# Patient Record
Sex: MALE | Race: WHITE | NOT HISPANIC OR LATINO | Employment: OTHER | URBAN - METROPOLITAN AREA
[De-identification: names, ages, dates, MRNs, and addresses within clinical notes are randomized per-mention and may not be internally consistent; named-entity substitution may affect disease eponyms.]

---

## 2017-01-18 ENCOUNTER — ALLSCRIPTS OFFICE VISIT (OUTPATIENT)
Dept: OTHER | Facility: OTHER | Age: 67
End: 2017-01-18

## 2017-01-18 ENCOUNTER — HOSPITAL ENCOUNTER (OUTPATIENT)
Dept: RADIOLOGY | Facility: MEDICAL CENTER | Age: 67
Discharge: HOME | End: 2017-01-18
Payer: COMMERCIAL

## 2017-01-18 DIAGNOSIS — R06.02 SHORTNESS OF BREATH: ICD-10-CM

## 2017-01-18 DIAGNOSIS — Z11.59 ENCOUNTER FOR SCREENING FOR OTHER VIRAL DISEASES: ICD-10-CM

## 2017-01-18 DIAGNOSIS — Z12.5 ENCOUNTER FOR SCREENING FOR MALIGNANT NEOPLASM OF PROSTATE: ICD-10-CM

## 2017-01-18 DIAGNOSIS — Z12.2 ENCOUNTER FOR SCREENING FOR MALIGNANT NEOPLASM OF RESPIRATORY ORGANS: ICD-10-CM

## 2017-01-18 DIAGNOSIS — I10 ESSENTIAL (PRIMARY) HYPERTENSION: ICD-10-CM

## 2017-01-18 DIAGNOSIS — Z13.6 ENCOUNTER FOR SCREENING FOR CARDIOVASCULAR DISORDERS: ICD-10-CM

## 2017-01-18 PROCEDURE — 71020 HB CHEST X-RAY 2VW FRONTAL&LATL: CPT

## 2017-01-19 ENCOUNTER — APPOINTMENT (OUTPATIENT)
Dept: LAB | Facility: HOSPITAL | Age: 67
End: 2017-01-19
Payer: COMMERCIAL

## 2017-01-19 ENCOUNTER — TRANSCRIBE ORDERS (OUTPATIENT)
Dept: ADMINISTRATIVE | Facility: HOSPITAL | Age: 67
End: 2017-01-19

## 2017-01-19 ENCOUNTER — GENERIC CONVERSION - ENCOUNTER (OUTPATIENT)
Dept: OTHER | Facility: OTHER | Age: 67
End: 2017-01-19

## 2017-01-19 DIAGNOSIS — I10 ESSENTIAL (PRIMARY) HYPERTENSION: ICD-10-CM

## 2017-01-19 DIAGNOSIS — R06.81 APNEA: Primary | ICD-10-CM

## 2017-01-19 DIAGNOSIS — Z11.59 ENCOUNTER FOR SCREENING FOR OTHER VIRAL DISEASES: ICD-10-CM

## 2017-01-19 DIAGNOSIS — Z12.5 ENCOUNTER FOR SCREENING FOR MALIGNANT NEOPLASM OF PROSTATE: ICD-10-CM

## 2017-01-19 LAB
ALBUMIN SERPL BCP-MCNC: 3.8 G/DL (ref 3.5–5)
ALP SERPL-CCNC: 67 U/L (ref 46–116)
ALT SERPL W P-5'-P-CCNC: 28 U/L (ref 12–78)
ANION GAP SERPL CALCULATED.3IONS-SCNC: 3 MMOL/L (ref 4–13)
AST SERPL W P-5'-P-CCNC: 17 U/L (ref 5–45)
BASOPHILS # BLD AUTO: 0.05 THOUSANDS/ΜL (ref 0–0.1)
BASOPHILS NFR BLD AUTO: 1 % (ref 0–1)
BILIRUB SERPL-MCNC: 0.7 MG/DL (ref 0.2–1)
BUN SERPL-MCNC: 13 MG/DL (ref 5–25)
CALCIUM SERPL-MCNC: 9.1 MG/DL (ref 8.3–10.1)
CHLORIDE SERPL-SCNC: 95 MMOL/L (ref 100–108)
CHOLEST SERPL-MCNC: 211 MG/DL (ref 50–200)
CO2 SERPL-SCNC: 34 MMOL/L (ref 21–32)
CREAT SERPL-MCNC: 0.8 MG/DL (ref 0.6–1.3)
EOSINOPHIL # BLD AUTO: 0.2 THOUSAND/ΜL (ref 0–0.61)
EOSINOPHIL NFR BLD AUTO: 5 % (ref 0–6)
ERYTHROCYTE [DISTWIDTH] IN BLOOD BY AUTOMATED COUNT: 12.9 % (ref 11.6–15.1)
GFR SERPL CREATININE-BSD FRML MDRD: >60 ML/MIN/1.73SQ M
GLUCOSE SERPL-MCNC: 105 MG/DL (ref 65–140)
HCT VFR BLD AUTO: 53 % (ref 36.5–49.3)
HDLC SERPL-MCNC: 67 MG/DL (ref 40–60)
HGB BLD-MCNC: 17.8 G/DL (ref 12–17)
LDLC SERPL CALC-MCNC: 134 MG/DL (ref 0–100)
LYMPHOCYTES # BLD AUTO: 0.88 THOUSANDS/ΜL (ref 0.6–4.47)
LYMPHOCYTES NFR BLD AUTO: 20 % (ref 14–44)
MCH RBC QN AUTO: 32.1 PG (ref 26.8–34.3)
MCHC RBC AUTO-ENTMCNC: 33.6 G/DL (ref 31.4–37.4)
MCV RBC AUTO: 96 FL (ref 82–98)
MONOCYTES # BLD AUTO: 0.5 THOUSAND/ΜL (ref 0.17–1.22)
MONOCYTES NFR BLD AUTO: 11 % (ref 4–12)
NEUTROPHILS # BLD AUTO: 2.74 THOUSANDS/ΜL (ref 1.85–7.62)
NEUTS SEG NFR BLD AUTO: 63 % (ref 43–75)
NRBC BLD AUTO-RTO: 0 /100 WBCS
PLATELET # BLD AUTO: 182 THOUSANDS/UL (ref 149–390)
PMV BLD AUTO: 9.6 FL (ref 8.9–12.7)
POTASSIUM SERPL-SCNC: 4.5 MMOL/L (ref 3.5–5.3)
PROT SERPL-MCNC: 7.6 G/DL (ref 6.4–8.2)
PSA SERPL-MCNC: 0.6 NG/ML (ref 0–4)
RBC # BLD AUTO: 5.54 MILLION/UL (ref 3.88–5.62)
SODIUM SERPL-SCNC: 132 MMOL/L (ref 136–145)
TRIGL SERPL-MCNC: 49 MG/DL
TSH SERPL DL<=0.05 MIU/L-ACNC: 1.7 UIU/ML (ref 0.36–3.74)
WBC # BLD AUTO: 4.38 THOUSAND/UL (ref 4.31–10.16)

## 2017-01-19 PROCEDURE — 80053 COMPREHEN METABOLIC PANEL: CPT

## 2017-01-19 PROCEDURE — 80061 LIPID PANEL: CPT

## 2017-01-19 PROCEDURE — 85025 COMPLETE CBC W/AUTO DIFF WBC: CPT

## 2017-01-19 PROCEDURE — G0103 PSA SCREENING: HCPCS

## 2017-01-19 PROCEDURE — 36415 COLL VENOUS BLD VENIPUNCTURE: CPT

## 2017-01-19 PROCEDURE — 86803 HEPATITIS C AB TEST: CPT

## 2017-01-19 PROCEDURE — 84443 ASSAY THYROID STIM HORMONE: CPT

## 2017-01-20 LAB — HCV AB SER QL: NORMAL

## 2017-01-26 ENCOUNTER — HOSPITAL ENCOUNTER (OUTPATIENT)
Dept: CT IMAGING | Facility: CLINIC | Age: 67
Discharge: HOME | End: 2017-01-26
Payer: COMMERCIAL

## 2017-01-26 DIAGNOSIS — Z12.2 ENCOUNTER FOR SCREENING FOR MALIGNANT NEOPLASM OF RESPIRATORY ORGANS: ICD-10-CM

## 2017-01-30 ENCOUNTER — HOSPITAL ENCOUNTER (OUTPATIENT)
Dept: NON INVASIVE DIAGNOSTICS | Facility: CLINIC | Age: 67
Discharge: HOME | End: 2017-01-30
Payer: COMMERCIAL

## 2017-01-30 DIAGNOSIS — R06.02 SHORTNESS OF BREATH: ICD-10-CM

## 2017-01-30 PROCEDURE — 93303 ECHO TRANSTHORACIC: CPT

## 2017-01-31 ENCOUNTER — HOSPITAL ENCOUNTER (OUTPATIENT)
Dept: ULTRASOUND IMAGING | Facility: CLINIC | Age: 67
Discharge: HOME | End: 2017-01-31
Payer: COMMERCIAL

## 2017-01-31 DIAGNOSIS — Z13.6 ENCOUNTER FOR SCREENING FOR CARDIOVASCULAR DISORDERS: ICD-10-CM

## 2017-01-31 PROCEDURE — 76775 US EXAM ABDO BACK WALL LIM: CPT

## 2017-02-02 ENCOUNTER — ALLSCRIPTS OFFICE VISIT (OUTPATIENT)
Dept: OTHER | Facility: OTHER | Age: 67
End: 2017-02-02

## 2017-02-15 ENCOUNTER — GENERIC CONVERSION - ENCOUNTER (OUTPATIENT)
Dept: OTHER | Facility: OTHER | Age: 67
End: 2017-02-15

## 2017-03-03 ENCOUNTER — ALLSCRIPTS OFFICE VISIT (OUTPATIENT)
Dept: OTHER | Facility: OTHER | Age: 67
End: 2017-03-03

## 2017-03-10 ENCOUNTER — TRANSCRIBE ORDERS (OUTPATIENT)
Dept: SLEEP CENTER | Facility: CLINIC | Age: 67
End: 2017-03-10

## 2017-03-10 ENCOUNTER — HOSPITAL ENCOUNTER (OUTPATIENT)
Dept: SLEEP CENTER | Facility: CLINIC | Age: 67
Discharge: HOME | End: 2017-03-10
Payer: COMMERCIAL

## 2017-03-10 DIAGNOSIS — G47.33 OSA (OBSTRUCTIVE SLEEP APNEA): Primary | ICD-10-CM

## 2017-03-10 DIAGNOSIS — R06.81 APNEA: ICD-10-CM

## 2017-03-16 ENCOUNTER — ALLSCRIPTS OFFICE VISIT (OUTPATIENT)
Dept: OTHER | Facility: OTHER | Age: 67
End: 2017-03-16

## 2017-03-17 ENCOUNTER — TRANSCRIBE ORDERS (OUTPATIENT)
Dept: ADMINISTRATIVE | Facility: HOSPITAL | Age: 67
End: 2017-03-17

## 2017-03-17 DIAGNOSIS — Z72.0 TOBACCO ABUSE: Primary | ICD-10-CM

## 2017-03-27 ENCOUNTER — HOSPITAL ENCOUNTER (OUTPATIENT)
Dept: NON INVASIVE DIAGNOSTICS | Facility: CLINIC | Age: 67
Discharge: HOME | End: 2017-03-27
Payer: COMMERCIAL

## 2017-03-27 DIAGNOSIS — R94.31 ABNORMAL EKG: ICD-10-CM

## 2017-03-27 LAB
MAX DIASTOLIC BP: 84 MMHG
MAX HEART RATE: 88 BPM
MAX PREDICTED HEART RATE: 155 BPM
MAX. SYSTOLIC BP: 114 MMHG
PROTOCOL NAME: NORMAL
REASON FOR TERMINATION: NORMAL
TARGET HR FORMULA: NORMAL
TEST INDICATION: NORMAL
TIME IN EXERCISE PHASE: 180 S

## 2017-03-27 PROCEDURE — A9502 TC99M TETROFOSMIN: HCPCS

## 2017-03-27 PROCEDURE — 78452 HT MUSCLE IMAGE SPECT MULT: CPT

## 2017-03-27 PROCEDURE — 93017 CV STRESS TEST TRACING ONLY: CPT

## 2017-03-29 ENCOUNTER — ALLSCRIPTS OFFICE VISIT (OUTPATIENT)
Dept: OTHER | Facility: OTHER | Age: 67
End: 2017-03-29

## 2017-04-03 ENCOUNTER — ALLSCRIPTS OFFICE VISIT (OUTPATIENT)
Dept: OTHER | Facility: OTHER | Age: 67
End: 2017-04-03

## 2017-04-11 ENCOUNTER — ALLSCRIPTS OFFICE VISIT (OUTPATIENT)
Dept: OTHER | Facility: OTHER | Age: 67
End: 2017-04-11

## 2017-04-26 ENCOUNTER — TELEPHONE (OUTPATIENT)
Dept: INPATIENT UNIT | Facility: HOSPITAL | Age: 67
End: 2017-04-26

## 2017-04-26 PROBLEM — R00.2 PALPITATIONS: Status: ACTIVE | Noted: 2017-04-26

## 2017-04-27 ENCOUNTER — HOSPITAL ENCOUNTER (OUTPATIENT)
Dept: NON INVASIVE DIAGNOSTICS | Facility: HOSPITAL | Age: 67
Discharge: HOME | End: 2017-04-27
Attending: INTERNAL MEDICINE | Admitting: INTERNAL MEDICINE
Payer: COMMERCIAL

## 2017-04-27 VITALS
SYSTOLIC BLOOD PRESSURE: 128 MMHG | OXYGEN SATURATION: 95 % | BODY MASS INDEX: 34.46 KG/M2 | TEMPERATURE: 98 F | HEIGHT: 73 IN | DIASTOLIC BLOOD PRESSURE: 67 MMHG | HEART RATE: 70 BPM | WEIGHT: 260 LBS | RESPIRATION RATE: 18 BRPM

## 2017-04-27 DIAGNOSIS — R94.39 ABNORMAL STRESS TEST: ICD-10-CM

## 2017-04-27 LAB
ANION GAP SERPL CALCULATED.3IONS-SCNC: 7 MMOL/L (ref 4–13)
ATRIAL RATE: 72 BPM
BUN SERPL-MCNC: 16 MG/DL (ref 5–25)
CALCIUM SERPL-MCNC: 9.1 MG/DL (ref 8.3–10.1)
CHLORIDE SERPL-SCNC: 95 MMOL/L (ref 100–108)
CHOLEST SERPL-MCNC: 176 MG/DL (ref 50–200)
CO2 SERPL-SCNC: 29 MMOL/L (ref 21–32)
CREAT SERPL-MCNC: 0.8 MG/DL (ref 0.6–1.3)
ERYTHROCYTE [DISTWIDTH] IN BLOOD BY AUTOMATED COUNT: 13.1 % (ref 11.6–15.1)
GFR SERPL CREATININE-BSD FRML MDRD: >60 ML/MIN/1.73SQ M
GLUCOSE P FAST SERPL-MCNC: 102 MG/DL (ref 65–99)
GLUCOSE SERPL-MCNC: 102 MG/DL (ref 65–140)
HCT VFR BLD AUTO: 46.5 % (ref 36.5–49.3)
HDLC SERPL-MCNC: 49 MG/DL (ref 40–60)
HGB BLD-MCNC: 16.3 G/DL (ref 12–17)
INR PPP: 0.97 (ref 0.86–1.16)
LDLC SERPL CALC-MCNC: 115 MG/DL (ref 0–100)
MAGNESIUM SERPL-MCNC: 2.1 MG/DL (ref 1.6–2.6)
MCH RBC QN AUTO: 33 PG (ref 26.8–34.3)
MCHC RBC AUTO-ENTMCNC: 35.1 G/DL (ref 31.4–37.4)
MCV RBC AUTO: 94 FL (ref 82–98)
P AXIS: -11 DEGREES
PLATELET # BLD AUTO: 184 THOUSANDS/UL (ref 149–390)
PMV BLD AUTO: 9.7 FL (ref 8.9–12.7)
POTASSIUM SERPL-SCNC: 4.4 MMOL/L (ref 3.5–5.3)
PR INTERVAL: 188 MS
PROTHROMBIN TIME: 13 SECONDS (ref 12–14.3)
QRS AXIS: 47 DEGREES
QRSD INTERVAL: 98 MS
QT INTERVAL: 394 MS
QTC INTERVAL: 431 MS
RBC # BLD AUTO: 4.94 MILLION/UL (ref 3.88–5.62)
SODIUM SERPL-SCNC: 131 MMOL/L (ref 136–145)
T WAVE AXIS: 69 DEGREES
TRIGL SERPL-MCNC: 62 MG/DL
VENTRICULAR RATE: 72 BPM
WBC # BLD AUTO: 5.77 THOUSAND/UL (ref 4.31–10.16)

## 2017-04-27 PROCEDURE — C1769 GUIDE WIRE: HCPCS | Performed by: INTERNAL MEDICINE

## 2017-04-27 PROCEDURE — 85027 COMPLETE CBC AUTOMATED: CPT | Performed by: INTERNAL MEDICINE

## 2017-04-27 PROCEDURE — 93458 L HRT ARTERY/VENTRICLE ANGIO: CPT | Performed by: INTERNAL MEDICINE

## 2017-04-27 PROCEDURE — 83735 ASSAY OF MAGNESIUM: CPT | Performed by: INTERNAL MEDICINE

## 2017-04-27 PROCEDURE — 99153 MOD SED SAME PHYS/QHP EA: CPT | Performed by: INTERNAL MEDICINE

## 2017-04-27 PROCEDURE — 85610 PROTHROMBIN TIME: CPT | Performed by: INTERNAL MEDICINE

## 2017-04-27 PROCEDURE — 93005 ELECTROCARDIOGRAM TRACING: CPT | Performed by: INTERNAL MEDICINE

## 2017-04-27 PROCEDURE — C1894 INTRO/SHEATH, NON-LASER: HCPCS | Performed by: INTERNAL MEDICINE

## 2017-04-27 PROCEDURE — 80061 LIPID PANEL: CPT | Performed by: INTERNAL MEDICINE

## 2017-04-27 PROCEDURE — A9270 NON-COVERED ITEM OR SERVICE: HCPCS | Performed by: INTERNAL MEDICINE

## 2017-04-27 PROCEDURE — 99152 MOD SED SAME PHYS/QHP 5/>YRS: CPT | Performed by: INTERNAL MEDICINE

## 2017-04-27 PROCEDURE — 80048 BASIC METABOLIC PNL TOTAL CA: CPT | Performed by: INTERNAL MEDICINE

## 2017-04-27 RX ORDER — AMLODIPINE BESYLATE 5 MG/1
5 TABLET ORAL DAILY
Status: CANCELLED | OUTPATIENT
Start: 2017-04-27

## 2017-04-27 RX ORDER — LIDOCAINE HYDROCHLORIDE 10 MG/ML
INJECTION, SOLUTION INFILTRATION; PERINEURAL CODE/TRAUMA/SEDATION MEDICATION
Status: COMPLETED | OUTPATIENT
Start: 2017-04-27 | End: 2017-04-27

## 2017-04-27 RX ORDER — MIDAZOLAM HYDROCHLORIDE 1 MG/ML
INJECTION INTRAMUSCULAR; INTRAVENOUS CODE/TRAUMA/SEDATION MEDICATION
Status: COMPLETED | OUTPATIENT
Start: 2017-04-27 | End: 2017-04-27

## 2017-04-27 RX ORDER — HEPARIN SODIUM 1000 [USP'U]/ML
INJECTION, SOLUTION INTRAVENOUS; SUBCUTANEOUS CODE/TRAUMA/SEDATION MEDICATION
Status: COMPLETED | OUTPATIENT
Start: 2017-04-27 | End: 2017-04-27

## 2017-04-27 RX ORDER — FENTANYL CITRATE 50 UG/ML
INJECTION, SOLUTION INTRAMUSCULAR; INTRAVENOUS CODE/TRAUMA/SEDATION MEDICATION
Status: COMPLETED | OUTPATIENT
Start: 2017-04-27 | End: 2017-04-27

## 2017-04-27 RX ORDER — ASPIRIN 81 MG/1
324 TABLET, CHEWABLE ORAL ONCE
Status: COMPLETED | OUTPATIENT
Start: 2017-04-27 | End: 2017-04-27

## 2017-04-27 RX ORDER — ASPIRIN 81 MG/1
81 TABLET, CHEWABLE ORAL DAILY
COMMUNITY
End: 2018-01-30 | Stop reason: DRUGHIGH

## 2017-04-27 RX ORDER — LISINOPRIL 10 MG/1
10 TABLET ORAL DAILY
COMMUNITY
End: 2018-08-10 | Stop reason: SDUPTHER

## 2017-04-27 RX ORDER — CLOPIDOGREL BISULFATE 75 MG/1
600 TABLET ORAL ONCE
Status: COMPLETED | OUTPATIENT
Start: 2017-04-27 | End: 2017-04-27

## 2017-04-27 RX ORDER — VERAPAMIL HCL 2.5 MG/ML
AMPUL (ML) INTRAVENOUS CODE/TRAUMA/SEDATION MEDICATION
Status: COMPLETED | OUTPATIENT
Start: 2017-04-27 | End: 2017-04-27

## 2017-04-27 RX ORDER — SODIUM CHLORIDE 9 MG/ML
100 INJECTION, SOLUTION INTRAVENOUS CONTINUOUS
Status: DISCONTINUED | OUTPATIENT
Start: 2017-04-27 | End: 2017-04-27 | Stop reason: HOSPADM

## 2017-04-27 RX ORDER — ALBUTEROL SULFATE 90 UG/1
2 AEROSOL, METERED RESPIRATORY (INHALATION) EVERY 4 HOURS PRN
Status: CANCELLED | OUTPATIENT
Start: 2017-04-27

## 2017-04-27 RX ORDER — SODIUM CHLORIDE 9 MG/ML
125 INJECTION, SOLUTION INTRAVENOUS CONTINUOUS
Status: DISCONTINUED | OUTPATIENT
Start: 2017-04-27 | End: 2017-04-27

## 2017-04-27 RX ORDER — NITROGLYCERIN 20 MG/100ML
INJECTION INTRAVENOUS CODE/TRAUMA/SEDATION MEDICATION
Status: COMPLETED | OUTPATIENT
Start: 2017-04-27 | End: 2017-04-27

## 2017-04-27 RX ORDER — AMLODIPINE BESYLATE 5 MG/1
5 TABLET ORAL DAILY
COMMUNITY
End: 2018-02-26 | Stop reason: SDUPTHER

## 2017-04-27 RX ORDER — LISINOPRIL 20 MG/1
20 TABLET ORAL DAILY
Status: CANCELLED | OUTPATIENT
Start: 2017-04-27

## 2017-04-27 RX ADMIN — ASPIRIN 324 MG: 81 TABLET, CHEWABLE ORAL at 09:07

## 2017-04-27 RX ADMIN — CLOPIDOGREL 600 MG: 75 TABLET, FILM COATED ORAL at 09:14

## 2017-04-27 RX ADMIN — FENTANYL CITRATE 50 MCG: 50 INJECTION, SOLUTION INTRAMUSCULAR; INTRAVENOUS at 12:14

## 2017-04-27 RX ADMIN — VERAPAMIL HYDROCHLORIDE 1.25 MG: 2.5 INJECTION, SOLUTION INTRAVENOUS at 12:24

## 2017-04-27 RX ADMIN — LIDOCAINE HYDROCHLORIDE 1 ML: 10 INJECTION, SOLUTION INFILTRATION; PERINEURAL at 12:21

## 2017-04-27 RX ADMIN — HEPARIN SODIUM 4000 UNITS: 1000 INJECTION INTRAVENOUS; SUBCUTANEOUS at 12:24

## 2017-04-27 RX ADMIN — MIDAZOLAM HYDROCHLORIDE 2 MG: 1 INJECTION, SOLUTION INTRAMUSCULAR; INTRAVENOUS at 12:14

## 2017-04-27 RX ADMIN — SODIUM CHLORIDE 125 ML/HR: 0.9 INJECTION, SOLUTION INTRAVENOUS at 09:07

## 2017-04-27 RX ADMIN — NITROGLYCERIN 200 MCG: 20 INJECTION INTRAVENOUS at 12:23

## 2017-05-03 ENCOUNTER — ALLSCRIPTS OFFICE VISIT (OUTPATIENT)
Dept: OTHER | Facility: OTHER | Age: 67
End: 2017-05-03

## 2017-05-08 ENCOUNTER — ANESTHESIA EVENT (OUTPATIENT)
Dept: GASTROENTEROLOGY | Facility: HOSPITAL | Age: 67
End: 2017-05-08
Payer: COMMERCIAL

## 2017-05-09 ENCOUNTER — GENERIC CONVERSION - ENCOUNTER (OUTPATIENT)
Dept: OTHER | Facility: OTHER | Age: 67
End: 2017-05-09

## 2017-05-09 ENCOUNTER — HOSPITAL ENCOUNTER (OUTPATIENT)
Facility: HOSPITAL | Age: 67
Setting detail: OUTPATIENT SURGERY
Discharge: HOME | End: 2017-05-09
Attending: INTERNAL MEDICINE | Admitting: INTERNAL MEDICINE
Payer: COMMERCIAL

## 2017-05-09 ENCOUNTER — ANESTHESIA (OUTPATIENT)
Dept: GASTROENTEROLOGY | Facility: HOSPITAL | Age: 67
End: 2017-05-09
Payer: COMMERCIAL

## 2017-05-09 VITALS
OXYGEN SATURATION: 97 % | HEIGHT: 73 IN | RESPIRATION RATE: 20 BRPM | BODY MASS INDEX: 35.06 KG/M2 | SYSTOLIC BLOOD PRESSURE: 111 MMHG | WEIGHT: 264.55 LBS | TEMPERATURE: 97.2 F | HEART RATE: 69 BPM | DIASTOLIC BLOOD PRESSURE: 59 MMHG

## 2017-05-09 DIAGNOSIS — K44.9 HIATAL HERNIA: ICD-10-CM

## 2017-05-09 DIAGNOSIS — Z12.11 ENCOUNTER FOR SCREENING FOR MALIGNANT NEOPLASM OF COLON: ICD-10-CM

## 2017-05-09 PROCEDURE — 88313 SPECIAL STAINS GROUP 2: CPT | Performed by: INTERNAL MEDICINE

## 2017-05-09 PROCEDURE — 88342 IMHCHEM/IMCYTCHM 1ST ANTB: CPT | Performed by: INTERNAL MEDICINE

## 2017-05-09 PROCEDURE — 88305 TISSUE EXAM BY PATHOLOGIST: CPT | Performed by: INTERNAL MEDICINE

## 2017-05-09 RX ORDER — SODIUM CHLORIDE, SODIUM LACTATE, POTASSIUM CHLORIDE, CALCIUM CHLORIDE 600; 310; 30; 20 MG/100ML; MG/100ML; MG/100ML; MG/100ML
100 INJECTION, SOLUTION INTRAVENOUS CONTINUOUS
Status: DISCONTINUED | OUTPATIENT
Start: 2017-05-09 | End: 2017-05-09 | Stop reason: HOSPADM

## 2017-05-09 RX ORDER — HYDROCHLOROTHIAZIDE 12.5 MG/1
12.5 TABLET ORAL DAILY
COMMUNITY
End: 2018-02-22

## 2017-05-09 RX ORDER — KETAMINE HYDROCHLORIDE 50 MG/ML
INJECTION, SOLUTION, CONCENTRATE INTRAMUSCULAR; INTRAVENOUS AS NEEDED
Status: DISCONTINUED | OUTPATIENT
Start: 2017-05-09 | End: 2017-05-09 | Stop reason: SURG

## 2017-05-09 RX ORDER — PROPOFOL 10 MG/ML
INJECTION, EMULSION INTRAVENOUS AS NEEDED
Status: DISCONTINUED | OUTPATIENT
Start: 2017-05-09 | End: 2017-05-09 | Stop reason: SURG

## 2017-05-09 RX ORDER — PANTOPRAZOLE SODIUM 40 MG/1
40 TABLET, DELAYED RELEASE ORAL
Status: DISCONTINUED | OUTPATIENT
Start: 2017-05-09 | End: 2017-05-09 | Stop reason: HOSPADM

## 2017-05-09 RX ORDER — ATORVASTATIN CALCIUM 20 MG/1
20 TABLET, FILM COATED ORAL DAILY
COMMUNITY
End: 2018-08-13 | Stop reason: SDUPTHER

## 2017-05-09 RX ADMIN — METOPROLOL TARTRATE 2.5 MG: 5 INJECTION, SOLUTION INTRAVENOUS at 09:13

## 2017-05-09 RX ADMIN — KETAMINE HYDROCHLORIDE 30 MG: 50 INJECTION INTRAMUSCULAR; INTRAVENOUS at 09:07

## 2017-05-09 RX ADMIN — PROPOFOL 20 MG: 10 INJECTION, EMULSION INTRAVENOUS at 09:09

## 2017-05-09 RX ADMIN — KETAMINE HYDROCHLORIDE 40 MG: 50 INJECTION INTRAMUSCULAR; INTRAVENOUS at 09:02

## 2017-05-09 RX ADMIN — PROPOFOL 100 MG: 10 INJECTION, EMULSION INTRAVENOUS at 09:02

## 2017-05-09 RX ADMIN — METOPROLOL TARTRATE 2.5 MG: 5 INJECTION, SOLUTION INTRAVENOUS at 09:05

## 2017-05-09 RX ADMIN — SODIUM CHLORIDE, SODIUM LACTATE, POTASSIUM CHLORIDE, AND CALCIUM CHLORIDE 100 ML/HR: .6; .31; .03; .02 INJECTION, SOLUTION INTRAVENOUS at 08:21

## 2017-05-09 RX ADMIN — PROPOFOL 30 MG: 10 INJECTION, EMULSION INTRAVENOUS at 09:16

## 2017-05-09 RX ADMIN — KETAMINE HYDROCHLORIDE 30 MG: 50 INJECTION INTRAMUSCULAR; INTRAVENOUS at 09:14

## 2017-05-09 RX ADMIN — PROPOFOL 30 MG: 10 INJECTION, EMULSION INTRAVENOUS at 09:12

## 2017-05-15 ENCOUNTER — GENERIC CONVERSION - ENCOUNTER (OUTPATIENT)
Dept: OTHER | Facility: OTHER | Age: 67
End: 2017-05-15

## 2017-05-17 ENCOUNTER — ALLSCRIPTS OFFICE VISIT (OUTPATIENT)
Dept: OTHER | Facility: OTHER | Age: 67
End: 2017-05-17

## 2017-05-17 ENCOUNTER — HOSPITAL ENCOUNTER (OUTPATIENT)
Dept: RADIOLOGY | Facility: MEDICAL CENTER | Age: 67
Discharge: HOME | End: 2017-05-17
Payer: COMMERCIAL

## 2017-05-17 DIAGNOSIS — I10 ESSENTIAL (PRIMARY) HYPERTENSION: ICD-10-CM

## 2017-05-17 DIAGNOSIS — M25.512 PAIN IN LEFT SHOULDER: ICD-10-CM

## 2017-05-17 DIAGNOSIS — J44.9 CHRONIC OBSTRUCTIVE PULMONARY DISEASE (HCC): ICD-10-CM

## 2017-05-17 DIAGNOSIS — M25.511 PAIN IN RIGHT SHOULDER: ICD-10-CM

## 2017-05-17 DIAGNOSIS — M25.619 STIFFNESS OF UNSPECIFIED SHOULDER, NOT ELSEWHERE CLASSIFIED: ICD-10-CM

## 2017-05-17 PROCEDURE — 73030 X-RAY EXAM OF SHOULDER: CPT

## 2017-05-19 ENCOUNTER — GENERIC CONVERSION - ENCOUNTER (OUTPATIENT)
Dept: OTHER | Facility: OTHER | Age: 67
End: 2017-05-19

## 2017-05-24 ENCOUNTER — APPOINTMENT (OUTPATIENT)
Dept: LAB | Facility: MEDICAL CENTER | Age: 67
End: 2017-05-24
Payer: COMMERCIAL

## 2017-05-24 DIAGNOSIS — E78.5 HYPERLIPIDEMIA: ICD-10-CM

## 2017-05-24 DIAGNOSIS — J44.9 CHRONIC OBSTRUCTIVE PULMONARY DISEASE (HCC): ICD-10-CM

## 2017-05-24 DIAGNOSIS — M25.619 STIFFNESS OF UNSPECIFIED SHOULDER, NOT ELSEWHERE CLASSIFIED: ICD-10-CM

## 2017-05-24 DIAGNOSIS — I10 ESSENTIAL (PRIMARY) HYPERTENSION: ICD-10-CM

## 2017-05-24 DIAGNOSIS — M25.512 PAIN IN LEFT SHOULDER: ICD-10-CM

## 2017-05-24 DIAGNOSIS — E87.1 HYPO-OSMOLALITY AND HYPONATREMIA: ICD-10-CM

## 2017-05-24 DIAGNOSIS — M25.511 PAIN IN RIGHT SHOULDER: ICD-10-CM

## 2017-05-24 LAB
ALBUMIN SERPL BCP-MCNC: 3.7 G/DL (ref 3.5–5)
ALP SERPL-CCNC: 67 U/L (ref 46–116)
ALT SERPL W P-5'-P-CCNC: 30 U/L (ref 12–78)
ANION GAP SERPL CALCULATED.3IONS-SCNC: 4 MMOL/L (ref 4–13)
AST SERPL W P-5'-P-CCNC: 24 U/L (ref 5–45)
BASOPHILS # BLD AUTO: 0.05 THOUSANDS/ΜL (ref 0–0.1)
BASOPHILS NFR BLD AUTO: 1 % (ref 0–1)
BILIRUB DIRECT SERPL-MCNC: 0.29 MG/DL (ref 0–0.2)
BILIRUB SERPL-MCNC: 0.89 MG/DL (ref 0.2–1)
BUN SERPL-MCNC: 9 MG/DL (ref 5–25)
CALCIUM SERPL-MCNC: 9.6 MG/DL (ref 8.3–10.1)
CHLORIDE SERPL-SCNC: 87 MMOL/L (ref 100–108)
CHOLEST SERPL-MCNC: 101 MG/DL (ref 50–200)
CO2 SERPL-SCNC: 33 MMOL/L (ref 21–32)
CREAT SERPL-MCNC: 0.8 MG/DL (ref 0.6–1.3)
EOSINOPHIL # BLD AUTO: 0.21 THOUSAND/ΜL (ref 0–0.61)
EOSINOPHIL NFR BLD AUTO: 4 % (ref 0–6)
ERYTHROCYTE [DISTWIDTH] IN BLOOD BY AUTOMATED COUNT: 13.4 % (ref 11.6–15.1)
GFR SERPL CREATININE-BSD FRML MDRD: >60 ML/MIN/1.73SQ M
GLUCOSE P FAST SERPL-MCNC: 96 MG/DL (ref 65–99)
HCT VFR BLD AUTO: 46.6 % (ref 36.5–49.3)
HDLC SERPL-MCNC: 38 MG/DL (ref 40–60)
HGB BLD-MCNC: 16.1 G/DL (ref 12–17)
LDLC SERPL CALC-MCNC: 52 MG/DL (ref 0–100)
LYMPHOCYTES # BLD AUTO: 0.81 THOUSANDS/ΜL (ref 0.6–4.47)
LYMPHOCYTES NFR BLD AUTO: 13 % (ref 14–44)
MCH RBC QN AUTO: 32.9 PG (ref 26.8–34.3)
MCHC RBC AUTO-ENTMCNC: 34.5 G/DL (ref 31.4–37.4)
MCV RBC AUTO: 95 FL (ref 82–98)
MONOCYTES # BLD AUTO: 0.79 THOUSAND/ΜL (ref 0.17–1.22)
MONOCYTES NFR BLD AUTO: 13 % (ref 4–12)
NEUTROPHILS # BLD AUTO: 4.16 THOUSANDS/ΜL (ref 1.85–7.62)
NEUTS SEG NFR BLD AUTO: 69 % (ref 43–75)
NRBC BLD AUTO-RTO: 0 /100 WBCS
PLATELET # BLD AUTO: 209 THOUSANDS/UL (ref 149–390)
PMV BLD AUTO: 9.7 FL (ref 8.9–12.7)
POTASSIUM SERPL-SCNC: 4.4 MMOL/L (ref 3.5–5.3)
PROT SERPL-MCNC: 7.5 G/DL (ref 6.4–8.2)
RBC # BLD AUTO: 4.9 MILLION/UL (ref 3.88–5.62)
SODIUM SERPL-SCNC: 124 MMOL/L (ref 136–145)
TRIGL SERPL-MCNC: 56 MG/DL
WBC # BLD AUTO: 6.03 THOUSAND/UL (ref 4.31–10.16)

## 2017-05-24 PROCEDURE — 80048 BASIC METABOLIC PNL TOTAL CA: CPT

## 2017-05-24 PROCEDURE — 80076 HEPATIC FUNCTION PANEL: CPT

## 2017-05-24 PROCEDURE — 85025 COMPLETE CBC W/AUTO DIFF WBC: CPT

## 2017-05-24 PROCEDURE — 80061 LIPID PANEL: CPT

## 2017-05-24 PROCEDURE — 36415 COLL VENOUS BLD VENIPUNCTURE: CPT

## 2017-05-25 ENCOUNTER — GENERIC CONVERSION - ENCOUNTER (OUTPATIENT)
Dept: OTHER | Facility: OTHER | Age: 67
End: 2017-05-25

## 2017-05-26 ENCOUNTER — HOSPITAL ENCOUNTER (EMERGENCY)
Facility: HOSPITAL | Age: 67
Discharge: LEFT BEFORE DISCHARGE | End: 2017-05-26
Attending: EMERGENCY MEDICINE
Payer: COMMERCIAL

## 2017-05-26 ENCOUNTER — APPOINTMENT (EMERGENCY)
Dept: RADIOLOGY | Facility: HOSPITAL | Age: 67
End: 2017-05-26
Payer: COMMERCIAL

## 2017-05-26 VITALS
HEART RATE: 80 BPM | WEIGHT: 262.35 LBS | SYSTOLIC BLOOD PRESSURE: 106 MMHG | DIASTOLIC BLOOD PRESSURE: 59 MMHG | OXYGEN SATURATION: 95 % | RESPIRATION RATE: 20 BRPM | BODY MASS INDEX: 34.77 KG/M2 | HEIGHT: 73 IN | TEMPERATURE: 97.8 F

## 2017-05-26 DIAGNOSIS — E87.1 HYPONATREMIA: ICD-10-CM

## 2017-05-26 DIAGNOSIS — J44.1 COPD EXACERBATION (HCC): ICD-10-CM

## 2017-05-26 DIAGNOSIS — J18.9 PNEUMONIA: Primary | ICD-10-CM

## 2017-05-26 LAB
ANION GAP SERPL CALCULATED.3IONS-SCNC: 8 MMOL/L (ref 4–13)
BASOPHILS # BLD AUTO: 0.02 THOUSANDS/ΜL (ref 0–0.1)
BASOPHILS NFR BLD AUTO: 0 % (ref 0–1)
BUN SERPL-MCNC: 11 MG/DL (ref 5–25)
CALCIUM SERPL-MCNC: 9.9 MG/DL (ref 8.3–10.1)
CHLORIDE SERPL-SCNC: 84 MMOL/L (ref 100–108)
CO2 SERPL-SCNC: 31 MMOL/L (ref 21–32)
CREAT SERPL-MCNC: 0.78 MG/DL (ref 0.6–1.3)
EOSINOPHIL # BLD AUTO: 0.09 THOUSAND/ΜL (ref 0–0.61)
EOSINOPHIL NFR BLD AUTO: 1 % (ref 0–6)
ERYTHROCYTE [DISTWIDTH] IN BLOOD BY AUTOMATED COUNT: 12.6 % (ref 11.6–15.1)
GFR SERPL CREATININE-BSD FRML MDRD: >60 ML/MIN/1.73SQ M
GLUCOSE SERPL-MCNC: 99 MG/DL (ref 65–140)
HCT VFR BLD AUTO: 47.3 % (ref 36.5–49.3)
HGB BLD-MCNC: 16.6 G/DL (ref 12–17)
LACTATE SERPL-SCNC: 1.5 MMOL/L (ref 0.5–2)
LYMPHOCYTES # BLD AUTO: 0.93 THOUSANDS/ΜL (ref 0.6–4.47)
LYMPHOCYTES NFR BLD AUTO: 11 % (ref 14–44)
MAGNESIUM SERPL-MCNC: 1.8 MG/DL (ref 1.6–2.6)
MCH RBC QN AUTO: 31.9 PG (ref 26.8–34.3)
MCHC RBC AUTO-ENTMCNC: 35.1 G/DL (ref 31.4–37.4)
MCV RBC AUTO: 91 FL (ref 82–98)
MONOCYTES # BLD AUTO: 0.7 THOUSAND/ΜL (ref 0.17–1.22)
MONOCYTES NFR BLD AUTO: 8 % (ref 4–12)
NEUTROPHILS # BLD AUTO: 6.52 THOUSANDS/ΜL (ref 1.85–7.62)
NEUTS SEG NFR BLD AUTO: 79 % (ref 43–75)
NRBC BLD AUTO-RTO: 0 /100 WBCS
NT-PROBNP SERPL-MCNC: 490 PG/ML
PLATELET # BLD AUTO: 223 THOUSANDS/UL (ref 149–390)
PMV BLD AUTO: 8.9 FL (ref 8.9–12.7)
POTASSIUM SERPL-SCNC: 4 MMOL/L (ref 3.5–5.3)
RBC # BLD AUTO: 5.21 MILLION/UL (ref 3.88–5.62)
SODIUM SERPL-SCNC: 123 MMOL/L (ref 136–145)
TROPONIN I SERPL-MCNC: <0.02 NG/ML
WBC # BLD AUTO: 8.29 THOUSAND/UL (ref 4.31–10.16)

## 2017-05-26 PROCEDURE — A9270 NON-COVERED ITEM OR SERVICE: HCPCS | Performed by: EMERGENCY MEDICINE

## 2017-05-26 PROCEDURE — 94760 N-INVAS EAR/PLS OXIMETRY 1: CPT

## 2017-05-26 PROCEDURE — 93005 ELECTROCARDIOGRAM TRACING: CPT | Performed by: EMERGENCY MEDICINE

## 2017-05-26 PROCEDURE — 84484 ASSAY OF TROPONIN QUANT: CPT | Performed by: EMERGENCY MEDICINE

## 2017-05-26 PROCEDURE — 83605 ASSAY OF LACTIC ACID: CPT | Performed by: EMERGENCY MEDICINE

## 2017-05-26 PROCEDURE — 80048 BASIC METABOLIC PNL TOTAL CA: CPT | Performed by: EMERGENCY MEDICINE

## 2017-05-26 PROCEDURE — 71020 HB CHEST X-RAY 2VW FRONTAL&LATL: CPT

## 2017-05-26 PROCEDURE — 83735 ASSAY OF MAGNESIUM: CPT | Performed by: EMERGENCY MEDICINE

## 2017-05-26 PROCEDURE — 94644 CONT INHLJ TX 1ST HOUR: CPT

## 2017-05-26 PROCEDURE — 99285 EMERGENCY DEPT VISIT HI MDM: CPT

## 2017-05-26 PROCEDURE — 36415 COLL VENOUS BLD VENIPUNCTURE: CPT | Performed by: EMERGENCY MEDICINE

## 2017-05-26 PROCEDURE — 87040 BLOOD CULTURE FOR BACTERIA: CPT | Performed by: EMERGENCY MEDICINE

## 2017-05-26 PROCEDURE — 85025 COMPLETE CBC W/AUTO DIFF WBC: CPT | Performed by: EMERGENCY MEDICINE

## 2017-05-26 PROCEDURE — 83880 ASSAY OF NATRIURETIC PEPTIDE: CPT | Performed by: EMERGENCY MEDICINE

## 2017-05-26 PROCEDURE — 96374 THER/PROPH/DIAG INJ IV PUSH: CPT

## 2017-05-26 RX ORDER — IPRATROPIUM BROMIDE AND ALBUTEROL SULFATE 2.5; .5 MG/3ML; MG/3ML
3 SOLUTION RESPIRATORY (INHALATION) ONCE
Status: DISCONTINUED | OUTPATIENT
Start: 2017-05-26 | End: 2017-05-26

## 2017-05-26 RX ORDER — ALBUTEROL SULFATE 90 UG/1
2 AEROSOL, METERED RESPIRATORY (INHALATION) EVERY 4 HOURS PRN
Qty: 1 INHALER | Refills: 0 | Status: SHIPPED | OUTPATIENT
Start: 2017-05-26 | End: 2018-03-13 | Stop reason: SDUPTHER

## 2017-05-26 RX ORDER — ALBUTEROL SULFATE 2.5 MG/3ML
2.5 SOLUTION RESPIRATORY (INHALATION) ONCE
Status: DISCONTINUED | OUTPATIENT
Start: 2017-05-26 | End: 2017-05-26 | Stop reason: HOSPADM

## 2017-05-26 RX ORDER — ALBUTEROL SULFATE 90 UG/1
2 AEROSOL, METERED RESPIRATORY (INHALATION) ONCE
Status: COMPLETED | OUTPATIENT
Start: 2017-05-26 | End: 2017-05-26

## 2017-05-26 RX ORDER — ALBUTEROL SULFATE 2.5 MG/3ML
SOLUTION RESPIRATORY (INHALATION)
Status: DISCONTINUED
Start: 2017-05-26 | End: 2017-05-26 | Stop reason: HOSPADM

## 2017-05-26 RX ORDER — SODIUM CHLORIDE FOR INHALATION 0.9 %
VIAL, NEBULIZER (ML) INHALATION
Status: COMPLETED
Start: 2017-05-26 | End: 2017-05-26

## 2017-05-26 RX ORDER — PANTOPRAZOLE SODIUM 40 MG/1
40 TABLET, DELAYED RELEASE ORAL DAILY
COMMUNITY
End: 2018-08-10 | Stop reason: SDUPTHER

## 2017-05-26 RX ORDER — AMLODIPINE BESYLATE 5 MG/1
TABLET ORAL
COMMUNITY
Start: 2017-01-18 | End: 2017-05-26

## 2017-05-26 RX ORDER — AMOXICILLIN AND CLAVULANATE POTASSIUM 562.5; 437.5; 62.5 MG/1; MG/1; MG/1
2 TABLET, FILM COATED, EXTENDED RELEASE ORAL 2 TIMES DAILY
Qty: 40 TABLET | Refills: 0 | Status: SHIPPED | OUTPATIENT
Start: 2017-05-26 | End: 2017-06-05

## 2017-05-26 RX ORDER — AZITHROMYCIN 250 MG/1
500 TABLET, FILM COATED ORAL ONCE
Status: COMPLETED | OUTPATIENT
Start: 2017-05-26 | End: 2017-05-26

## 2017-05-26 RX ORDER — ALBUTEROL SULFATE 2.5 MG/3ML
10 SOLUTION RESPIRATORY (INHALATION) ONCE
Status: COMPLETED | OUTPATIENT
Start: 2017-05-26 | End: 2017-05-26

## 2017-05-26 RX ORDER — ALBUTEROL SULFATE 2.5 MG/3ML
2.5 SOLUTION RESPIRATORY (INHALATION) EVERY 6 HOURS PRN
Qty: 75 ML | Refills: 0 | Status: SHIPPED | OUTPATIENT
Start: 2017-05-26 | End: 2018-03-13

## 2017-05-26 RX ORDER — PREDNISONE 10 MG/1
TABLET ORAL
Qty: 30 TABLET | Refills: 0 | Status: ON HOLD | OUTPATIENT
Start: 2017-05-26 | End: 2018-02-14

## 2017-05-26 RX ORDER — AZITHROMYCIN 250 MG/1
250 TABLET, FILM COATED ORAL DAILY
Qty: 4 TABLET | Refills: 0 | Status: SHIPPED | OUTPATIENT
Start: 2017-05-27 | End: 2017-05-31

## 2017-05-26 RX ADMIN — AZITHROMYCIN 500 MG: 250 TABLET, FILM COATED ORAL at 21:35

## 2017-05-26 RX ADMIN — ALBUTEROL SULFATE 2 PUFF: 90 AEROSOL, METERED RESPIRATORY (INHALATION) at 21:35

## 2017-05-26 RX ADMIN — ALBUTEROL SULFATE 10 MG: 2.5 SOLUTION RESPIRATORY (INHALATION) at 18:45

## 2017-05-26 RX ADMIN — ISODIUM CHLORIDE 9 ML: 0.03 SOLUTION RESPIRATORY (INHALATION) at 18:44

## 2017-05-26 RX ADMIN — CEFTRIAXONE 1000 MG: 1 INJECTION, POWDER, FOR SOLUTION INTRAMUSCULAR; INTRAVENOUS at 21:04

## 2017-05-30 LAB
ATRIAL RATE: 77 BPM
PR INTERVAL: 172 MS
QRS AXIS: 65 DEGREES
QRSD INTERVAL: 102 MS
QT INTERVAL: 386 MS
QTC INTERVAL: 436 MS
T WAVE AXIS: 90 DEGREES
VENTRICULAR RATE: 77 BPM

## 2017-05-31 LAB
BACTERIA BLD CULT: NORMAL
BACTERIA BLD CULT: NORMAL

## 2017-06-01 ENCOUNTER — ALLSCRIPTS OFFICE VISIT (OUTPATIENT)
Dept: OTHER | Facility: OTHER | Age: 67
End: 2017-06-01

## 2017-06-02 ENCOUNTER — ALLSCRIPTS OFFICE VISIT (OUTPATIENT)
Dept: OTHER | Facility: OTHER | Age: 67
End: 2017-06-02

## 2017-06-14 ENCOUNTER — ALLSCRIPTS OFFICE VISIT (OUTPATIENT)
Dept: OTHER | Facility: OTHER | Age: 67
End: 2017-06-14

## 2017-06-30 ENCOUNTER — TRANSCRIBE ORDERS (OUTPATIENT)
Dept: ADMINISTRATIVE | Facility: HOSPITAL | Age: 67
End: 2017-06-30

## 2017-06-30 ENCOUNTER — APPOINTMENT (OUTPATIENT)
Dept: LAB | Facility: HOSPITAL | Age: 67
End: 2017-06-30
Payer: COMMERCIAL

## 2017-06-30 DIAGNOSIS — J44.9 OBSTRUCTIVE CHRONIC BRONCHITIS WITHOUT EXACERBATION (HCC): Primary | ICD-10-CM

## 2017-06-30 DIAGNOSIS — M25.511 RIGHT SHOULDER PAIN, UNSPECIFIED CHRONICITY: ICD-10-CM

## 2017-06-30 DIAGNOSIS — M25.619: ICD-10-CM

## 2017-06-30 DIAGNOSIS — J44.9 OBSTRUCTIVE CHRONIC BRONCHITIS WITHOUT EXACERBATION (HCC): ICD-10-CM

## 2017-06-30 LAB
ANION GAP SERPL CALCULATED.3IONS-SCNC: 7 MMOL/L (ref 4–13)
BASOPHILS # BLD AUTO: 0.04 THOUSANDS/ΜL (ref 0–0.1)
BASOPHILS NFR BLD AUTO: 1 % (ref 0–1)
BUN SERPL-MCNC: 12 MG/DL (ref 5–25)
CALCIUM SERPL-MCNC: 9.9 MG/DL (ref 8.3–10.1)
CHLORIDE SERPL-SCNC: 92 MMOL/L (ref 100–108)
CO2 SERPL-SCNC: 29 MMOL/L (ref 21–32)
CREAT SERPL-MCNC: 0.91 MG/DL (ref 0.6–1.3)
EOSINOPHIL # BLD AUTO: 0.17 THOUSAND/ΜL (ref 0–0.61)
EOSINOPHIL NFR BLD AUTO: 4 % (ref 0–6)
ERYTHROCYTE [DISTWIDTH] IN BLOOD BY AUTOMATED COUNT: 13.5 % (ref 11.6–15.1)
GFR SERPL CREATININE-BSD FRML MDRD: >60 ML/MIN/1.73SQ M
GLUCOSE P FAST SERPL-MCNC: 107 MG/DL (ref 65–99)
HCT VFR BLD AUTO: 48.6 % (ref 36.5–49.3)
HGB BLD-MCNC: 16.7 G/DL (ref 12–17)
LYMPHOCYTES # BLD AUTO: 0.84 THOUSANDS/ΜL (ref 0.6–4.47)
LYMPHOCYTES NFR BLD AUTO: 18 % (ref 14–44)
MCH RBC QN AUTO: 31.7 PG (ref 26.8–34.3)
MCHC RBC AUTO-ENTMCNC: 34.4 G/DL (ref 31.4–37.4)
MCV RBC AUTO: 92 FL (ref 82–98)
MONOCYTES # BLD AUTO: 0.53 THOUSAND/ΜL (ref 0.17–1.22)
MONOCYTES NFR BLD AUTO: 11 % (ref 4–12)
NEUTROPHILS # BLD AUTO: 3.09 THOUSANDS/ΜL (ref 1.85–7.62)
NEUTS SEG NFR BLD AUTO: 66 % (ref 43–75)
NRBC BLD AUTO-RTO: 0 /100 WBCS
PLATELET # BLD AUTO: 238 THOUSANDS/UL (ref 149–390)
PMV BLD AUTO: 9.1 FL (ref 8.9–12.7)
POTASSIUM SERPL-SCNC: 5.2 MMOL/L (ref 3.5–5.3)
RBC # BLD AUTO: 5.26 MILLION/UL (ref 3.88–5.62)
SODIUM SERPL-SCNC: 128 MMOL/L (ref 136–145)
WBC # BLD AUTO: 4.69 THOUSAND/UL (ref 4.31–10.16)

## 2017-06-30 PROCEDURE — 36415 COLL VENOUS BLD VENIPUNCTURE: CPT

## 2017-06-30 PROCEDURE — 85025 COMPLETE CBC W/AUTO DIFF WBC: CPT

## 2017-06-30 PROCEDURE — 80048 BASIC METABOLIC PNL TOTAL CA: CPT

## 2017-07-03 ENCOUNTER — GENERIC CONVERSION - ENCOUNTER (OUTPATIENT)
Dept: OTHER | Facility: OTHER | Age: 67
End: 2017-07-03

## 2017-08-04 ENCOUNTER — ALLSCRIPTS OFFICE VISIT (OUTPATIENT)
Dept: OTHER | Facility: OTHER | Age: 67
End: 2017-08-04

## 2017-08-04 ENCOUNTER — GENERIC CONVERSION - ENCOUNTER (OUTPATIENT)
Dept: OTHER | Facility: OTHER | Age: 67
End: 2017-08-04

## 2017-08-04 ENCOUNTER — APPOINTMENT (OUTPATIENT)
Dept: RADIOLOGY | Facility: MEDICAL CENTER | Age: 67
End: 2017-08-04
Payer: COMMERCIAL

## 2017-08-04 DIAGNOSIS — E87.1 HYPO-OSMOLALITY AND HYPONATREMIA: ICD-10-CM

## 2017-08-04 DIAGNOSIS — M25.561 PAIN IN RIGHT KNEE: ICD-10-CM

## 2017-08-04 PROCEDURE — 73562 X-RAY EXAM OF KNEE 3: CPT

## 2017-08-17 ENCOUNTER — ALLSCRIPTS OFFICE VISIT (OUTPATIENT)
Dept: OTHER | Facility: OTHER | Age: 67
End: 2017-08-17

## 2017-09-05 ENCOUNTER — APPOINTMENT (OUTPATIENT)
Dept: LAB | Facility: MEDICAL CENTER | Age: 67
End: 2017-09-05
Payer: COMMERCIAL

## 2017-09-05 ENCOUNTER — GENERIC CONVERSION - ENCOUNTER (OUTPATIENT)
Dept: OTHER | Facility: OTHER | Age: 67
End: 2017-09-05

## 2017-09-05 DIAGNOSIS — E87.1 HYPO-OSMOLALITY AND HYPONATREMIA: ICD-10-CM

## 2017-09-05 LAB
ANION GAP SERPL CALCULATED.3IONS-SCNC: 6 MMOL/L (ref 4–13)
BUN SERPL-MCNC: 11 MG/DL (ref 5–25)
CALCIUM SERPL-MCNC: 9.6 MG/DL (ref 8.3–10.1)
CHLORIDE SERPL-SCNC: 89 MMOL/L (ref 100–108)
CO2 SERPL-SCNC: 29 MMOL/L (ref 21–32)
CREAT SERPL-MCNC: 0.79 MG/DL (ref 0.6–1.3)
GFR SERPL CREATININE-BSD FRML MDRD: 94 ML/MIN/1.73SQ M
GLUCOSE P FAST SERPL-MCNC: 98 MG/DL (ref 65–99)
POTASSIUM SERPL-SCNC: 4.6 MMOL/L (ref 3.5–5.3)
SODIUM SERPL-SCNC: 124 MMOL/L (ref 136–145)

## 2017-09-05 PROCEDURE — 80048 BASIC METABOLIC PNL TOTAL CA: CPT

## 2017-09-05 PROCEDURE — 36415 COLL VENOUS BLD VENIPUNCTURE: CPT

## 2017-09-06 ENCOUNTER — GENERIC CONVERSION - ENCOUNTER (OUTPATIENT)
Dept: OTHER | Facility: OTHER | Age: 67
End: 2017-09-06

## 2017-09-11 ENCOUNTER — ALLSCRIPTS OFFICE VISIT (OUTPATIENT)
Dept: OTHER | Facility: OTHER | Age: 67
End: 2017-09-11

## 2017-09-12 DIAGNOSIS — J18.9 PNEUMONIA: ICD-10-CM

## 2017-09-12 DIAGNOSIS — E87.1 HYPO-OSMOLALITY AND HYPONATREMIA (CODE): ICD-10-CM

## 2017-10-11 ENCOUNTER — APPOINTMENT (OUTPATIENT)
Dept: LAB | Facility: HOSPITAL | Age: 67
End: 2017-10-11
Attending: INTERNAL MEDICINE
Payer: COMMERCIAL

## 2017-10-11 ENCOUNTER — TRANSCRIBE ORDERS (OUTPATIENT)
Dept: ADMINISTRATIVE | Facility: HOSPITAL | Age: 67
End: 2017-10-11

## 2017-10-11 ENCOUNTER — ALLSCRIPTS OFFICE VISIT (OUTPATIENT)
Dept: OTHER | Facility: OTHER | Age: 67
End: 2017-10-11

## 2017-10-11 DIAGNOSIS — E87.1 HYPO-OSMOLALITY AND HYPONATREMIA (CODE): ICD-10-CM

## 2017-10-11 LAB
ANION GAP SERPL CALCULATED.3IONS-SCNC: 8 MMOL/L (ref 4–13)
BUN SERPL-MCNC: 19 MG/DL (ref 5–25)
CALCIUM SERPL-MCNC: 9.6 MG/DL (ref 8.3–10.1)
CHLORIDE SERPL-SCNC: 93 MMOL/L (ref 100–108)
CO2 SERPL-SCNC: 30 MMOL/L (ref 21–32)
CREAT SERPL-MCNC: 0.91 MG/DL (ref 0.6–1.3)
GFR SERPL CREATININE-BSD FRML MDRD: 88 ML/MIN/1.73SQ M
GLUCOSE P FAST SERPL-MCNC: 101 MG/DL (ref 65–99)
OSMOLALITY UR: 247 MMOL/KG
POTASSIUM SERPL-SCNC: 5 MMOL/L (ref 3.5–5.3)
SODIUM 24H UR-SCNC: 36 MOL/L
SODIUM SERPL-SCNC: 131 MMOL/L (ref 136–145)
TSH SERPL DL<=0.05 MIU/L-ACNC: 1.35 UIU/ML (ref 0.36–3.74)

## 2017-10-11 PROCEDURE — 84443 ASSAY THYROID STIM HORMONE: CPT

## 2017-10-11 PROCEDURE — 84300 ASSAY OF URINE SODIUM: CPT

## 2017-10-11 PROCEDURE — 80048 BASIC METABOLIC PNL TOTAL CA: CPT

## 2017-10-11 PROCEDURE — 83935 ASSAY OF URINE OSMOLALITY: CPT

## 2017-10-11 PROCEDURE — 36415 COLL VENOUS BLD VENIPUNCTURE: CPT

## 2017-10-27 NOTE — CONSULTS
Assessment  1  Hypertension (401 9) (I10)   2  Hyponatremia (276 1) (E87 1)    Plan  Hyponatremia    · (1) BASIC METABOLIC PROFILE; Status:Active; Requested for:11Oct2017;    Perform:[facility]; Due:11Oct2018; Ordered;For:Hyponatremia; Ordered By:Dmirti Burrows;   · (1) BASIC METABOLIC PROFILE; Status:Active; Requested for:11Oct2017;    Perform:[facility]; Due:11Oct2018; Ordered;For:Hyponatremia; Ordered By:Dmitri Burrows;   · (1) OSMOLALITY, URINE; Status:Active; Requested for:11Oct2017;    Perform:[facility]; Due:11Oct2018; Ordered;For:Hyponatremia; Ordered By:Dmitri Burrows;   · (1) SODIUM, URINE RANDOM; Status:Active; Requested for:11Oct2017;    Perform:[facility]; Due:11Oct2018; Ordered;For:Hyponatremia; Ordered By:Dmitri Burrows;   · (1) TSH WITH FT4 REFLEX; Status:Active; Requested for:11Oct2017;    Perform:[facility]; Due:11Oct2018; Ordered;Ordered By:Dmitri Burrows;   · Follow-up visit in 1 month Evaluation and Treatment  Follow-up  Status: Hold For -Scheduling  Requested for: 29QDR8970   Ordered; Hyponatremia; Ordered By: Tamiko Andrade Performed:  Due: 06ZLU7065  Unlinked    · HydroCHLOROthiazide 12 5 MG Oral Tablet   Dispense: 0 Days ; #: Sufficient Tablet; Refill: 0; RANJIT = N; Record; Last Updated By: Tamiko Andrade; 10/11/2017 9:29:14 AM    Discussion/Summary    1  Hyponatremia  Chronic, exact etiology is unknown but suspect secondary to SIADH which can be secondary to underlying COPD  review of old medical records since January 2017 patient is found to have hyponatremia with last known sodium level of 124  Currently patient is asymptomatic from hyponatremia perspective  Hydrochlorothiazide was also stopped in June 2017 due to hyponatremia but sodium level has failed to improve  BMP, random urine sodium and urine osmolarity along with TSH for further evaluation of hyponatremia    found to have signs of SIADH, may consider 1 5 L of fluid restriction along with salt tablet and possible Lasix  CT scan of the chest done in January 2017 did not show signs of lung cancer  Patient continued to smoke cigarettes every day but not interested to quit at this point  Hypertension  Essential, today's blood pressure was controlled and at goal and plan to monitor hypertension with amlodipine 5 mg q  daily and lisinopril 10 mg q  daily  Continue avoiding HCTZ for times being  May need to liberalize salt intake if found to have SIADH as the etiology of hyponatremia  to nephrology clinic in 4 weeks  Plan to check BMP before next visit  can be reached at [phone]  [reviewed on 10/12/2017]-called and left a message to the patient0 91 with a GFR of 88  TSH 1 3  Sodium 131 + urinary sodium of 36 and osmolality of 247 -> suggesting SIADH as the etiology of hyponatremia although exact etiology of SIADH has remained unknown  Plan to start salt tablet 1 g p o  t i d  plus advised to follow 24-32 ounce of fluid restriction at home  prescription was sent to the pharmacy  The patient was counseled regarding diagnostic results,-- instructions for management,-- risk factor reductions,-- prognosis,-- patient and family education,-- impressions,-- risks and benefits of treatment options,-- importance of compliance with treatment  Patient is able to Self-Care  Smoking cessation counseling was provided although patient is not interested to quit at this point  Possible side effects of new medications were reviewed with the patient/guardian today  The treatment plan was reviewed with the patient/guardian  The patient/guardian understands and agrees with the treatment plan      Reason For Visit  Further management of hyponatremia      History of Present Illness  This is 63-year-old male with a past medical history of coronary artery disease, hypertension, hyperlipidemia, COPD, current smoker, found to have hyponatremia and was referred to nephrology for further evaluation    review of old medical records since January 2007 patient was found to have low sodium level with last known sodium of 124  Sodium level before January 2017 is unknown  also underwent CT scan of the chest in January 2017 and did not have any signs of lung cancer  said that he also history of hypertension which is well controlled on recent medication  In June 2017 due to low sodium level patient's hydrochlorothiazide was stopped by primary care physician but it failed to improve his sodium level  also have a COPD and continue to smoke  Smoking cessation counseling was provided today but patient is not interested to quit at this point  also have underlying coronary artery disease along with hyperlipidemia and also also been followed by cardiologist   takes all the medications as prescribed and does not use any NSAIDs  Review of Systems   Constitutional: no fever,-- no chills-- and-- not feeling poorly  Eyes: no dryness of the eyes  ENT: no nasal discharge  Cardiovascular: no orthopnea,-- no chest pain-- and-- no palpitations  Respiratory: cough, but-- no shortness of breath-- and-- no wheezing  Gastrointestinal: no abdominal pain,-- no nausea-- and-- no diarrhea  Genitourinary: no dysuria-- and-- does not have slow stream   Musculoskeletal: no arthralgias,-- no joint swelling-- and-- no joint stiffness  Integumentary: no itching-- and-- no skin wound  Neurological: no headache,-- no numbness-- and-- no dizziness  Psychiatric: not suicidal,-- no anxiety-- and-- no depression  Endocrine: no muscle weakness  Hematologic/Lymphatic: no tendency for easy bleeding  ROS reviewed  Active Problems  1  Abnormal EKG (794 31) (R94 31)   2  Abnormal heart sounds (785 3) (R01 2)   3  Arteriosclerotic coronary artery disease (414 00) (I25 10)   4  Bilateral shoulder pain (719 41) (M25 511,M25 512)   5  Colon polyps (211 3) (K63 5)   6  COPD (chronic obstructive pulmonary disease) (496) (J44 9)   7   Elevated hematocrit (282 7) (R71 8)   8  Elevated hemoglobin (282 7) (D58 2)   9  Emphysema of lung (492 8) (J43 9)   10  Encounter for screening colonoscopy (V76 51) (Z12 11)   11  Hiatal hernia (553 3) (K44 9)   12  History of adenomatous polyp of colon (V12 72) (Z86 010)   13  Hyperlipidemia (272 4) (E78 5)   14  Hypertension (401 9) (I10)   15  Hyponatremia (276 1) (E87 1)   16  Limited range of motion of shoulder (719 51) (M25 619)   17  Mild concentric left ventricular hypertrophy (LVH) (429 3) (I51 7)   18  Need for pneumococcal vaccination (V03 82) (Z23)   19  Osteoarthritis of left glenohumeral joint (715 91) (M19 012)   20  Osteoarthritis of right glenohumeral joint (715 91) (M19 011)   21  Pityriasis rosea (696 3) (L42)   22  Pneumonia (486) (J18 9)   23  PVC (premature ventricular contraction) (427 69) (I49 3)   24  Right knee pain (719 46) (M25 561)   25  SOB (shortness of breath) on exertion (786 05) (R06 02)   26  Tobacco use (305 1) (Z72 0)    Past Medical History  1  Patient denies significant medical history    The active problems and past medical history were reviewed and updated today  Surgical History  1  History of Ankle Surgery   2  History of Hand Surgery    The surgical history was reviewed and updated today  Family History  Mother    1  Denied: Family history of Colon cancer   2  Denied: Family history of colonic polyps   3  Family history of hypertension (V17 49) (Z82 49)   4  Denied: Family history of liver disease  Father    11  Denied: Family history of Colon cancer   6  Family history of Emphysema lung   7  Denied: Family history of colonic polyps   8  Family history of hypertension (V17 49) (Z82 49)   9  Denied: Family history of liver disease  Brother    8  Family history of arthritis (V17 7) (Z82 61)   11  Family history of diabetes mellitus (V18 0) (Z83 3)  Grandmother    15  Family history of cardiac disorder (V17 49) (Z82 49)   13   Family history of hypertension (V17 49) (Z82 49)    The family history was reviewed and updated today  Social History     · Alcohol use (V49 89) (Z78 9)   · Caffeine use (V49 89) (F15 90)   · Current every day smoker (305 1) (F17 200)   · Tobacco use (305 1) (Z72 0)  The social history was reviewed and updated today  The social history was reviewed and is unchanged  Current Meds   1  AmLODIPine Besylate 5 MG Oral Tablet; take 1 tablet every day as directed; Therapy: 63NMB9879 to ([employer]in [employer]uss)  Requested for: 71Rnn7383; Last Rx:00Iur3731 Ordered   2  Aspirin 81 MG Oral Tablet Delayed Release; Take 1 tablet daily Recorded   3  Atorvastatin Calcium 20 MG Oral Tablet; Take 1 tablet daily; Therapy: 13ITG9662 to (Evaluate:30Oct2017)  Requested for: 16GEP2150; Last Rx:91Pof9274 Ordered   4  Colcrys 0 6 MG Oral Tablet; Take 2 tabs now and then 1 tab 1hr later x1  May repeat again in 3 days and then in 6 days if needed; Therapy: 56XOX4253 to (Last Rx:17Ojh7310)  Requested for: 93Qbp8454 Ordered   5  HydroCHLOROthiazide 12 5 MG Oral Tablet; TAKE 1 TABLET DAILY; Therapy: (Recorded:11Oct2017) to Recorded   6  Lisinopril 10 MG Oral Tablet; TAKE 1 TABLET DAILY AS DIRECTED; Therapy: 11HAR6258 to (Evaluate:14Mar2018)  Requested for: 66Skb6132; Last Rx:59Hej0917 Ordered   7  Pantoprazole Sodium 40 MG Oral Tablet Delayed Release; TAKE ONE TABLET DAILY 30 MINS BEFORE BREAKFAST  Requested for: 82Fsw4711; Last Rx:08Dzj4235 Ordered   8  Spiriva Respimat 2 5 MCG/ACT Inhalation Aerosol Solution; INHALE 2 PUFFS ONCE DAILY; Therapy: 48BLI0123 to (Last Rx:41Kqx9544)  Requested for: 06JQS7391 Ordered   9  Ventolin  (90 Base) MCG/ACT Inhalation Aerosol Solution; INHALE 1 TO 2 PUFFS EVERY 4 TO 6 HOURS AS NEEDED; Therapy: 21JPE5998 to (Last Rx:00Doj9765)  Requested for: 99Ihf7369 Ordered    The medication list was reviewed and updated today  Allergies  1   No Known Drug Allergies    Vitals  Vital Signs    Recorded: 22LUN3973 09:04AM   Temperature 97 7 F Heart Rate 64   Systolic 392, Sitting   Diastolic 82, Sitting   Height 6 ft    Weight 260 lb    BMI Calculated 35 26   BSA Calculated 2 38       Physical Exam   Constitutional: General appearance: No acute distress, well appearing and well nourished  ENT: External ears and nose appear normal     Eyes: Anicteric sclerae  Neck: No bruit heard over either carotid  JVD:  No JVD present  Pulmonary: Respiratory effort: No increased work of breathing or signs of respiratory distress  -- Auscultation of lungs: Clear to auscultation  Cardiovascular: Auscultation of heart: Abnormal  -- S1-S2 heard, irregular  Abdomen: Non-tender, no masses  Extremities: No cyanosis, clubbing or edema  Pulses: Dorsalis Pedis and Posterior Tibial pulses normal   Rash: No rash present  Neurologic: Non Focal     Psychiatric: Orientation to person, place, and time: Normal  -- and-- Mood and affect: Normal    Back: No CVA tenderness  Results/Data  (1) BASIC METABOLIC PROFILE 93BJZ4328 10:10AM Olivia Monahan Order Number: [id number]     Test Name Result Flag Reference   SODIUM 124 mmol/L L 136-145   POTASSIUM 4 6 mmol/L  3 5-5 3   CHLORIDE 89 mmol/L L 100-108   CARBON DIOXIDE 29 mmol/L  21-32   ANION GAP (CALC) 6 mmol/L  4-13   BLOOD UREA NITROGEN 11 mg/dL  5-25   CREATININE 0 79 mg/dL  0 60-1 30   Standardized to IDMS reference method   CALCIUM 9 6 mg/dL  8 3-10 1   eGFR 94 ml/min/1 73sq m       National Kidney Disease Education Program recommendations are as follows: GFR calculation is accurate only with a steady state creatinine Chronic Kidney disease less than 60 ml/min/1 73 sq  meters Kidney failure less than 15 ml/min/1 73 sq  meters  GLUCOSE FASTING 98 mg/dL  65-99   Specimen collection should occur prior to Sulfasalazine administration due to the potential for falsely depressed results   Specimen collection should occur prior to Sulfapyridine administration due to the potential for falsely elevated results  * CT LUNG SCREENING PROGRAM 26Jan2017 09:35AM Akila Chirinos Order Number: [id number]   - Patient Instructions: To schedule this appointment, please contact Central Scheduling at [phone]  Test Name Result Flag Reference   CT LUNG SCREENING PROGRAM (Report)       CT CHEST LUNG CANCER SCREENING WITHOUT IV CONTRAST   INDICATION: Long term smoking history  COMPARISON: None  TECHNIQUE: Unenhanced CT examination of the chest was performed utilizing a low dose protocol  Axial, sagittal and coronal reformatted images were submitted for interpretation  This examination, like all CT scans performed in the [facility], was performed utilizing techniques to minimize radiation dose exposure, including the use of iterative reconstruction and automated exposure control  FINDINGS:   LUNGS: Scattered somewhat tree-in-bud opacities are seen within the inferior right upper lobe as well as the lingula  Findings are most consistent with an infectious etiology likely an atypical organism  Moderate emphysema is identified  No pulmonary mass  No tracheal or endobronchial lesion  PLEURA: Unremarkable  HEART/GREAT VESSELS: Extensive coronary atherosclerosis is noted  MEDIASTINUM AND JAZMYNE: Moderate hiatal hernia is present  CHEST WALL AND LOWER NECK: Unremarkable  VISUALIZED STRUCTURES IN THE UPPER ABDOMEN: Unremarkable  OSSEOUS STRUCTURES: No acute fracture  No destructive osseous lesion  IMPRESSION:   No lung nodule or focal consolidation  Lung-RADS: Lung-RADS1, negative  Continued annual screening with LDCT in 12 months  Tree-in-bud appearing opacities within the inferior right upper lobe and lingula most consistent with an infectious process, likely an atypical organism  Moderate emphysema  Workstation performed: [id number]   Signed by:   Torito Pearson MD  1/27/17     Future Appointments    Date/Time Provider Specialty Site   02/22/2018 10:00 AM Gudelia Barboza APPLE Scott   [address] [facility]       Signatures   Electronically signed by : APPLE Sanchez ; Oct 11 2017  9:31AM EST                       (Author)    Electronically signed by : APPLE Sanchez ; Oct 12 2017  3:50PM EST                       (Author)

## 2017-10-31 ENCOUNTER — APPOINTMENT (OUTPATIENT)
Dept: RADIOLOGY | Facility: MEDICAL CENTER | Age: 67
End: 2017-10-31
Payer: COMMERCIAL

## 2017-10-31 DIAGNOSIS — J18.9 PNEUMONIA: ICD-10-CM

## 2017-10-31 PROCEDURE — 71020 HB CHEST X-RAY 2VW FRONTAL&LATL: CPT

## 2017-11-02 ENCOUNTER — GENERIC CONVERSION - ENCOUNTER (OUTPATIENT)
Dept: OTHER | Facility: OTHER | Age: 67
End: 2017-11-02

## 2017-11-16 ENCOUNTER — APPOINTMENT (OUTPATIENT)
Dept: LAB | Facility: HOSPITAL | Age: 67
End: 2017-11-16
Attending: INTERNAL MEDICINE
Payer: COMMERCIAL

## 2017-11-16 ENCOUNTER — TRANSCRIBE ORDERS (OUTPATIENT)
Dept: ADMINISTRATIVE | Facility: HOSPITAL | Age: 67
End: 2017-11-16

## 2017-11-16 DIAGNOSIS — E87.1 HYPO-OSMOLALITY AND HYPONATREMIA (CODE): ICD-10-CM

## 2017-11-16 LAB
ANION GAP SERPL CALCULATED.3IONS-SCNC: 9 MMOL/L (ref 4–13)
BUN SERPL-MCNC: 12 MG/DL (ref 5–25)
CALCIUM SERPL-MCNC: 9.5 MG/DL (ref 8.3–10.1)
CHLORIDE SERPL-SCNC: 95 MMOL/L (ref 100–108)
CO2 SERPL-SCNC: 29 MMOL/L (ref 21–32)
CREAT SERPL-MCNC: 1 MG/DL (ref 0.6–1.3)
GFR SERPL CREATININE-BSD FRML MDRD: 79 ML/MIN/1.73SQ M
GLUCOSE P FAST SERPL-MCNC: 126 MG/DL (ref 65–99)
POTASSIUM SERPL-SCNC: 4.4 MMOL/L (ref 3.5–5.3)
SODIUM SERPL-SCNC: 133 MMOL/L (ref 136–145)

## 2017-11-16 PROCEDURE — 36415 COLL VENOUS BLD VENIPUNCTURE: CPT

## 2017-11-16 PROCEDURE — 80048 BASIC METABOLIC PNL TOTAL CA: CPT

## 2017-11-22 ENCOUNTER — GENERIC CONVERSION - ENCOUNTER (OUTPATIENT)
Dept: OTHER | Facility: OTHER | Age: 67
End: 2017-11-22

## 2017-11-22 DIAGNOSIS — E87.1 HYPO-OSMOLALITY AND HYPONATREMIA (CODE): ICD-10-CM

## 2018-01-10 ENCOUNTER — ALLSCRIPTS OFFICE VISIT (OUTPATIENT)
Dept: OTHER | Facility: OTHER | Age: 68
End: 2018-01-10

## 2018-01-10 NOTE — RESULT NOTES
Verified Results  (1) Yesica 91CYN4460 10:30AM [facility] Order Number: [id number]     Test Name Result Flag Reference   SODIUM 131 mmol/L L 136-145   POTASSIUM 5 0 mmol/L  3 5-5 3   Slightly Hemolyzed; Results May be Affected   CHLORIDE 93 mmol/L L 100-108   CARBON DIOXIDE 30 mmol/L  21-32   ANION GAP (CALC) 8 mmol/L  4-13   BLOOD UREA NITROGEN 19 mg/dL  5-25   CREATININE 0 91 mg/dL  0 60-1 30   Standardized to IDMS reference method   CALCIUM 9 6 mg/dL  8 3-10 1   eGFR 88 ml/min/1 73sq m     National Kidney Disease Education Program recommendations are as follows:  GFR calculation is accurate only with a steady state creatinine  Chronic Kidney disease less than 60 ml/min/1 73 sq  meters  Kidney failure less than 15 ml/min/1 73 sq  meters  GLUCOSE FASTING 101 mg/dL H 65-99   Specimen collection should occur prior to Sulfasalazine administration due to the potential for falsely depressed results  Specimen collection should occur prior to Sulfapyridine administration due to the potential for falsely elevated results  (1) OSMOLALITY, URINE 33OYL0810 10:30AM opinions.h Order Number: [id number]     Test Name Result Flag Reference   OSMOL, URINE 247 mmol/KG L 250-900     (1) SODIUM, URINE RANDOM 46UHV7222 10:30AM [facility] Order Number: [id number]     Test Name Result Flag Reference   SODIUM URINE 36       (1) TSH WITH FT4 REFLEX 11Oct2017 10:30AM opinions.h Order Number: [id number]     Test Name Result Flag Reference   TSH 1 352 uIU/mL  0 358-3 740   Patients undergoing fluorescein dye angiography may retain small amounts of fluorescein in the body for 48-72 hours post procedure  Samples containing fluorescein can produce falsely depressed TSH values  If the patient had this procedure,a specimen should be resubmitted post fluorescein clearance         Plan  Hyponatremia    · (1) BASIC METABOLIC PROFILE; Status:Active; Requested for:15Xuk3439;    · (1) BASIC METABOLIC PROFILE; Status:Complete;   Done: 81XTK7010 10:30AM   · (1) OSMOLALITY, URINE; Status:Complete;   Done: 74LEF9750 10:30AM   · (1) SODIUM, URINE RANDOM; Status:Complete;   Done: 31QNB4073 10:30AM   · (1) TSH WITH FT4 REFLEX; Status:Complete;   Done: 35KXA4314 10:30AM   · Follow-up visit in 1 month Evaluation and Treatment  Follow-up  Status: Complete -  Scheduling  Done: 03DBR9694 11:00AM  Unlinked    · HydroCHLOROthiazide 12 5 MG Oral Tablet

## 2018-01-10 NOTE — RESULT NOTES
Discussion/Summary      Gastric biopsies came back as benign and negative for H  pylori  Patient to continue PPI and call for follow-up office visit in 2 months with PA to discuss about hiatal hernia repair         Colon polyps removed came back as precancerous tubular adenoma  Next colonoscopy in 3 years  Patient to call our office for any GI symptoms  DISCUSSED WITH PATIENT  FOLLOW-UP WITH CARLINE SCHEDULED FOR 07/11/17  REMINDER SET  CS           Verified Results  (1) TISSUE EXAM 31ULV6305 09:06AM Asmita Door     Test Name Result Flag Reference   LAB AP CASE REPORT (Report)     Surgical Pathology Report             Case: [id number]                   Authorizing Provider: Dixie Toure MD     Collected:      05/09/2017 0906        Ordering Location:   [facility]    Received:      05/09/2017 R Kaia Metz [facility] Endoscopy                               Pathologist:      Louie Argueta DO                               Specimens:  A) - Stomach, Cold Bx Gastric Body R/O H Pylori                            B) - Colon, Cold Snare Polypectomy Hepatic Flexure                           C) - Rectum, Cold Bx Polypectomy Rectum x5   LAB AP FINAL DIAGNOSIS (Report)     A  Stomach, body, biopsy:  - Minimal chronic inactive oxyntic gastritis  - No H  pylori organisms identified on H&E and immunohistochemical stains   - No intestinal metaplasia identified, confirmed with PAS/AB cytochemical   stain  B  Colon, hepatic flexure polyp, polypectomy:  - Polypoid fragments of colonic mucosa with focal hyperplastic changes of   surface epithelium  C  Rectum, polyps x5, polypectomy:  - Tubular adenoma  - Hyperplastic polyps (4)  - Negative for high-grade dysplasia  Appropriate positive and negative controls obtained    Interpretation performed at [facility], [facility]  Electronically signed by Louie Argueta DO on 5/10/2017 at 12:36 PM   LAB [facility] SURGICAL ADDITIONAL INFORMATION (Report)     These tests were developed and their performance characteristics   determined by [facility] or [address] [place] [address]  They may not be cleared or approved by the [place]  Food and   Drug Administration  The FDA has determined that such clearance or   approval is not necessary  These tests are used for clinical purposes  They should not be regarded as investigational or for research  This   laboratory has been approved by [place] [address], designated as a high-complexity   laboratory and is qualified to perform these tests  LAB AP GROSS DESCRIPTION (Report)     A  The specimen is received in formalin, labeled with the patient's name   and hospital number, and is designated gastric body biopsy  The   specimen consists of 2 tan-pink soft tissue fragments measuring 0 4 cm and   0 5 cm in greatest dimension  Entirely submitted  One cassette  B  The specimen is received in formalin, labeled with the patient's name   and hospital number, and is designated hepatic flexure polyp  The   specimen consists of one tan-pink soft tissue fragment measuring 0 4 cm in   greatest dimension  Entirely submitted  One cassette  C  The specimen is received in formalin, labeled with the patient's name   and hospital number, and is designated rectum polyp ??5 the specimen   consists of 5 tan-pink soft tissue fragments each measuring 0 3 cm in   greatest dimension  Entirely submitted  One cassette  Note: The estimated total formalin fixation time based upon information   provided by the submitting clinician and the standard processing schedule   is 17 25 hours  MAS   LAB AP CLINICAL INFORMATION (Report)     FINDINGS: Esophagus: The GE junction was observed 33 cm from the entry   site  Large hiatal hernia about 10cm with erythema and eroions at the   distal part c/w ariane erosions  Stomach: Gastritis was found in the   body of the stomach  A biopsy was taken   The specimen was collected for   pathology   Duodenum: The duodenum appeared to be normal

## 2018-01-10 NOTE — RESULT NOTES
PFT Results v2:   Diagnosis/Reason For Study: COPD   Referring Provider: Dr Gallo Staff   Spirometry: Forced vital capacity: 2 41L and 48% Predicted Values  Forced expiratory volume in one second: 0 60L and 16% Predicted Value  FEV1/FVC ratio is 33% Predicted Values  F EF 25-75 percent, 0 25 L, 8% predicted    Post Bronchodilator Spirometry: Forced vital capacity : 3 76L and 76% Predicted Values  Forced expiratory volume in one second : 1 01L and 27% Predicted Value  FEV1/FVC ratio is 35% Predicted Values  F EF 25-75 percent, 0 55 L, 19% predicted    Lung Volumes:   Slow vital capacity 1 24 L, 25% predicted    DLCO:   DLCO 52% Predicted Values  PFT Interpretation:   Patient had a full lung function testing with spirometry lung volumes and DLCO  Patient gave a good effort  Patient was unable to tolerate the FRC,  The flow volume curve demonstrates a severe obstructive pattern  There is severe obstructive ventilatory limitation with an appreciable response to the bronchodilator  The total lung capacity could not be measured because the patient could not tolerate the FRC  The DLCO is moderately decreased  Findings are consistent with severe COPD  Clinical correlation is required  Future Appointments    Date/Time Provider Specialty Site   03/03/2017 10:15 AM APPLE Ruiz  [address] [facility]   03/29/2017 03:20 PM APPLE Brown   Gastroenterology Adult [place] [address]      Electronically signed by : APPLE Tabor ; Feb 21 2017  1:00PM EST                       (Author)

## 2018-01-10 NOTE — PROGRESS NOTES
Chief Complaint  Patient here for a blood pressure check  Pt states he was in the ER and Dx: with Pneumonia  Active Problems    1  Abnormal EKG (794 31) (R94 31)   2  Abnormal heart sounds (785 3) (R01 2)   3  Arteriosclerotic coronary artery disease (414 00) (I25 10)   4  Bilateral shoulder pain (719 41) (M25 511,M25 512)   5  Colon polyps (211 3) (K63 5)   6  COPD (chronic obstructive pulmonary disease) (496) (J44 9)   7  Elevated hematocrit (282 7) (R71 8)   8  Elevated hemoglobin (282 7) (D58 2)   9  Emphysema of lung (492 8) (J43 9)   10  Encounter for screening colonoscopy (V76 51) (Z12 11)   11  Hiatal hernia (553 3) (K44 9)   12  Hyperlipidemia (272 4) (E78 5)   13  Hypertension (401 9) (I10)   14  Hyponatremia (276 1) (E87 1)   15  Limited range of motion of shoulder (719 51) (M25 619)   16  Mild concentric left ventricular hypertrophy (LVH) (429 3) (I51 7)   17  Need for pneumococcal vaccination (V03 82) (Z23)   18  Osteoarthritis of left glenohumeral joint (715 91) (M19 012)   19  Osteoarthritis of right glenohumeral joint (715 91) (M19 011)   20  Pityriasis rosea (696 3) (L42)   21  Pneumonia (486) (J18 9)   22  PVC (premature ventricular contraction) (427 69) (I49 3)   23  SOB (shortness of breath) on exertion (786 05) (R06 02)   24  Tobacco use (305 1) (Z72 0)    Current Meds   1  AmLODIPine Besylate 5 MG Oral Tablet; TAKE 1 TABLET DAILY AS DIRECTED; Therapy: 53VOU0298 to (Evaluate:06Sia1113)  Requested for: 99MCO3707; Last   Rx:13Mar2017 Ordered   2  Aspirin 81 MG Oral Tablet Delayed Release; Take 1 tablet daily Recorded   3  Atorvastatin Calcium 20 MG Oral Tablet; Take 1 tablet daily; Therapy: 71ETM0341 to (Evaluate:30Oct2017)  Requested for: 23XOU2818; Last   Rx:69Qdv2114 Ordered   4  Lisinopril 10 MG Oral Tablet; Therapy: 91CSP4093 to Recorded   5  Pantoprazole Sodium 40 MG Oral Tablet Delayed Release; Take 1 tablet twice daily   Recorded   6   Spiriva Respimat 2 5 MCG/ACT Inhalation Aerosol Solution; INHALE 2 PUFFS ONCE   DAILY; Therapy: 32JDI1584 to (Last Rx:96Gut0176)  Requested for: 24HVK4631 Ordered   7  ZyrTEC Allergy 10 MG Oral Capsule Recorded    Allergies    1  No Known Drug Allergies    Vitals  Signs    Heart Rate: 76  Systolic: 996  Diastolic: 64    Plan  Hyponatremia    · (1) BASIC METABOLIC PROFILE; Status:Active; Requested IXQ:[id number]; Future Appointments    Date/Time Provider Specialty Site   08/17/2017 10:15 AM APPLE Sepulveda  [address] [facility]   07/13/2017 08:45 AM APPLE Dozier   Orthopedic Surgery [facility]   06/14/2017 01:20 PM Mendel Bares, MD Cardiology [address] [place]   07/11/2017 01:00 PM Tony Cardona [facility] Gastroenterology [place] [address]     Signatures   Electronically signed by : APPLE Claros ; Jun 2 2017 10:17AM EST                       (Author)

## 2018-01-10 NOTE — RESULT NOTES
Message   CXR reviewed  The lungs are clear  There is however a hiatal hernia  I recommend pt see [facility] for evaluation as this can be harmful as the stomach starts to push on the heart  Verified Results  * XR CHEST PA & LATERAL 24MDI9427 11:15AM Brynn Chambers Order Number: [id number]     Test Name Result Flag Reference   XR CHEST PA & LATERAL (Report)     CHEST - DUAL ENERGY     INDICATION: Shortness of breath     COMPARISON: None     VIEWS: PA (including soft tissue/bone algorithms) and lateral ; 6 images     FINDINGS:     The cardiac silhouette is unremarkable  Retrocardiac hiatal hernia noted  The lungs are clear  No pleural effusions  Mild to moderate degenerative changes in dextroscoliosis, thoracic spine  Degenerative changes noted at the right glenohumeral joint  IMPRESSION:     No active pulmonary disease  Hiatal hernia          Workstation performed: [id number]     Signed by:   Batsheva Fisher MD   1/19/17

## 2018-01-12 VITALS
DIASTOLIC BLOOD PRESSURE: 80 MMHG | WEIGHT: 258.5 LBS | RESPIRATION RATE: 20 BRPM | BODY MASS INDEX: 34.1 KG/M2 | SYSTOLIC BLOOD PRESSURE: 126 MMHG | HEART RATE: 88 BPM

## 2018-01-12 VITALS — SYSTOLIC BLOOD PRESSURE: 154 MMHG | HEART RATE: 76 BPM | DIASTOLIC BLOOD PRESSURE: 64 MMHG

## 2018-01-12 VITALS
SYSTOLIC BLOOD PRESSURE: 132 MMHG | DIASTOLIC BLOOD PRESSURE: 80 MMHG | WEIGHT: 265 LBS | TEMPERATURE: 96.2 F | OXYGEN SATURATION: 95 % | HEART RATE: 72 BPM

## 2018-01-12 NOTE — PROGRESS NOTES
Assessment   Assessed    1  Arteriosclerotic coronary artery disease (414 00) (I25 10)   2  Hyperlipidemia (272 4) (E78 5)   3  Hypertension (401 9) (I10)   4  Tobacco use (305 1) (Z72 0)    Plan   SocHx: Tobacco use    · Follow-up visit in 3 months Evaluation and Treatment  Follow-up  Status: Hold For -    Scheduling  Requested for: 78UWX0311   Ordered; For: SocHx: Tobacco use; Ordered By: Delano Kruse Performed:  Due: 66DOC1107    Discussion/Summary   His exertional symptoms continue  He would like to undergo PCI of his RCA   We reviewed the fact that the likelihood of success is 80-90%, lower than with non- PCI  We also discussed the possibility that his symptoms may be purely respiratory  Finally, we reviewed the risks entailed in  PCI  He understands and is willing to proceed  We will schedule the procedure for March  As in the past, I urged him to discontinue smoking  He has significantly reduced his cigarette consumption  Chief Complaint   Chief Complaint Free Text Note Form: pt is here for a follow up  pt c/o sob due to copd  History of Present Illness                 Mr Rupinder Lopez was referred in 3/17 becausee of frequent PVCs identified on a routine ECG  There was no history of overt heart disease, and the patient denied chest discomfort, dyspnea, syncope, or palpitations  However, because of the presence of multiple coronary risk factors, a SPECT study was performed, revealing a large fixed inferior defect  In addition, a screening CT of the chest without contrast, obtained to assess his lungs because of his smoking history, disclosed the presence of extensive coronary atherosclerosis  Consequently, coronary angiography was performed  He was found to have a chronic total occlusion of the mid RCA with CORNELL grade 1 antegrade flow, as well as collateral flow from the left system  There was moderate atherosclerosis of the LAD with 50% lesions in the proximal and mid vessel    The basal and mid inferior wall was hypokinetic with an estimated ejection fraction 55%  The EDP was mildly elevated  Echocardiography disclosed mild LVH  LV function was normal   Because of the presence of a chronic total occlusion of the mid RCA with no reversibility by SPECT what preserved wall motion by echo, it is likely that there is a significant territory of viable but ischemic myocardium in the inferior distribution  In 917, we discussed the feasibility of  PCI, with the possibility of improving his symptoms, but it's somewhat increased risk compared to conventional PCI  He initially did not wish to consider this option, that in 1/18 indicated that he would prefer to undergo attempted RCA   The risks and benefits of that procedure were discussed in detail  He has multiple CAD risk factors  He has significant dyslipidemia; A lipid panel in 1/17 disclosed total cholesterol 211, , HDL 67, and triglycerides 49  He initially declined to take statin therapy because he believes it adversely affected his father's kidneys  However, after the finding of significant CAD at catheterization, he agreed to a trial of atorvastatin, beginning with 20 mg per day and slowly titrating upward as tolerated  He had a markedly beneficial response, with a reduction in total cholesterol to 101, and a reduction in LDL to 52; the HDL also declined to 38; triglycerides were stable at 56  He is hypertensive  He was reluctant to increase his antihypertensive therapy because it was recently initiated  He is a smoker and was initially resistant to smoking cessation; however at the time of his 5/17 visit he indicated that he was attempting to reduce his cigarette consumption  He does not have diabetes  A screening ultrasound of the abdominal aorta in 1/17 was normal           He has significant COPD  CT of the chest in 1/17 disclosed moderate emphysema   There opacities in the right upper lobe suggesting the presence of an infectious process, possibly an atypical organism  Extensive coronary atherosclerosis was noted  Active Problems   Problems    1  Abnormal EKG (794 31) (R94 31)   2  Abnormal heart sounds (785 3) (R01 2)   3  Arteriosclerotic coronary artery disease (414 00) (I25 10)   4  Bilateral shoulder pain (719 41) (M25 511,M25 512)   5  Colon polyps (211 3) (K63 5)   6  COPD (chronic obstructive pulmonary disease) (496) (J44 9)   7  Elevated hematocrit (282 7) (R71 8)   8  Elevated hemoglobin (282 7) (D58 2)   9  Emphysema of lung (492 8) (J43 9)   10  Encounter for screening colonoscopy (V76 51) (Z12 11)   11  Hiatal hernia (553 3) (K44 9)   12  History of adenomatous polyp of colon (V12 72) (Z86 010)   13  Hyperlipidemia (272 4) (E78 5)   14  Hypertension (401 9) (I10)   15  Hyponatremia (276 1) (E87 1)   16  Limited range of motion of shoulder (719 51) (M25 619)   17  Mild concentric left ventricular hypertrophy (LVH) (429 3) (I51 7)   18  Need for pneumococcal vaccination (V03 82) (Z23)   19  Osteoarthritis of left glenohumeral joint (715 91) (M19 012)   20  Osteoarthritis of right glenohumeral joint (715 91) (M19 011)   21  Pityriasis rosea (696 3) (L42)   22  Pneumonia (486) (J18 9)   23  PVC (premature ventricular contraction) (427 69) (I49 3)   24  Right knee pain (719 46) (M25 561)   25  SOB (shortness of breath) on exertion (786 05) (R06 02)   26  Tobacco use (305 1) (Z72 0)    Past Medical History   Problems    1  Patient denies significant medical history    Surgical History   Problems    1  History of Ankle Surgery   2  History of Hand Surgery    Family History   Mother    1  Denied: Family history of Colon cancer   2  Denied: Family history of colonic polyps   3  Family history of hypertension (V17 49) (Z82 49)   4  Denied: Family history of liver disease  Father    11  Denied: Family history of Colon cancer   6  Family history of Emphysema lung   7   Denied: Family history of colonic polyps   8  Family history of hypertension (V17 49) (Z82 49)   9  Denied: Family history of liver disease  Brother    8  Family history of arthritis (V17 7) (Z82 61)   11  Family history of diabetes mellitus (V18 0) (Z83 3)  Grandmother    15  Family history of cardiac disorder (V17 49) (Z82 49)   13  Family history of hypertension (V17 49) (Z82 49)    Social History   Problems    · Alcohol use (V49 89) (Z78 9)   · Always uses seat belt   · Caffeine use (V49 89) (F15 90)   · Current every day smoker (305 1) (F17 200)   · Daily caffeine consumption, 2-3 servings a day   · Tobacco use (305 1) (Z72 0)    Current Meds    1  AmLODIPine Besylate 5 MG Oral Tablet; take 1 tablet every day as directed; Therapy: 09QYU3123 to ([id number])  Requested for: 93Vil3920; Last     Rx:01Sep2017 Ordered   2  Aspirin 81 MG Oral Tablet Delayed Release; Take 1 tablet daily Recorded   3  Atorvastatin Calcium 20 MG Oral Tablet; Take 1 tablet daily; Therapy: 53TNQ2900 to (Evaluate:40Kex9636)  Requested for: 24DDF8337; Last     Rx:14Nov2017 Ordered   4  Colcrys 0 6 MG Oral Tablet; Take 2 tabs now and then 1 tab 1hr later x1  May repeat     again in 3 days and then in 6 days if needed; Therapy: 37WOZ9595 to (Last Rx:75Bdb4448)  Requested for: 09Kza6382 Ordered   5  Lisinopril 10 MG Oral Tablet; TAKE 1 TABLET DAILY AS DIRECTED; Therapy: 63RKB8847 to (Dixie Perez)  Requested for: 52PUK3564; Last     Rx:09Jan2018 Ordered   6  Pantoprazole Sodium 40 MG Oral Tablet Delayed Release; TAKE ONE TABLET DAILY 30     MINS BEFORE BREAKFAST  Requested for: 25Nls6577; Last Rx:77Wyp3707 Ordered   7  Sodium Chloride 1 GM Oral Tablet; take 2 tablet twice daily; Therapy: 04KEK3101 to ([phone])  Requested for: 75CAL8454; Last     Rx:09Jan2018 Ordered   8  Spiriva Respimat 2 5 MCG/ACT Inhalation Aerosol Solution; INHALE 2 PUFFS ONCE     DAILY;      Therapy: 15OOO5844 to (Last Rx:09Jan2018)  Requested for: 81LNT7894 Ordered   9  Ventolin  (90 Base) MCG/ACT Inhalation Aerosol Solution; INHALE 1 TO 2 PUFFS     EVERY 4 TO 6 HOURS AS NEEDED; Therapy: 71WYE4158 to (Last Rx:09Jan2018)  Requested for: 46HFR3291 Ordered    Allergies   Medication    1  No Known Drug Allergies    Vitals   Vital Signs    Recorded: 10XXV2477 10:59AM   Heart Rate 84, R Radial   Systolic 722, LUE, Sitting   Diastolic 74, LUE, Sitting   BP CUFF SIZE Large   Height 6 ft    Weight 268 lb 11 2 oz   BMI Calculated 36 44   BSA Calculated 2 42   O2 Saturation 98     Physical Exam        Constitutional - General appearance: No acute distress, well appearing and well nourished  Eyes - Conjunctiva and Sclera examination: Conjunctiva pink, sclera anicteric  Neck - Normal, no JVD   Pulmonary - Respiratory effort: No signs of respiratory distress  -- Auscultation of lungs: Abnormal  -- Significantly reduced breath sounds bilaterally; scattered rhonchi  Cardiovascular - Auscultation of heart: Normal rate and rhythm, normal S1 and S2, no murmurs  -- Pedal pulses: Normal, 2+ bilaterally  -- Examination of extremities for edema and/or varicosities: Normal        Abdomen - Soft  Musculoskeletal - Gait and station: Normal gait  Skin - Skin: Normal without rashes  Skin is warm and well perfused  Neurologic - Speech normal  No focal deficits  Psychiatric - Orientation to person, place, and time: Normal       Health Management   Colon polyps   COLONOSCOPY (GI, SURG); every 3 years; Last 22HWM1668; Next Due: 53VEH0147; Active    Future Appointments      Date/Time Provider Specialty Site   02/22/2018 10:00 AM Crista Meier DO Family Medicine [facility]   02/27/2018 10:15 AM APPLE Wood   Nephrology  [address] [place]     Signatures    Electronically signed by : Romelia Grant MD; Sung 10 2018 11:32AM EST                       (Author)

## 2018-01-13 VITALS
RESPIRATION RATE: 26 BRPM | WEIGHT: 259.38 LBS | SYSTOLIC BLOOD PRESSURE: 160 MMHG | DIASTOLIC BLOOD PRESSURE: 94 MMHG | HEART RATE: 64 BPM

## 2018-01-13 VITALS
SYSTOLIC BLOOD PRESSURE: 162 MMHG | DIASTOLIC BLOOD PRESSURE: 84 MMHG | WEIGHT: 265.5 LBS | RESPIRATION RATE: 20 BRPM | HEART RATE: 68 BPM

## 2018-01-13 VITALS
WEIGHT: 253.25 LBS | RESPIRATION RATE: 22 BRPM | TEMPERATURE: 98.1 F | HEART RATE: 100 BPM | DIASTOLIC BLOOD PRESSURE: 96 MMHG | SYSTOLIC BLOOD PRESSURE: 140 MMHG | BODY MASS INDEX: 34.35 KG/M2

## 2018-01-13 VITALS
SYSTOLIC BLOOD PRESSURE: 140 MMHG | HEART RATE: 64 BPM | WEIGHT: 260 LBS | DIASTOLIC BLOOD PRESSURE: 82 MMHG | TEMPERATURE: 97.7 F | HEIGHT: 72 IN | BODY MASS INDEX: 35.21 KG/M2

## 2018-01-13 VITALS
BODY MASS INDEX: 34.81 KG/M2 | SYSTOLIC BLOOD PRESSURE: 140 MMHG | HEIGHT: 72 IN | OXYGEN SATURATION: 94 % | TEMPERATURE: 97.2 F | DIASTOLIC BLOOD PRESSURE: 70 MMHG | WEIGHT: 257 LBS | RESPIRATION RATE: 16 BRPM | HEART RATE: 72 BPM

## 2018-01-13 VITALS
WEIGHT: 256 LBS | OXYGEN SATURATION: 85 % | HEART RATE: 79 BPM | SYSTOLIC BLOOD PRESSURE: 140 MMHG | HEIGHT: 72 IN | DIASTOLIC BLOOD PRESSURE: 56 MMHG | BODY MASS INDEX: 34.67 KG/M2

## 2018-01-13 VITALS
DIASTOLIC BLOOD PRESSURE: 81 MMHG | SYSTOLIC BLOOD PRESSURE: 151 MMHG | WEIGHT: 261.13 LBS | BODY MASS INDEX: 35.37 KG/M2 | HEIGHT: 72 IN | HEART RATE: 80 BPM

## 2018-01-13 VITALS
WEIGHT: 256 LBS | HEART RATE: 64 BPM | BODY MASS INDEX: 34.67 KG/M2 | SYSTOLIC BLOOD PRESSURE: 136 MMHG | DIASTOLIC BLOOD PRESSURE: 58 MMHG | HEIGHT: 72 IN

## 2018-01-13 NOTE — RESULT NOTES
Verified Results  (1) BASIC METABOLIC PROFILE 91AJM1974 10:10AM Blanka Anderson Order Number: [id number]     Test Name Result Flag Reference   SODIUM 124 mmol/L L 136-145   POTASSIUM 4 6 mmol/L  3 5-5 3   CHLORIDE 89 mmol/L L 100-108   CARBON DIOXIDE 29 mmol/L  21-32   ANION GAP (CALC) 6 mmol/L  4-13   BLOOD UREA NITROGEN 11 mg/dL  5-25   CREATININE 0 79 mg/dL  0 60-1 30   Standardized to IDMS reference method   CALCIUM 9 6 mg/dL  8 3-10 1   eGFR 94 ml/min/1 73sq m     National Kidney Disease Education Program recommendations are as follows:  GFR calculation is accurate only with a steady state creatinine  Chronic Kidney disease less than 60 ml/min/1 73 sq  meters  Kidney failure less than 15 ml/min/1 73 sq  meters  GLUCOSE FASTING 98 mg/dL  65-99   Specimen collection should occur prior to Sulfasalazine administration due to the potential for falsely depressed results  Specimen collection should occur prior to Sulfapyridine administration due to the potential for falsely elevated results

## 2018-01-14 VITALS
HEIGHT: 72 IN | HEART RATE: 76 BPM | WEIGHT: 269.13 LBS | BODY MASS INDEX: 36.45 KG/M2 | DIASTOLIC BLOOD PRESSURE: 76 MMHG | RESPIRATION RATE: 16 BRPM | SYSTOLIC BLOOD PRESSURE: 132 MMHG

## 2018-01-14 VITALS
HEIGHT: 72 IN | BODY MASS INDEX: 36.3 KG/M2 | WEIGHT: 268 LBS | SYSTOLIC BLOOD PRESSURE: 100 MMHG | DIASTOLIC BLOOD PRESSURE: 60 MMHG | HEART RATE: 76 BPM

## 2018-01-14 VITALS
DIASTOLIC BLOOD PRESSURE: 80 MMHG | WEIGHT: 263.25 LBS | BODY MASS INDEX: 35.7 KG/M2 | HEART RATE: 88 BPM | SYSTOLIC BLOOD PRESSURE: 158 MMHG | RESPIRATION RATE: 24 BRPM

## 2018-01-14 VITALS
WEIGHT: 262.25 LBS | RESPIRATION RATE: 22 BRPM | SYSTOLIC BLOOD PRESSURE: 180 MMHG | HEIGHT: 72 IN | HEART RATE: 80 BPM | DIASTOLIC BLOOD PRESSURE: 98 MMHG | BODY MASS INDEX: 35.52 KG/M2

## 2018-01-14 VITALS
WEIGHT: 265 LBS | SYSTOLIC BLOOD PRESSURE: 164 MMHG | BODY MASS INDEX: 35.89 KG/M2 | HEIGHT: 72 IN | HEART RATE: 76 BPM | DIASTOLIC BLOOD PRESSURE: 80 MMHG

## 2018-01-14 NOTE — RESULT NOTES
Verified Results  * XR KNEE 3 VW RIGHT NON INJURY 68Wiv3733 03:56PM Teri Mccray Order Number: [id number]   Performing Comments: Assess for signs of joint infection  Test Name Result Flag Reference   XR KNEE 3 VW RIGHT (Report)     RIGHT KNEE     INDICATION: Right knee pain  COMPARISON: None     VIEWS: 3     IMAGES: 3     FINDINGS:     There is no acute fracture or dislocation  There is a moderate-sized joint effusion  Minimal patellar spurring  Narrowing of the patellofemoral compartment  No lytic or blastic lesions are seen  Soft tissue swelling about the knee  IMPRESSION:     There is a moderate-sized joint effusion  Soft tissue swelling  Joint effusion         Workstation performed: [id number]     Signed by:   Joel Us MD   8/4/17

## 2018-01-15 NOTE — RESULT NOTES
Verified Results  (1) CBC/PLT/DIFF 07IJL7951 10:12AM Jaron Smith     Test Name Result Flag Reference   WBC COUNT 4 69 Thousand/uL  4 31-10 16   RBC COUNT 5 26 Million/uL  3 88-5 62   HEMOGLOBIN 16 7 g/dL  12 0-17 0   HEMATOCRIT 48 6 %  36 5-49 3   MCV 92 fL  82-98   MCH 31 7 pg  26 8-34 3   MCHC 34 4 g/dL  31 4-37 4   RDW 13 5 %  11 6-15 1   MPV 9 1 fL  8 9-12 7   PLATELET COUNT 291 Thousands/uL  149-390   nRBC AUTOMATED 0 /100 WBCs     NEUTROPHILS RELATIVE PERCENT 66 %  43-75   LYMPHOCYTES RELATIVE PERCENT 18 %  14-44   MONOCYTES RELATIVE PERCENT 11 %  4-12   EOSINOPHILS RELATIVE PERCENT 4 %  0-6   BASOPHILS RELATIVE PERCENT 1 %  0-1   NEUTROPHILS ABSOLUTE COUNT 3 09 Thousands/? ??L  1 85-7 62   LYMPHOCYTES ABSOLUTE COUNT 0 84 Thousands/? ??L  0 60-4 47   MONOCYTES ABSOLUTE COUNT 0 53 Thousand/? ??L  0 17-1 22   EOSINOPHILS ABSOLUTE COUNT 0 17 Thousand/? ??L  0 00-0 61   BASOPHILS ABSOLUTE COUNT 0 04 Thousands/? ??L  0 00-0 10   This bloodwork is non-fasting  Please drink two glasses of water morning of  bloodwork  (1) BASIC METABOLIC PROFILE 21JDT9670 10:12AM Jaron [place]     Test Name Result Flag Reference   SODIUM 128 mmol/L L 136-145   POTASSIUM 5 2 mmol/L  3 5-5 3   Slightly Hemolyzed; Results May be Affected   CHLORIDE 92 mmol/L L 100-108   CARBON DIOXIDE 29 mmol/L  21-32   ANION GAP (CALC) 7 mmol/L  4-13   BLOOD UREA NITROGEN 12 mg/dL  5-25   CREATININE 0 91 mg/dL  0 60-1 30   Standardized to IDMS reference method   CALCIUM 9 9 mg/dL  8 3-10 1   eGFR Non-African American      >60 0 ml/min/1 73sq m   [email or web address] [facility] Disease Education Program recommendations are as follows:  GFR calculation is accurate only with a steady state creatinine  Chronic Kidney disease less than 60 ml/min/1 73 sq  meters  Kidney failure less than 15 ml/min/1 73 sq  meters     GLUCOSE FASTING 107 mg/dL H 65-99

## 2018-01-15 NOTE — RESULT NOTES
Verified Results  * XR CHEST PA & LATERAL 31Oct2017 10:42AM [facility] Order Number: [id number]     Test Name Result Flag Reference   XR CHEST PA & LATERAL (Report)     CHEST - DUAL ENERGY     INDICATION: Pneumonia  COMPARISON: 5/26/2017  VIEWS: PA (including soft tissue/bone algorithms) and lateral projections     IMAGES: 6     FINDINGS:        Cardiomediastinal silhouette appears unremarkable  Emphysematous changes are noted consistent with chronic obstructive pulmonary disease  No airspace opacity to suggest focal pneumonia  No pneumothorax or pleural effusion  Severe right shoulder arthropathy stable  IMPRESSION:     Emphysematous changes are noted  Otherwise clear lungs         Workstation performed: [id number]     Signed by:   Norm Barriga MD   11/2/17

## 2018-01-16 NOTE — RESULT NOTES
Verified Results  (1) BASIC METABOLIC PROFILE [id number] 11:08AM Wilene Arrow Order Number: [id number]     Test Name Result Flag Reference   SODIUM 133 mmol/L L 136-145   POTASSIUM 4 4 mmol/L  3 5-5 3   CHLORIDE 95 mmol/L L 100-108   CARBON DIOXIDE 29 mmol/L  21-32   ANION GAP (CALC) 9 mmol/L  4-13   BLOOD UREA NITROGEN 12 mg/dL  5-25   CREATININE 1 00 mg/dL  0 60-1 30   Standardized to IDMS reference method   CALCIUM 9 5 mg/dL  8 3-10 1   eGFR 79 ml/min/1 73sq m     National Kidney Disease Education Program recommendations are as follows:  GFR calculation is accurate only with a steady state creatinine  Chronic Kidney disease less than 60 ml/min/1 73 sq  meters  Kidney failure less than 15 ml/min/1 73 sq  meters  GLUCOSE FASTING 126 mg/dL H 65-99   Specimen collection should occur prior to Sulfasalazine administration due to the potential for falsely depressed results  Specimen collection should occur prior to Sulfapyridine administration due to the potential for falsely elevated results  (1) Yesica 74YXS9974 10:30AM [facility] Order Number: [id number]     Test Name Result Flag Reference   SODIUM 131 mmol/L L 136-145   POTASSIUM 5 0 mmol/L  3 5-5 3   Slightly Hemolyzed; Results May be Affected   CHLORIDE 93 mmol/L L 100-108   CARBON DIOXIDE 30 mmol/L  21-32   ANION GAP (CALC) 8 mmol/L  4-13   BLOOD UREA NITROGEN 19 mg/dL  5-25   CREATININE 0 91 mg/dL  0 60-1 30   Standardized to IDMS reference method   CALCIUM 9 6 mg/dL  8 3-10 1   eGFR 88 ml/min/1 73sq m     National Kidney Disease Education Program recommendations are as follows:  GFR calculation is accurate only with a steady state creatinine  Chronic Kidney disease less than 60 ml/min/1 73 sq  meters  Kidney failure less than 15 ml/min/1 73 sq  meters     GLUCOSE FASTING 101 mg/dL H 65-99   Specimen collection should occur prior to Sulfasalazine administration due to the potential for falsely depressed results  Specimen collection should occur prior to Sulfapyridine administration due to the potential for falsely elevated results  (1) OSMOLALITY, URINE 69BFG2350 10:30AM Karen Mode   TW Order Number: [id number]     Test Name Result Flag Reference   OSMOL, URINE 247 mmol/KG L 250-900     (1) SODIUM, URINE RANDOM 84WEV3129 10:30AM Michael Whaley Order Number: [id number]     Test Name Result Flag Reference   SODIUM URINE 36       (1) TSH WITH FT4 REFLEX 11Oct2017 10:30AM Karen Mode   TW Order Number: [id number]     Test Name Result Flag Reference   TSH 1 352 uIU/mL  0 358-3 740   Patients undergoing fluorescein dye angiography may retain small amounts of fluorescein in the body for 48-72 hours post procedure  Samples containing fluorescein can produce falsely depressed TSH values  If the patient had this procedure,a specimen should be resubmitted post fluorescein clearance         Plan  Hyponatremia    · Follow-up visit in 1 month Evaluation and Treatment  Follow-up  Status: Complete -  Scheduling  Done: 87UXO2373 11:00AM   · (1) BASIC METABOLIC PROFILE; Status:Complete;   Done: 91YPJ6646 10:30AM   · (1) BASIC METABOLIC PROFILE; Status:Complete;   Done: 20TJN1132 11:08AM   · (1) OSMOLALITY, URINE; Status:Complete;   Done: 96BQR1603 10:30AM   · (1) SODIUM, URINE RANDOM; Status:Complete;   Done: 18BHQ4292 10:30AM   · (1) TSH WITH FT4 REFLEX; Status:Complete;   Done: 91PTV3883 10:30AM  Unlinked    · HydroCHLOROthiazide 12 5 MG Oral Tablet

## 2018-01-16 NOTE — RESULT NOTES
Verified Results  (1) CBC/PLT/DIFF 86PYN6134 09:56AM [facility] Number: [id number]     Test Name Result Flag Reference   WBC COUNT 6 03 Thousand/uL  4 31-10 16   RBC COUNT 4 90 Million/uL  3 88-5 62   HEMOGLOBIN 16 1 g/dL  12 0-17 0   HEMATOCRIT 46 6 %  36 5-49 3   MCV 95 fL  82-98   MCH 32 9 pg  26 8-34 3   MCHC 34 5 g/dL  31 4-37 4   RDW 13 4 %  11 6-15 1   MPV 9 7 fL  8 9-12 7   PLATELET COUNT 776 Thousands/uL  149-390   nRBC AUTOMATED 0 /100 WBCs     NEUTROPHILS RELATIVE PERCENT 69 %  43-75   LYMPHOCYTES RELATIVE PERCENT 13 % L 14-44   MONOCYTES RELATIVE PERCENT 13 % H 4-12   EOSINOPHILS RELATIVE PERCENT 4 %  0-6   BASOPHILS RELATIVE PERCENT 1 %  0-1   NEUTROPHILS ABSOLUTE COUNT 4 16 Thousands/? ??L  1 85-7 62   LYMPHOCYTES ABSOLUTE COUNT 0 81 Thousands/? ??L  0 60-4 47   MONOCYTES ABSOLUTE COUNT 0 79 Thousand/? ??L  0 17-1 22   EOSINOPHILS ABSOLUTE COUNT 0 21 Thousand/? ??L  0 00-0 61   BASOPHILS ABSOLUTE COUNT 0 05 Thousands/? ??L  0 00-0 10     (1) BASIC METABOLIC PROFILE 03XUU3032 09:56AM [facility] Order Number: [id number]     Test Name Result Flag Reference   SODIUM 124 mmol/L L 136-145   POTASSIUM 4 4 mmol/L  3 5-5 3   CHLORIDE 87 mmol/L L 100-108   CARBON DIOXIDE 33 mmol/L H 21-32   ANION GAP (CALC) 4 mmol/L  4-13   BLOOD UREA NITROGEN 9 mg/dL  5-25   CREATININE 0 80 mg/dL  0 60-1 30   Standardized to IDMS reference method   CALCIUM 9 6 mg/dL  8 3-10 1   eGFR Non-African American      >60 0 ml/min/1 73sq m   [employer] Disease Education Program recommendations are as follows:  GFR calculation is accurate only with a steady state creatinine  Chronic Kidney disease less than 60 ml/min/1 73 sq  meters  Kidney failure less than 15 ml/min/1 73 sq  meters     GLUCOSE FASTING 96 mg/dL  65-99

## 2018-01-16 NOTE — RESULT NOTES
Verified Results  * XR SHOULDER 2+ VIEW LEFT [id number] 10:27AM vLine Order Number: [id number]     Test Name Result Flag Reference   XR SHOULDER 2+ VW LEFT (Report)     LEFT SHOULDER     INDICATION: Limited range of motion  Anterior shoulder pain  COMPARISON: Contralateral     VIEWS: 3     IMAGES: 3     FINDINGS:     There is no acute fracture or dislocation  There is severe narrowing of the subacromial space  There is also severe degenerative changes with bone on bone narrowing of the glenohumeral articulation, seen in combination with sclerosis, subchondral cyst formation and marginal osteophytes  There    is remodeling and deformity of the normal humeral head contour  IMPRESSION:     Severe degenerative changes as described  Workstation performed: [id number]     Signed by:   Cuco Lo MD   5/19/17     * XR SHOULDER 2+ VIEW RIGHT [id number] 10:27AM vLine Order Number: [id number]     Test Name Result Flag Reference   XR SHOULDER 2+ VW RIGHT (Report)     RIGHT SHOULDER     INDICATION: Limited range of motion  Anterior shoulder pain  COMPARISON: None     VIEWS: 3     IMAGES: 3     FINDINGS:     There is no acute fracture or dislocation  Severe degenerative changes, with bone on bone narrowing of the glenohumeral articulation with severe sclerosis and subchondral cyst formation and hypertrophic osteophyte formation  Moderate narrowing of the subacromial space  IMPRESSION:     Severe degenerative changes as above         Workstation performed: [id number]     Signed by:   Cuco Lo MD   5/19/17

## 2018-01-22 VITALS
TEMPERATURE: 98.1 F | DIASTOLIC BLOOD PRESSURE: 86 MMHG | BODY MASS INDEX: 35.01 KG/M2 | HEART RATE: 66 BPM | WEIGHT: 258.5 LBS | HEIGHT: 72 IN | SYSTOLIC BLOOD PRESSURE: 132 MMHG

## 2018-01-22 VITALS
BODY MASS INDEX: 35.26 KG/M2 | WEIGHT: 260 LBS | HEART RATE: 81 BPM | SYSTOLIC BLOOD PRESSURE: 118 MMHG | TEMPERATURE: 96.2 F | DIASTOLIC BLOOD PRESSURE: 68 MMHG | OXYGEN SATURATION: 96 %

## 2018-01-23 VITALS
BODY MASS INDEX: 36.39 KG/M2 | WEIGHT: 268.7 LBS | SYSTOLIC BLOOD PRESSURE: 126 MMHG | HEIGHT: 72 IN | DIASTOLIC BLOOD PRESSURE: 74 MMHG | OXYGEN SATURATION: 98 % | HEART RATE: 84 BPM

## 2018-01-26 ENCOUNTER — TRANSCRIBE ORDERS (OUTPATIENT)
Dept: CARDIOLOGY CLINIC | Facility: CLINIC | Age: 68
End: 2018-01-26

## 2018-01-26 DIAGNOSIS — I25.10 CORONARY ARTERY DISEASE INVOLVING NATIVE CORONARY ARTERY OF NATIVE HEART WITHOUT ANGINA PECTORIS: Primary | ICD-10-CM

## 2018-01-30 ENCOUNTER — TELEPHONE (OUTPATIENT)
Dept: INPATIENT UNIT | Facility: HOSPITAL | Age: 68
End: 2018-01-30

## 2018-01-30 PROBLEM — I20.9 ANGINA, CLASS III (HCC): Chronic | Status: ACTIVE | Noted: 2018-01-30

## 2018-01-30 PROBLEM — I25.10 CORONARY ARTERY DISEASE INVOLVING NATIVE CORONARY ARTERY: Chronic | Status: ACTIVE | Noted: 2018-01-30

## 2018-01-30 PROBLEM — R06.00 DOE (DYSPNEA ON EXERTION): Chronic | Status: ACTIVE | Noted: 2018-01-30

## 2018-01-30 RX ORDER — SODIUM CHLORIDE 9 MG/ML
125 INJECTION, SOLUTION INTRAVENOUS CONTINUOUS
Status: CANCELLED | OUTPATIENT
Start: 2018-01-30

## 2018-01-30 RX ORDER — ASPIRIN 81 MG/1
324 TABLET, CHEWABLE ORAL ONCE
Status: CANCELLED | OUTPATIENT
Start: 2018-01-30 | End: 2018-01-30

## 2018-01-30 RX ORDER — CLOPIDOGREL BISULFATE 75 MG/1
600 TABLET ORAL ONCE
Status: CANCELLED | OUTPATIENT
Start: 2018-01-30 | End: 2018-01-30

## 2018-01-30 RX ORDER — SODIUM CHLORIDE 9 MG/ML
75 INJECTION, SOLUTION INTRAVENOUS CONTINUOUS
Status: CANCELLED | OUTPATIENT
Start: 2018-01-30

## 2018-01-31 ENCOUNTER — HOSPITAL ENCOUNTER (OUTPATIENT)
Dept: NON INVASIVE DIAGNOSTICS | Facility: HOSPITAL | Age: 68
Discharge: HOME | End: 2018-01-31
Attending: INTERNAL MEDICINE | Admitting: INTERNAL MEDICINE
Payer: COMMERCIAL

## 2018-01-31 VITALS
WEIGHT: 270 LBS | HEIGHT: 73 IN | DIASTOLIC BLOOD PRESSURE: 68 MMHG | SYSTOLIC BLOOD PRESSURE: 137 MMHG | TEMPERATURE: 98.5 F | BODY MASS INDEX: 35.78 KG/M2 | OXYGEN SATURATION: 98 % | HEART RATE: 64 BPM | RESPIRATION RATE: 18 BRPM

## 2018-01-31 DIAGNOSIS — I25.10 CORONARY ARTERY DISEASE INVOLVING NATIVE CORONARY ARTERY OF NATIVE HEART WITHOUT ANGINA PECTORIS: ICD-10-CM

## 2018-01-31 LAB
ANION GAP SERPL CALCULATED.3IONS-SCNC: 6 MMOL/L (ref 4–13)
ATRIAL RATE: 64 BPM
BUN SERPL-MCNC: 12 MG/DL (ref 5–25)
CALCIUM SERPL-MCNC: 9 MG/DL (ref 8.3–10.1)
CHLORIDE SERPL-SCNC: 93 MMOL/L (ref 100–108)
CHOLEST SERPL-MCNC: 120 MG/DL (ref 50–200)
CO2 SERPL-SCNC: 30 MMOL/L (ref 21–32)
CREAT SERPL-MCNC: 0.76 MG/DL (ref 0.6–1.3)
ERYTHROCYTE [DISTWIDTH] IN BLOOD BY AUTOMATED COUNT: 13.4 % (ref 11.6–15.1)
GFR SERPL CREATININE-BSD FRML MDRD: 95 ML/MIN/1.73SQ M
GLUCOSE P FAST SERPL-MCNC: 104 MG/DL (ref 65–99)
GLUCOSE SERPL-MCNC: 104 MG/DL (ref 65–140)
HCT VFR BLD AUTO: 44.9 % (ref 36.5–49.3)
HDLC SERPL-MCNC: 42 MG/DL (ref 40–60)
HGB BLD-MCNC: 15.4 G/DL (ref 12–17)
INR PPP: 1.06 (ref 0.86–1.16)
LDLC SERPL CALC-MCNC: 66 MG/DL (ref 0–100)
MAGNESIUM SERPL-MCNC: 2.1 MG/DL (ref 1.6–2.6)
MCH RBC QN AUTO: 32.3 PG (ref 26.8–34.3)
MCHC RBC AUTO-ENTMCNC: 34.3 G/DL (ref 31.4–37.4)
MCV RBC AUTO: 94 FL (ref 82–98)
PLATELET # BLD AUTO: 261 THOUSANDS/UL (ref 149–390)
PMV BLD AUTO: 9.5 FL (ref 8.9–12.7)
POTASSIUM SERPL-SCNC: 4.3 MMOL/L (ref 3.5–5.3)
PR INTERVAL: 194 MS
PROTHROMBIN TIME: 13.8 SECONDS (ref 12.1–14.4)
QRS AXIS: 36 DEGREES
QRSD INTERVAL: 92 MS
QT INTERVAL: 398 MS
QTC INTERVAL: 410 MS
RBC # BLD AUTO: 4.77 MILLION/UL (ref 3.88–5.62)
SODIUM SERPL-SCNC: 129 MMOL/L (ref 136–145)
T WAVE AXIS: 75 DEGREES
TRIGL SERPL-MCNC: 60 MG/DL
VENTRICULAR RATE: 64 BPM
WBC # BLD AUTO: 7.73 THOUSAND/UL (ref 4.31–10.16)

## 2018-01-31 PROCEDURE — C1769 GUIDE WIRE: HCPCS | Performed by: INTERNAL MEDICINE

## 2018-01-31 PROCEDURE — 93005 ELECTROCARDIOGRAM TRACING: CPT | Performed by: INTERNAL MEDICINE

## 2018-01-31 PROCEDURE — 99152 MOD SED SAME PHYS/QHP 5/>YRS: CPT | Performed by: INTERNAL MEDICINE

## 2018-01-31 PROCEDURE — 80048 BASIC METABOLIC PNL TOTAL CA: CPT | Performed by: INTERNAL MEDICINE

## 2018-01-31 PROCEDURE — 85027 COMPLETE CBC AUTOMATED: CPT | Performed by: INTERNAL MEDICINE

## 2018-01-31 PROCEDURE — C1887 CATHETER, GUIDING: HCPCS | Performed by: INTERNAL MEDICINE

## 2018-01-31 PROCEDURE — 93010 ELECTROCARDIOGRAM REPORT: CPT | Performed by: INTERNAL MEDICINE

## 2018-01-31 PROCEDURE — 93454 CORONARY ARTERY ANGIO S&I: CPT | Performed by: INTERNAL MEDICINE

## 2018-01-31 PROCEDURE — 83735 ASSAY OF MAGNESIUM: CPT | Performed by: INTERNAL MEDICINE

## 2018-01-31 PROCEDURE — C1894 INTRO/SHEATH, NON-LASER: HCPCS | Performed by: INTERNAL MEDICINE

## 2018-01-31 PROCEDURE — 99153 MOD SED SAME PHYS/QHP EA: CPT | Performed by: INTERNAL MEDICINE

## 2018-01-31 PROCEDURE — C1760 CLOSURE DEV, VASC: HCPCS | Performed by: INTERNAL MEDICINE

## 2018-01-31 PROCEDURE — 85610 PROTHROMBIN TIME: CPT | Performed by: INTERNAL MEDICINE

## 2018-01-31 PROCEDURE — 80061 LIPID PANEL: CPT | Performed by: INTERNAL MEDICINE

## 2018-01-31 RX ORDER — ACETAMINOPHEN 325 MG/1
650 TABLET ORAL EVERY 6 HOURS PRN
Status: DISCONTINUED | OUTPATIENT
Start: 2018-01-31 | End: 2018-01-31 | Stop reason: HOSPADM

## 2018-01-31 RX ORDER — ASPIRIN 81 MG/1
81 TABLET ORAL DAILY
COMMUNITY
End: 2019-07-30 | Stop reason: ALTCHOICE

## 2018-01-31 RX ORDER — MIDAZOLAM HYDROCHLORIDE 1 MG/ML
INJECTION INTRAMUSCULAR; INTRAVENOUS CODE/TRAUMA/SEDATION MEDICATION
Status: COMPLETED | OUTPATIENT
Start: 2018-01-31 | End: 2018-01-31

## 2018-01-31 RX ORDER — CLOPIDOGREL BISULFATE 75 MG/1
600 TABLET ORAL ONCE
Status: COMPLETED | OUTPATIENT
Start: 2018-01-31 | End: 2018-01-31

## 2018-01-31 RX ORDER — SODIUM CHLORIDE 9 MG/ML
125 INJECTION, SOLUTION INTRAVENOUS CONTINUOUS
Status: DISCONTINUED | OUTPATIENT
Start: 2018-01-31 | End: 2018-01-31 | Stop reason: HOSPADM

## 2018-01-31 RX ORDER — ASPIRIN 81 MG/1
324 TABLET, CHEWABLE ORAL ONCE
Status: COMPLETED | OUTPATIENT
Start: 2018-01-31 | End: 2018-01-31

## 2018-01-31 RX ORDER — FENTANYL CITRATE 50 UG/ML
INJECTION, SOLUTION INTRAMUSCULAR; INTRAVENOUS CODE/TRAUMA/SEDATION MEDICATION
Status: COMPLETED | OUTPATIENT
Start: 2018-01-31 | End: 2018-01-31

## 2018-01-31 RX ORDER — LIDOCAINE HYDROCHLORIDE 10 MG/ML
INJECTION, SOLUTION INFILTRATION; PERINEURAL CODE/TRAUMA/SEDATION MEDICATION
Status: COMPLETED | OUTPATIENT
Start: 2018-01-31 | End: 2018-01-31

## 2018-01-31 RX ORDER — SODIUM CHLORIDE 1000 MG
2 TABLET, SOLUBLE MISCELLANEOUS DAILY
COMMUNITY
End: 2018-02-27 | Stop reason: SDUPTHER

## 2018-01-31 RX ADMIN — SODIUM CHLORIDE 125 ML/HR: 0.9 INJECTION, SOLUTION INTRAVENOUS at 07:30

## 2018-01-31 RX ADMIN — IOHEXOL 130 ML: 350 INJECTION, SOLUTION INTRAVENOUS at 08:43

## 2018-01-31 RX ADMIN — MIDAZOLAM 2 MG: 1 INJECTION INTRAMUSCULAR; INTRAVENOUS at 08:06

## 2018-01-31 RX ADMIN — ASPIRIN 81 MG 243 MG: 81 TABLET ORAL at 07:28

## 2018-01-31 RX ADMIN — CLOPIDOGREL BISULFATE 600 MG: 75 TABLET ORAL at 07:29

## 2018-01-31 RX ADMIN — MIDAZOLAM 1 MG: 1 INJECTION INTRAMUSCULAR; INTRAVENOUS at 08:15

## 2018-01-31 RX ADMIN — FENTANYL CITRATE 50 MCG: 50 INJECTION, SOLUTION INTRAMUSCULAR; INTRAVENOUS at 08:06

## 2018-01-31 RX ADMIN — LIDOCAINE HYDROCHLORIDE 10 ML: 10 INJECTION, SOLUTION INFILTRATION; PERINEURAL at 08:13

## 2018-01-31 RX ADMIN — FENTANYL CITRATE 25 MCG: 50 INJECTION, SOLUTION INTRAMUSCULAR; INTRAVENOUS at 08:14

## 2018-01-31 NOTE — DISCHARGE INSTRUCTIONS
1  Please see the post cardiac catheterization dishcarge instructions  No heavy lifting, greater than 10 lbs  or strenuous  activity for 48 hrs  2 Remove band aid tomorrow  Shower and wash area- groin gently with soap and water- beginning tomorrow  Rinse and pat dry  Apply new water seal band aid  Repeat this process for 5 days  No powders, creams lotions or antibiotic ointments  for 5 days  No tub baths, hot tubs or swimming for 5 days  3  Please call our office ([phone]) if you have any fever, redness, swelling, discharge from your groin access site  4 No driving for 1 day    5  Perclose Booklet       Heart Catheterization   WHAT YOU NEED TO KNOW:   A heart catheterization is a procedure to look at your heart and its blood vessels  Healthcare providers can measure oxygen levels and pressures in your heart  They can also fix problems with your valves, blood vessels, or the walls of your heart  You may need this procedure if you have chest pain, heart disease, or your heart is not working properly  DISCHARGE INSTRUCTIONS:   Call 911 for any of the following:   · You have any of the following signs of a heart attack:      ¨ Squeezing, pressure, or pain in your chest that lasts longer than 5 minutes or returns    ¨ Discomfort or pain in your back, neck, jaw, stomach, or arm     ¨ Trouble breathing    ¨ Nausea or vomiting    ¨ Lightheadedness or a sudden cold sweat, especially with chest pain or trouble breathing    · You have any of the following signs of a stroke:      ¨ Numbness or drooping on one side of your face     ¨ Weakness in an arm or leg    ¨ Confusion or difficulty speaking    ¨ Dizziness, a severe headache, or vision loss    · You feel lightheaded, short of breath, and have chest pain  · You cough up blood  · You have trouble breathing  · You cannot stop the bleeding from your wound even after you hold firm pressure for 10 minutes    Seek care immediately if: · Blood soaks through your bandage  · Your stitches come apart  · Your arm or leg feels numb, cool, or looks pale  · Your wound gets swollen quickly  Contact your healthcare provider if:   · You have a fever or chills  · Your wound is red, swollen, or draining pus  · Your wound looks more bruised or there is new bruising on the side of your leg or arm  · You have nausea or are vomiting  · Your skin is itchy, swollen, or you have a rash  · You have questions or concerns about your condition or care  Medicines: You may need any of the following:  · Blood thinners  help prevent blood clots  You may need to take blood thinners after your procedure if your healthcare provider finds problems in your heart or blood vessels  You may also need them if he replaces one of your heart valves  Examples of blood thinners include heparin and warfarin  Clots can cause strokes, heart attacks, and death  The following are general safety guidelines to follow while you are taking a blood thinner:    ¨ Watch for bleeding and bruising while you take blood thinners  Watch for bleeding from your gums or nose  Watch for blood in your urine and bowel movements  Use a soft washcloth on your skin, and a soft toothbrush to brush your teeth  This can keep your skin and gums from bleeding  If you shave, use an electric shaver  Do not play contact sports  ¨ Tell your dentist and other healthcare providers that you take anticoagulants  Wear a bracelet or necklace that says you take this medicine  ¨ Do not start or stop any medicines unless your healthcare provider tells you to  Many medicines cannot be used with blood thinners  ¨ Tell your healthcare provider right away if you forget to take the medicine, or if you take too much  ¨ Warfarin  is a blood thinner that you may need to take  The following are things you should be aware of if you take warfarin      § Foods and medicines can affect the amount of warfarin in your blood  Do not make major changes to your diet while you take warfarin  Warfarin works best when you eat about the same amount of vitamin K every day  Vitamin K is found in green leafy vegetables and certain other foods  Ask for more information about what to eat when you are taking warfarin  § You will need to see your healthcare provider for follow-up visits when you are on warfarin  You will need regular blood tests  These tests are used to decide how much medicine you need  · Acetaminophen  helps decrease your pain and fever  This medicine is available without a doctor's order  Ask how much medicine is safe to take, and how often to take it  Acetaminophen can cause liver damage if not taken correctly  · Take your medicine as directed  Contact your healthcare provider if you think your medicine is not helping or if you have side effects  Tell him or her if you are allergic to any medicine  Keep a list of the medicines, vitamins, and herbs you take  Include the amounts, and when and why you take them  Bring the list or the pill bottles to follow-up visits  Carry your medicine list with you in case of an emergency  Bathing: You may be able to shower the day after your procedure  Remove your pressure bandage before you shower  Do not take baths or go in hot tubs or pools  Carefully wash the wound with soap and water  Pat the area dry  Care for your wound as directed:  Change your bandage when it gets wet or dirty  A small band-aid can be placed on your wound after you remove the pressure bandage  Monitor your wound everyday for signs of infection such as redness, swelling, or pus  Mild bruising is normal and expected  Do not put powders, lotions, or creams on your wound  Self-care:   · Apply firm , steady pressure if bleeding occurs  A small amount of bleeding from your wound is possible  Apply pressure with a clean gauze or towel for 5 to 10 minutes   Call 911 if bleeding becomes heavy or does not stop  · Do not lift anything heavier than 5 pounds  until directed by your healthcare provider  Heavy lifting can put stress on your wound and cause bleeding  Do not push or pull with the arm that was used for the procedure  · Do not do vigorous activity for at least 48 hours  Vigorous activity may cause bleeding from your wound  Rest and do quiet activities  Short walks to the bathroom and around the house are okay  Ask your healthcare provider when you can return to your normal activities  · Limit stair climbing  to prevent bleeding from your wound  Plan activities on one floor and use stairs 2 times a day or less  · Drink liquids  to flush the contrast liquid from your body and prevent blood clots  Ask how much liquid to drink each day and which liquids are best for you  · Restart your blood thinners as directed  Your healthcare provider may tell you to start taking your blood thinners after your procedure or he may have you wait a few days  Follow up with your healthcare provider as directed:  Write down your questions so you remember to ask them during your visits  © 2017 [facility] Information is for End User's use only and may not be sold, redistributed or otherwise used for commercial purposes  All illustrations and images included in CareNotes® are the copyrighted property of A D A M [employer]  or [employer]  The above information is an  only  It is not intended as medical advice for individual conditions or treatments  Talk to your doctor, nurse or pharmacist before following any medical regimen to see if it is safe and effective for you
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
## 2018-02-13 ENCOUNTER — TELEPHONE (OUTPATIENT)
Dept: INPATIENT UNIT | Facility: HOSPITAL | Age: 68
End: 2018-02-13

## 2018-02-13 RX ORDER — SODIUM CHLORIDE 9 MG/ML
125 INJECTION, SOLUTION INTRAVENOUS CONTINUOUS
Status: CANCELLED | OUTPATIENT
Start: 2018-02-13

## 2018-02-13 RX ORDER — ASPIRIN 81 MG/1
324 TABLET, CHEWABLE ORAL ONCE
Status: CANCELLED | OUTPATIENT
Start: 2018-02-13 | End: 2018-02-13

## 2018-02-14 ENCOUNTER — HOSPITAL ENCOUNTER (OUTPATIENT)
Dept: NON INVASIVE DIAGNOSTICS | Facility: HOSPITAL | Age: 68
Discharge: HOME | End: 2018-02-15
Attending: INTERNAL MEDICINE | Admitting: INTERNAL MEDICINE
Payer: COMMERCIAL

## 2018-02-14 ENCOUNTER — ANESTHESIA (OUTPATIENT)
Dept: SURGERY | Facility: HOSPITAL | Age: 68
End: 2018-02-14
Payer: COMMERCIAL

## 2018-02-14 ENCOUNTER — ANESTHESIA EVENT (OUTPATIENT)
Dept: SURGERY | Facility: HOSPITAL | Age: 68
End: 2018-02-14
Payer: COMMERCIAL

## 2018-02-14 DIAGNOSIS — I25.10 CORONARY ARTERIOSCLEROSIS: ICD-10-CM

## 2018-02-14 DIAGNOSIS — I25.10 CORONARY ARTERY DISEASE INVOLVING NATIVE CORONARY ARTERY: Primary | Chronic | ICD-10-CM

## 2018-02-14 LAB
ANION GAP SERPL CALCULATED.3IONS-SCNC: 7 MMOL/L (ref 4–13)
ATRIAL RATE: 71 BPM
BUN SERPL-MCNC: 15 MG/DL (ref 5–25)
CALCIUM SERPL-MCNC: 8.9 MG/DL (ref 8.3–10.1)
CHLORIDE SERPL-SCNC: 96 MMOL/L (ref 100–108)
CHOLEST SERPL-MCNC: 138 MG/DL (ref 50–200)
CO2 SERPL-SCNC: 29 MMOL/L (ref 21–32)
CREAT SERPL-MCNC: 0.79 MG/DL (ref 0.6–1.3)
ERYTHROCYTE [DISTWIDTH] IN BLOOD BY AUTOMATED COUNT: 13.9 % (ref 11.6–15.1)
GFR SERPL CREATININE-BSD FRML MDRD: 94 ML/MIN/1.73SQ M
GLUCOSE P FAST SERPL-MCNC: 105 MG/DL (ref 65–99)
GLUCOSE SERPL-MCNC: 105 MG/DL (ref 65–140)
HCT VFR BLD AUTO: 44.2 % (ref 36.5–49.3)
HDLC SERPL-MCNC: 54 MG/DL (ref 40–60)
HGB BLD-MCNC: 15.3 G/DL (ref 12–17)
INR PPP: 1.07 (ref 0.86–1.16)
KCT BLD-ACNC: 189 SEC (ref 89–137)
KCT BLD-ACNC: 224 SEC (ref 89–137)
KCT BLD-ACNC: 272 SEC (ref 89–137)
KCT BLD-ACNC: 297 SEC (ref 89–137)
LDLC SERPL CALC-MCNC: 72 MG/DL (ref 0–100)
MAGNESIUM SERPL-MCNC: 1.9 MG/DL (ref 1.6–2.6)
MCH RBC QN AUTO: 32.6 PG (ref 26.8–34.3)
MCHC RBC AUTO-ENTMCNC: 34.6 G/DL (ref 31.4–37.4)
MCV RBC AUTO: 94 FL (ref 82–98)
P AXIS: 113 DEGREES
PLATELET # BLD AUTO: 227 THOUSANDS/UL (ref 149–390)
PMV BLD AUTO: 9.8 FL (ref 8.9–12.7)
POTASSIUM SERPL-SCNC: 4.5 MMOL/L (ref 3.5–5.3)
PR INTERVAL: 184 MS
PROTHROMBIN TIME: 13.9 SECONDS (ref 12.1–14.4)
QRS AXIS: 52 DEGREES
QRSD INTERVAL: 98 MS
QT INTERVAL: 394 MS
QTC INTERVAL: 428 MS
RBC # BLD AUTO: 4.69 MILLION/UL (ref 3.88–5.62)
SODIUM SERPL-SCNC: 132 MMOL/L (ref 136–145)
SPECIMEN SOURCE: ABNORMAL
T WAVE AXIS: 86 DEGREES
TRIGL SERPL-MCNC: 60 MG/DL
VENTRICULAR RATE: 71 BPM
WBC # BLD AUTO: 6.57 THOUSAND/UL (ref 4.31–10.16)

## 2018-02-14 PROCEDURE — 80061 LIPID PANEL: CPT | Performed by: INTERNAL MEDICINE

## 2018-02-14 PROCEDURE — C9607 PERC D-E COR REVASC CHRO SIN: HCPCS | Performed by: INTERNAL MEDICINE

## 2018-02-14 PROCEDURE — C1769 GUIDE WIRE: HCPCS | Performed by: INTERNAL MEDICINE

## 2018-02-14 PROCEDURE — 92920 PRQ TRLUML C ANGIOP 1ART&/BR: CPT | Performed by: INTERNAL MEDICINE

## 2018-02-14 PROCEDURE — C1887 CATHETER, GUIDING: HCPCS | Performed by: INTERNAL MEDICINE

## 2018-02-14 PROCEDURE — 93005 ELECTROCARDIOGRAM TRACING: CPT

## 2018-02-14 PROCEDURE — C1894 INTRO/SHEATH, NON-LASER: HCPCS | Performed by: INTERNAL MEDICINE

## 2018-02-14 PROCEDURE — 85610 PROTHROMBIN TIME: CPT | Performed by: INTERNAL MEDICINE

## 2018-02-14 PROCEDURE — 93454 CORONARY ARTERY ANGIO S&I: CPT | Performed by: INTERNAL MEDICINE

## 2018-02-14 PROCEDURE — 93010 ELECTROCARDIOGRAM REPORT: CPT | Performed by: INTERNAL MEDICINE

## 2018-02-14 PROCEDURE — 80048 BASIC METABOLIC PNL TOTAL CA: CPT | Performed by: INTERNAL MEDICINE

## 2018-02-14 PROCEDURE — 83735 ASSAY OF MAGNESIUM: CPT | Performed by: INTERNAL MEDICINE

## 2018-02-14 PROCEDURE — 85347 COAGULATION TIME ACTIVATED: CPT

## 2018-02-14 PROCEDURE — 85027 COMPLETE CBC AUTOMATED: CPT | Performed by: INTERNAL MEDICINE

## 2018-02-14 PROCEDURE — C1725 CATH, TRANSLUMIN NON-LASER: HCPCS | Performed by: INTERNAL MEDICINE

## 2018-02-14 PROCEDURE — C1760 CLOSURE DEV, VASC: HCPCS | Performed by: INTERNAL MEDICINE

## 2018-02-14 RX ORDER — LIDOCAINE HYDROCHLORIDE AND EPINEPHRINE 10; 10 MG/ML; UG/ML
INJECTION, SOLUTION INFILTRATION; PERINEURAL CODE/TRAUMA/SEDATION MEDICATION
Status: COMPLETED | OUTPATIENT
Start: 2018-02-14 | End: 2018-02-14

## 2018-02-14 RX ORDER — SODIUM CHLORIDE 9 MG/ML
INJECTION, SOLUTION INTRAVENOUS CONTINUOUS PRN
Status: DISCONTINUED | OUTPATIENT
Start: 2018-02-14 | End: 2018-02-14 | Stop reason: SURG

## 2018-02-14 RX ORDER — DEXMEDETOMIDINE HYDROCHLORIDE 100 UG/ML
INJECTION, SOLUTION INTRAVENOUS AS NEEDED
Status: DISCONTINUED | OUTPATIENT
Start: 2018-02-14 | End: 2018-02-14 | Stop reason: SURG

## 2018-02-14 RX ORDER — ONDANSETRON 2 MG/ML
4 INJECTION INTRAMUSCULAR; INTRAVENOUS EVERY 6 HOURS PRN
Status: DISCONTINUED | OUTPATIENT
Start: 2018-02-14 | End: 2018-02-15 | Stop reason: HOSPADM

## 2018-02-14 RX ORDER — MIDAZOLAM HYDROCHLORIDE 1 MG/ML
INJECTION INTRAMUSCULAR; INTRAVENOUS AS NEEDED
Status: DISCONTINUED | OUTPATIENT
Start: 2018-02-14 | End: 2018-02-14 | Stop reason: SURG

## 2018-02-14 RX ORDER — KETAMINE HYDROCHLORIDE 50 MG/ML
INJECTION, SOLUTION, CONCENTRATE INTRAMUSCULAR; INTRAVENOUS AS NEEDED
Status: DISCONTINUED | OUTPATIENT
Start: 2018-02-14 | End: 2018-02-14 | Stop reason: SURG

## 2018-02-14 RX ORDER — PROPOFOL 10 MG/ML
INJECTION, EMULSION INTRAVENOUS AS NEEDED
Status: DISCONTINUED | OUTPATIENT
Start: 2018-02-14 | End: 2018-02-14 | Stop reason: SURG

## 2018-02-14 RX ORDER — CLOPIDOGREL BISULFATE 75 MG/1
75 TABLET ORAL DAILY
Status: DISCONTINUED | OUTPATIENT
Start: 2018-02-15 | End: 2018-02-15 | Stop reason: HOSPADM

## 2018-02-14 RX ORDER — ATORVASTATIN CALCIUM 80 MG/1
80 TABLET, FILM COATED ORAL
Status: DISCONTINUED | OUTPATIENT
Start: 2018-02-14 | End: 2018-02-15 | Stop reason: HOSPADM

## 2018-02-14 RX ORDER — LISINOPRIL 10 MG/1
10 TABLET ORAL DAILY
Status: DISCONTINUED | OUTPATIENT
Start: 2018-02-15 | End: 2018-02-15 | Stop reason: HOSPADM

## 2018-02-14 RX ORDER — NITROGLYCERIN 0.4 MG/1
0.4 TABLET SUBLINGUAL
Qty: 90 TABLET | Refills: 3 | Status: CANCELLED | OUTPATIENT
Start: 2018-02-14

## 2018-02-14 RX ORDER — NICOTINE 21 MG/24HR
1 PATCH, TRANSDERMAL 24 HOURS TRANSDERMAL DAILY
Status: DISCONTINUED | OUTPATIENT
Start: 2018-02-15 | End: 2018-02-15 | Stop reason: HOSPADM

## 2018-02-14 RX ORDER — CLOPIDOGREL BISULFATE 75 MG/1
75 TABLET ORAL DAILY
Qty: 30 TABLET | Refills: 6 | Status: CANCELLED | OUTPATIENT
Start: 2018-02-15

## 2018-02-14 RX ORDER — CLOPIDOGREL BISULFATE 75 MG/1
300 TABLET ORAL ONCE
Status: COMPLETED | OUTPATIENT
Start: 2018-02-14 | End: 2018-02-14

## 2018-02-14 RX ORDER — ACETAMINOPHEN 325 MG/1
650 TABLET ORAL EVERY 4 HOURS PRN
Status: DISCONTINUED | OUTPATIENT
Start: 2018-02-14 | End: 2018-02-15 | Stop reason: HOSPADM

## 2018-02-14 RX ORDER — SODIUM CHLORIDE 1000 MG
1 TABLET, SOLUBLE MISCELLANEOUS 2 TIMES DAILY
Status: DISCONTINUED | OUTPATIENT
Start: 2018-02-14 | End: 2018-02-15 | Stop reason: HOSPADM

## 2018-02-14 RX ORDER — AMLODIPINE BESYLATE 5 MG/1
5 TABLET ORAL DAILY
Status: DISCONTINUED | OUTPATIENT
Start: 2018-02-15 | End: 2018-02-15 | Stop reason: HOSPADM

## 2018-02-14 RX ORDER — PANTOPRAZOLE SODIUM 40 MG/1
40 TABLET, DELAYED RELEASE ORAL
Status: DISCONTINUED | OUTPATIENT
Start: 2018-02-15 | End: 2018-02-15 | Stop reason: HOSPADM

## 2018-02-14 RX ORDER — SODIUM CHLORIDE 9 MG/ML
125 INJECTION, SOLUTION INTRAVENOUS CONTINUOUS
Status: DISCONTINUED | OUTPATIENT
Start: 2018-02-14 | End: 2018-02-14

## 2018-02-14 RX ORDER — ASPIRIN 81 MG/1
324 TABLET, CHEWABLE ORAL ONCE
Status: COMPLETED | OUTPATIENT
Start: 2018-02-14 | End: 2018-02-14

## 2018-02-14 RX ORDER — ALBUTEROL SULFATE 90 UG/1
2 AEROSOL, METERED RESPIRATORY (INHALATION) EVERY 4 HOURS PRN
Status: DISCONTINUED | OUTPATIENT
Start: 2018-02-14 | End: 2018-02-15 | Stop reason: HOSPADM

## 2018-02-14 RX ORDER — ASPIRIN 81 MG/1
81 TABLET, CHEWABLE ORAL DAILY
Status: DISCONTINUED | OUTPATIENT
Start: 2018-02-14 | End: 2018-02-15 | Stop reason: HOSPADM

## 2018-02-14 RX ORDER — LIDOCAINE HYDROCHLORIDE 10 MG/ML
INJECTION, SOLUTION INFILTRATION; PERINEURAL CODE/TRAUMA/SEDATION MEDICATION
Status: COMPLETED | OUTPATIENT
Start: 2018-02-14 | End: 2018-02-14

## 2018-02-14 RX ORDER — NITROGLYCERIN 0.4 MG/1
0.4 TABLET SUBLINGUAL
Status: DISCONTINUED | OUTPATIENT
Start: 2018-02-14 | End: 2018-02-15 | Stop reason: HOSPADM

## 2018-02-14 RX ORDER — ALBUTEROL SULFATE 2.5 MG/3ML
2.5 SOLUTION RESPIRATORY (INHALATION) EVERY 6 HOURS PRN
Status: DISCONTINUED | OUTPATIENT
Start: 2018-02-14 | End: 2018-02-15 | Stop reason: HOSPADM

## 2018-02-14 RX ORDER — HYDROCHLOROTHIAZIDE 12.5 MG/1
12.5 TABLET ORAL DAILY
Status: DISCONTINUED | OUTPATIENT
Start: 2018-02-15 | End: 2018-02-15 | Stop reason: HOSPADM

## 2018-02-14 RX ORDER — SODIUM CHLORIDE 9 MG/ML
125 INJECTION, SOLUTION INTRAVENOUS CONTINUOUS
Status: DISPENSED | OUTPATIENT
Start: 2018-02-14 | End: 2018-02-15

## 2018-02-14 RX ORDER — HEPARIN SODIUM 1000 [USP'U]/ML
INJECTION, SOLUTION INTRAVENOUS; SUBCUTANEOUS CODE/TRAUMA/SEDATION MEDICATION
Status: COMPLETED | OUTPATIENT
Start: 2018-02-14 | End: 2018-02-14

## 2018-02-14 RX ADMIN — SODIUM CHLORIDE: 0.9 INJECTION, SOLUTION INTRAVENOUS at 11:45

## 2018-02-14 RX ADMIN — DEXMEDETOMIDINE HYDROCHLORIDE 6 MCG: 100 INJECTION, SOLUTION INTRAVENOUS at 14:04

## 2018-02-14 RX ADMIN — SODIUM CHLORIDE 125 ML/HR: 0.9 INJECTION, SOLUTION INTRAVENOUS at 08:03

## 2018-02-14 RX ADMIN — DEXMEDETOMIDINE HYDROCHLORIDE 4 MCG: 100 INJECTION, SOLUTION INTRAVENOUS at 13:38

## 2018-02-14 RX ADMIN — DEXMEDETOMIDINE HYDROCHLORIDE 4 MCG: 100 INJECTION, SOLUTION INTRAVENOUS at 13:27

## 2018-02-14 RX ADMIN — KETAMINE HYDROCHLORIDE 10 MG: 50 INJECTION, SOLUTION INTRAMUSCULAR; INTRAVENOUS at 13:12

## 2018-02-14 RX ADMIN — DEXMEDETOMIDINE HYDROCHLORIDE 4 MCG: 100 INJECTION, SOLUTION INTRAVENOUS at 14:01

## 2018-02-14 RX ADMIN — LIDOCAINE HYDROCHLORIDE AND EPINEPHRINE 20 ML: 10; 10 INJECTION, SOLUTION INFILTRATION; PERINEURAL at 14:42

## 2018-02-14 RX ADMIN — HEPARIN SODIUM 5000 UNITS: 1000 INJECTION INTRAVENOUS; SUBCUTANEOUS at 14:09

## 2018-02-14 RX ADMIN — KETAMINE HYDROCHLORIDE 20 MG: 50 INJECTION, SOLUTION INTRAMUSCULAR; INTRAVENOUS at 12:39

## 2018-02-14 RX ADMIN — DEXMEDETOMIDINE HYDROCHLORIDE 4 MCG: 100 INJECTION, SOLUTION INTRAVENOUS at 13:45

## 2018-02-14 RX ADMIN — PROPOFOL 30 MG: 10 INJECTION, EMULSION INTRAVENOUS at 14:06

## 2018-02-14 RX ADMIN — CLOPIDOGREL BISULFATE 300 MG: 75 TABLET ORAL at 22:31

## 2018-02-14 RX ADMIN — KETAMINE HYDROCHLORIDE 10 MG: 50 INJECTION, SOLUTION INTRAMUSCULAR; INTRAVENOUS at 12:30

## 2018-02-14 RX ADMIN — HEPARIN SODIUM 7500 UNITS: 1000 INJECTION INTRAVENOUS; SUBCUTANEOUS at 12:42

## 2018-02-14 RX ADMIN — SODIUM CHLORIDE: 0.9 INJECTION, SOLUTION INTRAVENOUS at 14:08

## 2018-02-14 RX ADMIN — MIDAZOLAM HYDROCHLORIDE 2 MG: 1 INJECTION, SOLUTION INTRAMUSCULAR; INTRAVENOUS at 12:22

## 2018-02-14 RX ADMIN — PROPOFOL 30 MG: 10 INJECTION, EMULSION INTRAVENOUS at 14:30

## 2018-02-14 RX ADMIN — HEPARIN SODIUM 5000 UNITS: 1000 INJECTION INTRAVENOUS; SUBCUTANEOUS at 12:57

## 2018-02-14 RX ADMIN — IOHEXOL 275 ML: 350 INJECTION, SOLUTION INTRAVENOUS at 14:35

## 2018-02-14 RX ADMIN — ASPIRIN 81 MG 324 MG: 81 TABLET ORAL at 08:03

## 2018-02-14 RX ADMIN — LIDOCAINE HYDROCHLORIDE 5 ML: 10 INJECTION, SOLUTION INFILTRATION; PERINEURAL at 12:32

## 2018-02-14 RX ADMIN — PROPOFOL 30 MG: 10 INJECTION, EMULSION INTRAVENOUS at 13:12

## 2018-02-14 RX ADMIN — KETAMINE HYDROCHLORIDE 10 MG: 50 INJECTION, SOLUTION INTRAMUSCULAR; INTRAVENOUS at 14:02

## 2018-02-14 RX ADMIN — LIDOCAINE HYDROCHLORIDE 5 ML: 10 INJECTION, SOLUTION INFILTRATION; PERINEURAL at 12:34

## 2018-02-14 RX ADMIN — DEXMEDETOMIDINE HYDROCHLORIDE 4 MCG: 100 INJECTION, SOLUTION INTRAVENOUS at 13:43

## 2018-02-14 RX ADMIN — DEXMEDETOMIDINE HYDROCHLORIDE 10 MCG: 100 INJECTION, SOLUTION INTRAVENOUS at 14:38

## 2018-02-14 RX ADMIN — TICAGRELOR 180 MG: 90 TABLET ORAL at 12:03

## 2018-02-14 RX ADMIN — DEXMEDETOMIDINE HYDROCHLORIDE 4 MCG: 100 INJECTION, SOLUTION INTRAVENOUS at 13:22

## 2018-02-14 RX ADMIN — Medication 0.03 MCG/KG/MIN: at 12:30

## 2018-02-14 NOTE — PROGRESS NOTES
Patient is entirely non compliant  Has slow ooze and refuses to lie flat in bed  Groin dressing changed and patient sitting up at the bedside within minutes after  Patient thoroughly educated on the importance of bed rest  No active s/s bleeding or hematoma noted when groin change occurred  Will continue to monitor

## 2018-02-14 NOTE — ANESTHESIA PREPROCEDURE EVALUATION
Review of Systems/Medical History  Patient summary reviewed  Chart reviewed      Cardiovascular  Hyperlipidemia, Hypertension , Past MI > 6 months, CAD, , ULLOA,   Comment: EF 40-55%  Occluded mid RCA,  Pulmonary  Smoker cigarette smoker  , COPD , Shortness of breath,        GI/Hepatic    GERD ,             Endo/Other  Negative endo/other ROS      GYN       Hematology  Negative hematology ROS      Musculoskeletal  Negative musculoskeletal ROS        Neurology  Negative neurology ROS      Psychology   Negative psychology ROS              Physical Exam    Airway  Comment: Large neck, dominguez  Mallampati score: III  TM Distance: <3 FB  Neck ROM: full     Dental   Comment: edentulous,     Cardiovascular      Pulmonary      Other Findings        Anesthesia Plan  ASA Score- 3     Anesthesia Type- IV sedation with anesthesia with ASA Monitors  Additional Monitors:   Airway Plan:     Comment: Backup plan GA; discussed with patient  Plan Factors-    Induction- intravenous  Postoperative Plan-     Informed Consent- Anesthetic plan and risks discussed with patient

## 2018-02-14 NOTE — ANESTHESIA POSTPROCEDURE EVALUATION
Post-Op Assessment Note      CV Status:  Stable    Mental Status:  Alert and awake    Hydration Status:  Stable    PONV Controlled:  None    Airway Patency:  Patent    Post Op Vitals Reviewed: Yes          Staff: CRNA           BP   143/72   Temp      Pulse  68   Resp   10   SpO2   95% on 6LFM   Post procedure VS noted above, SV non obstructed, awake and comfortable

## 2018-02-14 NOTE — DISCHARGE SUMMARY
Discharge Summary - Karin Turcios 79 y o  male MRN: [id number]        Admission Date: 2/14/2018     Date of Discharge:  02/15/2018    Admitting Diagnosis: Coronary arteriosclerosis [I25 10]    Secondary Diagnoses:   Hyperlipidemia  Hypertension  Tobacco user  Chronic stable COPD  Discharge Diagnosis:   CAD status post PCI of  of the RCA    Cardiologist:  Dr Jerris Severin    Office Cardiologist:  Dr Jerris Severin    PCP: Jassi Edmond [facility]:  70-year-old male with history of coronary disease and chronically occluded right coronary artery, hypertension hyperlipidemia chronic stable COPD and tobacco use  Patient was electively admitted for PCI (no stent) of a chronically occluded RCA  This was performed by Dr Dr Jerris Severin  Both femoral arteries were utilized  Please refer to full cardiac catheterization report for complete details of procedure  Patient will require DAPT for 1 year  Procedures Performed:   Orders Placed This Encounter   Procedures    Cardiac pci w/        Complications:  None       Discharge instructions/Information to patient and family:   See after visit summary for information provided to patient and family  Provisions for Follow-Up Care:  Dr Jerris Severin 26th of February at 16:00, [facility] office      Disposition: Home      Planned Readmission: No    Discharge Statement   I spent 45 minutes discharging the patient  This time was spent on the day of discharge  I had direct contact with the patient on the day of discharge  Additional documentation is required if more than 30 minutes were spent on discharge  Discharge Medications:  Please refer to medication discharge record for current medications         Dictated By: TAYLOR Diop, Acute Care Nurse Practitioner    ** Please Note: Fluency Direct Dictation voice to text software may have been used in the creation of this document   **

## 2018-02-15 VITALS
HEART RATE: 75 BPM | WEIGHT: 265 LBS | BODY MASS INDEX: 35.12 KG/M2 | HEIGHT: 73 IN | TEMPERATURE: 98.2 F | DIASTOLIC BLOOD PRESSURE: 77 MMHG | RESPIRATION RATE: 20 BRPM | SYSTOLIC BLOOD PRESSURE: 157 MMHG | OXYGEN SATURATION: 97 %

## 2018-02-15 LAB
ANION GAP SERPL CALCULATED.3IONS-SCNC: 7 MMOL/L (ref 4–13)
BUN SERPL-MCNC: 11 MG/DL (ref 5–25)
CALCIUM SERPL-MCNC: 8.8 MG/DL (ref 8.3–10.1)
CHLORIDE SERPL-SCNC: 99 MMOL/L (ref 100–108)
CO2 SERPL-SCNC: 28 MMOL/L (ref 21–32)
CREAT SERPL-MCNC: 0.68 MG/DL (ref 0.6–1.3)
ERYTHROCYTE [DISTWIDTH] IN BLOOD BY AUTOMATED COUNT: 13.9 % (ref 11.6–15.1)
GFR SERPL CREATININE-BSD FRML MDRD: 100 ML/MIN/1.73SQ M
GLUCOSE SERPL-MCNC: 100 MG/DL (ref 65–140)
HCT VFR BLD AUTO: 41.6 % (ref 36.5–49.3)
HGB BLD-MCNC: 14.1 G/DL (ref 12–17)
MCH RBC QN AUTO: 32 PG (ref 26.8–34.3)
MCHC RBC AUTO-ENTMCNC: 33.9 G/DL (ref 31.4–37.4)
MCV RBC AUTO: 94 FL (ref 82–98)
PLATELET # BLD AUTO: 221 THOUSANDS/UL (ref 149–390)
PMV BLD AUTO: 9.9 FL (ref 8.9–12.7)
POTASSIUM SERPL-SCNC: 4.2 MMOL/L (ref 3.5–5.3)
RBC # BLD AUTO: 4.41 MILLION/UL (ref 3.88–5.62)
SODIUM SERPL-SCNC: 134 MMOL/L (ref 136–145)
WBC # BLD AUTO: 7.38 THOUSAND/UL (ref 4.31–10.16)

## 2018-02-15 PROCEDURE — 80048 BASIC METABOLIC PNL TOTAL CA: CPT | Performed by: INTERNAL MEDICINE

## 2018-02-15 PROCEDURE — 99214 OFFICE O/P EST MOD 30 MIN: CPT | Performed by: NURSE PRACTITIONER

## 2018-02-15 PROCEDURE — 85027 COMPLETE CBC AUTOMATED: CPT | Performed by: INTERNAL MEDICINE

## 2018-02-15 RX ORDER — CLOPIDOGREL BISULFATE 75 MG/1
75 TABLET ORAL DAILY
Qty: 30 TABLET | Refills: 11 | Status: SHIPPED | OUTPATIENT
Start: 2018-02-16 | End: 2019-02-24 | Stop reason: SDUPTHER

## 2018-02-15 RX ORDER — NICOTINE 21 MG/24HR
1 PATCH, TRANSDERMAL 24 HOURS TRANSDERMAL DAILY
Qty: 28 PATCH | Refills: 0 | Status: SHIPPED | OUTPATIENT
Start: 2018-02-16 | End: 2018-02-15 | Stop reason: HOSPADM

## 2018-02-15 RX ORDER — NITROGLYCERIN 0.4 MG/1
0.4 TABLET SUBLINGUAL
Qty: 90 TABLET | Refills: 0 | Status: SHIPPED | OUTPATIENT
Start: 2018-02-15 | End: 2019-08-14 | Stop reason: SDUPTHER

## 2018-02-15 RX ADMIN — CLOPIDOGREL BISULFATE 75 MG: 75 TABLET ORAL at 08:42

## 2018-02-15 RX ADMIN — AMLODIPINE BESYLATE 5 MG: 5 TABLET ORAL at 08:42

## 2018-02-15 RX ADMIN — PANTOPRAZOLE SODIUM 40 MG: 40 TABLET, DELAYED RELEASE ORAL at 06:34

## 2018-02-15 RX ADMIN — LISINOPRIL 10 MG: 10 TABLET ORAL at 08:42

## 2018-02-15 RX ADMIN — ASPIRIN 81 MG 81 MG: 81 TABLET ORAL at 08:42

## 2018-02-15 NOTE — CASE MANAGEMENT
Thank you,  [facility] in the [facility] by [employer] for 2017  Network Utilization Review Department  Phone: [phone]; Fax [phone]  ATTENTION: The Network Utilization Review Department is now centralized for our 7 Facilities  Make a note that we have a new phone and fax numbers for our Department  Please call with any questions or concerns to [phone] and carefully follow the prompts so that you are directed to the right person  All voicemails are confidential  Fax any determinations, approvals, denials, and requests for initial or continue stay review clinical to [phone]  Due to HIGH CALL volume, it would be easier if you could please send faxed requests to expedite your requests and in part, help us provide discharge notifications faster  Initial Clinical Review  SCHEDULED OUTPATIENT CARDIAC PROCEDURE 2/14/18 / CARDIAC CATH + PCI RCA  (PROCEDURE REPORT NOT AVAILABLE)    Age/Sex: 79 y o  male    Surgery Date: 2/14/18    Procedure: CARDIAC CATH / PCI RCA    Anesthesia Plan: IV sedation with anesthesia with ASA Monitors  Admission Orders: Date/Time/Statement: 2/14/18 AT 1515 OUTPATIENT NO CHARGE BED    02/14/18 1516  Outpatient No Charge Bed Once     Transfer Service: Cardiology       Question: Admitting Physician Answer: Cecy Frey    02/14/18 1515       Vital Signs: /77 (BP Location: Right arm)   Pulse 75   Temp 98 2 °F (36 8 °C) (Tympanic Core)   Resp 20   Ht 6' 1" (1 854 m)   Wt 120 kg (265 lb)   SpO2 97%   BMI 34 96 kg/m²     Diet:        Diet Orders            Start     Ordered    02/14/18 1516  Diet Cardiac; Cardiac TLC 2 3 GM NA  Diet effective now     Question Answer Comment   Diet Type Cardiac    Cardiac Cardiac TLC 2 3 GM NA    RD to adjust diet per protocol?  Yes        02/14/18 1515          Continuous IV Infusions:  IVF sodium chloride 0 9 % infusion  at 125 cc/hr     Mobility:   Post Procedure Restrictions    DVT Prophylaxis:  ASA, Plavix, SCD    Scheduled Meds:  Current Facility-Administered Medications:  acetaminophen 650 mg Oral Q4H PRN Dominga Rivas, JULIONP   albuterol 2 puff Inhalation Q4H PRN Dominga Rivas, CRNP   albuterol 2 5 mg Nebulization Q6H PRN Dominga Rivas, CRNP   amLODIPine 5 mg Oral Daily Dominga Rivas, CRNP   aspirin 81 mg Oral Daily Dominga Rivas, CRNP   atorvastatin 80 mg Oral Daily With [employer], CRNP   clopidogrel 75 mg Oral Daily Dominga Rivas, CRNP   hydrochlorothiazide 12 5 mg Oral Daily Dominga Rivas, CRNP   lisinopril 10 mg Oral Daily Dominga Rivas, CRNP   nicotine 1 patch Transdermal Daily Dominga Rivas, JULIONP   nitroglycerin 0 4 mg Sublingual Q5 Min PRN Dominga Rivas, CRNP   ondansetron 4 mg Intravenous Q6H PRN Dominga Rivas, CRNP   pantoprazole 40 mg Oral Early Morning Dominga Rivas, CRNP   sodium chloride 1 g Oral BID Dominga Rivas, CRNP   tiotropium 18 mcg Inhalation Daily Dominga Rivas, JULIONP     PRN Meds:     acetaminophen    albuterol    albuterol    nitroglycerin    ondansetron      Pain Control:   Pain Medications             aspirin (ECOTRIN LOW STRENGTH) 81 mg EC tablet Take 81 mg by mouth daily

## 2018-02-15 NOTE — PROGRESS NOTES
Received patient at 2130  Clarification obtained from Cardiologist on call regarding overdue Plavix and Aspirin  OK to administer only Plavix 300 mg PO for now  Plavix administered as ordered  Patient alert and oriented  No signs of distress noted  No C/O pain  Cardiologist on call also made aware of small amount of bleeding noted on Left groin site

## 2018-02-15 NOTE — PROGRESS NOTES
Dr Mika Marcano made aware that ASA 81mg wasn't given at , post cath  Approved to just start in morning

## 2018-02-15 NOTE — DISCHARGE INSTRUCTIONS
1  Please see the post angioplasty discharge instructions  No heavy lifting, greater than 10 lbs  or strenuous activity for 1 week  Follow angioplasty discharge instructions  2 Remove band aid tomorrow  Shower and wash area- groins gently with soap and water- beginning tomorrow  Rinse and pat dry  Apply new water seal band aid  Repeat this process for 5 days  No powders, creams lotions or antibiotic ointments  for 5 days  No tub baths, hot tubs or swimming for 5 days  3  Call [facility] ([phone]) if you develop a fever, redness or drainage at your groins access site  4  No driving for 2 days    5  Do not stop aspirin or Plavix (clopiogrel) any reason without a cardiologists consent, or the stent could block up and cause a heart attack  6  Stent card and book  7 Perclose Booklet  Coronary Artery Disease   WHAT YOU NEED TO KNOW:   Coronary artery disease (CAD) is narrowing of the arteries to your heart caused by a buildup of plaque  Plaque is made up of cholesterol and other substances  The narrowing in your arteries decreases the amount of blood that can flow to your heart  This causes your heart to get less oxygen  DISCHARGE INSTRUCTIONS:   Call 911 for any of the following:   · You have any of the following signs of a heart attack:      ¨ Squeezing, pressure, or pain in your chest that lasts longer than 5 minutes or returns    ¨ Discomfort or pain in your back, neck, jaw, stomach, or arm     ¨ Trouble breathing    ¨ Nausea or vomiting    ¨ Lightheadedness or a sudden cold sweat, especially with chest pain or trouble breathing    Contact your healthcare provider if:   · You have chest pain that is more frequent, or you have chest pain at rest     · You have questions or concerns about your condition or care  Cardiac rehabilitation:  Your healthcare provider may recommend that you attend cardiac rehabilitation (rehab)   This is a program run by specialists who will help you safely strengthen your heart and reduce the risk for more heart disease  The plan includes exercise, relaxation, stress management, and heart-healthy nutrition  Healthcare providers will also check to make sure any medicines you are taking are working  Medicines: You may  need any of the following:  · Blood pressure medicines  are given to lower your blood pressure  These medicines may include ACE inhibitors and beta-blockers  ACE inhibitors help keep your blood vessels relaxed and open, which helps keep blood flowing into your heart  Beta-blockers keep your heart pumping strongly and regularly  This helps keep your heart from working too hard to get oxygen  · Cholesterol medicines  help lower blood cholesterol levels  · Nitrates , such as nitroglycerin, relax the arteries of your heart so it gets more oxygen  They help to relieve your chest pain  · Antiplatelets , such as aspirin, help prevent blood clots  Take your antiplatelet medicine exactly as directed  These medicines make it more likely for you to bleed or bruise  If you are told to take aspirin, do not take acetaminophen or ibuprofen instead  · Blood thinners    help prevent blood clots  Examples of blood thinners include heparin and warfarin  Clots can cause strokes, heart attacks, and death  The following are general safety guidelines to follow while you are taking a blood thinner:    ¨ Watch for bleeding and bruising while you take blood thinners  Watch for bleeding from your gums or nose  Watch for blood in your urine and bowel movements  Use a soft washcloth on your skin, and a soft toothbrush to brush your teeth  This can keep your skin and gums from bleeding  If you shave, use an electric shaver  Do not play contact sports  ¨ Tell your dentist and other healthcare providers that you take anticoagulants  Wear a bracelet or necklace that says you take this medicine       ¨ Do not start or stop any medicines unless your healthcare provider tells you to  Many medicines cannot be used with blood thinners  ¨ Tell your healthcare provider right away if you forget to take the medicine, or if you take too much  ¨ Warfarin  is a blood thinner that you may need to take  The following are things you should be aware of if you take warfarin  § Foods and medicines can affect the amount of warfarin in your blood  Do not make major changes to your diet while you take warfarin  Warfarin works best when you eat about the same amount of vitamin K every day  Vitamin K is found in green leafy vegetables and certain other foods  Ask for more information about what to eat when you are taking warfarin  § You will need to see your healthcare provider for follow-up visits when you are on warfarin  You will need regular blood tests  These tests are used to decide how much medicine you need  · Do not take certain medicines without asking your healthcare provider first   These include NSAIDs, herbal or vitamin supplements, or hormones (estrogen or progestin)  · Take your medicine as directed  Contact your healthcare provider if you think your medicine is not helping or if you have side effects  Tell him or her if you are allergic to any medicine  Keep a list of the medicines, vitamins, and herbs you take  Include the amounts, and when and why you take them  Bring the list or the pill bottles to follow-up visits  Carry your medicine list with you in case of an emergency  Follow up with your healthcare provider as directed: You may need to return for other tests  You may also be referred to a cardiac surgeon  Write down your questions so you remember to ask them during your visits  Manage CAD:   · Do not smoke  Nicotine and other chemicals in cigarettes and cigars can cause heart and lung damage  Ask your healthcare provider for information if you currently smoke and need help to quit   E-cigarettes or smokeless tobacco still contain nicotine  Talk to your healthcare provider before you use these products  · Exercise regularly  Exercise at least 30 minutes each day, on most days of the week  Exercise helps to lower high cholesterol and high blood pressure  It can also help you maintain a healthy weight  Ask your healthcare provider about the kind of exercise you should do and how to get started  · Maintain a healthy weight  If you are overweight, talk to your healthcare provider about how to lose weight  A weight loss of 10% can improve your heart health  · Eat heart-healthy foods  Include fresh fruits and vegetables in your meal plan  Choose low-fat foods, such as skim or 1% fat milk, low-fat cheese and yogurt, fish, chicken (without skin), and lean meats  Eat two 4-ounce servings of fish high in omega-3 fats each week, such as salmon, fresh tuna, and herring  Do not eat foods that are high in sodium, such as canned foods, potato chips, salty snacks, and cold cuts  Put less table salt on your food  · Limit or do not drink alcohol  A drink of alcohol is 12 ounces of beer, 5 ounces of wine, or 1½ ounces of liquor  · Manage other health conditions  Follow your healthcare provider's advice on how to manage other conditions that can affect your heart health  These include diabetes, high blood pressure, and high cholesterol  You may need to take medicines for these conditions and make other lifestyle changes  · Ask if you should have a flu vaccine  The flu can be dangerous for a person who has CAD  The flu vaccine is available every year in the fall  © 2017 [facility] Information is for End User's use only and may not be sold, redistributed or otherwise used for commercial purposes  All illustrations and images included in CareNotes® are the copyrighted property of [employer]  or [employer]ash[employer]  The above information is an  only   It is not intended as medical advice for individual conditions or treatments  Talk to your doctor, nurse or pharmacist before following any medical regimen to see if it is safe and effective for you  Coronary Angioplasty   WHAT YOU SHOULD KNOW:   Coronary angioplasty is a procedure that opens arteries in your heart that have a buildup of plaque  Plaque is a mixture of fat and cholesterol  This procedure helps to increase blood flow to your heart  AFTER YOU LEAVE:   Medicines: You will be given any of the following:  · Antiplatelets  prevent blood clots from forming  You will need to take aspirin and another type of platelet medicine  Take this medicine daily as directed  Tell your cardiologist if you miss a dose  · Nitrates , such as nitroglycerin, relax the arteries of your heart so it gets more oxygen  This medicine helps to relieve chest pain  · Cholesterol medicine  helps decrease the amount of cholesterol in your blood  · Blood pressure medicine  lowers your blood pressure  · Take your medicine as directed  Call your healthcare provider if you think your medicine is not helping or if you have side effects  Tell him if you are allergic to any medicine  Keep a list of the medicines, vitamins, and herbs you take  Include the amounts, and when and why you take them  Bring the list or the pill bottles to follow-up visits  Carry your medicine list with you in case of an emergency  Follow up with your cardiologist as directed:  Write down your questions so you remember to ask them during your visits  Activity:   · Keep your leg straight as much as possible  Try not to bend at the site of the incision for 24 to 48 hours  · You may feel like resting more after your procedure  Slowly start to do more each day  Rest when you feel it is needed  · Ask when you can return to your daily activities  Wound care:  Carefully wash the wound with soap and water  Dry the area and put on new, clean bandages as directed   Change your bandages when they get wet or dirty  Do not smoke: If you smoke, it is never too late to quit  Smoking increases your risk for heart disease and stroke  Ask your cardiologist if you need help quitting  Cardiac rehab:  Your cardiologist may recommend that you attend cardiac rehabilitation (rehab)  This is a program run by specialists who will help you safely strengthen your heart and reduce the risk of more heart disease  The plan includes exercise, relaxation, stress management, and heart-healthy nutrition  Caregivers will also check to make sure any medicines you are taking are working  Contact your cardiologist if:   · You have a fever or chills  · You have questions or concerns about your condition or care  Seek care immediately or call 911 if:   · Your incision is swollen, red, or has pus or foul-smelling fluid coming from it  · You start to bleed from your catheter site again  · You have any of the following signs of a heart attack:     ¨ Squeezing, pressure, fullness, or pain in your chest that lasts longer than a few minutes or returns     ¨ Discomfort or pain in your back, neck, jaw, stomach, or arm    ¨ Shortness of breath or breathing problems    ¨ A sudden cold sweat, lightheadedness, dizziness, or nausea, especially with chest pain or trouble breathing    · You have any of the following signs of a stroke:     ¨ Part of your face droops or is numb    ¨ Weakness in an arm or leg    ¨ Confusion or difficulty speaking    ¨ Dizziness, a severe headache, or vision loss    · Your arm or leg feels warm, tender, and painful  It may look swollen and red  © 2014 [address] is for End User's use only and may not be sold, redistributed or otherwise used for commercial purposes  All illustrations and images included in CareNotes® are the copyrighted property of [employer]  or Prashanth Layne  The above information is an  only   It is not intended as medical advice for individual conditions or treatments  Talk to your doctor, nurse or pharmacist before following any medical regimen to see if it is safe and effective for you

## 2018-02-15 NOTE — DISCHARGE SUMMARY
Discharge Summary - Julia Infante 79 y o  male MRN: [id number]    Unit/Bed#: [facility] 214-02 Encounter: [id number]    Admission Date: 2/14/2018   Discharge Date: 2/15/18    Disposition: Home      PCP: Dr Simba Canela   OP Cardiologist: Dr Ravinder Ward   Interventional cardiologist: Dr Ravinder Ward     Discharge Diagnosis: CAD  Secondary Diagnoses:   1  Hyperlipidemia  2  Hypertension   3  Tobacco abuse  4  COPD     Condition at Discharge: stable   Consultants: None   Procedures: Cardiac cath 2/14/18 s/p PCI of  of the RCA    /77 (BP Location: Right arm)   Pulse 75   Temp 98 2 °F (36 8 °C) (Tympanic Core)   Resp 20   Ht 6' 1" (1 854 m)   Wt 120 kg (265 lb)   SpO2 97%   BMI 34 96 kg/m²      Review of Systems   Constitution: Negative for chills and fever  Cardiovascular: Positive for dyspnea on exertion  Negative for chest pain, leg swelling and orthopnea  Respiratory: Positive for cough and shortness of breath  Musculoskeletal: Negative for falls  Gastrointestinal: Negative for nausea and vomiting  Neurological: Negative for dizziness and light-headedness  Psychiatric/Behavioral: Negative for altered mental status  Physical Exam   Constitutional: He is oriented to person, place, and time  No distress  Neck: Neck supple  No JVD present  Cardiovascular: Normal rate, regular rhythm, normal heart sounds and intact distal pulses  Pulmonary/Chest: Effort normal and breath sounds normal  He has no wheezes  He has no rales  RA  Decreased throughout  No crackles    Abdominal: Soft  Bowel sounds are normal    Morbidly obese    Musculoskeletal: He exhibits no edema  Neurological: He is alert and oriented to person, place, and time  Skin: Skin is warm and dry  He is not diaphoretic  Bilateral groin puncture sites dsg CDI  No hematoma or bleeding  Ecchymosis    Psychiatric: He has a normal mood and affect   His behavior is normal        HPI and Hospital Course:     Mr Julia Infante is a 79year old male with history of CAD and chronically occluded RCA, hypertension, hyperlipidemia, COPD and tobacco abuse  He was electively admitted 2/14/18 for PCI (no stent) of a chronically occluded RCA by Dr Lorene Espinal via bilateral femoral arteries  Patient discharged home on DAPT and recommended for 1 year  He will follow up with Dr Lorene Espinal as scheduled 2/26/18  Discharge instructions reviewed with patient  Patient declines nicotine patch  Scripts for BMP and CBC within 1 week provided  Discharge Medications:  See after visit summary for reconciled discharge medications provided to patient and family        Current Facility-Administered Medications   Medication Dose Route Frequency    acetaminophen (TYLENOL) tablet 650 mg  650 mg Oral Q4H PRN    albuterol (PROVENTIL HFA,VENTOLIN HFA) inhaler 2 puff  2 puff Inhalation Q4H PRN    albuterol inhalation solution 2 5 mg  2 5 mg Nebulization Q6H PRN    amLODIPine (NORVASC) tablet 5 mg  5 mg Oral Daily    aspirin chewable tablet 81 mg  81 mg Oral Daily    atorvastatin (LIPITOR) tablet 80 mg  80 mg Oral Daily With Dinner    clopidogrel (PLAVIX) tablet 75 mg  75 mg Oral Daily    hydrochlorothiazide (HYDRODIURIL) tablet 12 5 mg  12 5 mg Oral Daily    lisinopril (ZESTRIL) tablet 10 mg  10 mg Oral Daily    nicotine (NICODERM CQ) 14 mg/24hr TD 24 hr patch 1 patch  1 patch Transdermal Daily    nitroglycerin (NITROSTAT) SL tablet 0 4 mg  0 4 mg Sublingual Q5 Min PRN    ondansetron (ZOFRAN) injection 4 mg  4 mg Intravenous Q6H PRN    pantoprazole (PROTONIX) EC tablet 40 mg  40 mg Oral Early Morning    sodium chloride tablet 1 g  1 g Oral BID    tiotropium (SPIRIVA) capsule for inhaler 18 mcg  18 mcg Inhalation Daily       Pertinent Labs/diagnostics:        CBC with diff:   Results from last 7 days  Lab Units 02/15/18  0504 02/14/18  0816   WBC Thousand/uL 7 38 6 57   HEMOGLOBIN g/dL 14 1 15 3   HEMATOCRIT % 41 6 44 2   MCV fL 94 94   PLATELETS Thousands/uL 221 227   MCH pg 32 0 32 6   MCHC g/dL 33 9 34 6   RDW % 13 9 13 9   MPV fL 9 9 9 8         CMP:  Results from last 7 days  Lab Units 02/15/18  0504 02/14/18  0816   SODIUM mmol/L 134* 132*   POTASSIUM mmol/L 4 2 4 5   CHLORIDE mmol/L 99* 96*   CO2 mmol/L 28 29   ANION GAP mmol/L 7 7   BUN mg/dL 11 15   CREATININE mg/dL 0 68 0 79   GLUCOSE RANDOM mg/dL 100 105   CALCIUM mg/dL 8 8 8 9   EGFR ml/min/1 73sq m 100 94       Lipid Profile:   Lab Results   Component Value Date    CHOL 138 02/14/2018    CHOL 120 01/31/2018    CHOL 101 05/24/2017     Lab Results   Component Value Date    HDL 54 02/14/2018    HDL 42 01/31/2018    HDL 38 (L) 05/24/2017     Lab Results   Component Value Date    LDLCALC 72 02/14/2018    LDLCALC 66 01/31/2018    LDLCALC 52 05/24/2017     Lab Results   Component Value Date    TRIG 60 02/14/2018    TRIG 60 01/31/2018    TRIG 56 05/24/2017       Discharge instructions/Information to patient and family:   See after visit summary for information provided to patient and family  Provisions for Follow-Up Care:  See after visit summary for information related to follow-up care and any pertinent home health orders  Planned Readmission: No    Discharge Statement   I spent 45 minutes minutes discharging the patient  This time was spent on the day of discharge  I had direct contact with the patient on the day of discharge  Additional documentation is required if more than 30 minutes were spent on discharge  ** Please Note: Dragon 360 Dictation voice to text software may have been used in the creation of this document   **

## 2018-02-15 NOTE — PROGRESS NOTES
Patient moved to room for the night across the bone, patient remains non compliant with bedrest  When patient went to stand up, active bleeding was noted from both femoral puncture sites  Dressings are saturated and falling off, patient is stating that his groin hurts  Patient helped to bed, 2L applied as patient does not tolerate lying flat r/t smoking  Pressure held for 45 minutes on both groins sites as active bleeding and bilateral hematomas were noted  Cardiology fellow paged immediately  Dr Junior Clemente made aware of patient's status and at bedside to hold pressure  VSS  Bleeding stopped and hematomas decompressed  +1 bilateral pedal pulses noted throughout  Patient reeducated on bed rest lying flat until 2130

## 2018-02-15 NOTE — PROGRESS NOTES
Bilateral groin dressings changed at this time  Old drainage noted on left groin dressing  No active bleeding or new hematoma  Patient has ecchymosis around bilateral puncture sites  Redressed with water seal bandaids, and cleaned thoroughly with chloroprep  Patient refuses to be washed or have his sheets changed at this time  Will continue to monitor

## 2018-02-22 ENCOUNTER — OFFICE VISIT (OUTPATIENT)
Dept: FAMILY MEDICINE CLINIC | Facility: MEDICAL CENTER | Age: 68
End: 2018-02-22
Payer: COMMERCIAL

## 2018-02-22 VITALS
SYSTOLIC BLOOD PRESSURE: 138 MMHG | BODY MASS INDEX: 36.11 KG/M2 | RESPIRATION RATE: 18 BRPM | WEIGHT: 272.5 LBS | HEIGHT: 73 IN | DIASTOLIC BLOOD PRESSURE: 82 MMHG | HEART RATE: 70 BPM

## 2018-02-22 DIAGNOSIS — Z23 FLU VACCINE NEED: ICD-10-CM

## 2018-02-22 DIAGNOSIS — Z12.5 SCREENING FOR PROSTATE CANCER: ICD-10-CM

## 2018-02-22 DIAGNOSIS — Z12.2 ENCOUNTER FOR SCREENING FOR LUNG CANCER: ICD-10-CM

## 2018-02-22 DIAGNOSIS — Z00.00 WELLNESS EXAMINATION: Primary | ICD-10-CM

## 2018-02-22 PROBLEM — I49.3 PVC (PREMATURE VENTRICULAR CONTRACTION): Status: ACTIVE | Noted: 2017-03-03

## 2018-02-22 PROBLEM — R01.2 ABNORMAL HEART SOUNDS: Status: ACTIVE | Noted: 2017-03-03

## 2018-02-22 PROBLEM — R94.31 ABNORMAL EKG: Status: ACTIVE | Noted: 2017-03-03

## 2018-02-22 PROBLEM — M25.619 LIMITED RANGE OF MOTION OF SHOULDER: Status: RESOLVED | Noted: 2017-05-17 | Resolved: 2018-02-22

## 2018-02-22 PROBLEM — M25.511 BILATERAL SHOULDER PAIN: Status: ACTIVE | Noted: 2017-05-17

## 2018-02-22 PROBLEM — D58.2 ELEVATED HEMOGLOBIN (HCC): Status: ACTIVE | Noted: 2017-02-02

## 2018-02-22 PROBLEM — I10 HYPERTENSION: Status: ACTIVE | Noted: 2017-01-18

## 2018-02-22 PROBLEM — E87.1 HYPONATREMIA: Status: ACTIVE | Noted: 2017-02-02

## 2018-02-22 PROBLEM — M25.561 RIGHT KNEE PAIN: Status: ACTIVE | Noted: 2017-08-04

## 2018-02-22 PROBLEM — J18.9 PNEUMONIA: Status: ACTIVE | Noted: 2017-02-02

## 2018-02-22 PROBLEM — M19.011 OSTEOARTHRITIS OF RIGHT GLENOHUMERAL JOINT: Status: ACTIVE | Noted: 2017-06-01

## 2018-02-22 PROBLEM — I51.7 MILD CONCENTRIC LEFT VENTRICULAR HYPERTROPHY (LVH): Status: ACTIVE | Noted: 2017-02-02

## 2018-02-22 PROBLEM — R71.8 ELEVATED HEMATOCRIT: Status: ACTIVE | Noted: 2017-02-02

## 2018-02-22 PROBLEM — K63.5 COLON POLYPS: Status: ACTIVE | Noted: 2017-05-15

## 2018-02-22 PROBLEM — M25.512 BILATERAL SHOULDER PAIN: Status: RESOLVED | Noted: 2017-05-17 | Resolved: 2018-02-22

## 2018-02-22 PROBLEM — M25.512 BILATERAL SHOULDER PAIN: Status: ACTIVE | Noted: 2017-05-17

## 2018-02-22 PROBLEM — M25.619 LIMITED RANGE OF MOTION OF SHOULDER: Status: ACTIVE | Noted: 2017-05-17

## 2018-02-22 PROBLEM — E78.5 HYPERLIPIDEMIA: Status: ACTIVE | Noted: 2017-02-02

## 2018-02-22 PROBLEM — J18.9 PNEUMONIA: Status: RESOLVED | Noted: 2017-02-02 | Resolved: 2018-02-22

## 2018-02-22 PROBLEM — M25.511 BILATERAL SHOULDER PAIN: Status: RESOLVED | Noted: 2017-05-17 | Resolved: 2018-02-22

## 2018-02-22 PROBLEM — J44.9 COPD (CHRONIC OBSTRUCTIVE PULMONARY DISEASE) (HCC): Status: ACTIVE | Noted: 2017-01-18

## 2018-02-22 PROBLEM — J43.9 EMPHYSEMA OF LUNG (HCC): Status: ACTIVE | Noted: 2017-02-02

## 2018-02-22 PROBLEM — M25.561 RIGHT KNEE PAIN: Status: RESOLVED | Noted: 2017-08-04 | Resolved: 2018-02-22

## 2018-02-22 PROBLEM — K44.9 HIATAL HERNIA: Status: ACTIVE | Noted: 2017-01-19

## 2018-02-22 PROBLEM — M19.012 OSTEOARTHRITIS OF LEFT GLENOHUMERAL JOINT: Status: ACTIVE | Noted: 2017-06-01

## 2018-02-22 PROBLEM — L42 PITYRIASIS ROSEA: Status: ACTIVE | Noted: 2017-01-18

## 2018-02-22 PROCEDURE — 3725F SCREEN DEPRESSION PERFORMED: CPT

## 2018-02-22 PROCEDURE — 90662 IIV NO PRSV INCREASED AG IM: CPT

## 2018-02-22 PROCEDURE — 1101F PT FALLS ASSESS-DOCD LE1/YR: CPT | Performed by: FAMILY MEDICINE

## 2018-02-22 PROCEDURE — 90471 IMMUNIZATION ADMIN: CPT

## 2018-02-22 PROCEDURE — G0439 PPPS, SUBSEQ VISIT: HCPCS | Performed by: FAMILY MEDICINE

## 2018-02-22 NOTE — PROGRESS NOTES
HPI:  Karen Ha is a 79 y o  male here for his Subsequent Wellness Visit  Patient Active Problem List   Diagnosis    Palpitations    Coronary artery disease involving native coronary artery    Angina, class III (HCC)    ULLOA (dyspnea on exertion)    Abnormal EKG    Abnormal heart sounds    Colon polyps    COPD (chronic obstructive pulmonary disease) (HCC)    Elevated hematocrit    Elevated hemoglobin (HCC)    Emphysema of lung (HCC)    Hiatal hernia    Hyperlipidemia    Hypertension    Hyponatremia    Mild concentric left ventricular hypertrophy (LVH)    Osteoarthritis of left glenohumeral joint    Osteoarthritis of right glenohumeral joint    Pityriasis rosea    PVC (premature ventricular contraction)     Past Medical History:   Diagnosis Date    COPD [facility]ch[facility] obstructive pulmonary disease) ([facility] [address] )     Hyperlipidemia     Hypertension     Myocardial infarction     Pneumonia 2/2/2017    Pulmonary emphysema (HCC)     Shortness of breath      Past Surgical History:   Procedure Laterality Date    ANKLE SURGERY Right 1978    COLONOSCOPY      HAND SURGERY Left 1964    MI COLONOSCOPY FLX DX W/COLLJ SPEC WHEN PFRMD N/A 5/9/2017    Procedure: EGD AND COLONOSCOPY;  Surgeon: Juan F Esparza MD;  [facility];   Service: Gastroenterology     Family History   Problem Relation Age of Onset    Hypertension Mother     Emphysema Father     Hypertension Father     Arthritis Brother     Diabetes Brother     Other Other      Cardiac Disorder    Hypertension Other      History   Smoking Status    Current Every Day Smoker    Packs/day: 0 50    Years: 40 00   Smokeless Tobacco    Never Used     History   Alcohol Use    25 2 oz/week    42 Cans of beer per week     Comment: 3-6 drinks per day      History   Drug Use No     /82   Pulse 70   Resp 18   Ht 6' 1" (1 854 m)   Wt 124 kg (272 lb 8 oz)   BMI 35 95 kg/m²       Current Outpatient Prescriptions   Medication Sig Dispense Refill    albuterol (2 5 mg/3 mL) 0 083 % nebulizer solution Take 3 mL by nebulization every 6 (six) hours as needed for wheezing 75 mL 0    albuterol (PROVENTIL HFA,VENTOLIN HFA) 90 mcg/act inhaler Inhale 2 puffs every 4 (four) hours as needed for wheezing 1 Inhaler 0    amLODIPine (NORVASC) 5 mg tablet Take 5 mg by mouth daily      aspirin (ECOTRIN LOW STRENGTH) 81 mg EC tablet Take 81 mg by mouth daily      atorvastatin (LIPITOR) 20 mg tablet Take 20 mg by mouth daily      clopidogrel (PLAVIX) 75 mg tablet Take 1 tablet (75 mg total) by mouth daily 30 tablet 11    lisinopril (ZESTRIL) 20 mg tablet Take 10 mg by mouth daily        nitroglycerin (NITROSTAT) 0 4 mg SL tablet Place 1 tablet (0 4 mg total) under the tongue every 5 (five) minutes as needed for chest pain 90 tablet 0    pantoprazole (PROTONIX) 40 mg tablet Take by mouth      sodium chloride 1 g tablet Take 1 g by mouth 2 (two) times a day      Tiotropium Bromide Monohydrate (SPIRIVA RESPIMAT) 2 5 MCG/ACT AERS Inhale       No current facility-administered medications for this visit  No Known Allergies  Immunization History   Administered Date(s) Administered    Influenza Split High Dose Preservative Free IM 02/22/2018    Pneumococcal Conjugate 13-Valent 05/17/2017       Patient Care Team:  Chris Edmond DO as PCP - MD Zen Slade MD Ulanda Shearing, PA-C Suzy Nobles, MD Donzella Greenland, MD as Consulting Physician (Nephrology)  Elidia Vásquez MD    Medicare Screening Tests and Risk Assessments:  AWV Clinical     ISAR:   Previous hospitalizations?:  Yes   How many hospitalizations have you had in the last year?:  1-2   Additional Comments: For cardiac stent         Once in a Lifetime Medicare Screening:   EKG performed?:  Yes Results:  See various EKGs   AAA screening performed? (if performed, please add date to Health Maintenance):  Yes   Date performed:  1/31/17 Results:  No AAA       Medicare Screening Tests and Risk Assessment:   AAA Risk Assessment     Tobacco use (males only):   Yes   Age over 72 (males only):  Yes    Osteoporosis Risk Assessment    None indicated:  Yes    HIV Risk Assessment    None indicated:  Yes        Drug and Alcohol Use:   Tobacco use    Cigarettes:  current smoker    Smokeless:  never used smokeless tobacco    Tobacco use duration    Tobacco Cessation Readiness    Alcohol use    Alcohol use:  frequent use    Alcohol Treatment Readiness   Illicit Drug Use    Drug use:  never        Diet & Exercise:   Diet   What is your diet?:  Regular   How many servings a day of the following:   Exercise    Do you currently exercise?:  currently not exercising       Cognitive Impairment Screening:   Depression screening preformed:  Yes    Cognitive Impairment Screening        Functional Ability/Level of Safety:   Hearing    Hearing difficulties:  No    Hearing Impairment Assessment    Hearing status:  No impairment   Current Activities    Help needed with the folllowing:    ADL    Fall Risk   Injury History       Home Safety:   Are there hazards in your environment?:  No   Home Safety Risk Factors       Advanced Directives:   Advanced Directives    Living Will:  Yes    Patient's End of Life Decisions        Urinary Incontinence:   Do you have urinary incontinence?:  No        Glaucoma:            Provider Screening     Preventative Screening/Counseling:   Cardiovascular Screening/Counseling:   (Labs Q5 years, EKG optional one-time)   General:  Screening Current Counseling:  Healthy Diet, Healthy Weight          Diabetes Screening/Counseling:   (2 tests/year if Pre-Diabetes or 1 test/year if no Diabetes)   General:  Screening Current Counseling:  Healthy Diet, Healthy Weight          Colorectal Cancer Screening/Counseling:   (FOBT Q1 yr; Flex Sig Q4 yrs or Q10 yrs after Screening Colonoscopy; Screening Colonoscpy Q2 yrs High Risk or Q10 yrs Low Risk; Barium Enema Q2 yrs High Risk or Q4 yrs Low Risk) General:  Screening Current Counseling:  high fiber diet          Prostate Cancer Screening/Counseling:   (Annual)    General:  Risks and Benefits Discussed Due for: Labs:  PSA         Breast Cancer Screening/Counseling:   (Baseline Age 28 - 43; Annual Age 36+)         Cervical Cancer Screening/Counseling:   (Annual for High Risk or Childbearing Age with Abnormal Pap in Last 3 yrs; Every 2 all others)         Osteoporosis Screening/Counseling:   (Every 2 Yrs if at risk or more if medically necessary)   General:  Screening Not Indicated           AAA Screening/Counseling:   (Once per Lifetime with risk factors)     Age over 72 (males only):  Yes Tobacco use (males only):  Yes   General:  Screening Current           Glaucoma Screening/Counseling:   (Annual)   General:  Risks and Benefits Discussed, Patient Declines          HIV Screening/Counseling:   (Voluntary; Once annually for high risk OR 3 times for Pregnancy at diagnosis of IUP; 3rd trimester; and at Labor   General:  Screening Not Indicated           Hepatitis C Screening:             Immunizations:   Influenza (annual):  Risks & Benefits Discussed, Influenza Due Today   Pneumococcal (Once in a Lifetime):  Risks & Benefits Discussed   Zostavax (Medicare D Coverage, Pt >70 yo):  Patient Declines       Other Preventative Couseling (Non-Medicare Wellness Visit Required):       Referrals (Non-Medicare Wellness Visit Required):       Medical Equipment/Suppliers:           No exam data present    Physical Exam :  Physical Exam   Constitutional: He is oriented to person, place, and time  He appears well-developed and well-nourished  HENT:   Head: Normocephalic and atraumatic  Right Ear: External ear normal    Left Ear: External ear normal    Nose: Nose normal    Mouth/Throat: Oropharynx is clear and moist    Eyes: Conjunctivae and EOM are normal  Pupils are equal, round, and reactive to light  Neck: Normal range of motion  Neck supple     Cardiovascular: Normal rate, regular rhythm and normal heart sounds  Pulmonary/Chest: Effort normal and breath sounds normal    Abdominal: Soft  Bowel sounds are normal    Neurological: He is alert and oriented to person, place, and time  Skin: Skin is warm and dry  Reviewed Updated [facility] Prior Wellness Visits:   Last Medicare wellness visit information was reviewed, patient interviewed , no change since last AWVno  Last Medicare wellness visit information was reviewed, patient interviewed and updates made to the record today no    Assessment and Plan:  1  Wellness examination     2  Flu vaccine need  Flu Vaccine High Dose Split Preservative Free IM   3  Encounter for screening for lung cancer  CT lung screening program   4   Screening for prostate cancer  PSA       Health Maintenance Due   Topic Date Due    DTaP,Tdap,and Td Vaccines (1 - Tdap) 01/05/1959    Depression Screening PHQ-9  01/05/1964    Fall Risk  01/05/2017    ABDOMINAL AORTIC ANEURYSM (AAA) SCREEN  01/05/2017    PNEUMOCOCCAL POLYSACCHARIDE VACCINE AGE 72 AND OVER  01/05/2017    INFLUENZA VACCINE  09/01/2017

## 2018-02-26 ENCOUNTER — OFFICE VISIT (OUTPATIENT)
Dept: CARDIOLOGY CLINIC | Facility: MEDICAL CENTER | Age: 68
End: 2018-02-26
Payer: COMMERCIAL

## 2018-02-26 ENCOUNTER — APPOINTMENT (OUTPATIENT)
Dept: LAB | Facility: MEDICAL CENTER | Age: 68
End: 2018-02-26
Payer: COMMERCIAL

## 2018-02-26 VITALS
SYSTOLIC BLOOD PRESSURE: 132 MMHG | OXYGEN SATURATION: 96 % | BODY MASS INDEX: 35.88 KG/M2 | HEART RATE: 60 BPM | WEIGHT: 270.7 LBS | DIASTOLIC BLOOD PRESSURE: 68 MMHG | HEIGHT: 73 IN

## 2018-02-26 DIAGNOSIS — I10 ESSENTIAL HYPERTENSION: ICD-10-CM

## 2018-02-26 DIAGNOSIS — E87.1 HYPO-OSMOLALITY AND HYPONATREMIA (CODE): ICD-10-CM

## 2018-02-26 DIAGNOSIS — I25.10 CORONARY ARTERY DISEASE INVOLVING NATIVE CORONARY ARTERY OF NATIVE HEART WITHOUT ANGINA PECTORIS: Primary | Chronic | ICD-10-CM

## 2018-02-26 DIAGNOSIS — I25.10 CORONARY ARTERY DISEASE INVOLVING NATIVE CORONARY ARTERY: Chronic | ICD-10-CM

## 2018-02-26 DIAGNOSIS — Z12.5 SCREENING FOR PROSTATE CANCER: ICD-10-CM

## 2018-02-26 DIAGNOSIS — I10 ESSENTIAL HYPERTENSION: Primary | ICD-10-CM

## 2018-02-26 DIAGNOSIS — I51.7 MILD CONCENTRIC LEFT VENTRICULAR HYPERTROPHY (LVH): ICD-10-CM

## 2018-02-26 PROBLEM — I20.9 ANGINA, CLASS III (HCC): Chronic | Status: RESOLVED | Noted: 2018-01-30 | Resolved: 2018-02-26

## 2018-02-26 LAB
ANION GAP SERPL CALCULATED.3IONS-SCNC: 7 MMOL/L (ref 4–13)
BUN SERPL-MCNC: 19 MG/DL (ref 5–25)
CALCIUM SERPL-MCNC: 9.7 MG/DL (ref 8.3–10.1)
CHLORIDE SERPL-SCNC: 95 MMOL/L (ref 100–108)
CO2 SERPL-SCNC: 30 MMOL/L (ref 21–32)
CREAT SERPL-MCNC: 1.04 MG/DL (ref 0.6–1.3)
ERYTHROCYTE [DISTWIDTH] IN BLOOD BY AUTOMATED COUNT: 14.1 % (ref 11.6–15.1)
GFR SERPL CREATININE-BSD FRML MDRD: 74 ML/MIN/1.73SQ M
GLUCOSE SERPL-MCNC: 97 MG/DL (ref 65–140)
HCT VFR BLD AUTO: 47.9 % (ref 36.5–49.3)
HGB BLD-MCNC: 16.2 G/DL (ref 12–17)
MCH RBC QN AUTO: 32.4 PG (ref 26.8–34.3)
MCHC RBC AUTO-ENTMCNC: 33.8 G/DL (ref 31.4–37.4)
MCV RBC AUTO: 96 FL (ref 82–98)
PLATELET # BLD AUTO: 224 THOUSANDS/UL (ref 149–390)
PMV BLD AUTO: 10.1 FL (ref 8.9–12.7)
POTASSIUM SERPL-SCNC: 4.8 MMOL/L (ref 3.5–5.3)
PSA SERPL-MCNC: 0.6 NG/ML (ref 0–4)
RBC # BLD AUTO: 5 MILLION/UL (ref 3.88–5.62)
SODIUM SERPL-SCNC: 132 MMOL/L (ref 136–145)
WBC # BLD AUTO: 6.37 THOUSAND/UL (ref 4.31–10.16)

## 2018-02-26 PROCEDURE — 85027 COMPLETE CBC AUTOMATED: CPT

## 2018-02-26 PROCEDURE — 36415 COLL VENOUS BLD VENIPUNCTURE: CPT

## 2018-02-26 PROCEDURE — 99214 OFFICE O/P EST MOD 30 MIN: CPT | Performed by: INTERNAL MEDICINE

## 2018-02-26 PROCEDURE — G0103 PSA SCREENING: HCPCS

## 2018-02-26 PROCEDURE — 80048 BASIC METABOLIC PNL TOTAL CA: CPT

## 2018-02-26 RX ORDER — AMLODIPINE BESYLATE 5 MG/1
5 TABLET ORAL DAILY
Qty: 90 TABLET | Refills: 1 | Status: SHIPPED | OUTPATIENT
Start: 2018-02-26 | End: 2018-09-06 | Stop reason: SDUPTHER

## 2018-02-26 NOTE — PROGRESS NOTES
Cardiology Follow Up    Giles Hernandez  1950  [id number]  [facility]  [address] [place] [address] [address]    No diagnosis found  Discussion: His breathing has improved since opening the mid RCA  He still has a  at the acute margin  I will review his films with Dr Biju Amaya in [place] in 2 days  I will see him again in 3 months  Cardiovascular History:    Mr Arnulfo Us was referred in 3/17 becausee of frequent PVCs identified on a routine ECG  There was no history of overt heart disease, and the patient denied chest discomfort, dyspnea, syncope, or palpitations  However, because of the presence of multiple coronary risk factors, a SPECT study was performed, revealing a large fixed inferior defect  In addition, a screening CT of the chest without contrast, obtained to assess his lungs because of his smoking history, disclosed the presence of extensive coronary atherosclerosis  Consequently, coronary angiography was performed  He was found to have a chronic total occlusion of the mid RCA with CORNELL grade 1 antegrade flow, as well as collateral flow from the left system  There was moderate atherosclerosis of the LAD with 50% lesions in the proximal and mid vessel  The basal and mid inferior wall was hypokinetic with an estimated ejection fraction 55%  The EDP was mildly elevated  Echocardiography disclosed mild LVH  LV function was normal   Because of the presence of a chronic total occlusion of the mid RCA with no reversibility by SPECT what preserved wall motion by echo, it is likely that there is a significant territory of viable but ischemic myocardium in the inferior distribution  In 917, we discussed the feasibility of  PCI, with the possibility of improving his symptoms, but it's somewhat increased risk compared to conventional PCI   He initially did not wish to consider this option, that in 1/18 indicated that he would prefer to undergo attempted RCA   The risks and benefits of that procedure were discussed in detail  On 2/14/18, balloon angioplasty procedure was performed on the lesion in the mid RCA  The chronic total occlusion was crossed and dilated  However, this revealed the presence of a total stenosis in the distal vessel  This distal occlusion could not be crossed  The subintima was entered, but the subintimal plane could not be extended beyond the distal cap  Attempts to reach the distal RCA retrograde via septal collaterals were unsuccessful  Stenting of the mid vessel was not attempted, since this might impede efforts to deliver equipment distally at the next procedure  He has multiple CAD risk factors  He has significant dyslipidemia; A lipid panel in 1/17 disclosed total cholesterol 211, , HDL 67, and triglycerides 49  He initially declined to take statin therapy because he believes it adversely affected his father's kidneys  However, after the finding of significant CAD at catheterization, he agreed to a trial of atorvastatin, beginning with 20 mg per day and slowly titrating upward as tolerated  He had a markedly beneficial response, with a reduction in total cholesterol to 101, and a reduction in LDL to 52; the HDL also declined to 38; triglycerides were stable at 56  He is hypertensive  He was reluctant to increase his antihypertensive therapy because it was recently initiated  He is a smoker and was initially resistant to smoking cessation; however at the time of his 5/17 visit he indicated that he was attempting to reduce his cigarette consumption  He does not have diabetes  A screening ultrasound of the abdominal aorta in 1/17 was normal           He has significant COPD  CT of the chest in 1/17 disclosed moderate emphysema  There opacities in the right upper lobe suggesting the presence of an infectious process, possibly an atypical organism   Extensive coronary atherosclerosis was noted  Patient Active Problem List   Diagnosis    Palpitations    Coronary artery disease involving native coronary artery    [facility] ([facility]    ULLOA (dyspnea on exertion)    Abnormal EKG    Abnormal heart sounds    Colon polyps    COPD (chronic obstructive pulmonary disease) (HCC)    Elevated hematocrit    Elevated hemoglobin (HCC)    Emphysema of lung (HCC)    Hiatal hernia    Hyperlipidemia    Hypertension    Hyponatremia    Mild concentric left ventricular hypertrophy (LVH)    Osteoarthritis of left glenohumeral joint    Osteoarthritis of right glenohumeral joint    Pityriasis rosea    PVC (premature ventricular contraction)     Past Medical History:   Diagnosis Date    [facility]ch[facility] disease) ([facility])     Hyperlipidemia     Hypertension     Myocardial infarction     Pneumonia 2/2/2017    Pulmonary emphysema ([facility])     Shortness of breath      Social History     Social History    Marital status:      Spouse name: N/A    Number of children: N/A    Years of education: N/A     Occupational History    Not on file       Social History Main Topics    Smoking status: Current Every Day Smoker     Packs/day: 0 50     Years: 40 00    Smokeless tobacco: Never Used    Alcohol use 25 2 oz/week     42 Cans of beer per week      Comment: 3-6 drinks per day    Drug use: No    Sexual activity: Not on file     Other Topics Concern    Not on file     Social History Narrative    Always uses a seat belt    Caffeine use - Daily caffeine consumption, 2-3 servings a day              Family History   Problem Relation Age of Onset    Hypertension Mother     Emphysema Father     Hypertension Father     Arthritis Brother     Diabetes Brother     Other Other      Cardiac Disorder    Hypertension Other      Past Surgical History:   Procedure Laterality Date    ANKLE SURGERY Right 1978    COLONOSCOPY      HAND SURGERY Left 1964    MI COLONOSCOPY FLX DX W/COLLJ SPEC WHEN PFRMD N/A 5/9/2017    Procedure: EGD AND COLONOSCOPY;  Surgeon: Roly Fuchs MD;  [facility];   Service: Gastroenterology       Current Outpatient Prescriptions:     albuterol (2 5 mg/3 mL) 0 083 % nebulizer solution, Take 3 mL by nebulization every 6 (six) hours as needed for wheezing, Disp: 75 mL, Rfl: 0    albuterol (PROVENTIL HFA,VENTOLIN HFA) 90 mcg/act inhaler, Inhale 2 puffs every 4 (four) hours as needed for wheezing, Disp: 1 Inhaler, Rfl: 0    amLODIPine (NORVASC) 5 mg tablet, Take 5 mg by mouth daily, Disp: , Rfl:     aspirin (ECOTRIN LOW STRENGTH) 81 mg EC tablet, Take 81 mg by mouth daily, Disp: , Rfl:     atorvastatin (LIPITOR) 20 mg tablet, Take 20 mg by mouth daily, Disp: , Rfl:     clopidogrel (PLAVIX) 75 mg tablet, Take 1 tablet (75 mg total) by mouth daily, Disp: 30 tablet, Rfl: 11    lisinopril (ZESTRIL) 20 mg tablet, Take 10 mg by mouth daily  , Disp: , Rfl:     nitroglycerin (NITROSTAT) 0 4 mg SL tablet, Place 1 tablet (0 4 mg total) under the tongue every 5 (five) minutes as needed for chest pain, Disp: 90 tablet, Rfl: 0    pantoprazole (PROTONIX) 40 mg tablet, Take by mouth, Disp: , Rfl:     sodium chloride 1 g tablet, Take 1 g by mouth 2 (two) times a day, Disp: , Rfl:     Tiotropium Bromide Monohydrate (SPIRIVA RESPIMAT) 2 5 MCG/ACT AERS, Inhale, Disp: , Rfl:   No Known Allergies    Labs:  Admission on 02/14/2018, Discharged on 02/15/2018   Component Date Value    Sodium 02/14/2018 132*    Potassium 02/14/2018 4 5     Chloride 02/14/2018 96*    CO2 02/14/2018 29     Anion Gap 02/14/2018 7     BUN 02/14/2018 15     Creatinine 02/14/2018 0 79     Glucose 02/14/2018 105     Glucose, Fasting 02/14/2018 105*    Calcium 02/14/2018 8 9     eGFR 02/14/2018 94     WBC 02/14/2018 6 57     RBC 02/14/2018 4 69     Hemoglobin 02/14/2018 15 3     Hematocrit 02/14/2018 44 2     MCV 02/14/2018 94     MCH 02/14/2018 32 6     [facility]     RDW 02/14/2018 13 9     Platelets 22/77/1723 227     MPV 02/14/2018 9 8     Cholesterol 02/14/2018 138     Triglycerides 02/14/2018 60     HDL, Direct 02/14/2018 54     LDL Calculated 02/14/2018 72     Magnesium 02/14/2018 1 9     Protime 02/14/2018 13 9     INR 02/14/2018 1 07     Ventricular Rate 02/14/2018 71     Atrial Rate 02/14/2018 71     LA Interval 02/14/2018 184     QRSD Interval 02/14/2018 98     QT Interval 02/14/2018 394     QTC Interval 02/14/2018 428     P Axis 02/14/2018 113     QRS Axis 02/14/2018 52     T Wave Axis 02/14/2018 86     Activated Clotting Time,* 02/14/2018 224*    Specimen Type 02/14/2018 VENOUS     Activated Clotting Time,* 02/14/2018 297*    Specimen Type 02/14/2018 VENOUS     Activated Clotting Time,* 02/14/2018 189*    Specimen Type 02/14/2018 VENOUS     Activated Clotting Time,* 02/14/2018 272*    Specimen Type 02/14/2018 VENOUS     WBC 02/15/2018 7 38     RBC 02/15/2018 4 41     Hemoglobin 02/15/2018 14 1     Hematocrit 02/15/2018 41 6     MCV 02/15/2018 94     MCH 02/15/2018 32 0     MCHC 02/15/2018 33 9     RDW 02/15/2018 13 9     Platelets 55/54/0749 221     MPV 02/15/2018 9 9     Sodium 02/15/2018 134*    Potassium 02/15/2018 4 2     Chloride 02/15/2018 99*    CO2 02/15/2018 28     Anion Gap 02/15/2018 7     BUN 02/15/2018 11     Creatinine 02/15/2018 0 68     Glucose 02/15/2018 100     Calcium 02/15/2018 8 8     eGFR 02/15/2018 100    Admission on 01/31/2018, Discharged on 01/31/2018   Component Date Value    Ventricular Rate 01/31/2018 64     Atrial Rate 01/31/2018 64     LA Interval 01/31/2018 194     QRSD Interval 01/31/2018 92     QT Interval 01/31/2018 398     QTC Interval 01/31/2018 410     QRS Axis 01/31/2018 36     T Wave Axis 01/31/2018 75     Sodium 01/31/2018 129*    Potassium 01/31/2018 4 3     Chloride 01/31/2018 93*    CO2 01/31/2018 30     Anion Gap 01/31/2018 6     BUN 01/31/2018 12     Creatinine 01/31/2018 0 76     Glucose 01/31/2018 104     Glucose, Fasting 01/31/2018 104*    Calcium 01/31/2018 9 0     eGFR 01/31/2018 95     WBC 01/31/2018 7 73     RBC 01/31/2018 4 77     Hemoglobin 01/31/2018 15 4     Hematocrit 01/31/2018 44 9     MCV 01/31/2018 94     MCH 01/31/2018 32 3     MCHC 01/31/2018 34 3     RDW 01/31/2018 13 4     Platelets 54/54/2432 261     MPV 01/31/2018 9 5     Cholesterol 01/31/2018 120     Triglycerides 01/31/2018 60     HDL, Direct 01/31/2018 42     LDL Calculated 01/31/2018 66     Magnesium 01/31/2018 2 1     Protime 01/31/2018 13 8     INR 01/31/2018 1 06      Imaging: No results found  Review of Systems:  ROS    Physical Exam:  Physical Exam   Constitutional: He is oriented to person, place, and time  He appears well-developed and well-nourished  No distress  HENT:   Head: Normocephalic and atraumatic  Eyes: Conjunctivae and EOM are normal  No scleral icterus  Neck: Normal range of motion  Neck supple  No JVD present  No tracheal deviation present  Cardiovascular: Normal rate, regular rhythm, normal heart sounds and intact distal pulses  Exam reveals no gallop and no friction rub  No murmur heard  Pulmonary/Chest: Effort normal  No stridor  No respiratory distress  He has no wheezes  He has no rales  He exhibits no tenderness  Decreased BS bilaterally  No rales  Abdominal: Soft  Bowel sounds are normal  He exhibits no distension  There is no tenderness  Musculoskeletal: Normal range of motion  He exhibits no edema or tenderness  Neurological: He is alert and oriented to person, place, and time  No cranial nerve deficit  Coordination normal    Skin: Skin is warm and dry  He is not diaphoretic  No erythema  Psychiatric: He has a normal mood and affect  His behavior is normal  Judgment and thought content normal    Vitals reviewed        Anirudh Watson MD

## 2018-02-27 ENCOUNTER — OFFICE VISIT (OUTPATIENT)
Dept: NEPHROLOGY | Facility: CLINIC | Age: 68
End: 2018-02-27

## 2018-02-27 VITALS
SYSTOLIC BLOOD PRESSURE: 152 MMHG | BODY MASS INDEX: 35.78 KG/M2 | WEIGHT: 270 LBS | DIASTOLIC BLOOD PRESSURE: 86 MMHG | HEIGHT: 73 IN

## 2018-02-27 DIAGNOSIS — I10 ESSENTIAL HYPERTENSION: ICD-10-CM

## 2018-02-27 DIAGNOSIS — E87.1 HYPONATREMIA: Primary | ICD-10-CM

## 2018-02-27 PROCEDURE — 99213 OFFICE O/P EST LOW 20 MIN: CPT | Performed by: INTERNAL MEDICINE

## 2018-02-27 RX ORDER — SODIUM CHLORIDE 1000 MG
1 TABLET, SOLUBLE MISCELLANEOUS 2 TIMES DAILY
Qty: 60 TABLET | Refills: 0
Start: 2018-02-27 | End: 2018-06-06 | Stop reason: SDUPTHER

## 2018-02-27 NOTE — PROGRESS NOTES
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  Patient has also underwent CT scan of chest in January 2017 which was also negative for lung cancer  Hyponatremia was suspected due to underlying COPD  Currently patient is on salt tablet and was prescribed 2 g PO BID but currently patient is taking salt tablet 2 g PO daily and only in the morning  Patient was also advised to follow fluid restriction but currently drinking at least 60 oz of fluid on daily basis  Patient also a history of hypertension and said that his blood pressure is well controlled at home  Patient also history of COPD and continues more  Smoking cessation counseling was provided today but patient is not interested to quit at this point  Patient also underlying coronary artery disease and also taking aspirin and Plavix  Patient takes all the medications as prescribed and does not use any NSAIDs  REVIEW OF SYSTEMS:    Review of Systems   Constitutional: Negative for chills and fever  HENT: Negative for nosebleeds and sore throat  Eyes: Negative for photophobia and pain  Respiratory: Negative for choking and wheezing  Cardiovascular: Negative for palpitations and leg swelling  Gastrointestinal: Negative for abdominal pain and blood in stool  Endocrine: Negative for cold intolerance and heat intolerance  Genitourinary: Negative for flank pain and hematuria  Musculoskeletal: Negative for joint swelling and neck pain  Skin: Negative for color change and pallor  Allergic/Immunologic: Negative for environmental allergies and immunocompromised state  Neurological: Negative for seizures and syncope  Hematological: Negative for adenopathy  Does not bruise/bleed easily  Psychiatric/Behavioral: Negative for confusion and suicidal ideas  PAST MEDICAL HISTORY:  Past Medical History:   Diagnosis Date    COPD (chronic obstructive pulmonary disease) ([address] )     Hyperlipidemia     Hypertension     Myocardial infarction     Pneumonia 2/2/2017    Pulmonary emphysema (HCC)     Shortness of breath        PAST SURGICAL HISTORY:  Past Surgical History:   Procedure Laterality Date    ANKLE SURGERY Right 1978    CAROTID STENT  02/13/2018    COLONOSCOPY      HAND SURGERY Left 1964    [place] COLONOSCOPY FLX DX W/COLLJ SPEC WHEN PFRMD N/A 5/9/2017    Procedure: EGD AND COLONOSCOPY;  Surgeon: Thomas Foy MD;  [facility];   Service: Gastroenterology       SOCIAL HISTORY:  History   Alcohol Use    4 2 - 12 6 oz/week    7 - 21 Cans of beer per week     Comment: 1-3 daily      History   Drug Use No     History   Smoking Status    Current Every Day Smoker    Packs/day: 0 50    Years: 40 00    Types: Cigarettes   Smokeless Tobacco    Never Used       FAMILY HISTORY:  Family History   Problem Relation Age of Onset    Hypertension Mother     Emphysema Father     Hypertension Father     Arthritis Brother     Diabetes Brother     Other Other      Cardiac Disorder    Hypertension Other        ALLERGY:  No Known Allergies    MEDICATIONS:    Current Outpatient Prescriptions:     albuterol (2 5 mg/3 mL) 0 083 % nebulizer solution, Take 3 mL by nebulization every 6 (six) hours as needed for wheezing, Disp: 75 mL, Rfl: 0    albuterol (PROVENTIL HFA,VENTOLIN HFA) 90 mcg/act inhaler, Inhale 2 puffs every 4 (four) hours as needed for wheezing, Disp: 1 Inhaler, Rfl: 0    amLODIPine (NORVASC) 5 mg tablet, Take 1 tablet (5 mg total) by mouth daily, Disp: 90 tablet, Rfl: 1    aspirin (ECOTRIN LOW STRENGTH) 81 mg EC tablet, Take 81 mg by mouth daily, Disp: , Rfl:     atorvastatin (LIPITOR) 20 mg tablet, Take 20 mg by mouth daily, Disp: , Rfl:     clopidogrel (PLAVIX) 75 mg tablet, Take 1 tablet (75 mg total) by mouth daily, Disp: 30 tablet, Rfl: 11    lisinopril (ZESTRIL) 20 mg tablet, Take 10 mg by mouth daily  , Disp: , Rfl:     pantoprazole (PROTONIX) 40 mg tablet, Take 40 mg by mouth daily  , Disp: , Rfl:     sodium chloride 1 g tablet, Take 1 tablet (1 g total) by mouth 2 (two) times a day, Disp: 60 tablet, Rfl: 0    Tiotropium Bromide Monohydrate (SPIRIVA RESPIMAT) 2 5 MCG/ACT AERS, Inhale 5 mcg daily  , Disp: , Rfl:     nitroglycerin (NITROSTAT) 0 4 mg SL tablet, Place 1 tablet (0 4 mg total) under the tongue every 5 (five) minutes as needed for chest pain, Disp: 90 tablet, Rfl: 0    PHYSICAL EXAM:  Vitals:    02/27/18 0958   BP: 152/86   BP Location: Left arm   Patient Position: Sitting   Cuff Size: Large   Weight: 122 kg (270 lb)   Height: 6' 1" (1 854 m)     Body mass index is 35 62 kg/m²  Physical Exam   Constitutional: He appears well-nourished  No distress  HENT:   Head: Normocephalic and atraumatic  Eyes: Conjunctivae are normal  No scleral icterus  Right eye exhibits normal extraocular motion  Left eye exhibits normal extraocular motion  Neck: Neck supple  No JVD present  Cardiovascular: Normal rate, S1 normal and S2 normal     Pulmonary/Chest: No accessory muscle usage  No respiratory distress  He has no wheezes  Abdominal: Soft  He exhibits no distension  Musculoskeletal:        Right ankle: He exhibits swelling  Left ankle: He exhibits swelling  Right hand: He exhibits no laceration  Left hand: He exhibits no laceration  Lymphadenopathy:        Right cervical: No superficial cervical adenopathy present  Left cervical: No superficial cervical adenopathy present  Right: No supraclavicular adenopathy present  Left: No supraclavicular adenopathy present  Neurological: He is alert  He is not disoriented  Skin: Skin is warm  No laceration noted  No cyanosis  Psychiatric: He is not combative  He does not exhibit a depressed mood  He expresses no suicidal ideation  LAB RESULTS:  Results for orders placed or performed in visit on 31/82/20   Basic metabolic panel   Result Value Ref Range    Sodium 132 (L) 136 - 145 mmol/L    Potassium 4 8 3 5 - 5 3 mmol/L    Chloride 95 (L) 100 - 108 mmol/L    CO2 30 21 - 32 mmol/L    Anion Gap 7 4 - 13 mmol/L    BUN 19 5 - 25 mg/dL    Creatinine 1 04 0 60 - 1 30 mg/dL    Glucose 97 65 - 140 mg/dL    Calcium 9 7 8 3 - 10 1 mg/dL    eGFR 74 ml/min/1 73sq m   CBC and Platelet   Result Value Ref Range    WBC 6 37 4 31 - 10 16 Thousand/uL    RBC 5 00 3 88 - 5 62 Million/uL    Hemoglobin 16 2 12 0 - 17 0 g/dL    Hematocrit 47 9 36 5 - 49 3 %    MCV 96 82 - 98 fL    MCH 32 4 26 8 - 34 3 pg    MCHC 33 8 31 4 - 37 4 g/dL    RDW 14 1 11 6 - 15 1 %    Platelets 733 341 - 617 Thousands/uL    MPV 10 1 8 9 - 12 7 fL   PSA   Result Value Ref Range    PSA 0 6 0 0 - 4 0 ng/mL       ASSESSMENT and PLAN:  Diagnoses and all orders for this visit:    Hyponatremia  -     Basic metabolic panel; Future  -     sodium chloride 1 g tablet; Take 1 tablet (1 g total) by mouth 2 (two) times a day    Essential hypertension  -     Basic metabolic panel; Future  -     Microalbumin / creatinine urine ratio; Future  -     Urinalysis with reflex to microscopic; Future    1  Hyponatremia  Chronic, based on urine lytes hyponatremia was thought to be secondary to underlying SIADH  Suspecting COPD as the etiology of SIADH  Patient continued to smoke cigarette and smoking cessation counseling was provided today but patient is not interested to quit smoking at this point      Patient is currently on salt tablet and was prescribed 2 g PO BID but for last few weeks patient has been taking salt tablet 2 g PO in the morning only  Patient is reluctant to go up on the salt tablet dose  Current sodium level is 132  Will plan to change salt tablet to 1 g PO BID as it will be better absorbs rather than taking 2 g at once  Patient is also drinking lots of water at least 60 oz per day and I have educated importance of fluid restriction in the setting of SIADH/hyponatremia  Advised patient to drink around 1 L of fluid per day  Plan to recheck sodium level again before next visit  Currently hyponatremia is asymptomatic  2   Hypertension  Essential, today's blood pressure was elevated and above the goal but patient said that he has been checking blood pressure on regular basis and his blood pressure has been under well control     Plan to monitor hypertension today with amlodipine 5 mg PO daily and lisinopril 10 mg PO daily     Currently renal function is at baseline with current creatinine of 1 04  Returns to Nephrology Clinic in 3 months  Plan to check BMP, urine analysis and urine microalbumin to creatinine ratio before next visit  Lab (reviewed on 6/4/2018)  Creatinine 1 1 with EGFR of 70  Urine analysis showed no signs of dysuria or hematuria  Urine microalbumin to creatinine ratio is 168 mg-> continue lisinopril 10 mg p o  daily  Sodium 130 to-> continue salt tablet 1 g p o  BID + follow 45 oz of fluid restriction per day  Normal potassium, bicarb and calcium    NO CHANGE        Portions of the record may have been created with voice recognition software  Occasional wrong word or "sound a like" substitutions may have occurred due to the inherent limitations of voice recognition software  Read the chart carefully and recognize, using context, where substitutions have occurred  If you have any questions, please contact the dictating provider

## 2018-02-27 NOTE — LETTER
February 27, 2018     Jayjay Gonzalez, DO  [address]  [facility] [address]    Patient: Laurel Beltran   YOB: 1950   Date of Visit: 2/27/2018       Dear Dr Beth Dykes: Thank you for referring Laurel Beltran to me for evaluation  Below are my notes for this consultation  If you have questions, please do not hesitate to call me  I look forward to following your patient along with you  Sincerely,        Tyler Hernandez MD        CC: No Recipients  Tyler Hernandez MD  2/27/2018 10:39 AM  Sign at close encounter  [address] 79 y o  male MRN: [id number]    Encounter: [id number] 2/27/2018    REASON FOR VISIT: Laurel Beltran is a 79 y o  male who is here on 2/27/2018 for No chief complaint on file  Tosin Crisp HPI:    This is a 72-year-old male with a past medical history of coronary artery disease, hypertension, hyperlipidemia, COPD, current smoker, returns to Nephrology Clinic for further management of hyponatremia  Patient had underwent workup for hyponatremia in the past and exact etiology of hyponatremia has remained unknown but 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  Patient has also underwent CT scan of chest in January 2017 which was also negative for lung cancer  Hyponatremia was suspected due to underlying COPD  Currently patient is on salt tablet and was prescribed 2 g PO BID but currently patient is taking salt tablet 2 g PO daily and only in the morning  Patient was also advised to follow fluid restriction but currently drinking at least 60 oz of fluid on daily basis  Patient also a history of hypertension and said that his blood pressure is well controlled at home  Patient also history of COPD and continues more  Smoking cessation counseling was provided today but patient is not interested to quit at this point  Patient also underlying coronary artery disease and also taking aspirin and Plavix      Patient takes all the medications as prescribed and does not use any NSAIDs  REVIEW OF SYSTEMS:    Review of Systems   Constitutional: Negative for chills and fever  HENT: Negative for nosebleeds and sore throat  Eyes: Negative for photophobia and pain  Respiratory: Negative for choking and wheezing  Cardiovascular: Negative for palpitations and leg swelling  Gastrointestinal: Negative for abdominal pain and blood in stool  Endocrine: Negative for cold intolerance and heat intolerance  Genitourinary: Negative for flank pain and hematuria  Musculoskeletal: Negative for joint swelling and neck pain  Skin: Negative for color change and pallor  Allergic/Immunologic: Negative for environmental allergies and immunocompromised state  Neurological: Negative for seizures and syncope  Hematological: Negative for adenopathy  Does not bruise/bleed easily  Psychiatric/Behavioral: Negative for confusion and suicidal ideas  PAST MEDICAL HISTORY:  Past Medical History:   Diagnosis Date    [facility] obstructive [facility]) [facility] [address] [facility]     Hyperlipidemia     Hypertension     Myocardial infarction     Pneumonia 2/2/2017    Pulmonary emphysema (HCC)     Shortness of breath        PAST SURGICAL HISTORY:  Past Surgical History:   Procedure Laterality Date    ANKLE SURGERY Right 1978    CAROTID STENT  02/13/2018    COLONOSCOPY      HAND SURGERY Left 1964    [place] COLONOSCOPY FLX DX W/COLLJ SPEC WHEN PFRMD N/A 5/9/2017    Procedure: EGD AND COLONOSCOPY;  Surgeon: Clau Hawthorne MD;  [facility];   Service: Gastroenterology       SOCIAL HISTORY:  History   Alcohol Use    4 2 - 12 6 oz/week    7 - 21 Cans of beer per week     Comment: 1-3 daily      History   Drug Use No     History   Smoking Status    Current Every Day Smoker    Packs/day: 0 50    Years: 40 00    Types: Cigarettes   Smokeless Tobacco    Never Used       FAMILY HISTORY:  Family History   Problem Relation Age of Onset    Hypertension Mother  Emphysema Father     Hypertension Father     Arthritis Brother     Diabetes Brother     Other Other      Cardiac Disorder    Hypertension Other        ALLERGY:  No Known Allergies    MEDICATIONS:    Current Outpatient Prescriptions:     albuterol (2 5 mg/3 mL) 0 083 % nebulizer solution, Take 3 mL by nebulization every 6 (six) hours as needed for wheezing, Disp: 75 mL, Rfl: 0    albuterol (PROVENTIL HFA,VENTOLIN HFA) 90 mcg/act inhaler, Inhale 2 puffs every 4 (four) hours as needed for wheezing, Disp: 1 Inhaler, Rfl: 0    amLODIPine (NORVASC) 5 mg tablet, Take 1 tablet (5 mg total) by mouth daily, Disp: 90 tablet, Rfl: 1    aspirin (ECOTRIN LOW STRENGTH) 81 mg EC tablet, Take 81 mg by mouth daily, Disp: , Rfl:     atorvastatin (LIPITOR) 20 mg tablet, Take 20 mg by mouth daily, Disp: , Rfl:     clopidogrel (PLAVIX) 75 mg tablet, Take 1 tablet (75 mg total) by mouth daily, Disp: 30 tablet, Rfl: 11    lisinopril (ZESTRIL) 20 mg tablet, Take 10 mg by mouth daily  , Disp: , Rfl:     pantoprazole (PROTONIX) 40 mg tablet, Take 40 mg by mouth daily  , Disp: , Rfl:     sodium chloride 1 g tablet, Take 1 tablet (1 g total) by mouth 2 (two) times a day, Disp: 60 tablet, Rfl: 0    Tiotropium Bromide Monohydrate (SPIRIVA RESPIMAT) 2 5 MCG/ACT AERS, Inhale 5 mcg daily  , Disp: , Rfl:     nitroglycerin (NITROSTAT) 0 4 mg SL tablet, Place 1 tablet (0 4 mg total) under the tongue every 5 (five) minutes as needed for chest pain, Disp: 90 tablet, Rfl: 0    PHYSICAL EXAM:  Vitals:    02/27/18 0958   BP: 152/86   BP Location: Left arm   Patient Position: Sitting   Cuff Size: Large   Weight: 122 kg (270 lb)   Height: 6' 1" (1 854 m)     Body mass index is 35 62 kg/m²  Physical Exam   Constitutional: He appears well-nourished  No distress  HENT:   Head: Normocephalic and atraumatic  Eyes: Conjunctivae are normal  No scleral icterus  Right eye exhibits normal extraocular motion   Left eye exhibits normal extraocular motion  Neck: Neck supple  No JVD present  Cardiovascular: Normal rate, S1 normal and S2 normal     Pulmonary/Chest: No accessory muscle usage  No respiratory distress  He has no wheezes  Abdominal: Soft  He exhibits no distension  Musculoskeletal:        Right ankle: He exhibits swelling  Left ankle: He exhibits swelling  Right hand: He exhibits no laceration  Left hand: He exhibits no laceration  Lymphadenopathy:        Right cervical: No superficial cervical adenopathy present  Left cervical: No superficial cervical adenopathy present  Right: No supraclavicular adenopathy present  Left: No supraclavicular adenopathy present  Neurological: He is alert  He is not disoriented  Skin: Skin is warm  No laceration noted  No cyanosis  Psychiatric: He is not combative  He does not exhibit a depressed mood  He expresses no suicidal ideation  LAB RESULTS:  Results for orders placed or performed in visit on 93/11/51   Basic metabolic panel   Result Value Ref Range    Sodium 132 (L) 136 - 145 mmol/L    Potassium 4 8 3 5 - 5 3 mmol/L    Chloride 95 (L) 100 - 108 mmol/L    CO2 30 21 - 32 mmol/L    Anion Gap 7 4 - 13 mmol/L    BUN 19 5 - 25 mg/dL    Creatinine 1 04 0 60 - 1 30 mg/dL    Glucose 97 65 - 140 mg/dL    Calcium 9 7 8 3 - 10 1 mg/dL    eGFR 74 ml/min/1 73sq m   CBC and Platelet   Result Value Ref Range    WBC 6 37 4 31 - 10 16 Thousand/uL    RBC 5 00 3 88 - 5 62 Million/uL    Hemoglobin 16 2 12 0 - 17 0 g/dL    Hematocrit 47 9 36 5 - 49 3 %    MCV 96 82 - 98 fL    MCH 32 4 26 8 - 34 3 pg    MCHC 33 8 31 4 - 37 4 g/dL    RDW 14 1 11 6 - 15 1 %    Platelets 392 384 - 254 Thousands/uL    MPV 10 1 8 9 - 12 7 fL   PSA   Result Value Ref Range    PSA 0 6 0 0 - 4 0 ng/mL       ASSESSMENT and PLAN:  Diagnoses and all orders for this visit:    Hyponatremia  -     Basic metabolic panel; Future  -     sodium chloride 1 g tablet;  Take 1 tablet (1 g total) by mouth 2 (two) times a day    Essential hypertension  -     Basic metabolic panel; Future  -     Microalbumin / creatinine urine ratio; Future  -     Urinalysis with reflex to microscopic; Future    1  Hyponatremia  Chronic, based on urine lytes hyponatremia was thought to be secondary to underlying SIADH  Suspecting COPD as the etiology of SIADH  Patient continued to smoke cigarette and smoking cessation counseling was provided today but patient is not interested to quit smoking at this point  Patient is currently on salt tablet and was prescribed 2 g PO BID but for last few weeks patient has been taking salt tablet 2 g PO in the morning only  Patient is reluctant to go up on the salt tablet dose  Current sodium level is 132  Will plan to change salt tablet to 1 g PO BID as it will be better absorbs rather than taking 2 g at once  Patient is also drinking lots of water at least 60 oz per day and I have educated importance of fluid restriction in the setting of SIADH/hyponatremia  Advised patient to drink around 1 L of fluid per day  Plan to recheck sodium level again before next visit  Currently hyponatremia is asymptomatic  2   Hypertension  Essential, today's blood pressure was controlled and at goal   Plan to monitor hypertension today with amlodipine 5 mg PO daily and lisinopril 10 mg PO daily     Currently renal function is at baseline with current creatinine of 1 04  Returns to Nephrology Clinic in 3 months  Plan to check BMP, urine analysis and urine microalbumin to creatinine ratio before next visit  Portions of the record may have been created with voice recognition software  Occasional wrong word or "sound a like" substitutions may have occurred due to the inherent limitations of voice recognition software  Read the chart carefully and recognize, using context, where substitutions have occurred  If you have any questions, please contact the dictating provider

## 2018-03-13 DIAGNOSIS — J44.9 CHRONIC OBSTRUCTIVE PULMONARY DISEASE, UNSPECIFIED COPD TYPE (HCC): Primary | ICD-10-CM

## 2018-03-15 ENCOUNTER — HOSPITAL ENCOUNTER (OUTPATIENT)
Dept: CT IMAGING | Facility: CLINIC | Age: 68
Discharge: HOME | End: 2018-03-15
Payer: COMMERCIAL

## 2018-03-15 DIAGNOSIS — Z12.2 ENCOUNTER FOR SCREENING FOR LUNG CANCER: ICD-10-CM

## 2018-03-22 PROBLEM — I71.2 THORACIC ASCENDING AORTIC ANEURYSM (HCC): Status: ACTIVE | Noted: 2018-03-22

## 2018-05-17 ENCOUNTER — OFFICE VISIT (OUTPATIENT)
Dept: CARDIOLOGY CLINIC | Facility: MEDICAL CENTER | Age: 68
End: 2018-05-17

## 2018-05-17 VITALS
SYSTOLIC BLOOD PRESSURE: 130 MMHG | HEART RATE: 82 BPM | WEIGHT: 273.3 LBS | DIASTOLIC BLOOD PRESSURE: 70 MMHG | OXYGEN SATURATION: 96 % | BODY MASS INDEX: 36.22 KG/M2 | HEIGHT: 73 IN

## 2018-05-17 DIAGNOSIS — I51.7 MILD CONCENTRIC LEFT VENTRICULAR HYPERTROPHY (LVH): ICD-10-CM

## 2018-05-17 DIAGNOSIS — I25.10 CORONARY ARTERY DISEASE INVOLVING NATIVE CORONARY ARTERY: Primary | Chronic | ICD-10-CM

## 2018-05-17 DIAGNOSIS — E78.5 HYPERLIPIDEMIA: ICD-10-CM

## 2018-05-17 DIAGNOSIS — R06.00 DOE (DYSPNEA ON EXERTION): Chronic | ICD-10-CM

## 2018-05-17 PROCEDURE — 99214 OFFICE O/P EST MOD 30 MIN: CPT | Performed by: INTERNAL MEDICINE

## 2018-05-17 PROCEDURE — 93000 ELECTROCARDIOGRAM COMPLETE: CPT | Performed by: INTERNAL MEDICINE

## 2018-05-17 NOTE — PROGRESS NOTES
Cardiology Follow Up    Roe Alejandra  1950  [id number]  [address]  [facility] [address]    1  Coronary artery disease involving native coronary artery  POCT ECG   2  Hyperlipidemia     3  Mild concentric left ventricular hypertrophy (LVH)  POCT ECG   4  ULLOA (dyspnea on exertion)  POCT ECG       Discussion: He remains stable  He has chronic dyspnea  He continues to smoke at a reduced level  I believe he is volume overloaded, but is on sodium supplementation because of a history of hyponatremia  Diuresis is probably warranted, although combining diuresis with sodium supplementation would be a irrational  He has a chronic total occlusion of the distal RCA  I believe that the risk/benefit ratio may not be favorable  I did not make any changes in his current medical regimen  I asked him to return for follow-up visit in 6 weeks  Cardiovascular History:  Mr Sil Watson was referred in 3/17 becausee of frequent PVCs identified on a routine ECG  There was no history of overt heart disease, and the patient denied chest discomfort, dyspnea, syncope, or palpitations  However, because of the presence of multiple coronary risk factors, a SPECT study was performed, revealing a large fixed inferior defect  In addition, a screening CT of the chest without contrast, obtained to assess his lungs because of his smoking history, disclosed the presence of extensive coronary atherosclerosis  Consequently, coronary angiography was performed  He was found to have a chronic total occlusion of the mid RCA with CORNELL grade 1 antegrade flow, as well as collateral flow from the left system  There was moderate atherosclerosis of the LAD with 50% lesions in the proximal and mid vessel   The basal and mid inferior wall was hypokinetic with an estimated ejection fraction 55%  The EDP was mildly elevated  Echocardiography disclosed mild LVH  LV function was normal   Because of the presence of a chronic total occlusion of the mid RCA with no reversibility by SPECT what preserved wall motion by echo, it is likely that there is a significant territory of viable but ischemic myocardium in the inferior distribution   In 9/17, we discussed the feasibility of  PCI, with the possibility of improving his symptoms, but it's somewhat increased risk compared to conventional PCI  He initially did not wish to consider this option, that in 1/18 indicated that he would prefer to undergo attempted RCA   The risks and benefits of that procedure were discussed in detail  On 2/14/18, balloon angioplasty procedure was performed on the lesion in the mid RCA  The chronic total occlusion was crossed and dilated  However, this revealed the presence of a total stenosis in the distal vessel  This distal occlusion could not be crossed  The subintima was entered, but the subintimal plane could not be extended beyond the distal cap  Attempts to reach the distal RCA retrograde via septal collaterals were unsuccessful  Stenting of the mid vessel was not attempted, since this might impede efforts to deliver equipment distally at the next procedure      He has multiple CAD risk factors  He has significant dyslipidemia; A lipid panel in 1/17 disclosed total cholesterol 211, , HDL 67, and triglycerides 49  He initially declined to take statin therapy because he believes it adversely affected his father's kidneys  However, after the finding of significant CAD at catheterization, he agreed to a trial of atorvastatin, beginning with 20 mg per day and slowly titrating upward as tolerated  He had a markedly beneficial response, with a reduction in total cholesterol to 101, and a reduction in LDL to 52; the HDL also declined to 38; triglycerides were stable at 56  He is hypertensive   He was reluctant to increase his antihypertensive therapy because it was recently initiated  He is a smoker and was initially resistant to smoking cessation; however at the time of his 5/17 visit he indicated that he was attempting to reduce his cigarette consumption  He does not have diabetes        A screening ultrasound of the abdominal aorta in 1/17 was normal       He has significant COPD  CT of the chest in 1/17 disclosed moderate emphysema  There opacities in the right upper lobe suggesting the presence of an infectious process, possibly an atypical organism  Extensive coronary atherosclerosis was noted  He has a history of hyponatremia for which he has been receiving oral sodium supplementation  At the time of catheterization in 2/18, elevation of the LVEDP was identified and furosemide initiated, but this was subsequently discontinued by another physician  Patient Active Problem List   Diagnosis    Palpitations    Coronary artery disease involving native coronary artery    ULLOA (dyspnea on exertion)    Abnormal EKG    Abnormal heart sounds    Colon polyps    COPD (chronic obstructive pulmonary disease) (HCC)    Elevated hematocrit    Elevated hemoglobin (HCC)    Emphysema of lung (HCC)    Hiatal hernia    Hyperlipidemia    Hypertension    Hyponatremia    Mild concentric left ventricular hypertrophy (LVH)    Osteoarthritis of left glenohumeral joint    Osteoarthritis of right glenohumeral joint    Pityriasis rosea    PVC (premature ventricular contraction)    Thoracic ascending aortic aneurysm (HCC)     Past Medical History:   Diagnosis Date    COPD (chronic obstructive pulmonary disease) (HCC)     Hyperlipidemia     Hypertension     [facility]ocardial infarction ([facility] [address] )     Pneumonia 2/2/2017    Pulmonary emphysema (HCC)     Shortness of breath      Social History     Social History    Marital status:      Spouse name: N/A    Number of children: N/A    Years of education: N/A     Occupational History    Not on file  Social History Main Topics    Smoking status: Current Every Day Smoker     Packs/day: 0 50     Years: 40 00     Types: Cigarettes    Smokeless tobacco: Never Used    Alcohol use 4 2 - 12 6 oz/week     7 - 21 Cans of beer per week      Comment: 1-3 daily     Drug use: No    Sexual activity: Not on file     Other Topics Concern    Not on file     Social History Narrative    Always uses a seat belt    Caffeine use - Daily caffeine consumption, 2-3 servings a day              Family History   Problem Relation Age of Onset    Hypertension Mother     Emphysema Father     Hypertension Father     Arthritis Brother     Diabetes Brother     Other Other      Cardiac Disorder    Hypertension Other      Past Surgical History:   Procedure Laterality Date    ANKLE SURGERY Right 1978    CAROTID STENT  02/13/2018    COLONOSCOPY      HAND SURGERY Left 1964    [place] COLONOSCOPY FLX DX W/COLLJ SPEC WHEN PFRMD N/A 5/9/2017    Procedure: EGD AND COLONOSCOPY;  Surgeon: Rusty Miller MD;  [facility];   Service: Gastroenterology       Current Outpatient Prescriptions:     amLODIPine (NORVASC) 5 mg tablet, Take 1 tablet (5 mg total) by mouth daily, Disp: 90 tablet, Rfl: 1    aspirin (ECOTRIN LOW STRENGTH) 81 mg EC tablet, Take 81 mg by mouth daily, Disp: , Rfl:     atorvastatin (LIPITOR) 20 mg tablet, Take 20 mg by mouth daily, Disp: , Rfl:     clopidogrel (PLAVIX) 75 mg tablet, Take 1 tablet (75 mg total) by mouth daily, Disp: 30 tablet, Rfl: 11    lisinopril (ZESTRIL) 10 mg tablet, Take 10 mg by mouth daily  , Disp: , Rfl:     nitroglycerin (NITROSTAT) 0 4 mg SL tablet, Place 1 tablet (0 4 mg total) under the tongue every 5 (five) minutes as needed for chest pain, Disp: 90 tablet, Rfl: 0    pantoprazole (PROTONIX) 40 mg tablet, Take 40 mg by mouth daily  , Disp: , Rfl:     sodium chloride 1 g tablet, Take 1 tablet (1 g total) by mouth 2 (two) times a day, Disp: 60 tablet, Rfl: 0    Tiotropium Bromide Monohydrate (SPIRIVA RESPIMAT) 2 5 MCG/ACT AERS, Inhale 5 mcg daily  , Disp: , Rfl:     VENTOLIN  (90 Base) MCG/ACT inhaler, inhale 1 to 2 puffs by mouth every 4 to 6 hours if needed, Disp: 18 g, Rfl: 1  No Known Allergies    Labs:  No visits with results within 2 Month(s) from this visit  Latest known visit with results is:   Appointment on 02/26/2018   Component Date Value    Sodium 02/26/2018 132*    Potassium 02/26/2018 4 8     Chloride 02/26/2018 95*    CO2 02/26/2018 30     Anion Gap 02/26/2018 7     BUN 02/26/2018 19     Creatinine 02/26/2018 1 04     Glucose 02/26/2018 97     Calcium 02/26/2018 9 7     eGFR 02/26/2018 74     WBC 02/26/2018 6 37     RBC 02/26/2018 5 00     Hemoglobin 02/26/2018 16 2     Hematocrit 02/26/2018 47 9     MCV 02/26/2018 96     MCH 02/26/2018 32 4     MCHC 02/26/2018 33 8     RDW 02/26/2018 14 1     Platelets 26/40/8670 224     MPV 02/26/2018 10 1     PSA 02/26/2018 0 6      Imaging: No results found  Review of Systems:  Review of Systems   Constitution: Negative  HENT: Negative  Eyes: Negative  Cardiovascular: Negative  Respiratory: Negative  Endocrine: Negative  Hematologic/Lymphatic: Negative  Skin: Negative  Musculoskeletal: Negative  Gastrointestinal: Negative  Genitourinary: Negative  Neurological: Negative  Psychiatric/Behavioral: Negative  Allergic/Immunologic: Negative  All other systems reviewed and are negative  Physical Exam:  Physical Exam   Constitutional: He is oriented to person, place, and time  He appears well-developed and well-nourished  No distress  HENT:   Head: Normocephalic and atraumatic  Eyes: Conjunctivae and EOM are normal  No scleral icterus  Neck: Normal range of motion  Neck supple  No JVD present  No tracheal deviation present  Cardiovascular: Normal rate, regular rhythm, normal heart sounds and intact distal pulses  Exam reveals no gallop and no friction rub  No murmur heard  Pulmonary/Chest: Effort normal  No stridor  No respiratory distress  He has no wheezes  He has no rales  He exhibits no tenderness  Significantly decreased breath sounds bilaterally  Abdominal: Soft  Bowel sounds are normal  He exhibits no distension  There is no tenderness  Musculoskeletal: Normal range of motion  He exhibits no edema or tenderness  Neurological: He is alert and oriented to person, place, and time  No cranial nerve deficit  Coordination normal    Skin: Skin is warm and dry  He is not diaphoretic  No erythema  Psychiatric: He has a normal mood and affect  His behavior is normal  Judgment and thought content normal    Vitals reviewed        Maxine Mackay MD

## 2018-05-31 ENCOUNTER — APPOINTMENT (OUTPATIENT)
Dept: LAB | Facility: HOSPITAL | Age: 68
End: 2018-05-31
Attending: INTERNAL MEDICINE
Payer: COMMERCIAL

## 2018-05-31 DIAGNOSIS — E87.1 HYPONATREMIA: ICD-10-CM

## 2018-05-31 DIAGNOSIS — I10 ESSENTIAL HYPERTENSION: ICD-10-CM

## 2018-05-31 LAB
ANION GAP SERPL CALCULATED.3IONS-SCNC: 6 MMOL/L (ref 4–13)
BACTERIA UR QL AUTO: ABNORMAL /HPF
BILIRUB UR QL STRIP: NEGATIVE
BUN SERPL-MCNC: 14 MG/DL (ref 5–25)
CALCIUM SERPL-MCNC: 9.4 MG/DL (ref 8.3–10.1)
CHLORIDE SERPL-SCNC: 96 MMOL/L (ref 100–108)
CLARITY UR: CLEAR
CO2 SERPL-SCNC: 30 MMOL/L (ref 21–32)
COLOR UR: YELLOW
CREAT SERPL-MCNC: 1.1 MG/DL (ref 0.6–1.3)
CREAT UR-MCNC: 64.9 MG/DL
GFR SERPL CREATININE-BSD FRML MDRD: 70 ML/MIN/1.73SQ M
GLUCOSE SERPL-MCNC: 115 MG/DL (ref 65–140)
GLUCOSE UR STRIP-MCNC: NEGATIVE MG/DL
HGB UR QL STRIP.AUTO: ABNORMAL
KETONES UR STRIP-MCNC: NEGATIVE MG/DL
LEUKOCYTE ESTERASE UR QL STRIP: NEGATIVE
MICROALBUMIN UR-MCNC: 109 MG/L (ref 0–20)
MICROALBUMIN/CREAT 24H UR: 168 MG/G CREATININE (ref 0–30)
NITRITE UR QL STRIP: NEGATIVE
NON-SQ EPI CELLS URNS QL MICRO: ABNORMAL /HPF
PH UR STRIP.AUTO: 7 [PH] (ref 4.5–8)
POTASSIUM SERPL-SCNC: 4.5 MMOL/L (ref 3.5–5.3)
PROT UR STRIP-MCNC: ABNORMAL MG/DL
RBC #/AREA URNS AUTO: ABNORMAL /HPF
SODIUM SERPL-SCNC: 132 MMOL/L (ref 136–145)
SP GR UR STRIP.AUTO: 1.01 (ref 1–1.03)
UROBILINOGEN UR QL STRIP.AUTO: 1 E.U./DL
WBC #/AREA URNS AUTO: ABNORMAL /HPF

## 2018-05-31 PROCEDURE — 81001 URINALYSIS AUTO W/SCOPE: CPT

## 2018-05-31 PROCEDURE — 82570 ASSAY OF URINE CREATININE: CPT

## 2018-05-31 PROCEDURE — 80048 BASIC METABOLIC PNL TOTAL CA: CPT

## 2018-05-31 PROCEDURE — 82043 UR ALBUMIN QUANTITATIVE: CPT

## 2018-05-31 PROCEDURE — 36415 COLL VENOUS BLD VENIPUNCTURE: CPT

## 2018-06-06 ENCOUNTER — OFFICE VISIT (OUTPATIENT)
Dept: NEPHROLOGY | Facility: CLINIC | Age: 68
End: 2018-06-06

## 2018-06-06 VITALS
DIASTOLIC BLOOD PRESSURE: 62 MMHG | SYSTOLIC BLOOD PRESSURE: 140 MMHG | BODY MASS INDEX: 35.74 KG/M2 | TEMPERATURE: 97.6 F | WEIGHT: 270.8 LBS

## 2018-06-06 DIAGNOSIS — E87.1 HYPONATREMIA: Primary | ICD-10-CM

## 2018-06-06 DIAGNOSIS — I10 ESSENTIAL HYPERTENSION: ICD-10-CM

## 2018-06-06 PROCEDURE — 99213 OFFICE O/P EST LOW 20 MIN: CPT | Performed by: INTERNAL MEDICINE

## 2018-06-06 RX ORDER — SODIUM CHLORIDE 1000 MG
1 TABLET, SOLUBLE MISCELLANEOUS DAILY
Qty: 60 TABLET | Refills: 0
Start: 2018-06-06 | End: 2019-03-07 | Stop reason: SDUPTHER

## 2018-06-06 NOTE — PROGRESS NOTES
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  Patient has also underwent CT scan of chest in January 2017 which was also negative for lung cancer  Hyponatremia was suspected due to underlying COPD  Currently patient is on salt tablet and was advised to take 1 g PO BID of salt tablet during last visit but patient is only taking 1 g PO daily at home  Patient said that he had gained significant amount of weight after initiation of salt tablet plus he is feeling more thirsty with afternoon dose of salt tablet and also getting nightmares with night dose of salt tablet and reluctant to go higher on the salt tablet dose at this point  Patient was also advised to follow fluid restriction and said that his drinking according to the thirst     Patient also a history of hypertension and said that his blood pressure is well controlled at home  Patient also history of COPD and continues more  Smoking cessation counseling was provided today but patient is not interested to quit at this point  Patient also underlying coronary artery disease and also taking aspirin and Plavix  Patient takes all the medications as prescribed and does not use any NSAIDs  REVIEW OF SYSTEMS:    Review of Systems   Constitutional: Negative for chills and fever  HENT: Negative for nosebleeds and sore throat  Eyes: Negative for photophobia and pain  Respiratory: Negative for choking and wheezing  Cardiovascular: Negative for palpitations and leg swelling  Gastrointestinal: Negative for abdominal pain and blood in stool  Endocrine: Negative for cold intolerance and heat intolerance  Genitourinary: Negative for flank pain and hematuria  Musculoskeletal: Negative for joint swelling and neck pain  Skin: Negative for color change and pallor  Allergic/Immunologic: Negative for environmental allergies and immunocompromised state  Neurological: Negative for seizures and syncope  Hematological: Negative for adenopathy  Does not bruise/bleed easily  Psychiatric/Behavioral: Negative for confusion and suicidal ideas  PAST MEDICAL HISTORY:  Past Medical History:   Diagnosis Date    COPD (chronic obstructive pulmonary disease) ([place] [address] )     Hyperlipidemia     Hypertension     Myocardial infarction ([place] [address] )     Pneumonia 2/2/2017    Pulmonary emphysema ([place] [address] )     Shortness of breath        PAST SURGICAL HISTORY:  Past Surgical History:   Procedure Laterality Date    ANKLE SURGERY Right 1978    CAROTID STENT  02/13/2018    COLONOSCOPY      HAND SURGERY Left 1964    [place] COLONOSCOPY FLX DX W/COLLJ SPEC WHEN PFRMD N/A 5/9/2017    Procedure: EGD AND COLONOSCOPY;  Surgeon: Elvia Smiley MD;  [facility];   Service: Gastroenterology       SOCIAL HISTORY:  History   Alcohol Use    4 2 - 12 6 oz/week    7 - 21 Cans of beer per week     Comment: 1-3 daily      History   Drug Use No     History   Smoking Status    Current Every Day Smoker    Packs/day: 0 50    Years: 40 00    Types: Cigarettes   Smokeless Tobacco    Never Used       FAMILY HISTORY:  Family History   Problem Relation Age of Onset    Hypertension Mother     Emphysema Father     Hypertension Father     Arthritis Brother     Diabetes Brother     Other Other      Cardiac Disorder    Hypertension Other        ALLERGY:  No Known Allergies    MEDICATIONS:    Current Outpatient Prescriptions:     amLODIPine (NORVASC) 5 mg tablet, Take 1 tablet (5 mg total) by mouth daily, Disp: 90 tablet, Rfl: 1    aspirin (ECOTRIN LOW STRENGTH) 81 mg EC tablet, Take 81 mg by mouth daily, Disp: , Rfl:     atorvastatin (LIPITOR) 20 mg tablet, Take 20 mg by mouth daily, Disp: , Rfl:     clopidogrel (PLAVIX) 75 mg tablet, Take 1 tablet (75 mg total) by mouth daily, Disp: 30 tablet, Rfl: 11    lisinopril (ZESTRIL) 10 mg tablet, Take 10 mg by mouth daily  , Disp: , Rfl:     nitroglycerin (NITROSTAT) 0 4 mg SL tablet, Place 1 tablet (0 4 mg total) under the tongue every 5 (five) minutes as needed for chest pain, Disp: 90 tablet, Rfl: 0    pantoprazole (PROTONIX) 40 mg tablet, Take 40 mg by mouth daily  , Disp: , Rfl:     sodium chloride 1 g tablet, Take 1 tablet (1 g total) by mouth daily, Disp: 60 tablet, Rfl: 0    Tiotropium Bromide Monohydrate (SPIRIVA RESPIMAT) 2 5 MCG/ACT AERS, Inhale 5 mcg daily  , Disp: , Rfl:     VENTOLIN  (90 Base) MCG/ACT inhaler, inhale 1 to 2 puffs by mouth every 4 to 6 hours if needed, Disp: 18 g, Rfl: 1    PHYSICAL EXAM:  Vitals:    06/06/18 1015   BP: 140/62   BP Location: Left arm   Patient Position: Sitting   Cuff Size: Adult   Temp: 97 6 °F (36 4 °C)   TempSrc: Oral   Weight: 123 kg (270 lb 12 8 oz)     Body mass index is 35 74 kg/m²  Physical Exam   Constitutional: He appears well-nourished  No distress  HENT:   Head: Normocephalic and atraumatic  Eyes: Conjunctivae are normal  No scleral icterus  Right eye exhibits normal extraocular motion  Left eye exhibits normal extraocular motion  Neck: Neck supple  No JVD present  Cardiovascular: Normal rate, S1 normal and S2 normal     Pulmonary/Chest: No accessory muscle usage  No respiratory distress  He has no wheezes  Abdominal: Soft  He exhibits no distension  Musculoskeletal:        Right ankle: He exhibits swelling  Left ankle: He exhibits swelling  Right hand: He exhibits no laceration          Left hand: He exhibits no laceration  Trace pedal edema   Lymphadenopathy:        Right cervical: No superficial cervical adenopathy present  Left cervical: No superficial cervical adenopathy present  Right: No supraclavicular adenopathy present  Left: No supraclavicular adenopathy present  Neurological: He is alert  He is not disoriented  Skin: Skin is warm  No laceration noted  No cyanosis  Psychiatric: He is not combative  He does not exhibit a depressed mood  He expresses no suicidal ideation  LAB RESULTS:  Results for orders placed or performed in visit on 85/48/04   Basic metabolic panel   Result Value Ref Range    Sodium 132 (L) 136 - 145 mmol/L    Potassium 4 5 3 5 - 5 3 mmol/L    Chloride 96 (L) 100 - 108 mmol/L    CO2 30 21 - 32 mmol/L    Anion Gap 6 4 - 13 mmol/L    BUN 14 5 - 25 mg/dL    Creatinine 1 10 0 60 - 1 30 mg/dL    Glucose 115 65 - 140 mg/dL    Calcium 9 4 8 3 - 10 1 mg/dL    eGFR 70 ml/min/1 73sq m   Microalbumin / creatinine urine ratio   Result Value Ref Range    Creatinine, Ur 64 9 mg/dL    Microalbum  ,U,Random 109 0 (H) 0 0 - 20 0 mg/L    Microalb Creat Ratio 168 (H) 0 - 30 mg/g creatinine   Urinalysis with reflex to microscopic   Result Value Ref Range    Color, UA Yellow     Clarity, UA Clear     Specific [place], UA 1 010 1 003 - 1 030    pH, UA 7 0 4 5 - 8 0    Leukocytes, UA Negative Negative    Nitrite, UA Negative Negative    Protein, UA Trace (A) Negative mg/dl    Glucose, UA Negative Negative mg/dl    Ketones, UA Negative Negative mg/dl    Urobilinogen, UA 1 0 0 2, 1 0 E U /dl E U /dl    Bilirubin, UA Negative Negative    Blood, UA Trace-lysed (A) Negative   Urine Microscopic   Result Value Ref Range    RBC, UA None Seen None Seen, 0-5 /hpf    WBC, UA 0-1 (A) None Seen, 0-5, 5-55, 5-65 /hpf    Epithelial Cells Occasional None Seen, Occasional /hpf    Bacteria, UA None Seen None Seen, Occasional /hpf       ASSESSMENT and PLAN:  Fawad Brandon was seen today for follow-up  Diagnoses and all orders for this visit:    Hyponatremia  -     Basic metabolic panel; Future    Essential hypertension    1  Hyponatremia  Chronic, based on urine lytes hyponatremia was thought to be secondary to underlying SIADH  Suspecting COPD as the etiology of SIADH  Patient continued to smoke cigarette and smoking cessation counseling was provided today but patient is not interested to quit smoking at this point  Patient was prescribed salt tablet 1 g PO BID during last visit but patient is only taking salt tablet 1 g a day  Patient said that with afternoon dose of salt tablet he is getting more thirsty and with the evening dose of salt tablet he is getting nightmare and patient is reluctant to go up on the salt tablet dose at this point  Patient also said that he has gained significant amount of weight with the salt tablet and he is now also prediabetic and does not want to take any higher dose of salt tablet at this point  Currently patient is taking salt tablet 1 g PO daily with current sodium level of 130  Advised patient to follow 45 oz of fluid restriction per day  Currently patient is asymptomatic from hyponatremia perspective  Will consider increasing the dose of salt tablet if patient's sodium level worsened further  2   Hypertension  Essential, today's blood pressure was controlled and at goal   Plan to monitor hypertension today with amlodipine 5 mg PO daily and lisinopril 10 mg PO daily     Currently renal function is at baseline with current creatinine of 1 1 with EGFR of 70  Returns to Nephrology Clinic in 4 months  Plan to check BMP before next visit  Portions of the record may have been created with voice recognition software  Occasional wrong word or "sound a like" substitutions may have occurred due to the inherent limitations of voice recognition software  Read the chart carefully and recognize, using context, where substitutions have occurred  If you have any questions, please contact the dictating provider

## 2018-06-06 NOTE — LETTER
June 6, 2018     Giovanna Jiménez DO  [address]  [employer] [place] [address]    Patient: Roe Alejandra   YOB: 1950   Date of Visit: 6/6/2018       Dear Dr Rosado Ek: Thank you for referring Roe Alejandra to me for evaluation  Below are my notes for this consultation  If you have questions, please do not hesitate to call me  I look forward to following your patient along with you  Sincerely,        Tyler Weiner MD        CC: No Recipients  Tyler Weiner MD  6/6/2018 10:48 AM  Sign at close encounter  [address] 79 y o  male MRN: [id number]    Encounter: [id number] 6/6/2018    REASON FOR VISIT: Roe Alejandra is a 79 y o  male who is here on 6/6/2018 for Follow-up    HPI:    This is a 72-year-old male with a past medical history of coronary artery disease, hypertension, hyperlipidemia, COPD, current smoker, returns to [facility] for further management of hyponatremia  Patient had underwent workup for hyponatremia in the past and exact etiology of hyponatremia has remained unknown but 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  Patient has also underwent CT scan of chest in January 2017 which was also negative for lung cancer  Hyponatremia was suspected due to underlying COPD  Currently patient is on salt tablet and was advised to take 1 g PO BID of salt tablet during last visit but patient is only taking 1 g PO daily at home  Patient said that he had gained significant amount of weight after initiation of salt tablet plus he is feeling more thirsty with afternoon dose of salt tablet and also getting nightmares with night dose of salt tablet and reluctant to go higher on the salt tablet dose at this point  Patient was also advised to follow fluid restriction and said that his drinking according to the thirst     Patient also a history of hypertension and said that his blood pressure is well controlled at home      Patient also history of COPD and continues more  Smoking cessation counseling was provided today but patient is not interested to quit at this point  Patient also underlying coronary artery disease and also taking aspirin and Plavix  Patient takes all the medications as prescribed and does not use any NSAIDs  REVIEW OF SYSTEMS:    Review of Systems   Constitutional: Negative for chills and fever  HENT: Negative for nosebleeds and sore throat  Eyes: Negative for photophobia and pain  Respiratory: Negative for choking and wheezing  Cardiovascular: Negative for palpitations and leg swelling  Gastrointestinal: Negative for abdominal pain and blood in stool  Endocrine: Negative for cold intolerance and heat intolerance  Genitourinary: Negative for flank pain and hematuria  Musculoskeletal: Negative for joint swelling and neck pain  Skin: Negative for color change and pallor  Allergic/Immunologic: Negative for environmental allergies and immunocompromised state  Neurological: Negative for seizures and syncope  Hematological: Negative for adenopathy  Does not bruise/bleed easily  Psychiatric/Behavioral: Negative for confusion and suicidal ideas  PAST MEDICAL HISTORY:  Past Medical History:   Diagnosis Date    [facility]chronic obstructive pulmonary disease) ([facility] )     Hyperlipidemia     Hypertension     Myocardial infarction ([facility] )     Pneumonia 2/2/2017    Pulmonary emphysema ([facility] )     Shortness of breath        PAST SURGICAL HISTORY:  Past Surgical History:   Procedure Laterality Date    ANKLE SURGERY Right 1978    CAROTID STENT  02/13/2018    COLONOSCOPY      HAND SURGERY Left 1964    [facility] COLONOSCOPY FLX DX W/COLLJ SPEC WHEN PFRMD N/A 5/9/2017    Procedure: EGD AND COLONOSCOPY;  Surgeon: Tom Mercado MD;  [facility];   Service: Gastroenterology       SOCIAL HISTORY:  History   Alcohol Use    4 2 - 12 6 oz/week    7 - 21 Cans of beer per week     Comment: 1-3 daily      History   Drug Use No     History   Smoking Status    Current Every Day Smoker    Packs/day: 0 50    Years: 40 00    Types: Cigarettes   Smokeless Tobacco    Never Used       FAMILY HISTORY:  Family History   Problem Relation Age of Onset    Hypertension Mother     Emphysema Father     Hypertension Father     Arthritis Brother     Diabetes Brother     Other Other      Cardiac Disorder    Hypertension Other        ALLERGY:  No Known Allergies    MEDICATIONS:    Current Outpatient Prescriptions:     amLODIPine (NORVASC) 5 mg tablet, Take 1 tablet (5 mg total) by mouth daily, Disp: 90 tablet, Rfl: 1    aspirin (ECOTRIN LOW STRENGTH) 81 mg EC tablet, Take 81 mg by mouth daily, Disp: , Rfl:     atorvastatin (LIPITOR) 20 mg tablet, Take 20 mg by mouth daily, Disp: , Rfl:     clopidogrel (PLAVIX) 75 mg tablet, Take 1 tablet (75 mg total) by mouth daily, Disp: 30 tablet, Rfl: 11    lisinopril (ZESTRIL) 10 mg tablet, Take 10 mg by mouth daily  , Disp: , Rfl:     nitroglycerin (NITROSTAT) 0 4 mg SL tablet, Place 1 tablet (0 4 mg total) under the tongue every 5 (five) minutes as needed for chest pain, Disp: 90 tablet, Rfl: 0    pantoprazole (PROTONIX) 40 mg tablet, Take 40 mg by mouth daily  , Disp: , Rfl:     sodium chloride 1 g tablet, Take 1 tablet (1 g total) by mouth daily, Disp: 60 tablet, Rfl: 0    Tiotropium Bromide Monohydrate (SPIRIVA RESPIMAT) 2 5 MCG/ACT AERS, Inhale 5 mcg daily  , Disp: , Rfl:     VENTOLIN  (90 Base) MCG/ACT inhaler, inhale 1 to 2 puffs by mouth every 4 to 6 hours if needed, Disp: 18 g, Rfl: 1    PHYSICAL EXAM:  Vitals:    06/06/18 1015   BP: 140/62   BP Location: Left arm   Patient Position: Sitting   Cuff Size: Adult   Temp: 97 6 °F (36 4 °C)   TempSrc: Oral   Weight: 123 kg (270 lb 12 8 oz)     Body mass index is 35 74 kg/m²  Physical Exam   Constitutional: He appears well-nourished  No distress  HENT:   Head: Normocephalic and atraumatic     Eyes: Conjunctivae are normal  No scleral icterus  Right eye exhibits normal extraocular motion  Left eye exhibits normal extraocular motion  Neck: Neck supple  No JVD present  Cardiovascular: Normal rate, S1 normal and S2 normal     Pulmonary/Chest: No accessory muscle usage  No respiratory distress  He has no wheezes  Abdominal: Soft  He exhibits no distension  Musculoskeletal:        Right ankle: He exhibits swelling  Left ankle: He exhibits swelling  Right hand: He exhibits no laceration  Left hand: He exhibits no laceration  Trace pedal edema   Lymphadenopathy:        Right cervical: No superficial cervical adenopathy present  Left cervical: No superficial cervical adenopathy present  Right: No supraclavicular adenopathy present  Left: No supraclavicular adenopathy present  Neurological: He is alert  He is not disoriented  Skin: Skin is warm  No laceration noted  No cyanosis  Psychiatric: He is not combative  He does not exhibit a depressed mood  He expresses no suicidal ideation  LAB RESULTS:  Results for orders placed or performed in visit on 79/02/41   Basic metabolic panel   Result Value Ref Range    Sodium 132 (L) 136 - 145 mmol/L    Potassium 4 5 3 5 - 5 3 mmol/L    Chloride 96 (L) 100 - 108 mmol/L    CO2 30 21 - 32 mmol/L    Anion Gap 6 4 - 13 mmol/L    BUN 14 5 - 25 mg/dL    Creatinine 1 10 0 60 - 1 30 mg/dL    Glucose 115 65 - 140 mg/dL    Calcium 9 4 8 3 - 10 1 mg/dL    eGFR 70 ml/min/1 73sq m   Microalbumin / creatinine urine ratio   Result Value Ref Range    Creatinine, Ur 64 9 mg/dL    Microalbum  ,U,Random 109 0 (H) 0 0 - 20 0 mg/L    Microalb Creat Ratio 168 (H) 0 - 30 mg/g creatinine   Urinalysis with reflex to microscopic   Result Value Ref Range    Color, UA Yellow     Clarity, UA Clear     Specific [place], UA 1 010 1 003 - 1 030    pH, UA 7 0 4 5 - 8 0    Leukocytes, UA Negative Negative    Nitrite, UA Negative Negative    Protein, UA Trace (A) Negative mg/dl Glucose, UA Negative Negative mg/dl    Ketones, UA Negative Negative mg/dl    Urobilinogen, UA 1 0 0 2, 1 0 E U /dl E U /dl    Bilirubin, UA Negative Negative    Blood, UA Trace-lysed (A) Negative   Urine Microscopic   Result Value Ref Range    RBC, UA None Seen None Seen, 0-5 /hpf    WBC, UA 0-1 (A) None Seen, 0-5, 5-55, 5-65 /hpf    Epithelial Cells Occasional None Seen, Occasional /hpf    Bacteria, UA None Seen None Seen, Occasional /hpf       ASSESSMENT and PLAN:  Luiz Galdamez was seen today for follow-up  Diagnoses and all orders for this visit:    Hyponatremia  -     Basic metabolic panel; Future    Essential hypertension    1  Hyponatremia  Chronic, based on urine lytes hyponatremia was thought to be secondary to underlying SIADH  Suspecting COPD as the etiology of SIADH  Patient continued to smoke cigarette and smoking cessation counseling was provided today but patient is not interested to quit smoking at this point  Patient was prescribed salt tablet 1 g PO BID during last visit but patient is only taking salt tablet 1 g a day  Patient said that with afternoon dose of salt tablet he is getting more thirsty and with the evening dose of salt tablet he is getting nightmare and patient is reluctant to go up on the salt tablet dose at this point  Patient also said that he has gained significant amount of weight with the salt tablet and he is now also prediabetic and does not want to take any higher dose of salt tablet at this point  Currently patient is taking salt tablet 1 g PO daily with current sodium level of 130  Advised patient to follow 45 oz of fluid restriction per day  Currently patient is asymptomatic from hyponatremia perspective  Will consider increasing the dose of salt tablet if patient's sodium level worsened further  2   Hypertension    Essential, today's blood pressure was controlled and at goal   Plan to monitor hypertension today with amlodipine 5 mg PO daily and lisinopril 10 mg PO daily     Currently renal function is at baseline with current creatinine of 1 1 with EGFR of 70  Returns to [facility]rology Clinic in 4 months  Plan to check BMP before next visit  Portions of the record may have been created with voice recognition software  Occasional wrong word or "sound a like" substitutions may have occurred due to the inherent limitations of voice recognition software  Read the chart carefully and recognize, using context, where substitutions have occurred  If you have any questions, please contact the dictating provider

## 2018-06-15 DIAGNOSIS — J44.9 CHRONIC OBSTRUCTIVE PULMONARY DISEASE, UNSPECIFIED COPD TYPE (HCC): ICD-10-CM

## 2018-07-24 ENCOUNTER — OFFICE VISIT (OUTPATIENT)
Dept: CARDIOLOGY CLINIC | Facility: MEDICAL CENTER | Age: 68
End: 2018-07-24

## 2018-07-24 VITALS
BODY MASS INDEX: 35.85 KG/M2 | HEART RATE: 70 BPM | DIASTOLIC BLOOD PRESSURE: 70 MMHG | WEIGHT: 270.5 LBS | HEIGHT: 73 IN | OXYGEN SATURATION: 98 % | SYSTOLIC BLOOD PRESSURE: 132 MMHG

## 2018-07-24 DIAGNOSIS — I25.10 CORONARY ARTERY DISEASE INVOLVING NATIVE CORONARY ARTERY: Primary | Chronic | ICD-10-CM

## 2018-07-24 DIAGNOSIS — E78.5 HYPERLIPIDEMIA: ICD-10-CM

## 2018-07-24 DIAGNOSIS — I49.3 PVC (PREMATURE VENTRICULAR CONTRACTION): ICD-10-CM

## 2018-07-24 DIAGNOSIS — I51.7 MILD CONCENTRIC LEFT VENTRICULAR HYPERTROPHY (LVH): ICD-10-CM

## 2018-07-24 DIAGNOSIS — I10 HYPERTENSION: ICD-10-CM

## 2018-07-24 DIAGNOSIS — R06.00 DOE (DYSPNEA ON EXERTION): Chronic | ICD-10-CM

## 2018-07-24 DIAGNOSIS — R00.2 PALPITATIONS: ICD-10-CM

## 2018-07-24 PROBLEM — R01.2 ABNORMAL HEART SOUNDS: Status: RESOLVED | Noted: 2017-03-03 | Resolved: 2018-07-24

## 2018-07-24 PROCEDURE — 99212 OFFICE O/P EST SF 10 MIN: CPT | Performed by: INTERNAL MEDICINE

## 2018-07-24 NOTE — PROGRESS NOTES
Cardiology Follow Up    Adrian Baxter  1950  [id number]  [address]    1  Coronary artery disease involving native coronary artery     2  Hyperlipidemia     3  Mild concentric left ventricular hypertrophy (LVH)     4  PVC (premature ventricular contraction)         Discussion:  He has been stable to mildly improved  He does not require NTG  His salt tablets a been reduced to one per day  He has reduced breath sounds on exam, without change  I did not make any changes in his medical regimen  I asked him to return in 3 months with a BMP, NT proBNP and lipid panel prior to that visit  Cardiovascular History:   Mr Luiz Vaughn was referred in 3/17 because of frequent PVCs identified on a routine ECG  There was no history of overt heart disease, and the patient denied chest discomfort, dyspnea, syncope, or palpitations  However, because of the presence of multiple coronary risk factors, a SPECT study was performed, revealing a large fixed inferior defect  In addition, a screening CT of the chest without contrast, obtained to assess his lungs because of his smoking history, disclosed the presence of extensive coronary atherosclerosis  Consequently, coronary angiography was performed  He was found to have a chronic total occlusion of the mid RCA with CORNELL grade 1 antegrade flow, as well as collateral flow from the left system  There was moderate atherosclerosis of the LAD with 50% lesions in the proximal and mid vessel   The basal and mid inferior wall was hypokinetic with an estimated ejection fraction 55%  The EDP was mildly elevated  Echocardiography disclosed mild LVH   LV function was normal   Because of the presence of a chronic total occlusion of the mid RCA with no reversibility by SPECT what preserved wall motion by echo, it is likely that there is a significant territory of viable but ischemic myocardium in the inferior distribution  Umesh Pannash 9/17, we discussed the feasibility of  PCI, with the possibility of improving his symptoms, but it's somewhat increased risk compared to conventional PCI  He initially did not wish to consider this option, that in 1/18 indicated that he would prefer to undergo attempted RCA   The risks and benefits of that procedure were discussed in detail  On 2/14/18, balloon angioplasty procedure was performed on the lesion in the mid RCA  The chronic total occlusion was crossed and dilated  However, this revealed the presence of a total stenosis in the distal vessel  This distal occlusion could not be crossed  The subintima was entered, but the subintimal plane could not be extended beyond the distal cap  Attempts to reach the distal RCA retrograde via septal collaterals were unsuccessful  Stenting of the mid vessel was not attempted, since this might impede efforts to deliver equipment distally at the next procedure  He has multiple CAD risk factors  He has significant dyslipidemia; A lipid panel in 1/17 disclosed total cholesterol 211, , HDL 67, and triglycerides 49  He initially declined to take statin therapy because he believes it adversely affected his father's kidneys  However, after the finding of significant CAD at catheterization, he agreed to a trial of atorvastatin, beginning with 20 mg per day and slowly titrating upward as tolerated  He had a markedly beneficial response, with a reduction in total cholesterol to 101, and a reduction in LDL to 52; the HDL also declined to 38; triglycerides were stable at 56  He is hypertensive  He was reluctant to increase his antihypertensive therapy because it was recently initiated  He is a smoker and was initially resistant to smoking cessation; however at the time of his 5/17 visit he indicated that he was attempting to reduce his cigarette consumption  He does not have diabetes       A screening ultrasound of the abdominal aorta in 1/17 was normal     He has significant COPD  CT of the chest in 1/17 disclosed moderate emphysema  There opacities in the right upper lobe suggesting the presence of an infectious process, possibly an atypical organism  Extensive coronary atherosclerosis was noted  He has a history of hyponatremia for which he has been receiving oral sodium supplementation  At the time of catheterization in 2/18, elevation of the LVEDP was identified and furosemide initiated, but this was subsequently discontinued by another physician  Patient Active Problem List   Diagnosis    Palpitations    Coronary artery disease involving native coronary artery    ULLOA (dyspnea on exertion)    Abnormal EKG    Abnormal heart sounds    Colon polyps    COPD (chronic obstructive pulmonary disease) (HCC)    Elevated hematocrit    Elevated hemoglobin (HCC)    Emphysema of lung (HCC)    Hiatal hernia    Hyperlipidemia    Hypertension    Hyponatremia    Mild concentric left ventricular hypertrophy (LVH)    Osteoarthritis of left glenohumeral joint    Osteoarthritis of right glenohumeral joint    Pityriasis rosea    PVC (premature ventricular contraction)    Thoracic ascending aortic aneurysm (HCC)     Past Medical History:   Diagnosis Date    COPD (chronic obstructive pulmonary disease) (HCC)     Hyperlipidemia     Hypertension     Myocardial infarction ([facility] [address] )     Pneumonia 2/2/2017    Pulmonary emphysema (HCC)     Shortness of breath      Social History     Social History    Marital status:      Spouse name: N/A    Number of children: N/A    Years of education: N/A     Occupational History    Not on file       Social History Main Topics    Smoking status: Current Every Day Smoker     Packs/day: 0 50     Years: 40 00     Types: Cigarettes    Smokeless tobacco: Never Used    Alcohol use 4 2 - 12 6 oz/week     7 - 21 Cans of beer per week      Comment: 1-3 daily     Drug use: No    Sexual activity: Not on file Other Topics Concern    Not on file     Social History Narrative    Always uses a seat belt    Caffeine use - Daily caffeine consumption, 2-3 servings a day              Family History   Problem Relation Age of Onset    Hypertension Mother     Emphysema Father     Hypertension Father     Arthritis Brother     Diabetes Brother     Other Other         Cardiac Disorder    Hypertension Other     Arrhythmia Neg Hx     Anuerysm Neg Hx     Asthma Neg Hx     Clotting disorder Neg Hx     Fainting Neg Hx      Past Surgical History:   Procedure Laterality Date    ANKLE SURGERY Right 1978    CAROTID STENT  02/13/2018    COLONOSCOPY      HAND SURGERY Left 1964    TX COLONOSCOPY FLX DX W/COLLJ SPEC WHEN PFRMD N/A 5/9/2017    Procedure: EGD AND COLONOSCOPY;  Surgeon: David Bah MD;  [facility];   Service: Gastroenterology       Current Outpatient Prescriptions:     amLODIPine (NORVASC) 5 mg tablet, Take 1 tablet (5 mg total) by mouth daily, Disp: 90 tablet, Rfl: 1    aspirin (ECOTRIN LOW STRENGTH) 81 mg EC tablet, Take 81 mg by mouth daily, Disp: , Rfl:     atorvastatin (LIPITOR) 20 mg tablet, Take 20 mg by mouth daily, Disp: , Rfl:     clopidogrel (PLAVIX) 75 mg tablet, Take 1 tablet (75 mg total) by mouth daily, Disp: 30 tablet, Rfl: 11    lisinopril (ZESTRIL) 10 mg tablet, Take 10 mg by mouth daily  , Disp: , Rfl:     nitroglycerin (NITROSTAT) 0 4 mg SL tablet, Place 1 tablet (0 4 mg total) under the tongue every 5 (five) minutes as needed for chest pain, Disp: 90 tablet, Rfl: 0    pantoprazole (PROTONIX) 40 mg tablet, Take 40 mg by mouth daily  , Disp: , Rfl:     sodium chloride 1 g tablet, Take 1 tablet (1 g total) by mouth daily, Disp: 60 tablet, Rfl: 0    Tiotropium Bromide Monohydrate (SPIRIVA RESPIMAT) 2 5 MCG/ACT AERS, Inhale 5 mcg daily  , Disp: , Rfl:     VENTOLIN  (90 Base) MCG/ACT inhaler, inhale 1 to 2 puffs by mouth every 4 to 6 hours if needed, Disp: 18 g, Rfl: 1  No Known Allergies    Labs:  Appointment on 05/31/2018   Component Date Value    Sodium 05/31/2018 132*    Potassium 05/31/2018 4 5     Chloride 05/31/2018 96*    CO2 05/31/2018 30     Anion Gap 05/31/2018 6     BUN 05/31/2018 14     Creatinine 05/31/2018 1 10     Glucose 05/31/2018 115     Calcium 05/31/2018 9 4     eGFR 05/31/2018 70     Creatinine, Ur 05/31/2018 64 9     Microalbum  ,U,Random 05/31/2018 109 0*    Microalb Creat Ratio 05/31/2018 168*    Color, UA 05/31/2018 Yellow     Clarity, UA 05/31/2018 Clear     Specific Gravity, UA 05/31/2018 1 010     pH, UA 05/31/2018 7 0     Leukocytes, UA 05/31/2018 Negative     Nitrite, UA 05/31/2018 Negative     Protein, UA 05/31/2018 Trace*    Glucose, UA 05/31/2018 Negative     Ketones, UA 05/31/2018 Negative     Urobilinogen, UA 05/31/2018 1 0     Bilirubin, UA 05/31/2018 Negative     Blood, UA 05/31/2018 Trace-lysed*    RBC, UA 05/31/2018 None Seen     WBC, UA 05/31/2018 0-1*    Epithelial Cells 05/31/2018 Occasional     Bacteria, UA 05/31/2018 None Seen    Appointment on 02/26/2018   Component Date Value    Sodium 02/26/2018 132*    Potassium 02/26/2018 4 8     Chloride 02/26/2018 95*    CO2 02/26/2018 30     Anion Gap 02/26/2018 7     BUN 02/26/2018 19     Creatinine 02/26/2018 1 04     Glucose 02/26/2018 97     Calcium 02/26/2018 9 7     eGFR 02/26/2018 74     WBC 02/26/2018 6 37     RBC 02/26/2018 5 00     Hemoglobin 02/26/2018 16 2     Hematocrit 02/26/2018 47 9     MCV 02/26/2018 96     MCH 02/26/2018 32 4     MCHC 02/26/2018 33 8     RDW 02/26/2018 14 1     Platelets 43/33/2672 224     MPV 02/26/2018 10 1     PSA 02/26/2018 0 6    Admission on 02/14/2018, Discharged on 02/15/2018   Component Date Value    Sodium 02/14/2018 132*    Potassium 02/14/2018 4 5     Chloride 02/14/2018 96*    CO2 02/14/2018 29     Anion Gap 02/14/2018 7     BUN 02/14/2018 15     Creatinine 02/14/2018 0 79     Glucose 02/14/2018 105     Glucose, Fasting 02/14/2018 105*    Calcium 02/14/2018 8 9     eGFR 02/14/2018 94     WBC 02/14/2018 6 57     RBC 02/14/2018 4 69     Hemoglobin 02/14/2018 15 3     Hematocrit 02/14/2018 44 2     MCV 02/14/2018 94     MCH 02/14/2018 32 6     MCHC 02/14/2018 34 6     RDW 02/14/2018 13 9     Platelets 38/15/0621 227     MPV 02/14/2018 9 8     Cholesterol 02/14/2018 138     Triglycerides 02/14/2018 60     HDL, Direct 02/14/2018 54     LDL Calculated 02/14/2018 72     Magnesium 02/14/2018 1 9     Protime 02/14/2018 13 9     INR 02/14/2018 1 07     Ventricular Rate 02/14/2018 71     Atrial Rate 02/14/2018 71     NC Interval 02/14/2018 184     QRSD Interval 02/14/2018 98     QT Interval 02/14/2018 394     QTC Interval 02/14/2018 428     P Axis 02/14/2018 113     QRS Axis 02/14/2018 52     T Wave Axis 02/14/2018 86     Activated Clotting Time,* 02/14/2018 224*    Specimen Type 02/14/2018 VENOUS     Activated Clotting Time,* 02/14/2018 297*    Specimen Type 02/14/2018 VENOUS     Activated Clotting Time,* 02/14/2018 189*    Specimen Type 02/14/2018 VENOUS     Activated Clotting Time,* 02/14/2018 272*    Specimen Type 02/14/2018 VENOUS     WBC 02/15/2018 7 38     RBC 02/15/2018 4 41     Hemoglobin 02/15/2018 14 1     Hematocrit 02/15/2018 41 6     MCV 02/15/2018 94     MCH 02/15/2018 32 0     MCHC 02/15/2018 33 9     RDW 02/15/2018 13 9     Platelets 17/30/2209 221     MPV 02/15/2018 9 9     Sodium 02/15/2018 134*    Potassium 02/15/2018 4 2     Chloride 02/15/2018 99*    CO2 02/15/2018 28     Anion Gap 02/15/2018 7     BUN 02/15/2018 11     Creatinine 02/15/2018 0 68     Glucose 02/15/2018 100     Calcium 02/15/2018 8 8     eGFR 02/15/2018 100    Admission on 01/31/2018, Discharged on 01/31/2018   Component Date Value    Ventricular Rate 01/31/2018 64     Atrial Rate 01/31/2018 64     NC Interval 01/31/2018 194     QRSD Interval 01/31/2018 92  QT Interval 01/31/2018 398     QTC Interval 01/31/2018 410     QRS Axis 01/31/2018 36     T Wave Axis 01/31/2018 75     Sodium 01/31/2018 129*    Potassium 01/31/2018 4 3     Chloride 01/31/2018 93*    CO2 01/31/2018 30     Anion Gap 01/31/2018 6     BUN 01/31/2018 12     Creatinine 01/31/2018 0 76     Glucose 01/31/2018 104     Glucose, Fasting 01/31/2018 104*    Calcium 01/31/2018 9 0     eGFR 01/31/2018 95     WBC 01/31/2018 7 73     RBC 01/31/2018 4 77     Hemoglobin 01/31/2018 15 4     Hematocrit 01/31/2018 44 9     MCV 01/31/2018 94     MCH 01/31/2018 32 3     MCHC 01/31/2018 34 3     RDW 01/31/2018 13 4     Platelets 76/63/5964 261     MPV 01/31/2018 9 5     Cholesterol 01/31/2018 120     Triglycerides 01/31/2018 60     HDL, Direct 01/31/2018 42     LDL Calculated 01/31/2018 66     Magnesium 01/31/2018 2 1     Protime 01/31/2018 13 8     INR 01/31/2018 1 06      Imaging: No results found  Review of Systems:  Review of Systems   Constitution: Negative  HENT: Negative  Eyes: Negative  Cardiovascular: Negative  Respiratory: Negative  Endocrine: Negative  Hematologic/Lymphatic: Negative  Skin: Negative  Musculoskeletal: Negative  Gastrointestinal: Negative  Genitourinary: Negative  Neurological: Negative  Psychiatric/Behavioral: Negative  Allergic/Immunologic: Negative  All other systems reviewed and are negative  Physical Exam:  Physical Exam   Constitutional: He is oriented to person, place, and time  He appears well-developed and well-nourished  No distress  HENT:   Head: Normocephalic and atraumatic  Eyes: Conjunctivae and EOM are normal  No scleral icterus  Neck: Normal range of motion  Neck supple  No JVD present  No tracheal deviation present  Cardiovascular: Normal rate, regular rhythm, normal heart sounds and intact distal pulses  Exam reveals no gallop and no friction rub  No murmur heard    Pulmonary/Chest: Effort normal and breath sounds normal  No stridor  No respiratory distress  He has no wheezes  He has no rales  He exhibits no tenderness  Abdominal: Soft  Bowel sounds are normal  He exhibits no distension  There is no tenderness  Musculoskeletal: Normal range of motion  He exhibits no edema or tenderness  Neurological: He is alert and oriented to person, place, and time  No cranial nerve deficit  Coordination normal    Skin: Skin is warm and dry  He is not diaphoretic  No erythema  Psychiatric: He has a normal mood and affect  His behavior is normal  Judgment and thought content normal    Vitals reviewed        Samantha Elizondo MD

## 2018-08-02 DIAGNOSIS — J44.9 CHRONIC OBSTRUCTIVE PULMONARY DISEASE, UNSPECIFIED COPD TYPE (HCC): Primary | ICD-10-CM

## 2018-08-05 RX ORDER — TIOTROPIUM BROMIDE INHALATION SPRAY 3.12 UG/1
SPRAY, METERED RESPIRATORY (INHALATION)
Qty: 4 G | Refills: 5 | Status: SHIPPED | OUTPATIENT
Start: 2018-08-05 | End: 2019-03-04 | Stop reason: SDUPTHER

## 2018-08-10 DIAGNOSIS — K21.9 GASTROESOPHAGEAL REFLUX DISEASE, ESOPHAGITIS PRESENCE NOT SPECIFIED: Primary | ICD-10-CM

## 2018-08-10 DIAGNOSIS — I10 ESSENTIAL HYPERTENSION: ICD-10-CM

## 2018-08-10 RX ORDER — PANTOPRAZOLE SODIUM 40 MG/1
40 TABLET, DELAYED RELEASE ORAL DAILY
Qty: 90 TABLET | Refills: 1 | Status: SHIPPED | OUTPATIENT
Start: 2018-08-10 | End: 2019-01-24 | Stop reason: SDUPTHER

## 2018-08-10 RX ORDER — LISINOPRIL 10 MG/1
20 TABLET ORAL DAILY
Qty: 90 TABLET | Refills: 1 | Status: SHIPPED | OUTPATIENT
Start: 2018-08-10 | End: 2018-10-23 | Stop reason: CLARIF

## 2018-08-13 DIAGNOSIS — E78.00 PURE HYPERCHOLESTEROLEMIA: Primary | ICD-10-CM

## 2018-08-21 RX ORDER — ATORVASTATIN CALCIUM 20 MG/1
20 TABLET, FILM COATED ORAL DAILY
Qty: 90 TABLET | Refills: 3 | Status: SHIPPED | OUTPATIENT
Start: 2018-08-21 | End: 2019-07-10 | Stop reason: HOSPADM

## 2018-08-24 ENCOUNTER — OFFICE VISIT (OUTPATIENT)
Dept: FAMILY MEDICINE CLINIC | Facility: MEDICAL CENTER | Age: 68
End: 2018-08-24

## 2018-08-24 VITALS
HEART RATE: 70 BPM | DIASTOLIC BLOOD PRESSURE: 82 MMHG | SYSTOLIC BLOOD PRESSURE: 132 MMHG | BODY MASS INDEX: 34.83 KG/M2 | WEIGHT: 264 LBS | RESPIRATION RATE: 16 BRPM

## 2018-08-24 DIAGNOSIS — I10 ESSENTIAL HYPERTENSION: Primary | ICD-10-CM

## 2018-08-24 DIAGNOSIS — R73.09 ELEVATED GLUCOSE: ICD-10-CM

## 2018-08-24 DIAGNOSIS — J44.9 CHRONIC OBSTRUCTIVE PULMONARY DISEASE, UNSPECIFIED COPD TYPE (HCC): ICD-10-CM

## 2018-08-24 DIAGNOSIS — E78.5 HYPERLIPIDEMIA, UNSPECIFIED HYPERLIPIDEMIA TYPE: ICD-10-CM

## 2018-08-24 PROCEDURE — 99214 OFFICE O/P EST MOD 30 MIN: CPT | Performed by: FAMILY MEDICINE

## 2018-08-24 RX ORDER — LISINOPRIL 20 MG/1
20 TABLET ORAL DAILY
Refills: 0 | COMMUNITY
Start: 2018-08-10 | End: 2019-01-24 | Stop reason: SDUPTHER

## 2018-08-24 NOTE — PROGRESS NOTES
Assessment/Plan:    No problem-specific Assessment & Plan notes found for this encounter  Diagnoses and all orders for this visit:    Essential hypertension  Blood pressure is well controlled on repeat measurement  Continue amlodipine 5 mg daily and lisinopril 10 mg daily  Chronic obstructive pulmonary disease, unspecified COPD type ([address] )  Appears to be stable  Continue Spiriva and as needed Ventolin  Hyperlipidemia, unspecified hyperlipidemia type  -     Hepatic function panel; Future  Patient was given a lipid panel ordered by his cardiologist   He will be getting this done in September  I will check a hepatic function panel as well and adjust his statin if needed  Elevated glucose  -     Hemoglobin A1C; Future  Elevated glucose on previous labs  Check an A1c with next set of labs to assess for diabetes  Other orders  -     lisinopril (ZESTRIL) 20 mg tablet; Take 20 mg by mouth daily    Follow-up in six months or sooner if needed  Subjective:      Patient ID: Andreina Vega is a 79 y o  male  Patient presents for follow-up  He has high blood pressure  He is currently on amlodipine 5 mg daily and lisinopril 10 mg daily  Lisinopril dosing was clarified with him  Tolerating medications well without any lightheadedness, dizziness, leg swelling or cough  He has COPD  He is on Spiriva daily and Ventolin as needed  No troubles with the medication  They do help his breathing  Lately he has been using Ventolin a little more often due to the weather  Otherwise he will use the medication Ventolin at most once a week  He has high cholesterol  He is on atorvastatin 20 mg daily  No myalgias from the medication  He does have coronary artery disease as well as a thoracic aortic aneurysm and is followed by Cardiology  No acute concerns today          The following portions of the patient's history were reviewed and updated as appropriate:   He   Patient Active Problem List Diagnosis Date Noted    Elevated glucose 08/24/2018    Thoracic ascending aortic aneurysm (HCC) 03/22/2018    Coronary artery disease involving native coronary artery 01/30/2018    ULLOA (dyspnea on exertion) 01/30/2018    Osteoarthritis of left glenohumeral joint 06/01/2017    Osteoarthritis of right glenohumeral joint 06/01/2017    Colon polyps 05/15/2017    Palpitations 04/26/2017    Abnormal EKG 03/03/2017    PVC (premature ventricular contraction) 03/03/2017    Elevated hematocrit 02/02/2017    Elevated hemoglobin (HCC) 02/02/2017    Emphysema of lung ([facility] [address] ) 02/02/2017    Hyperlipidemia 02/02/2017    Hyponatremia 02/02/2017    Mild concentric left ventricular hypertrophy (LVH) 02/02/2017    Hiatal hernia 01/19/2017    COPD [facility] ([facility]) 01/18/2017    Hypertension 01/18/2017    Pityriasis rosea 01/18/2017     Current Outpatient Prescriptions   Medication Sig Dispense Refill    amLODIPine (NORVASC) 5 mg tablet Take 1 tablet (5 mg total) by mouth daily 90 tablet 1    aspirin (ECOTRIN LOW STRENGTH) 81 mg EC tablet Take 81 mg by mouth daily      atorvastatin (LIPITOR) 20 mg tablet Take 1 tablet (20 mg total) by mouth daily 90 tablet 3    clopidogrel (PLAVIX) 75 mg tablet Take 1 tablet (75 mg total) by mouth daily 30 tablet 11    lisinopril (ZESTRIL) 10 mg tablet Take 2 tablets (20 mg total) by mouth daily (Patient taking differently: Take 10 mg by mouth daily  ) 90 tablet 1    nitroglycerin (NITROSTAT) 0 4 mg SL tablet Place 1 tablet (0 4 mg total) under the tongue every 5 (five) minutes as needed for chest pain 90 tablet 0    pantoprazole (PROTONIX) 40 mg tablet Take 1 tablet (40 mg total) by mouth daily 90 tablet 1    sodium chloride 1 g tablet Take 1 tablet (1 g total) by mouth daily 60 tablet 0    SPIRIVA RESPIMAT 2 5 MCG/ACT AERS inhaler INHALE 2 PUFFS ONCE DAILY 4 g 5    VENTOLIN  (90 Base) MCG/ACT inhaler inhale 1 to 2 puffs by mouth every 4 to 6 hours if needed 18 g 1    lisinopril (ZESTRIL) 20 mg tablet Take 20 mg by mouth daily  0     No current facility-administered medications for this visit  He has No Known Allergies       Review of Systems   Constitutional: Negative for fever  Respiratory: Negative for shortness of breath  Cardiovascular: Negative for chest pain  Objective:      /82 (BP Location: Left arm, Patient Position: Sitting, Cuff Size: Large)   Pulse 70   Resp 16   Wt 120 kg (264 lb)   BMI 34 83 kg/m²          Physical Exam   Constitutional: He appears well-developed and well-nourished  Cardiovascular: Normal rate, regular rhythm and normal heart sounds      Pulmonary/Chest: Effort normal and breath sounds normal

## 2018-08-29 DIAGNOSIS — J44.9 CHRONIC OBSTRUCTIVE PULMONARY DISEASE, UNSPECIFIED COPD TYPE (HCC): ICD-10-CM

## 2018-09-05 ENCOUNTER — TELEPHONE (OUTPATIENT)
Dept: FAMILY MEDICINE CLINIC | Facility: MEDICAL CENTER | Age: 68
End: 2018-09-05

## 2018-09-05 NOTE — TELEPHONE ENCOUNTER
I suspect based on patient's last note that he is referring to lisinopril  He is supposed to be taking 10 mg daily and not 20 mg daily  I did clarify the dose with him and sent in the wrong prescription by mistake  I can fax in the 10 mg tablets, patient can cut the 20 mg tablets in half or he can take 20 mg daily as it will likely help to better control his blood pressure  Please let me know which option of the three patient wants to do

## 2018-09-05 NOTE — TELEPHONE ENCOUNTER
Says he went to  his med at the pharmacy and they told him you changed the dose from 10 to 20 mg  Is that correct? You didn't mention to him at appt and he wasn't sure they were right

## 2018-09-06 ENCOUNTER — TELEPHONE (OUTPATIENT)
Dept: FAMILY MEDICINE CLINIC | Facility: MEDICAL CENTER | Age: 68
End: 2018-09-06

## 2018-09-06 DIAGNOSIS — I10 ESSENTIAL HYPERTENSION: ICD-10-CM

## 2018-09-06 RX ORDER — AMLODIPINE BESYLATE 5 MG/1
TABLET ORAL
Qty: 90 TABLET | Refills: 1 | Status: SHIPPED | OUTPATIENT
Start: 2018-09-06 | End: 2019-04-17 | Stop reason: SDUPTHER

## 2018-09-06 NOTE — TELEPHONE ENCOUNTER
Patient left message on my machine, Tuesday so maybe he has called back  If not,  he left a message regarding why his medicine was changed  Please give him a call to review his concerns  Thank you   Charly Wilson

## 2018-09-26 ENCOUNTER — APPOINTMENT (OUTPATIENT)
Dept: LAB | Facility: HOSPITAL | Age: 68
End: 2018-09-26
Attending: INTERNAL MEDICINE
Payer: COMMERCIAL

## 2018-09-26 ENCOUNTER — TRANSCRIBE ORDERS (OUTPATIENT)
Dept: ADMINISTRATIVE | Facility: HOSPITAL | Age: 68
End: 2018-09-26

## 2018-09-26 DIAGNOSIS — E78.5 HYPERLIPIDEMIA, UNSPECIFIED HYPERLIPIDEMIA TYPE: ICD-10-CM

## 2018-09-26 DIAGNOSIS — E78.5 HYPERLIPIDEMIA, UNSPECIFIED HYPERLIPIDEMIA TYPE: Primary | ICD-10-CM

## 2018-09-26 DIAGNOSIS — R73.09 ELEVATED GLUCOSE: ICD-10-CM

## 2018-09-26 DIAGNOSIS — I10 HYPERTENSION: ICD-10-CM

## 2018-09-26 DIAGNOSIS — I10 BENIGN HYPERTENSION: ICD-10-CM

## 2018-09-26 LAB
ALBUMIN SERPL BCP-MCNC: 3.8 G/DL (ref 3.5–5)
ALP SERPL-CCNC: 84 U/L (ref 46–116)
ALT SERPL W P-5'-P-CCNC: 22 U/L (ref 12–78)
ANION GAP SERPL CALCULATED.3IONS-SCNC: 3 MMOL/L (ref 4–13)
AST SERPL W P-5'-P-CCNC: 10 U/L (ref 5–45)
BILIRUB DIRECT SERPL-MCNC: 0.21 MG/DL (ref 0–0.2)
BILIRUB SERPL-MCNC: 0.5 MG/DL (ref 0.2–1)
BUN SERPL-MCNC: 20 MG/DL (ref 5–25)
CALCIUM SERPL-MCNC: 9.5 MG/DL (ref 8.3–10.1)
CHLORIDE SERPL-SCNC: 96 MMOL/L (ref 100–108)
CHOLEST SERPL-MCNC: 142 MG/DL (ref 50–200)
CO2 SERPL-SCNC: 33 MMOL/L (ref 21–32)
CREAT SERPL-MCNC: 0.94 MG/DL (ref 0.6–1.3)
ERYTHROCYTE [DISTWIDTH] IN BLOOD BY AUTOMATED COUNT: 13.8 % (ref 11.6–15.1)
EST. AVERAGE GLUCOSE BLD GHB EST-MCNC: 117 MG/DL
GFR SERPL CREATININE-BSD FRML MDRD: 84 ML/MIN/1.73SQ M
GLUCOSE P FAST SERPL-MCNC: 110 MG/DL (ref 65–99)
HBA1C MFR BLD: 5.7 % (ref 4.2–6.3)
HCT VFR BLD AUTO: 50.4 % (ref 36.5–49.3)
HDLC SERPL-MCNC: 49 MG/DL (ref 40–60)
HGB BLD-MCNC: 16.8 G/DL (ref 12–17)
LDLC SERPL CALC-MCNC: 81 MG/DL (ref 0–100)
MCH RBC QN AUTO: 31.8 PG (ref 26.8–34.3)
MCHC RBC AUTO-ENTMCNC: 33.3 G/DL (ref 31.4–37.4)
MCV RBC AUTO: 96 FL (ref 82–98)
NT-PROBNP SERPL-MCNC: 366 PG/ML
PLATELET # BLD AUTO: 189 THOUSANDS/UL (ref 149–390)
PMV BLD AUTO: 9.6 FL (ref 8.9–12.7)
POTASSIUM SERPL-SCNC: 4.7 MMOL/L (ref 3.5–5.3)
PROT SERPL-MCNC: 7 G/DL (ref 6.4–8.2)
RBC # BLD AUTO: 5.28 MILLION/UL (ref 3.88–5.62)
SODIUM SERPL-SCNC: 132 MMOL/L (ref 136–145)
TRIGL SERPL-MCNC: 62 MG/DL
WBC # BLD AUTO: 5.1 THOUSAND/UL (ref 4.31–10.16)

## 2018-09-26 PROCEDURE — 83036 HEMOGLOBIN GLYCOSYLATED A1C: CPT

## 2018-09-26 PROCEDURE — 80048 BASIC METABOLIC PNL TOTAL CA: CPT

## 2018-09-26 PROCEDURE — 80076 HEPATIC FUNCTION PANEL: CPT

## 2018-09-26 PROCEDURE — 36415 COLL VENOUS BLD VENIPUNCTURE: CPT

## 2018-09-26 PROCEDURE — 83880 ASSAY OF NATRIURETIC PEPTIDE: CPT

## 2018-09-26 PROCEDURE — 80061 LIPID PANEL: CPT

## 2018-09-26 PROCEDURE — 85027 COMPLETE CBC AUTOMATED: CPT

## 2018-09-27 ENCOUNTER — TELEPHONE (OUTPATIENT)
Dept: FAMILY MEDICINE CLINIC | Facility: MEDICAL CENTER | Age: 68
End: 2018-09-27

## 2018-09-27 DIAGNOSIS — R79.81 ELEVATED CO2 LEVEL: ICD-10-CM

## 2018-09-27 DIAGNOSIS — J44.9 CHRONIC OBSTRUCTIVE PULMONARY DISEASE, UNSPECIFIED COPD TYPE (HCC): ICD-10-CM

## 2018-09-27 DIAGNOSIS — J43.9 PULMONARY EMPHYSEMA, UNSPECIFIED EMPHYSEMA TYPE (HCC): Primary | ICD-10-CM

## 2018-09-27 NOTE — TELEPHONE ENCOUNTER
----- Message from Jeny Bejarano DO sent at 9/27/2018  7:19 AM EDT -----  Labs reviewed  Sugars look ok  Liver and kidney function look good  There are some abnormalities to suggest COPD not well controlled  I highly recommend pt see Pulmonology for evaluation  There are some abnormalities on labs for the heart  I suspect the cardiology office will be contacting pt with results  Please let me know if pt is agreeable with seeing Pulm and we can get that set up

## 2018-10-08 ENCOUNTER — OFFICE VISIT (OUTPATIENT)
Dept: CARDIOLOGY CLINIC | Facility: MEDICAL CENTER | Age: 68
End: 2018-10-08
Payer: COMMERCIAL

## 2018-10-08 VITALS
SYSTOLIC BLOOD PRESSURE: 146 MMHG | OXYGEN SATURATION: 95 % | WEIGHT: 264.3 LBS | HEART RATE: 56 BPM | BODY MASS INDEX: 35.03 KG/M2 | DIASTOLIC BLOOD PRESSURE: 70 MMHG | HEIGHT: 73 IN

## 2018-10-08 DIAGNOSIS — E78.5 HYPERLIPIDEMIA: ICD-10-CM

## 2018-10-08 DIAGNOSIS — I25.10 CORONARY ARTERY DISEASE INVOLVING NATIVE CORONARY ARTERY: Primary | Chronic | ICD-10-CM

## 2018-10-08 PROCEDURE — 99213 OFFICE O/P EST LOW 20 MIN: CPT | Performed by: INTERNAL MEDICINE

## 2018-10-08 NOTE — PROGRESS NOTES
Cardiology Follow Up    Dewey Britton  1950  [id number]  [address]    No diagnosis found  Discussion:   He has been stable  He has dyspnea at greater than usual levels of exertion  He has not had any angina  His lipid panel was acceptable  NT proBNP was 366  Blood pressure was slightly elevated but he indicated it generally is in the acceptable range  He is seen pulmonology later this week  In the absence of any changes in symptoms, we will defer consideration of repeat attempt on his RCA   The issue can be revisited if there is a change in his status  I did not make any changes in his medical regimen  I asked him to return for follow-up visit in 4 months  No testing was ordered  Cardiovascular History:  Mr Johnie Felipe was referred in 3/17 because of frequent PVCs identified on a routine ECG  There was no history of overt heart disease, and the patient denied chest discomfort, dyspnea, syncope, or palpitations  However, because of the presence of multiple coronary risk factors, a SPECT study was performed, revealing a large fixed inferior defect  In addition, a screening CT of the chest without contrast, obtained to assess his lungs because of his smoking history, disclosed the presence of extensive coronary atherosclerosis  Consequently, coronary angiography was performed  He was found to have a chronic total occlusion of the mid RCA with CORNELL grade 1 antegrade flow, as well as collateral flow from the left system  There was moderate atherosclerosis of the LAD with 50% lesions in the proximal and mid vessel   The basal and mid inferior wall was hypokinetic with an estimated ejection fraction 55%  The EDP was mildly elevated  Echocardiography disclosed mild LVH   LV function was normal   Because of the presence of a chronic total occlusion of the mid RCA with no reversibility by SPECT what preserved wall motion by echo, it is likely that there is a significant territory of viable but ischemic myocardium in the inferior distribution   In 9/17, we discussed the feasibility of  PCI, with the possibility of improving his symptoms, but it's somewhat increased risk compared to conventional PCI  He initially did not wish to consider this option, that in 1/18 indicated that he would prefer to undergo attempted RCA   The risks and benefits of that procedure were discussed in detail  On 2/14/18, balloon angioplasty procedure was performed on the lesion in the mid RCA  The chronic total occlusion was crossed and dilated  However, this revealed the presence of a total stenosis in the distal vessel  This distal occlusion could not be crossed  The subintima was entered, but the subintimal plane could not be extended beyond the distal cap  Attempts to reach the distal RCA retrograde via septal collaterals were unsuccessful  Stenting of the mid vessel was not attempted, since this might impede efforts to deliver equipment distally at the next procedure      He has multiple CAD risk factors  He has significant dyslipidemia; A lipid panel in 1/17 disclosed total cholesterol 211, , HDL 67, and triglycerides 49  He initially declined to take statin therapy because he believes it adversely affected his father's kidneys  However, after the finding of significant CAD at catheterization, he agreed to a trial of atorvastatin, beginning with 20 mg per day and slowly titrating upward as tolerated  He had a markedly beneficial response, with a reduction in total cholesterol to 101, and a reduction in LDL to 52; the HDL also declined to 38; triglycerides were stable at 56  He is hypertensive  He was reluctant to increase his antihypertensive therapy because it was recently initiated   He is a smoker and was initially resistant to smoking cessation; however at the time of his 5/17 visit he indicated that he was attempting to reduce his cigarette consumption  He does not have diabetes       A screening ultrasound of the abdominal aorta in 1/17 was normal      He has significant COPD  CT of the chest in 1/17 disclosed moderate emphysema  There opacities in the right upper lobe suggesting the presence of an infectious process, possibly an atypical organism  Extensive coronary atherosclerosis was noted      He has a history of hyponatremia for which he has been receiving oral sodium supplementation   At the time of catheterization in 2/18, elevation of the LVEDP was identified and furosemide initiated, but this was subsequently discontinued by another physician  NT pro-BNP was 366 in 9/18         Patient Active Problem List   Diagnosis    Palpitations    Coronary artery disease involving native coronary artery    ULLOA (dyspnea on exertion)    Abnormal EKG    Colon polyps    COPD (chronic obstructive pulmonary disease) (HCC)    Elevated hematocrit    Elevated hemoglobin (HCC)    Emphysema of lung (HCC)    Hiatal hernia    Hyperlipidemia    Hypertension    Hyponatremia    Mild concentric left ventricular hypertrophy (LVH)    Osteoarthritis of left glenohumeral joint    Osteoarthritis of right glenohumeral joint    Pityriasis rosea    PVC (premature ventricular contraction)    Thoracic ascending aortic aneurysm (HCC)    Elevated glucose    Elevated CO2 level     Past Medical History:   Diagnosis Date    [facility]) [facility] [address] [facility]     Hyperlipidemia     Hypertension     [facility] infarction ([facility] )     Pneumonia 2/2/2017    Pulmonary emphysema (HCC)     Shortness of breath      Social History     Social History    Marital status:      Spouse name: N/A    Number of children: N/A    Years of education: N/A     Occupational History    Not on file       Social History Main Topics    Smoking status: Current Every Day Smoker     Packs/day: 0 50     Years: 40 00     Types: Cigarettes    Smokeless tobacco: Never Used    Alcohol use 4 2 - 12 6 oz/week     7 - 21 Cans of beer per week      Comment: 1-3 daily     Drug use: No    Sexual activity: Not on file     Other Topics Concern    Not on file     Social History Narrative    Always uses a seat belt    Caffeine use - Daily caffeine consumption, 2-3 servings a day              Family History   Problem Relation Age of Onset    Hypertension Mother     Emphysema Father     Hypertension Father     Arthritis Brother     Diabetes Brother     Other Other         Cardiac Disorder    Hypertension Other     Arrhythmia Neg Hx     Anuerysm Neg Hx     Asthma Neg Hx     Clotting disorder Neg Hx     Fainting Neg Hx      Past Surgical History:   Procedure Laterality Date    ANKLE SURGERY Right 1978    CAROTID STENT  02/13/2018    COLONOSCOPY      HAND SURGERY Left 1964    NC COLONOSCOPY FLX DX W/COLLJ SPEC WHEN PFRMD N/A 5/9/2017    Procedure: EGD AND COLONOSCOPY;  Surgeon: Giulia Hill MD;  [facility];   Service: Gastroenterology       Current Outpatient Prescriptions:     amLODIPine (NORVASC) 5 mg tablet, take 1 tablet by mouth once daily, Disp: 90 tablet, Rfl: 1    aspirin (ECOTRIN LOW STRENGTH) 81 mg EC tablet, Take 81 mg by mouth daily, Disp: , Rfl:     atorvastatin (LIPITOR) 20 mg tablet, Take 1 tablet (20 mg total) by mouth daily, Disp: 90 tablet, Rfl: 3    clopidogrel (PLAVIX) 75 mg tablet, Take 1 tablet (75 mg total) by mouth daily, Disp: 30 tablet, Rfl: 11    lisinopril (ZESTRIL) 10 mg tablet, Take 2 tablets (20 mg total) by mouth daily (Patient taking differently: Take 10 mg by mouth daily  ), Disp: 90 tablet, Rfl: 1    nitroglycerin (NITROSTAT) 0 4 mg SL tablet, Place 1 tablet (0 4 mg total) under the tongue every 5 (five) minutes as needed for chest pain, Disp: 90 tablet, Rfl: 0    pantoprazole (PROTONIX) 40 mg tablet, Take 1 tablet (40 mg total) by mouth daily, Disp: 90 tablet, Rfl: 1    sodium chloride 1 g tablet, Take 1 tablet (1 g total) by mouth daily, Disp: 60 tablet, Rfl: 0    SPIRIVA RESPIMAT 2 5 MCG/ACT AERS inhaler, INHALE 2 PUFFS ONCE DAILY, Disp: 4 g, Rfl: 5    VENTOLIN  (90 Base) MCG/ACT inhaler, inhale 1 to 2 puffs by mouth every 4 to 6 hours if needed, Disp: 18 g, Rfl: 1    lisinopril (ZESTRIL) 20 mg tablet, Take 20 mg by mouth daily, Disp: , Rfl: 0  No Known Allergies    Labs:  Appointment on 09/26/2018   Component Date Value    Sodium 09/26/2018 132*    Potassium 09/26/2018 4 7     Chloride 09/26/2018 96*    CO2 09/26/2018 33*    ANION GAP 09/26/2018 3*    BUN 09/26/2018 20     Creatinine 09/26/2018 0 94     Glucose, Fasting 09/26/2018 110*    Calcium 09/26/2018 9 5     eGFR 09/26/2018 84     WBC 09/26/2018 5 10     RBC 09/26/2018 5 28     Hemoglobin 09/26/2018 16 8     Hematocrit 09/26/2018 50 4*    MCV 09/26/2018 96     MCH 09/26/2018 31 8     MCHC 09/26/2018 33 3     RDW 09/26/2018 13 8     Platelets 30/68/2909 189     MPV 09/26/2018 9 6     Total Bilirubin 09/26/2018 0 50     Bilirubin, Direct 09/26/2018 0 21*    Alkaline Phosphatase 09/26/2018 84     AST 09/26/2018 10     ALT 09/26/2018 22     Total Protein 09/26/2018 7 0     Albumin 09/26/2018 3 8     Hemoglobin A1C 09/26/2018 5 7     EAG 09/26/2018 117     NT-proBNP 09/26/2018 366*    Cholesterol 09/26/2018 142     Triglycerides 09/26/2018 62     HDL, Direct 09/26/2018 49     LDL Calculated 09/26/2018 81    Appointment on 05/31/2018   Component Date Value    Sodium 05/31/2018 132*    Potassium 05/31/2018 4 5     Chloride 05/31/2018 96*    CO2 05/31/2018 30     ANION GAP 05/31/2018 6     BUN 05/31/2018 14     Creatinine 05/31/2018 1 10     Glucose 05/31/2018 115     Calcium 05/31/2018 9 4     eGFR 05/31/2018 70     Creatinine, Ur 05/31/2018 64 9     Microalbum  ,U,Random 05/31/2018 109 0*    Microalb Creat Ratio 05/31/2018 168*    Color,  05/31/2018 Yellow     Clarity,  05/31/2018 Clear     Specific [place], UA 05/31/2018 1 010     pH, UA 05/31/2018 7 0     Leukocytes, UA 05/31/2018 Negative     Nitrite, UA 05/31/2018 Negative     Protein, UA 05/31/2018 Trace*    Glucose, UA 05/31/2018 Negative     Ketones, UA 05/31/2018 Negative     Urobilinogen, UA 05/31/2018 1 0     Bilirubin, UA 05/31/2018 Negative     Blood, UA 05/31/2018 Trace-lysed*    RBC, UA 05/31/2018 None Seen     WBC, UA 05/31/2018 0-1*    Epithelial Cells 05/31/2018 Occasional     Bacteria, UA 05/31/2018 None Seen      Imaging: No results found  Review of Systems:  ROS    Physical Exam:  Physical Exam   Constitutional: He is oriented to person, place, and time  He appears well-developed and well-nourished  No distress  HENT:   Head: Normocephalic and atraumatic  Eyes: Conjunctivae and EOM are normal  No scleral icterus  Neck: Normal range of motion  Neck supple  No JVD present  No tracheal deviation present  Cardiovascular: Normal rate, regular rhythm, normal heart sounds and intact distal pulses  Exam reveals no gallop and no friction rub  No murmur heard  Pulmonary/Chest: Effort normal  No stridor  No respiratory distress  He has no wheezes  He has no rales  He exhibits no tenderness  Significantly decreased breath sounds; few scattered fine rhonchi   Abdominal: Soft  Bowel sounds are normal  He exhibits no distension  There is no tenderness  Musculoskeletal: Normal range of motion  He exhibits no edema or tenderness  Neurological: He is alert and oriented to person, place, and time  No cranial nerve deficit  Coordination normal    Skin: Skin is warm and dry  He is not diaphoretic  No erythema  Psychiatric: He has a normal mood and affect  His behavior is normal  Judgment and thought content normal    Vitals reviewed        Ulysses Shaggy, MD

## 2018-10-09 ENCOUNTER — TELEPHONE (OUTPATIENT)
Dept: FAMILY MEDICINE CLINIC | Facility: MEDICAL CENTER | Age: 68
End: 2018-10-09

## 2018-10-09 ENCOUNTER — OFFICE VISIT (OUTPATIENT)
Dept: NEPHROLOGY | Facility: CLINIC | Age: 68
End: 2018-10-09
Payer: COMMERCIAL

## 2018-10-09 VITALS
DIASTOLIC BLOOD PRESSURE: 88 MMHG | WEIGHT: 264 LBS | BODY MASS INDEX: 34.83 KG/M2 | HEART RATE: 68 BPM | SYSTOLIC BLOOD PRESSURE: 152 MMHG | TEMPERATURE: 97.8 F

## 2018-10-09 DIAGNOSIS — I10 ESSENTIAL HYPERTENSION: ICD-10-CM

## 2018-10-09 DIAGNOSIS — E87.1 HYPONATREMIA: Primary | ICD-10-CM

## 2018-10-09 PROCEDURE — 99213 OFFICE O/P EST LOW 20 MIN: CPT | Performed by: INTERNAL MEDICINE

## 2018-10-09 NOTE — PROGRESS NOTES
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  Patient has also underwent CT scan of chest in January 2017 which was also negative for lung cancer  Hyponatremia was suspected due to underlying COPD  Patient was initially prescribed salt tablet 1 g PO BID but patient has decreased salt tablet to 1 g PO daily and he has been taking 1 tablet daily since last visit  Patient also said that he is also been following fluid restriction at home  Patient does have a history of hypertension and noticed to be elevated blood pressure today  Patient said that for last few times when he has seen other physician his blood pressure is been running on the higher side  Patient said that he was previously taking lisinopril 20 mg but currently taking 10 mg and he will go back to 20 mg to take care of the hypertension  Patient also history of COPD and continues more  Smoking cessation counseling was provided today but patient is not interested to quit at this point  Patient also underlying coronary artery disease and also taking aspirin and Plavix  Patient takes all the medications as prescribed and does not use any NSAIDs  REVIEW OF SYSTEMS:    Review of Systems   Constitutional: Negative for chills and fever  HENT: Negative for nosebleeds and sore throat  Eyes: Negative for photophobia and pain  Respiratory: Negative for choking and wheezing      Cardiovascular: Negative for palpitations and leg swelling  Gastrointestinal: Negative for abdominal pain and blood in stool  Endocrine: Negative for cold intolerance and heat intolerance  Genitourinary: Negative for flank pain and hematuria  Musculoskeletal: Negative for joint swelling and neck pain  Skin: Negative for color change and pallor  Allergic/Immunologic: Negative for environmental allergies and immunocompromised state  Neurological: Negative for seizures and syncope  Hematological: Negative for adenopathy  Does not bruise/bleed easily  Psychiatric/Behavioral: Negative for confusion and suicidal ideas  PAST MEDICAL HISTORY:  Past Medical History:   Diagnosis Date    [facility]     Hyperlipidemia     Hypertension     Myocardial infarction ([place] [address] )     Pneumonia 2/2/2017    Pulmonary emphysema ([place] [address] )     Shortness of breath        PAST SURGICAL HISTORY:  Past Surgical History:   Procedure Laterality Date    ANKLE SURGERY Right 1978    CAROTID STENT  02/13/2018    COLONOSCOPY      HAND SURGERY Left 1964    CO COLONOSCOPY FLX DX W/COLLJ SPEC WHEN PFRMD N/A 5/9/2017    Procedure: EGD AND COLONOSCOPY;  Surgeon: Carlyn Olson MD;  [facility];   Service: Gastroenterology       SOCIAL HISTORY:  History   Alcohol Use    4 2 - 12 6 oz/week    7 - 21 Cans of beer per week     Comment: 1-3 daily      History   Drug Use No     History   Smoking Status    Current Every Day Smoker    Packs/day: 0 50    Years: 40 00    Types: Cigarettes   Smokeless Tobacco    Never Used       FAMILY HISTORY:  Family History   Problem Relation Age of Onset    Hypertension Mother     Emphysema Father     Hypertension Father     Arthritis Brother     Diabetes Brother     Other Other         Cardiac Disorder    Hypertension Other     Arrhythmia Neg Hx     Anuerysm Neg Hx     Asthma Neg Hx     Clotting disorder Neg Hx     Fainting Neg Hx        ALLERGY:  No Known Allergies    MEDICATIONS:    Current Outpatient Prescriptions:     amLODIPine (NORVASC) 5 mg tablet, take 1 tablet by mouth once daily, Disp: 90 tablet, Rfl: 1    aspirin (ECOTRIN LOW STRENGTH) 81 mg EC tablet, Take 81 mg by mouth daily, Disp: , Rfl:     atorvastatin (LIPITOR) 20 mg tablet, Take 1 tablet (20 mg total) by mouth daily, Disp: 90 tablet, Rfl: 3    clopidogrel (PLAVIX) 75 mg tablet, Take 1 tablet (75 mg total) by mouth daily, Disp: 30 tablet, Rfl: 11    lisinopril (ZESTRIL) 10 mg tablet, Take 2 tablets (20 mg total) by mouth daily (Patient taking differently: Take 10 mg by mouth daily  ), Disp: 90 tablet, Rfl: 1    lisinopril (ZESTRIL) 20 mg tablet, Take 20 mg by mouth daily, Disp: , Rfl: 0    nitroglycerin (NITROSTAT) 0 4 mg SL tablet, Place 1 tablet (0 4 mg total) under the tongue every 5 (five) minutes as needed for chest pain, Disp: 90 tablet, Rfl: 0    pantoprazole (PROTONIX) 40 mg tablet, Take 1 tablet (40 mg total) by mouth daily, Disp: 90 tablet, Rfl: 1    sodium chloride 1 g tablet, Take 1 tablet (1 g total) by mouth daily, Disp: 60 tablet, Rfl: 0    SPIRIVA RESPIMAT 2 5 MCG/ACT AERS inhaler, INHALE 2 PUFFS ONCE DAILY, Disp: 4 g, Rfl: 5    VENTOLIN  (90 Base) MCG/ACT inhaler, inhale 1 to 2 puffs by mouth every 4 to 6 hours if needed, Disp: 18 g, Rfl: 1    PHYSICAL EXAM:  Vitals:    10/09/18 1000   BP: 152/88   BP Location: Left arm   Patient Position: Sitting   Cuff Size: Adult   Pulse: 68   Temp: 97 8 °F (36 6 °C)   TempSrc: Oral   Weight: 120 kg (264 lb)     Body mass index is 34 83 kg/m²  Physical Exam   Constitutional: He appears well-nourished  No distress  HENT:   Head: Normocephalic and atraumatic  Eyes: Conjunctivae are normal  No scleral icterus  Right eye exhibits normal extraocular motion  Left eye exhibits normal extraocular motion  Neck: Neck supple  No JVD present     Cardiovascular: Normal rate, S1 normal and S2 normal     Pulmonary/Chest: No accessory muscle usage  No respiratory distress  He has no wheezes  Abdominal: Soft  He exhibits no distension  Musculoskeletal:        Right ankle: He exhibits swelling  Left ankle: He exhibits swelling  Right hand: He exhibits no laceration  Left hand: He exhibits no laceration  Trace pedal edema   Lymphadenopathy:        Right cervical: No superficial cervical adenopathy present  Left cervical: No superficial cervical adenopathy present  Right: No supraclavicular adenopathy present  Left: No supraclavicular adenopathy present  Neurological: He is alert  He is not disoriented  Skin: Skin is warm  No laceration noted  No cyanosis  Psychiatric: He is not combative  He does not exhibit a depressed mood  He expresses no suicidal ideation         LAB RESULTS:  Results for orders placed or performed in visit on 23/20/23   Basic metabolic panel   Result Value Ref Range    Sodium 132 (L) 136 - 145 mmol/L    Potassium 4 7 3 5 - 5 3 mmol/L    Chloride 96 (L) 100 - 108 mmol/L    CO2 33 (H) 21 - 32 mmol/L    ANION GAP 3 (L) 4 - 13 mmol/L    BUN 20 5 - 25 mg/dL    Creatinine 0 94 0 60 - 1 30 mg/dL    Glucose, Fasting 110 (H) 65 - 99 mg/dL    Calcium 9 5 8 3 - 10 1 mg/dL    eGFR 84 ml/min/1 73sq m   CBC   Result Value Ref Range    WBC 5 10 4 31 - 10 16 Thousand/uL    RBC 5 28 3 88 - 5 62 Million/uL    Hemoglobin 16 8 12 0 - 17 0 g/dL    Hematocrit 50 4 (H) 36 5 - 49 3 %    MCV 96 82 - 98 fL    MCH 31 8 26 8 - 34 3 pg    MCHC 33 3 31 4 - 37 4 g/dL    RDW 13 8 11 6 - 15 1 %    Platelets 400 289 - 593 Thousands/uL    MPV 9 6 8 9 - 12 7 fL   Hepatic function panel   Result Value Ref Range    Total Bilirubin 0 50 0 20 - 1 00 mg/dL    Bilirubin, Direct 0 21 (H) 0 00 - 0 20 mg/dL    Alkaline Phosphatase 84 46 - 116 U/L    AST 10 5 - 45 U/L    ALT 22 12 - 78 U/L    Total Protein 7 0 6 4 - 8 2 g/dL    Albumin 3 8 3 5 - 5 0 g/dL   Hemoglobin A1C   Result Value Ref Range    Hemoglobin A1C 5 7 4 2 - 6 3 %     mg/dl   NT-BNP PRO   Result Value Ref Range    NT-proBNP 366 (H) <125 pg/mL   Lipid Panel with Direct LDL reflex   Result Value Ref Range    Cholesterol 142 50 - 200 mg/dL    Triglycerides 62 <=150 mg/dL    HDL, Direct 49 40 - 60 mg/dL    LDL Calculated 81 0 - 100 mg/dL       ASSESSMENT and PLAN:  Nubia Isaacs was seen today for follow-up  Diagnoses and all orders for this visit:    Hyponatremia  -     Basic metabolic panel; Future    Essential hypertension    1  Hyponatremia  Chronic, based on urine lytes hyponatremia was thought to be secondary to underlying SIADH  Suspecting COPD as the etiology of SIADH  Patient continued to smoke cigarette and smoking cessation counseling was provided today but patient is not interested to quit smoking at this point  Currently patient is taking salt tablet 1 g PO daily and refusing to go up on the salt tablet dose  Patient also saying that he has been following 45 oz of fluid restriction at home  In the interim patient's sodium level has improved to 132  Will plan to continue current dose of salt tablet and recheck sodium level before next visit  2   Hypertension  Essential, today's blood pressure was on the higher side  Patient also said that for last few times when he was seen by other providers his blood pressure has been running on the higher side  Currently patient is on amlodipine 5 mg PO daily and lisinopril 10 mg PO daily  I have discussed adjusting blood pressure medication which include amlodipine or lisinopril today but patient has refused for me to change any medication today  Patient said that he does have 20 mg lisinopril prescription at home and he will take higher dose and talk to primary care doctor regarding further adjustment if necessary  Patient does not check blood pressure at home and also refusing to check blood pressure on regular basis at home    Currently renal function is at baseline with a recent creatinine of 0 94 with EGFR of 84  Returns to Nephrology Clinic in 6 months  Plan to check BMP before next visit  Labs (done on 4/16/2019)  Creatinine 0 99 with EGFR of 78  Sodium 137-> continue current amount of salt tablet  Normal potassium, bicarb and calcium  Portions of the record may have been created with voice recognition software  Occasional wrong word or "sound a like" substitutions may have occurred due to the inherent limitations of voice recognition software  Read the chart carefully and recognize, using context, where substitutions have occurred  If you have any questions, please contact the dictating provider

## 2018-10-09 NOTE — LETTER
October 9, 2018     Ashley Mejia, DO  [address]  [facility] [address]    Patient: Ulysses Poole   YOB: 1950   Date of Visit: 10/9/2018       Dear Dr Micah Mckeon: Thank you for referring Ulysses Poole to me for evaluation  Below are my notes for this consultation  If you have questions, please do not hesitate to call me  I look forward to following your patient along with you  Sincerely,        Victor Manuel Redmond MD        CC: No Recipients  Victor Manuel Redmond MD  10/9/2018 10:15 AM  Sign at close encounter  [address] 79 y o  male MRN: [id number]    Encounter: [id number] 10/9/2018    REASON FOR VISIT: Ulysses Poole is a 79 y o  male who is here on 10/9/2018 for Follow-up    HPI:    This is a 82-year-old male with a past medical history of coronary artery disease, hypertension, hyperlipidemia, COPD, current smoker, returns to Nephrology Clinic for further management of hyponatremia  Patient had underwent workup for hyponatremia in the past and exact etiology of hyponatremia has remained unknown but 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  Patient has also underwent CT scan of chest in January 2017 which was also negative for lung cancer  Hyponatremia was suspected due to underlying COPD  Patient was initially prescribed salt tablet 1 g PO BID but patient has decreased salt tablet to 1 g PO daily and he has been taking 1 tablet daily since last visit  Patient also said that he is also been following fluid restriction at home  Patient does have a history of hypertension and noticed to be elevated blood pressure today  Patient said that for last few times when he has seen other physician his blood pressure is been running on the higher side  Patient said that he was previously taking lisinopril 20 mg but currently taking 10 mg and he will go back to 20 mg to take care of the hypertension  Patient also history of COPD and continues more    Smoking cessation counseling was provided today but patient is not interested to quit at this point  Patient also underlying coronary artery disease and also taking aspirin and Plavix  Patient takes all the medications as prescribed and does not use any NSAIDs  REVIEW OF SYSTEMS:    Review of Systems   Constitutional: Negative for chills and fever  HENT: Negative for nosebleeds and sore throat  Eyes: Negative for photophobia and pain  Respiratory: Negative for choking and wheezing  Cardiovascular: Negative for palpitations and leg swelling  Gastrointestinal: Negative for abdominal pain and blood in stool  Endocrine: Negative for cold intolerance and heat intolerance  Genitourinary: Negative for flank pain and hematuria  Musculoskeletal: Negative for joint swelling and neck pain  Skin: Negative for color change and pallor  Allergic/Immunologic: Negative for environmental allergies and immunocompromised state  Neurological: Negative for seizures and syncope  Hematological: Negative for adenopathy  Does not bruise/bleed easily  Psychiatric/Behavioral: Negative for confusion and suicidal ideas  PAST MEDICAL HISTORY:  Past Medical History:   Diagnosis Date    [facility] )     Hyperlipidemia     Hypertension     Myocardial infarction ([facility] )     Pneumonia 2/2/2017    Pulmonary emphysema ([facility] )     Shortness of breath        PAST SURGICAL HISTORY:  Past Surgical History:   Procedure Laterality Date    ANKLE SURGERY Right 1978    CAROTID STENT  02/13/2018    COLONOSCOPY      HAND SURGERY Left 1964    AR COLONOSCOPY FLX DX W/COLLJ SPEC WHEN PFRMD N/A 5/9/2017    Procedure: EGD AND COLONOSCOPY;  Surgeon: Evan Tom MD;  [facility];   Service: Gastroenterology       SOCIAL HISTORY:  History   Alcohol Use    4 2 - 12 6 oz/week    7 - 21 Cans of beer per week     Comment: 1-3 daily      History   Drug Use No     History Smoking Status    Current Every Day Smoker    Packs/day: 0 50    Years: 40 00    Types: Cigarettes   Smokeless Tobacco    Never Used       FAMILY HISTORY:  Family History   Problem Relation Age of Onset    Hypertension Mother     Emphysema Father     Hypertension Father     Arthritis Brother     Diabetes Brother     Other Other         Cardiac Disorder    Hypertension Other     Arrhythmia Neg Hx     Anuerysm Neg Hx     Asthma Neg Hx     Clotting disorder Neg Hx     Fainting Neg Hx        ALLERGY:  No Known Allergies    MEDICATIONS:    Current Outpatient Prescriptions:     amLODIPine (NORVASC) 5 mg tablet, take 1 tablet by mouth once daily, Disp: 90 tablet, Rfl: 1    aspirin (ECOTRIN LOW STRENGTH) 81 mg EC tablet, Take 81 mg by mouth daily, Disp: , Rfl:     atorvastatin (LIPITOR) 20 mg tablet, Take 1 tablet (20 mg total) by mouth daily, Disp: 90 tablet, Rfl: 3    clopidogrel (PLAVIX) 75 mg tablet, Take 1 tablet (75 mg total) by mouth daily, Disp: 30 tablet, Rfl: 11    lisinopril (ZESTRIL) 10 mg tablet, Take 2 tablets (20 mg total) by mouth daily (Patient taking differently: Take 10 mg by mouth daily  ), Disp: 90 tablet, Rfl: 1    lisinopril (ZESTRIL) 20 mg tablet, Take 20 mg by mouth daily, Disp: , Rfl: 0    nitroglycerin (NITROSTAT) 0 4 mg SL tablet, Place 1 tablet (0 4 mg total) under the tongue every 5 (five) minutes as needed for chest pain, Disp: 90 tablet, Rfl: 0    pantoprazole (PROTONIX) 40 mg tablet, Take 1 tablet (40 mg total) by mouth daily, Disp: 90 tablet, Rfl: 1    sodium chloride 1 g tablet, Take 1 tablet (1 g total) by mouth daily, Disp: 60 tablet, Rfl: 0    SPIRIVA RESPIMAT 2 5 MCG/ACT AERS inhaler, INHALE 2 PUFFS ONCE DAILY, Disp: 4 g, Rfl: 5    VENTOLIN  (90 Base) MCG/ACT inhaler, inhale 1 to 2 puffs by mouth every 4 to 6 hours if needed, Disp: 18 g, Rfl: 1    PHYSICAL EXAM:  Vitals:    10/09/18 1000   BP: 152/88   BP Location: Left arm   Patient Position: Sitting   Cuff Size: Adult   Pulse: 68   Temp: 97 8 °F (36 6 °C)   TempSrc: Oral   Weight: 120 kg (264 lb)     Body mass index is 34 83 kg/m²  Physical Exam   Constitutional: He appears well-nourished  No distress  HENT:   Head: Normocephalic and atraumatic  Eyes: Conjunctivae are normal  No scleral icterus  Right eye exhibits normal extraocular motion  Left eye exhibits normal extraocular motion  Neck: Neck supple  No JVD present  Cardiovascular: Normal rate, S1 normal and S2 normal     Pulmonary/Chest: No accessory muscle usage  No respiratory distress  He has no wheezes  Abdominal: Soft  He exhibits no distension  Musculoskeletal:        Right ankle: He exhibits swelling  Left ankle: He exhibits swelling  Right hand: He exhibits no laceration  Left hand: He exhibits no laceration  Trace pedal edema   Lymphadenopathy:        Right cervical: No superficial cervical adenopathy present  Left cervical: No superficial cervical adenopathy present  Right: No supraclavicular adenopathy present  Left: No supraclavicular adenopathy present  Neurological: He is alert  He is not disoriented  Skin: Skin is warm  No laceration noted  No cyanosis  Psychiatric: He is not combative  He does not exhibit a depressed mood  He expresses no suicidal ideation         LAB RESULTS:  Results for orders placed or performed in visit on 71/32/36   Basic metabolic panel   Result Value Ref Range    Sodium 132 (L) 136 - 145 mmol/L    Potassium 4 7 3 5 - 5 3 mmol/L    Chloride 96 (L) 100 - 108 mmol/L    CO2 33 (H) 21 - 32 mmol/L    ANION GAP 3 (L) 4 - 13 mmol/L    BUN 20 5 - 25 mg/dL    Creatinine 0 94 0 60 - 1 30 mg/dL    Glucose, Fasting 110 (H) 65 - 99 mg/dL    Calcium 9 5 8 3 - 10 1 mg/dL    eGFR 84 ml/min/1 73sq m   CBC   Result Value Ref Range    WBC 5 10 4 31 - 10 16 Thousand/uL    RBC 5 28 3 88 - 5 62 Million/uL    Hemoglobin 16 8 12 0 - 17 0 g/dL    Hematocrit 50 4 (H) 36 5 - 49 3 %    MCV 96 82 - 98 fL    MCH 31 8 26 8 - 34 3 pg    MCHC 33 3 31 4 - 37 4 g/dL    RDW 13 8 11 6 - 15 1 %    Platelets 647 852 - 150 Thousands/uL    MPV 9 6 8 9 - 12 7 fL   Hepatic function panel   Result Value Ref Range    Total Bilirubin 0 50 0 20 - 1 00 mg/dL    Bilirubin, Direct 0 21 (H) 0 00 - 0 20 mg/dL    Alkaline Phosphatase 84 46 - 116 U/L    AST 10 5 - 45 U/L    ALT 22 12 - 78 U/L    Total Protein 7 0 6 4 - 8 2 g/dL    Albumin 3 8 3 5 - 5 0 g/dL   Hemoglobin A1C   Result Value Ref Range    Hemoglobin A1C 5 7 4 2 - 6 3 %     mg/dl   NT-BNP PRO   Result Value Ref Range    NT-proBNP 366 (H) <125 pg/mL   Lipid Panel with Direct LDL reflex   Result Value Ref Range    Cholesterol 142 50 - 200 mg/dL    Triglycerides 62 <=150 mg/dL    HDL, Direct 49 40 - 60 mg/dL    LDL Calculated 81 0 - 100 mg/dL       ASSESSMENT and PLAN:  Nadia Sanchez was seen today for follow-up  Diagnoses and all orders for this visit:    Hyponatremia  -     Basic metabolic panel; Future    Essential hypertension    1  Hyponatremia  Chronic, based on urine lytes hyponatremia was thought to be secondary to underlying SIADH  Suspecting COPD as the etiology of SIADH  Patient continued to smoke cigarette and smoking cessation counseling was provided today but patient is not interested to quit smoking at this point  Currently patient is taking salt tablet 1 g PO daily and refusing to go up on the salt tablet dose  Patient also saying that he has been following 45 oz of fluid restriction at home  In the interim patient's sodium level has improved to 132  Will plan to continue current dose of salt tablet and recheck sodium level before next visit  2   Hypertension  Essential, today's blood pressure was on the higher side  Patient also said that for last few times when he was seen by other providers his blood pressure has been running on the higher side     Currently patient is on amlodipine 5 mg PO daily and lisinopril 10 mg PO daily  I have discussed adjusting blood pressure medication which include amlodipine or lisinopril today but patient has refused for me to change any medication today  Patient said that he does have 20 mg lisinopril prescription at home and he will take higher dose and talk to primary care doctor regarding further adjustment if necessary  Patient does not check blood pressure at home and also refusing to check blood pressure on regular basis at home  Currently renal function is at baseline with a recent creatinine of 0 94 with EGFR of 84  Returns to Nephrology Clinic in 6 months  Plan to check BMP before next visit  Portions of the record may have been created with voice recognition software  Occasional wrong word or "sound a like" substitutions may have occurred due to the inherent limitations of voice recognition software  Read the chart carefully and recognize, using context, where substitutions have occurred  If you have any questions, please contact the dictating provider

## 2018-10-09 NOTE — TELEPHONE ENCOUNTER
Patient called the office upset stating he feels his Nephrologist Dr Claus Fonseca is not helpful  Patient stated Allegra Salvador advised him to increase his BP medications and patient does not agree  Patient wants to talk to Dr Shoshana Fajardo for this matter  Routed to Dr Shoshana Fajardo to advise

## 2018-10-10 NOTE — TELEPHONE ENCOUNTER
BP was high at Nephrology and Cardiology  It was high when pt came to see me but did improve by the end of the visit  I suspect BP is higher most of the day than not  I do recommend the increase dosage

## 2018-10-23 ENCOUNTER — OFFICE VISIT (OUTPATIENT)
Dept: PULMONOLOGY | Facility: CLINIC | Age: 68
End: 2018-10-23
Payer: COMMERCIAL

## 2018-10-23 VITALS
TEMPERATURE: 97.6 F | BODY MASS INDEX: 36.71 KG/M2 | HEIGHT: 73 IN | WEIGHT: 277 LBS | SYSTOLIC BLOOD PRESSURE: 150 MMHG | DIASTOLIC BLOOD PRESSURE: 90 MMHG | OXYGEN SATURATION: 95 % | HEART RATE: 65 BPM

## 2018-10-23 DIAGNOSIS — R06.83 SNORING: Primary | ICD-10-CM

## 2018-10-23 DIAGNOSIS — J44.9 CHRONIC OBSTRUCTIVE PULMONARY DISEASE, UNSPECIFIED COPD TYPE (HCC): ICD-10-CM

## 2018-10-23 DIAGNOSIS — J43.9 PULMONARY EMPHYSEMA, UNSPECIFIED EMPHYSEMA TYPE (HCC): ICD-10-CM

## 2018-10-23 DIAGNOSIS — R79.81 ELEVATED CO2 LEVEL: ICD-10-CM

## 2018-10-23 DIAGNOSIS — G47.19 EXCESSIVE DAYTIME SLEEPINESS: ICD-10-CM

## 2018-10-23 PROCEDURE — 99215 OFFICE O/P EST HI 40 MIN: CPT | Performed by: INTERNAL MEDICINE

## 2018-10-23 PROCEDURE — 4040F PNEUMOC VAC/ADMIN/RCVD: CPT | Performed by: INTERNAL MEDICINE

## 2018-10-23 RX ORDER — POLYETHYLENE GLYCOL 3350 17 G
4 POWDER IN PACKET (EA) ORAL AS NEEDED
Qty: 100 EACH | Refills: 0 | Status: SHIPPED | OUTPATIENT
Start: 2018-10-23 | End: 2019-07-31 | Stop reason: CLARIF

## 2018-10-23 RX ORDER — NICOTINE 21 MG/24HR
1 PATCH, TRANSDERMAL 24 HOURS TRANSDERMAL EVERY 24 HOURS
Qty: 28 PATCH | Refills: 0 | Status: SHIPPED | OUTPATIENT
Start: 2018-10-23 | End: 2019-08-14

## 2018-10-23 NOTE — LETTER
October 24, 2018     Jaelyn Machado DO  [address]  [facility] [address]    Patient: Janice Couch   YOB: 1950   Date of Visit: 10/23/2018       Dear Dr Shoshana Fajardo: Thank you for referring Ely Cedlarry to me for evaluation  Below are my notes for this consultation  If you have questions, please do not hesitate to call me  I look forward to following your patient along with you  Sincerely,        Hal Rebolledo DO        CC: No Recipients  Hal Rebolledo DO  10/24/2018 10:07 PM  Sign at close encounter  Pulmonary Follow Up  Janice Couch 79 y o  male MRN: [id number]      Reason for consultation: COPD management    Assessment/Plan  79 y o  M with PMHx of COPD, HTN, hyperlipidemia and CAD s/p MI who comes in for follow up of COPD and to evaluate snoring  1   GOLD stage 4 COPD (FEV1 - 27%) - still actively smoking  Also with 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, fiberglass       -  He has had some benefit from Spiriva  Will continue that at this time       -  Start Symbicort with samples to see if he has any additional benefit form that       -  Continue albuterol every 4-6 hrs to help with dyspnea       -  He has no interest in performing a 6 minute walk test today  -  He is interested in quitting  Will start nicoderm patches along with lozenges to see if that helps him quit  2  Snoring -  Mallampati class 4, Body mass index is 36 55 kg/m² ,    He is at risk for obstructive sleep apnea based on [employer] survey  -  Check a home sleep study to assess for obstructive sleep apnea      -  Check ABG to assess for chronic hypoxia      -  I discussed in depth the diagnostic studies and treatment options involved with obstructive sleep apnea      -  I also discussed in depth the risk of leaving sleep apnea untreated including hypertension, heart failure, arrhythmia, MI and stroke        -  The patient is agreeable to undergo testing and treatment of obstructive sleep apnea  He understands that pitfalls she may encounter along the way and is willing to attempt CPAP treatment  History of Present Illness   HPI:  Bowen Angel is a 79 y o  male with PMHx as below who comes in for evaluation of COPD and snoring  He was seen by Dr Cha Cummins and was started on spiriva  However, he has not returned to the office after that  He was then encouraged to return to the office for follow up of his COPD  He continues to smoke 1/2 pack per day  He notes shortness of breath with minimal effort including up a slight hill or climbing stairs  He also admits to a chronic cough in the AM that produces clear sputum which remains unchanged  He also gets occasional wheezing and chest tightness  He has been using spiriva and has found that it has helped his symptoms a bit  He also has albuterol as needed  He is not fond of using many medications and has no interest in oxygen  As far as his sleep goes, he snores but denies daytime sleepiness, or witnessed apnea  He does not feel that he has much trouble with his sleep  ROS:   Review of Systems   Constitutional: Negative  HENT: Positive for congestion and postnasal drip  Negative for rhinorrhea and trouble swallowing  Respiratory: Positive for cough, chest tightness, shortness of breath and wheezing  Gastrointestinal: Negative  Genitourinary: Negative  Musculoskeletal: Negative  Skin: Negative  Allergic/Immunologic: Negative  Neurological: Negative  Hematological: Negative  Psychiatric/Behavioral: Negative          Historical Information   Past Medical History:   Diagnosis Date    [facility]     Hyperlipidemia     Hypertension     [facility]     Pneumonia 2/2/2017    Pulmonary emphysema ([facility])     Shortness of breath      Past Surgical History:   Procedure Laterality Date    ANKLE SURGERY Right 1978    CAROTID STENT  02/13/2018    COLONOSCOPY      HAND SURGERY Left 1964    [place] COLONOSCOPY FLX DX W/COLLJ SPEC WHEN PFRMD N/A 5/9/2017    Procedure: EGD AND COLONOSCOPY;  Surgeon: Reina Younger MD;  [facility]; Service: Gastroenterology     Family History   Problem Relation Age of Onset    Hypertension Mother     Emphysema Father     Hypertension Father     Arthritis Brother     Diabetes Brother     Other Other         Cardiac Disorder    Hypertension Other     Arrhythmia Neg Hx     Anuerysm Neg Hx     Asthma Neg Hx     Clotting disorder Neg Hx     Fainting Neg Hx      Social History     Social History    Marital status:      Spouse name: N/A    Number of children: N/A    Years of education: N/A     Occupational History    Not on file  Social History Main Topics    Smoking status: Current Every Day Smoker     Packs/day: 0 50     Years: 45 00     Types: Cigarettes    Smokeless tobacco: Never Used    Alcohol use 4 2 - 12 6 oz/week     7 - 21 Cans of beer per week      Comment: 1-3 daily     Drug use: No    Sexual activity: Not on file     Other Topics Concern    Not on file     Social History Narrative    Always uses a seat belt    Caffeine use - Daily caffeine consumption, 2-3 servings a day               Occupational History: Sheet metal, construction    Meds/Allergies   No Known Allergies    Home medications:  Prior to Admission medications    Medication Sig Start Date End Date Taking?  Authorizing Provider   amLODIPine (NORVASC) 5 mg tablet take 1 tablet by mouth once daily 9/6/18  Yes Janelle Umana DO   aspirin (ECOTRIN LOW STRENGTH) 81 mg EC tablet Take 81 mg by mouth daily   Yes Historical Provider, MD   atorvastatin (LIPITOR) 20 mg tablet Take 1 tablet (20 mg total) by mouth daily 8/21/18  Yes Trista Barrios MD   clopidogrel (PLAVIX) 75 mg tablet Take 1 tablet (75 mg total) by mouth daily 2/16/18  Yes TAYLOR Sylvester   lisinopril (ZESTRIL) 20 mg tablet Take 20 mg by mouth daily 8/10/18  Yes Historical Provider, MD   nitroglycerin (NITROSTAT) 0 4 mg SL tablet Place 1 tablet (0 4 mg total) under the tongue every 5 (five) minutes as needed for chest pain 2/15/18  Yes TAYLOR Cherry   pantoprazole (PROTONIX) 40 mg tablet Take 1 tablet (40 mg total) by mouth daily 8/10/18  Yes Jayla Haines DO   sodium chloride 1 g tablet Take 1 tablet (1 g total) by mouth daily 6/6/18  Yes Raleigh Ling MD   SPIRIVA RESPIMAT 2 5 MCG/ACT AERS inhaler INHALE 2 PUFFS ONCE DAILY 8/5/18  Yes Jayla Haines DO   VENTOLIN  (90 Base) MCG/ACT inhaler inhale 1 to 2 puffs by mouth every 4 to 6 hours if needed 8/29/18  Yes Jayla Haines DO   nicotine (NICODERM CQ) 21 mg/24 hr TD 24 hr patch Place 1 patch on the skin every 24 hours 10/23/18   Jenene Cabot, DO   nicotine polacrilex (COMMIT) 4 MG lozenge Apply 1 lozenge (4 mg total) to the mouth or throat as needed for smoking cessation 10/23/18   Jenene Cabot, DO       Vitals:   Blood pressure 150/90, pulse 65, temperature 97 6 °F (36 4 °C), temperature source Tympanic, height 6' 1" (1 854 m), weight 126 kg (277 lb), SpO2 95 % , RA, Body mass index is 36 55 kg/m²       Physical Exam  General: Obese, disheveled, Awake alert and oriented x 3, conversant without conversational dyspnea, NAD, normal affect  HEENT:  PERRL, Sclera noninjected, nonicteric OU, Nares patent,  no craniofacial abnormalities, Mucous membranes, moist, no oral lesions, normal dentition, Mallampati class 4  NECK:  Trachea midline, no accessory muscle use, no stridor, no cervical or supraclavicular adenopathy, JVP not elevated  CARDIAC: Reg, single s1/S2, no m/r/g  PULM: Decreased breath sounds, no wheezing, rhonchi or rales  ABD: Normoactive bowel sounds, soft nontender, nondistended, no rebound, no rigidity, no guarding  EXT: No cyanosis, no clubbing, no edema, normal capillary refill  NEURO: no focal neurologic deficits, AAOx3, moving all extremities appropriately    Labs: I have personally reviewed pertinent lab results  Lab Results   Component Value Date    WBC 5 10 09/26/2018    HGB 17 3 (H) 10/24/2018    HCT 51 (H) 10/24/2018    MCV 96 09/26/2018     09/26/2018      Lab Results   Component Value Date    GLUCOSE 124 10/24/2018    CALCIUM 9 5 09/26/2018     (L) 09/26/2018    K 4 7 09/26/2018    CO2 33 (H) 09/26/2018    CL 96 (L) 09/26/2018    BUN 20 09/26/2018    CREATININE 0 94 09/26/2018       PFTs:  The most recent pulmonary function tests were reviewed  4/2017  Spirometry: Forced vital capacity: 3 [id number]% Predicted Values  Forced expiratory volume in one second: 1 16ZWQw908% Predicted Value  FEV1/FVC ratio is 30%  Post Bronchodilator Spirometry:   Lung Volumes:   DLCO:    PFT Interpretation:   Study shows a severe obstructive pulmonary impairment  I did compare this to a pre-and post bronchodilator study done on 2/17  I think the patient was having difficulty at that time  There was a very marked improvement in the postbronchodilator study with the postbronchodilator forced vital capacity of 3 76 and a postbronchodilator FEV1 of 1 01L  Imaging  I personally reviewed the images on the [facility] system pertinent to today's visit  CT chest - IMPRESSION:     Stable emphysema  Previously seen tree-in-bud opacities in the lingula and right upper lobe have nearly resolved  No suspicious or developing nodule      Stable ascending thoracic aortic aneurysm measuring 4 1 cm  Continued follow-up recommended      Lung-RADS:  Lung-RADS2, benign appearance or behavior  Continue annual screening with LDCT in 12 months  Cardiac:  Echo 1/2017  LEFT VENTRICLE:  Ejection fraction was estimated to be 55 %  This study was inadequate for the evaluation of regional wall motion  There was mild concentric hypertrophy      Sleep studies:  None    DO Keyshawn Swan 73 Sleep Physician

## 2018-10-24 ENCOUNTER — HOSPITAL ENCOUNTER (OUTPATIENT)
Dept: PULMONOLOGY | Facility: HOSPITAL | Age: 68
Discharge: HOME | End: 2018-10-24
Payer: COMMERCIAL

## 2018-10-24 DIAGNOSIS — R79.81 ELEVATED CO2 LEVEL: ICD-10-CM

## 2018-10-24 LAB
BASE EXCESS BLDA CALC-SCNC: 4 MMOL/L (ref -2–3)
CA-I BLD-SCNC: 1.25 MMOL/L (ref 1.12–1.32)
FIO2 GAS DIL.REBREATH: 21 L
GLUCOSE SERPL-MCNC: 124 MG/DL (ref 65–140)
HCO3 BLDA-SCNC: 29.2 MMOL/L (ref 22–28)
HCT VFR BLD CALC: 51 % (ref 36.5–49.3)
HGB BLDA-MCNC: 17.3 G/DL (ref 12–17)
PCO2 BLD: 31 MMOL/L (ref 21–32)
PCO2 BLD: 44.6 MM HG (ref 36–44)
PH BLD: 7.42 [PH] (ref 7.35–7.45)
PO2 BLD: 69 MM HG (ref 75–129)
POTASSIUM BLD-SCNC: 4.3 MMOL/L (ref 3.5–5.3)
SAO2 % BLD FROM PO2: 94 % (ref 95–98)
SODIUM BLD-SCNC: 134 MMOL/L (ref 136–145)
SPECIMEN SOURCE: ABNORMAL

## 2018-10-24 PROCEDURE — 84132 ASSAY OF SERUM POTASSIUM: CPT

## 2018-10-24 PROCEDURE — 82803 BLOOD GASES ANY COMBINATION: CPT

## 2018-10-24 PROCEDURE — 36600 WITHDRAWAL OF ARTERIAL BLOOD: CPT

## 2018-10-24 PROCEDURE — 85014 HEMATOCRIT: CPT

## 2018-10-24 PROCEDURE — 82947 ASSAY GLUCOSE BLOOD QUANT: CPT

## 2018-10-24 PROCEDURE — 82330 ASSAY OF CALCIUM: CPT

## 2018-10-24 PROCEDURE — 84295 ASSAY OF SERUM SODIUM: CPT

## 2018-10-25 NOTE — PROGRESS NOTES
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, fiberglass       -  He has had some benefit from Spiriva  Will continue that at this time       -  Start Symbicort with samples to see if he has any additional benefit form that       -  Continue albuterol every 4-6 hrs to help with dyspnea       -  He has no interest in performing a 6 minute walk test today  -  He is interested in quitting  Will start nicoderm patches along with lozenges to see if that helps him quit  2  Snoring -  Mallampati class 4, Body mass index is 36 55 kg/m² ,    He is at risk for obstructive sleep apnea based on STOP [employer] survey  -  Check a home sleep study to assess for obstructive sleep apnea      -  Check ABG to assess for chronic hypoxia      -  I discussed in depth the diagnostic studies and treatment options involved with obstructive sleep apnea      -  I also discussed in depth the risk of leaving sleep apnea untreated including hypertension, heart failure, arrhythmia, MI and stroke  -  The patient is agreeable to undergo testing and treatment of obstructive sleep apnea  He understands that pitfalls she may encounter along the way and is willing to attempt CPAP treatment  History of Present Illness   HPI:  Elly Cortes is a 79 y o  male with PMHx as below who comes in for evaluation of COPD and snoring  He was seen by Dr Kym Kline and was started on spiriva  However, he has not returned to the office after that  He was then encouraged to return to the office for follow up of his COPD  He continues to smoke 1/2 pack per day    He notes shortness of breath with minimal effort including up a slight hill or climbing stairs  He also admits to a chronic cough in the AM that produces clear sputum which remains unchanged  He also gets occasional wheezing and chest tightness  He has been using spiriva and has found that it has helped his symptoms a bit  He also has albuterol as needed  He is not fond of using many medications and has no interest in oxygen  As far as his sleep goes, he snores but denies daytime sleepiness, or witnessed apnea  He does not feel that he has much trouble with his sleep  ROS:   Review of Systems   Constitutional: Negative  HENT: Positive for congestion and postnasal drip  Negative for rhinorrhea and trouble swallowing  Respiratory: Positive for cough, chest tightness, shortness of breath and wheezing  Gastrointestinal: Negative  Genitourinary: Negative  Musculoskeletal: Negative  Skin: Negative  Allergic/Immunologic: Negative  Neurological: Negative  Hematological: Negative  Psychiatric/Behavioral: Negative  Historical Information   Past Medical History:   Diagnosis Date    [facility]     Hyperlipidemia     Hypertension     [facility]     Pneumonia 2/2/2017    Pulmonary emphysema (HCC)     Shortness of breath      Past Surgical History:   Procedure Laterality Date    ANKLE SURGERY Right 1978    CAROTID STENT  02/13/2018    COLONOSCOPY      HAND SURGERY Left 1964    AR COLONOSCOPY FLX DX W/COLLJ SPEC WHEN PFRMD N/A 5/9/2017    Procedure: EGD AND COLONOSCOPY;  Surgeon: Jonelle Salcedo MD;  [facility];   Service: Gastroenterology     Family History   Problem Relation Age of Onset    Hypertension Mother     Emphysema Father     Hypertension Father     Arthritis Brother     Diabetes Brother     Other Other         Cardiac Disorder    Hypertension Other     Arrhythmia Neg Hx     Anuerysm Neg Hx     Asthma Neg Hx     Clotting disorder Neg Hx     Fainting Neg Hx      Social History     Social History    Marital status:      Spouse name: N/A    Number of children: N/A    Years of education: N/A     Occupational History    Not on file  Social History Main Topics    Smoking status: Current Every Day Smoker     Packs/day: 0 50     Years: 45 00     Types: Cigarettes    Smokeless tobacco: Never Used    Alcohol use 4 2 - 12 6 oz/week     7 - 21 Cans of beer per week      Comment: 1-3 daily     Drug use: No    Sexual activity: Not on file     Other Topics Concern    Not on file     Social History Narrative    Always uses a seat belt    Caffeine use - Daily caffeine consumption, 2-3 servings a day               Occupational History: Sheet metal, construction    Meds/Allergies   No Known Allergies    Home medications:  Prior to Admission medications    Medication Sig Start Date End Date Taking?  Authorizing Provider   amLODIPine (NORVASC) 5 mg tablet take 1 tablet by mouth once daily 9/6/18  Yes Jayjay Gonzalez DO   aspirin (ECOTRIN LOW STRENGTH) 81 mg EC tablet Take 81 mg by mouth daily   Yes Historical Provider, MD   atorvastatin (LIPITOR) 20 mg tablet Take 1 tablet (20 mg total) by mouth daily 8/21/18  Yes Gwyn Ball MD   clopidogrel (PLAVIX) 75 mg tablet Take 1 tablet (75 mg total) by mouth daily 2/16/18  Yes Kelvin Pellet, CRNP   lisinopril (ZESTRIL) 20 mg tablet Take 20 mg by mouth daily 8/10/18  Yes Historical Provider, MD   nitroglycerin (NITROSTAT) 0 4 mg SL tablet Place 1 tablet (0 4 mg total) under the tongue every 5 (five) minutes as needed for chest pain 2/15/18  Yes Kelvin Pellet, CRNP   pantoprazole (PROTONIX) 40 mg tablet Take 1 tablet (40 mg total) by mouth daily 8/10/18  Yes Jayjay Gonzalez, DO   sodium chloride 1 g tablet Take 1 tablet (1 g total) by mouth daily 6/6/18  Yes Tyler Hernandez MD   SPIRIVA RESPIMAT 2 5 MCG/ACT AERS inhaler INHALE 2 PUFFS ONCE DAILY 8/5/18  Yes Jayjay Gonzalez DO VENTOLIN  (90 Base) MCG/ACT inhaler inhale 1 to 2 puffs by mouth every 4 to 6 hours if needed 8/29/18  Yes Aniya Paredes, DO   nicotine (NICODERM CQ) 21 mg/24 hr TD 24 hr patch Place 1 patch on the skin every 24 hours 10/23/18   Eufemia Holloway DO   nicotine polacrilex (COMMIT) 4 MG lozenge Apply 1 lozenge (4 mg total) to the mouth or throat as needed for smoking cessation 10/23/18   Eufemia Holloway DO       Vitals:   Blood pressure 150/90, pulse 65, temperature 97 6 °F (36 4 °C), temperature source Tympanic, height 6' 1" (1 854 m), weight 126 kg (277 lb), SpO2 95 % , RA, Body mass index is 36 55 kg/m²  Physical Exam  General: Obese, disheveled, Awake alert and oriented x 3, conversant without conversational dyspnea, NAD, normal affect  HEENT:  PERRL, Sclera noninjected, nonicteric OU, Nares patent,  no craniofacial abnormalities, Mucous membranes, moist, no oral lesions, normal dentition, Mallampati class 4  NECK:  Trachea midline, no accessory muscle use, no stridor, no cervical or supraclavicular adenopathy, JVP not elevated  CARDIAC: Reg, single s1/S2, no m/r/g  PULM: Decreased breath sounds, no wheezing, rhonchi or rales  ABD: Normoactive bowel sounds, soft nontender, nondistended, no rebound, no rigidity, no guarding  EXT: No cyanosis, no clubbing, no edema, normal capillary refill  NEURO: no focal neurologic deficits, AAOx3, moving all extremities appropriately    Labs: I have personally reviewed pertinent lab results    Lab Results   Component Value Date    WBC 5 10 09/26/2018    HGB 17 3 (H) 10/24/2018    HCT 51 (H) 10/24/2018    MCV 96 09/26/2018     09/26/2018      Lab Results   Component Value Date    GLUCOSE 124 10/24/2018    CALCIUM 9 5 09/26/2018     (L) 09/26/2018    K 4 7 09/26/2018    CO2 33 (H) 09/26/2018    CL 96 (L) 09/26/2018    BUN 20 09/26/2018    CREATININE 0 94 09/26/2018       PFTs:  The most recent pulmonary function tests were reviewed  4/2017  Spirometry: Forced vital capacity: 3 32YYCx416% Predicted Values  Forced expiratory volume in one second: 1 79WVOn701% Predicted Value  FEV1/FVC ratio is 30%  Post Bronchodilator Spirometry:   Lung Volumes:   DLCO:    PFT Interpretation:   Study shows a severe obstructive pulmonary impairment  I did compare this to a pre-and post bronchodilator study done on 2/17  I think the patient was having difficulty at that time  There was a very marked improvement in the postbronchodilator study with the postbronchodilator forced vital capacity of 3 76 and a postbronchodilator FEV1 of 1 01L  Imaging  I personally reviewed the images on the [facility] system pertinent to today's visit  CT chest - IMPRESSION:     Stable emphysema  Previously seen tree-in-bud opacities in the lingula and right upper lobe have nearly resolved  No suspicious or developing nodule      Stable ascending thoracic aortic aneurysm measuring 4 1 cm  Continued follow-up recommended      Lung-RADS:  Lung-RADS2, benign appearance or behavior  Continue annual screening with LDCT in 12 months  Cardiac:  Echo 1/2017  LEFT VENTRICLE:  Ejection fraction was estimated to be 55 %  This study was inadequate for the evaluation of regional wall motion  There was mild concentric hypertrophy      Sleep studies:  None    DO Keyshawn Dumont 75 Sleep Physician

## 2018-11-01 DIAGNOSIS — J44.9 CHRONIC OBSTRUCTIVE PULMONARY DISEASE, UNSPECIFIED COPD TYPE (HCC): ICD-10-CM

## 2018-11-03 RX ORDER — ALBUTEROL SULFATE 90 UG/1
1-2 AEROSOL, METERED RESPIRATORY (INHALATION) EVERY 4 HOURS PRN
Qty: 18 G | Refills: 3 | Status: SHIPPED | OUTPATIENT
Start: 2018-11-03 | End: 2019-04-02 | Stop reason: SDUPTHER

## 2018-12-20 DIAGNOSIS — J44.9 CHRONIC OBSTRUCTIVE PULMONARY DISEASE, UNSPECIFIED COPD TYPE (HCC): Primary | ICD-10-CM

## 2018-12-20 RX ORDER — BUDESONIDE AND FORMOTEROL FUMARATE DIHYDRATE 80; 4.5 UG/1; UG/1
2 AEROSOL RESPIRATORY (INHALATION) 2 TIMES DAILY
Qty: 1 INHALER | Refills: 5 | Status: SHIPPED | OUTPATIENT
Start: 2018-12-20 | End: 2019-09-06 | Stop reason: SDUPTHER

## 2019-01-24 DIAGNOSIS — I10 ESSENTIAL HYPERTENSION: Primary | ICD-10-CM

## 2019-01-24 DIAGNOSIS — K21.9 GASTROESOPHAGEAL REFLUX DISEASE, ESOPHAGITIS PRESENCE NOT SPECIFIED: ICD-10-CM

## 2019-01-25 RX ORDER — PANTOPRAZOLE SODIUM 40 MG/1
40 TABLET, DELAYED RELEASE ORAL DAILY
Qty: 90 TABLET | Refills: 1 | Status: SHIPPED | OUTPATIENT
Start: 2019-01-25 | End: 2019-07-27 | Stop reason: SDUPTHER

## 2019-01-25 RX ORDER — LISINOPRIL 20 MG/1
20 TABLET ORAL DAILY
Qty: 90 TABLET | Refills: 1 | Status: SHIPPED | OUTPATIENT
Start: 2019-01-25 | End: 2019-04-17

## 2019-02-24 DIAGNOSIS — I25.10 CORONARY ARTERY DISEASE INVOLVING NATIVE CORONARY ARTERY: Chronic | ICD-10-CM

## 2019-02-26 RX ORDER — CLOPIDOGREL BISULFATE 75 MG/1
TABLET ORAL
Qty: 30 TABLET | Refills: 11 | Status: SHIPPED | OUTPATIENT
Start: 2019-02-26 | End: 2019-06-29 | Stop reason: HOSPADM

## 2019-03-04 DIAGNOSIS — J44.9 CHRONIC OBSTRUCTIVE PULMONARY DISEASE, UNSPECIFIED COPD TYPE (HCC): ICD-10-CM

## 2019-03-07 DIAGNOSIS — E87.1 HYPONATREMIA: ICD-10-CM

## 2019-03-07 RX ORDER — SODIUM CHLORIDE 1000 MG
1 TABLET, SOLUBLE MISCELLANEOUS DAILY
Qty: 90 TABLET | Refills: 2 | Status: SHIPPED | OUTPATIENT
Start: 2019-03-07 | End: 2019-09-17

## 2019-04-02 DIAGNOSIS — J44.9 CHRONIC OBSTRUCTIVE PULMONARY DISEASE, UNSPECIFIED COPD TYPE (HCC): ICD-10-CM

## 2019-04-04 ENCOUNTER — TELEPHONE (OUTPATIENT)
Dept: FAMILY MEDICINE CLINIC | Facility: MEDICAL CENTER | Age: 69
End: 2019-04-04

## 2019-04-04 DIAGNOSIS — J44.9 CHRONIC OBSTRUCTIVE PULMONARY DISEASE, UNSPECIFIED COPD TYPE (HCC): ICD-10-CM

## 2019-04-04 DIAGNOSIS — Z12.2 ENCOUNTER FOR SCREENING FOR LUNG CANCER: Primary | ICD-10-CM

## 2019-04-04 DIAGNOSIS — I10 ESSENTIAL HYPERTENSION: Primary | ICD-10-CM

## 2019-04-04 RX ORDER — ALBUTEROL SULFATE 90 UG/1
AEROSOL, METERED RESPIRATORY (INHALATION)
Qty: 18 G | Refills: 3 | Status: CANCELLED | OUTPATIENT
Start: 2019-04-04

## 2019-04-16 ENCOUNTER — APPOINTMENT (OUTPATIENT)
Dept: LAB | Facility: HOSPITAL | Age: 69
End: 2019-04-16
Attending: INTERNAL MEDICINE
Payer: COMMERCIAL

## 2019-04-16 DIAGNOSIS — E87.1 HYPONATREMIA: ICD-10-CM

## 2019-04-16 LAB
ANION GAP SERPL CALCULATED.3IONS-SCNC: 5 MMOL/L (ref 4–13)
BUN SERPL-MCNC: 16 MG/DL (ref 5–25)
CALCIUM SERPL-MCNC: 10.1 MG/DL (ref 8.3–10.1)
CHLORIDE SERPL-SCNC: 97 MMOL/L (ref 100–108)
CO2 SERPL-SCNC: 35 MMOL/L (ref 21–32)
CREAT SERPL-MCNC: 0.99 MG/DL (ref 0.6–1.3)
CREAT UR-MCNC: 85.9 MG/DL
GFR SERPL CREATININE-BSD FRML MDRD: 78 ML/MIN/1.73SQ M
GLUCOSE SERPL-MCNC: 114 MG/DL (ref 65–140)
MICROALBUMIN UR-MCNC: 1820 MG/L (ref 0–20)
MICROALBUMIN/CREAT 24H UR: 2119 MG/G CREATININE (ref 0–30)
POTASSIUM SERPL-SCNC: 4.8 MMOL/L (ref 3.5–5.3)
SODIUM SERPL-SCNC: 137 MMOL/L (ref 136–145)

## 2019-04-16 PROCEDURE — 82570 ASSAY OF URINE CREATININE: CPT | Performed by: FAMILY MEDICINE

## 2019-04-16 PROCEDURE — 80048 BASIC METABOLIC PNL TOTAL CA: CPT

## 2019-04-16 PROCEDURE — 82043 UR ALBUMIN QUANTITATIVE: CPT | Performed by: FAMILY MEDICINE

## 2019-04-16 PROCEDURE — 36415 COLL VENOUS BLD VENIPUNCTURE: CPT

## 2019-04-17 ENCOUNTER — OFFICE VISIT (OUTPATIENT)
Dept: FAMILY MEDICINE CLINIC | Facility: MEDICAL CENTER | Age: 69
End: 2019-04-17
Payer: COMMERCIAL

## 2019-04-17 ENCOUNTER — TELEPHONE (OUTPATIENT)
Dept: FAMILY MEDICINE CLINIC | Facility: MEDICAL CENTER | Age: 69
End: 2019-04-17

## 2019-04-17 VITALS
BODY MASS INDEX: 36.15 KG/M2 | HEART RATE: 68 BPM | RESPIRATION RATE: 16 BRPM | DIASTOLIC BLOOD PRESSURE: 86 MMHG | SYSTOLIC BLOOD PRESSURE: 158 MMHG | WEIGHT: 274 LBS

## 2019-04-17 DIAGNOSIS — E66.01 CLASS 2 SEVERE OBESITY DUE TO EXCESS CALORIES WITH SERIOUS COMORBIDITY AND BODY MASS INDEX (BMI) OF 36.0 TO 36.9 IN ADULT (HCC): ICD-10-CM

## 2019-04-17 DIAGNOSIS — R80.9 MICROALBUMINURIA: ICD-10-CM

## 2019-04-17 DIAGNOSIS — I10 ESSENTIAL HYPERTENSION: Primary | ICD-10-CM

## 2019-04-17 PROBLEM — E66.9 OBESITY: Status: ACTIVE | Noted: 2019-04-17

## 2019-04-17 PROCEDURE — 1160F RVW MEDS BY RX/DR IN RCRD: CPT | Performed by: FAMILY MEDICINE

## 2019-04-17 PROCEDURE — 3725F SCREEN DEPRESSION PERFORMED: CPT | Performed by: FAMILY MEDICINE

## 2019-04-17 PROCEDURE — 99214 OFFICE O/P EST MOD 30 MIN: CPT | Performed by: FAMILY MEDICINE

## 2019-04-17 RX ORDER — AMLODIPINE BESYLATE 5 MG/1
5 TABLET ORAL DAILY
Qty: 90 TABLET | Refills: 0 | Status: SHIPPED | OUTPATIENT
Start: 2019-04-17 | End: 2019-10-01 | Stop reason: SDUPTHER

## 2019-04-17 RX ORDER — OLMESARTAN MEDOXOMIL 40 MG/1
40 TABLET ORAL DAILY
Qty: 90 TABLET | Refills: 0 | Status: SHIPPED | OUTPATIENT
Start: 2019-04-17 | End: 2019-05-17

## 2019-05-17 ENCOUNTER — TELEPHONE (OUTPATIENT)
Dept: FAMILY MEDICINE CLINIC | Facility: MEDICAL CENTER | Age: 69
End: 2019-05-17

## 2019-05-17 DIAGNOSIS — I10 ESSENTIAL HYPERTENSION: Primary | ICD-10-CM

## 2019-05-17 RX ORDER — LOSARTAN POTASSIUM 100 MG/1
100 TABLET ORAL DAILY
Qty: 90 TABLET | Refills: 0 | Status: SHIPPED | OUTPATIENT
Start: 2019-05-17 | End: 2019-09-05 | Stop reason: SDUPTHER

## 2019-06-25 ENCOUNTER — APPOINTMENT (EMERGENCY)
Dept: RADIOLOGY | Facility: HOSPITAL | Age: 69
DRG: 062 | End: 2019-06-25
Payer: COMMERCIAL

## 2019-06-25 ENCOUNTER — HOSPITAL ENCOUNTER (INPATIENT)
Facility: HOSPITAL | Age: 69
LOS: 4 days | Discharge: HOME HEALTH | DRG: 062 | End: 2019-06-29
Attending: EMERGENCY MEDICINE | Admitting: ANESTHESIOLOGY
Payer: COMMERCIAL

## 2019-06-25 ENCOUNTER — APPOINTMENT (EMERGENCY)
Dept: CT IMAGING | Facility: HOSPITAL | Age: 69
DRG: 062 | End: 2019-06-25
Payer: COMMERCIAL

## 2019-06-25 DIAGNOSIS — I10 ESSENTIAL HYPERTENSION: ICD-10-CM

## 2019-06-25 DIAGNOSIS — I48.19 PERSISTENT ATRIAL FIBRILLATION (HCC): ICD-10-CM

## 2019-06-25 DIAGNOSIS — J44.9 CHRONIC OBSTRUCTIVE PULMONARY DISEASE, UNSPECIFIED COPD TYPE (HCC): ICD-10-CM

## 2019-06-25 DIAGNOSIS — I63.89 CEREBROVASCULAR ACCIDENT (CVA) DUE TO OTHER MECHANISM (HCC): ICD-10-CM

## 2019-06-25 DIAGNOSIS — I65.21 STENOSIS OF RIGHT CAROTID ARTERY: ICD-10-CM

## 2019-06-25 DIAGNOSIS — I48.91 NEW ONSET ATRIAL FIBRILLATION (HCC): ICD-10-CM

## 2019-06-25 DIAGNOSIS — I63.9 ACUTE CVA (CEREBROVASCULAR ACCIDENT) (HCC): Primary | ICD-10-CM

## 2019-06-25 PROBLEM — F10.10 ALCOHOL ABUSE: Status: ACTIVE | Noted: 2019-06-25

## 2019-06-25 PROBLEM — Z72.0 TOBACCO ABUSE: Status: ACTIVE | Noted: 2019-06-25

## 2019-06-25 LAB
ABO GROUP BLD: NORMAL
ANION GAP SERPL CALCULATED.3IONS-SCNC: 9 MMOL/L (ref 4–13)
APTT PPP: 32 SECONDS (ref 23–37)
BASE EXCESS BLDA CALC-SCNC: 3 MMOL/L (ref -2–3)
BLD GP AB SCN SERPL QL: NEGATIVE
BUN SERPL-MCNC: 14 MG/DL (ref 5–25)
CA-I BLD-SCNC: 1.11 MMOL/L (ref 1.12–1.32)
CALCIUM SERPL-MCNC: 9.5 MG/DL (ref 8.3–10.1)
CHLORIDE SERPL-SCNC: 98 MMOL/L (ref 100–108)
CO2 SERPL-SCNC: 26 MMOL/L (ref 21–32)
CREAT SERPL-MCNC: 1.05 MG/DL (ref 0.6–1.3)
ERYTHROCYTE [DISTWIDTH] IN BLOOD BY AUTOMATED COUNT: 14.6 % (ref 11.6–15.1)
GFR SERPL CREATININE-BSD FRML MDRD: 73 ML/MIN/1.73SQ M
GLUCOSE SERPL-MCNC: 97 MG/DL (ref 65–140)
GLUCOSE SERPL-MCNC: 97 MG/DL (ref 65–140)
HCO3 BLDA-SCNC: 26.9 MMOL/L (ref 24–30)
HCT VFR BLD AUTO: 49.9 % (ref 36.5–49.3)
HCT VFR BLD CALC: 50 % (ref 36.5–49.3)
HGB BLD-MCNC: 16.3 G/DL (ref 12–17)
HGB BLDA-MCNC: 17 G/DL (ref 12–17)
INR PPP: 1.05 (ref 0.84–1.19)
MCH RBC QN AUTO: 30.1 PG (ref 26.8–34.3)
MCHC RBC AUTO-ENTMCNC: 32.7 G/DL (ref 31.4–37.4)
MCV RBC AUTO: 92 FL (ref 82–98)
PCO2 BLD: 28 MMOL/L (ref 21–32)
PCO2 BLD: 38.7 MM HG (ref 42–50)
PH BLD: 7.45 [PH] (ref 7.3–7.4)
PLATELET # BLD AUTO: 208 THOUSANDS/UL (ref 149–390)
PMV BLD AUTO: 9.5 FL (ref 8.9–12.7)
PO2 BLD: 84 MM HG (ref 35–45)
POTASSIUM BLD-SCNC: 5.3 MMOL/L (ref 3.5–5.3)
POTASSIUM SERPL-SCNC: 4.6 MMOL/L (ref 3.5–5.3)
PROTHROMBIN TIME: 13.8 SECONDS (ref 11.6–14.5)
RBC # BLD AUTO: 5.42 MILLION/UL (ref 3.88–5.62)
RH BLD: POSITIVE
SAO2 % BLD FROM PO2: 97 % (ref 95–98)
SODIUM BLD-SCNC: 131 MMOL/L (ref 136–145)
SODIUM SERPL-SCNC: 133 MMOL/L (ref 136–145)
SPECIMEN EXPIRATION DATE: NORMAL
SPECIMEN SOURCE: ABNORMAL
WBC # BLD AUTO: 4.78 THOUSAND/UL (ref 4.31–10.16)

## 2019-06-25 PROCEDURE — 82947 ASSAY GLUCOSE BLOOD QUANT: CPT

## 2019-06-25 PROCEDURE — 70450 CT HEAD/BRAIN W/O DYE: CPT

## 2019-06-25 PROCEDURE — 85730 THROMBOPLASTIN TIME PARTIAL: CPT | Performed by: EMERGENCY MEDICINE

## 2019-06-25 PROCEDURE — 99285 EMERGENCY DEPT VISIT HI MDM: CPT | Performed by: EMERGENCY MEDICINE

## 2019-06-25 PROCEDURE — 99291 CRITICAL CARE FIRST HOUR: CPT

## 2019-06-25 PROCEDURE — 86901 BLOOD TYPING SEROLOGIC RH(D): CPT | Performed by: EMERGENCY MEDICINE

## 2019-06-25 PROCEDURE — 85610 PROTHROMBIN TIME: CPT | Performed by: EMERGENCY MEDICINE

## 2019-06-25 PROCEDURE — 85014 HEMATOCRIT: CPT

## 2019-06-25 PROCEDURE — 80048 BASIC METABOLIC PNL TOTAL CA: CPT | Performed by: EMERGENCY MEDICINE

## 2019-06-25 PROCEDURE — 99291 CRITICAL CARE FIRST HOUR: CPT | Performed by: ANESTHESIOLOGY

## 2019-06-25 PROCEDURE — 86850 RBC ANTIBODY SCREEN: CPT | Performed by: EMERGENCY MEDICINE

## 2019-06-25 PROCEDURE — 82803 BLOOD GASES ANY COMBINATION: CPT

## 2019-06-25 PROCEDURE — 84132 ASSAY OF SERUM POTASSIUM: CPT

## 2019-06-25 PROCEDURE — 82330 ASSAY OF CALCIUM: CPT

## 2019-06-25 PROCEDURE — 84295 ASSAY OF SERUM SODIUM: CPT

## 2019-06-25 PROCEDURE — 70496 CT ANGIOGRAPHY HEAD: CPT

## 2019-06-25 PROCEDURE — 3E03317 INTRODUCTION OF OTHER THROMBOLYTIC INTO PERIPHERAL VEIN, PERCUTANEOUS APPROACH: ICD-10-PCS | Performed by: EMERGENCY MEDICINE

## 2019-06-25 PROCEDURE — 94760 N-INVAS EAR/PLS OXIMETRY 1: CPT

## 2019-06-25 PROCEDURE — 70498 CT ANGIOGRAPHY NECK: CPT

## 2019-06-25 PROCEDURE — 96374 THER/PROPH/DIAG INJ IV PUSH: CPT

## 2019-06-25 PROCEDURE — 94640 AIRWAY INHALATION TREATMENT: CPT

## 2019-06-25 PROCEDURE — 36415 COLL VENOUS BLD VENIPUNCTURE: CPT | Performed by: EMERGENCY MEDICINE

## 2019-06-25 PROCEDURE — 85027 COMPLETE CBC AUTOMATED: CPT | Performed by: EMERGENCY MEDICINE

## 2019-06-25 PROCEDURE — 71045 X-RAY EXAM CHEST 1 VIEW: CPT

## 2019-06-25 PROCEDURE — 99223 1ST HOSP IP/OBS HIGH 75: CPT | Performed by: PHYSICIAN ASSISTANT

## 2019-06-25 PROCEDURE — 94644 CONT INHLJ TX 1ST HOUR: CPT

## 2019-06-25 PROCEDURE — 86900 BLOOD TYPING SEROLOGIC ABO: CPT | Performed by: EMERGENCY MEDICINE

## 2019-06-25 PROCEDURE — 93005 ELECTROCARDIOGRAM TRACING: CPT

## 2019-06-25 RX ORDER — LABETALOL 20 MG/4 ML (5 MG/ML) INTRAVENOUS SYRINGE
10 EVERY 4 HOURS PRN
Status: DISCONTINUED | OUTPATIENT
Start: 2019-06-25 | End: 2019-06-29 | Stop reason: HOSPADM

## 2019-06-25 RX ORDER — CHLORHEXIDINE GLUCONATE 0.12 MG/ML
15 RINSE ORAL EVERY 12 HOURS SCHEDULED
Status: DISCONTINUED | OUTPATIENT
Start: 2019-06-25 | End: 2019-06-29 | Stop reason: HOSPADM

## 2019-06-25 RX ORDER — PANTOPRAZOLE SODIUM 40 MG/1
40 TABLET, DELAYED RELEASE ORAL DAILY
Status: DISCONTINUED | OUTPATIENT
Start: 2019-06-26 | End: 2019-06-29 | Stop reason: HOSPADM

## 2019-06-25 RX ORDER — LABETALOL 20 MG/4 ML (5 MG/ML) INTRAVENOUS SYRINGE
20 ONCE
Status: COMPLETED | OUTPATIENT
Start: 2019-06-25 | End: 2019-06-25

## 2019-06-25 RX ORDER — ATORVASTATIN CALCIUM 40 MG/1
40 TABLET, FILM COATED ORAL EVERY EVENING
Status: DISCONTINUED | OUTPATIENT
Start: 2019-06-25 | End: 2019-06-29 | Stop reason: HOSPADM

## 2019-06-25 RX ORDER — ATORVASTATIN CALCIUM 40 MG/1
80 TABLET, FILM COATED ORAL DAILY
Status: DISCONTINUED | OUTPATIENT
Start: 2019-06-26 | End: 2019-06-25

## 2019-06-25 RX ORDER — SODIUM CHLORIDE, SODIUM GLUCONATE, SODIUM ACETATE, POTASSIUM CHLORIDE, MAGNESIUM CHLORIDE, SODIUM PHOSPHATE, DIBASIC, AND POTASSIUM PHOSPHATE .53; .5; .37; .037; .03; .012; .00082 G/100ML; G/100ML; G/100ML; G/100ML; G/100ML; G/100ML; G/100ML
100 INJECTION, SOLUTION INTRAVENOUS CONTINUOUS
Status: DISCONTINUED | OUTPATIENT
Start: 2019-06-25 | End: 2019-06-28

## 2019-06-25 RX ORDER — ALBUTEROL SULFATE 2.5 MG/3ML
10 SOLUTION RESPIRATORY (INHALATION) ONCE
Status: COMPLETED | OUTPATIENT
Start: 2019-06-25 | End: 2019-06-25

## 2019-06-25 RX ORDER — SODIUM CHLORIDE 9 MG/ML
75 INJECTION, SOLUTION INTRAVENOUS ONCE
Status: COMPLETED | OUTPATIENT
Start: 2019-06-25 | End: 2019-06-25

## 2019-06-25 RX ORDER — SENNOSIDES 8.6 MG
1 TABLET ORAL
Status: DISCONTINUED | OUTPATIENT
Start: 2019-06-25 | End: 2019-06-29 | Stop reason: HOSPADM

## 2019-06-25 RX ORDER — LEVALBUTEROL 1.25 MG/.5ML
1.25 SOLUTION, CONCENTRATE RESPIRATORY (INHALATION)
Status: DISCONTINUED | OUTPATIENT
Start: 2019-06-25 | End: 2019-06-26

## 2019-06-25 RX ORDER — ALBUTEROL SULFATE 2.5 MG/3ML
10 SOLUTION RESPIRATORY (INHALATION) ONCE
Status: DISCONTINUED | OUTPATIENT
Start: 2019-06-25 | End: 2019-06-25

## 2019-06-25 RX ADMIN — LABETALOL 20 MG/4 ML (5 MG/ML) INTRAVENOUS SYRINGE 20 MG: at 15:15

## 2019-06-25 RX ADMIN — ALBUTEROL SULFATE 10 MG: 2.5 SOLUTION RESPIRATORY (INHALATION) at 20:29

## 2019-06-25 RX ADMIN — NOREPINEPHRINE BITARTRATE 2 MCG/MIN: 1 INJECTION INTRAVENOUS at 23:58

## 2019-06-25 RX ADMIN — ALTEPLASE 81 MG: KIT at 15:15

## 2019-06-25 RX ADMIN — SODIUM CHLORIDE, SODIUM GLUCONATE, SODIUM ACETATE, POTASSIUM CHLORIDE, MAGNESIUM CHLORIDE, SODIUM PHOSPHATE, DIBASIC, AND POTASSIUM PHOSPHATE 100 ML/HR: .53; .5; .37; .037; .03; .012; .00082 INJECTION, SOLUTION INTRAVENOUS at 17:05

## 2019-06-25 RX ADMIN — LEVALBUTEROL 1.25 MG: 1.25 SOLUTION, CONCENTRATE RESPIRATORY (INHALATION) at 20:15

## 2019-06-25 RX ADMIN — CHLORHEXIDINE GLUCONATE 0.12% ORAL RINSE 15 ML: 1.2 LIQUID ORAL at 22:37

## 2019-06-25 RX ADMIN — SODIUM CHLORIDE 75 ML/HR: 0.9 INJECTION, SOLUTION INTRAVENOUS at 15:23

## 2019-06-25 RX ADMIN — IPRATROPIUM BROMIDE 0.5 MG: 0.5 SOLUTION RESPIRATORY (INHALATION) at 20:15

## 2019-06-25 RX ADMIN — IOHEXOL 85 ML: 350 INJECTION, SOLUTION INTRAVENOUS at 14:55

## 2019-06-26 ENCOUNTER — APPOINTMENT (INPATIENT)
Dept: CT IMAGING | Facility: HOSPITAL | Age: 69
DRG: 062 | End: 2019-06-26
Payer: COMMERCIAL

## 2019-06-26 ENCOUNTER — APPOINTMENT (INPATIENT)
Dept: NON INVASIVE DIAGNOSTICS | Facility: HOSPITAL | Age: 69
DRG: 062 | End: 2019-06-26
Payer: COMMERCIAL

## 2019-06-26 ENCOUNTER — APPOINTMENT (INPATIENT)
Dept: RADIOLOGY | Facility: HOSPITAL | Age: 69
DRG: 062 | End: 2019-06-26
Payer: COMMERCIAL

## 2019-06-26 ENCOUNTER — APPOINTMENT (INPATIENT)
Dept: MRI IMAGING | Facility: HOSPITAL | Age: 69
DRG: 062 | End: 2019-06-26
Payer: COMMERCIAL

## 2019-06-26 LAB
ALBUMIN SERPL BCP-MCNC: 3.4 G/DL (ref 3.5–5)
ALP SERPL-CCNC: 98 U/L (ref 46–116)
ALT SERPL W P-5'-P-CCNC: 21 U/L (ref 12–78)
ANION GAP SERPL CALCULATED.3IONS-SCNC: 10 MMOL/L (ref 4–13)
AST SERPL W P-5'-P-CCNC: 16 U/L (ref 5–45)
BASOPHILS # BLD AUTO: 0.05 THOUSANDS/ΜL (ref 0–0.1)
BASOPHILS NFR BLD AUTO: 1 % (ref 0–1)
BILIRUB SERPL-MCNC: 1 MG/DL (ref 0.2–1)
BUN SERPL-MCNC: 14 MG/DL (ref 5–25)
CALCIUM SERPL-MCNC: 8.8 MG/DL (ref 8.3–10.1)
CHLORIDE SERPL-SCNC: 98 MMOL/L (ref 100–108)
CHOLEST SERPL-MCNC: 172 MG/DL (ref 50–200)
CO2 SERPL-SCNC: 26 MMOL/L (ref 21–32)
CREAT SERPL-MCNC: 1.04 MG/DL (ref 0.6–1.3)
EOSINOPHIL # BLD AUTO: 0.19 THOUSAND/ΜL (ref 0–0.61)
EOSINOPHIL NFR BLD AUTO: 2 % (ref 0–6)
ERYTHROCYTE [DISTWIDTH] IN BLOOD BY AUTOMATED COUNT: 14.5 % (ref 11.6–15.1)
EST. AVERAGE GLUCOSE BLD GHB EST-MCNC: 128 MG/DL
GFR SERPL CREATININE-BSD FRML MDRD: 74 ML/MIN/1.73SQ M
GLUCOSE SERPL-MCNC: 111 MG/DL (ref 65–140)
GLUCOSE SERPL-MCNC: 121 MG/DL (ref 65–140)
GLUCOSE SERPL-MCNC: 166 MG/DL (ref 65–140)
GLUCOSE SERPL-MCNC: 173 MG/DL (ref 65–140)
GLUCOSE SERPL-MCNC: 181 MG/DL (ref 65–140)
GLUCOSE SERPL-MCNC: 205 MG/DL (ref 65–140)
HBA1C MFR BLD: 6.1 % (ref 4.2–6.3)
HCT VFR BLD AUTO: 47.7 % (ref 36.5–49.3)
HDLC SERPL-MCNC: 46 MG/DL (ref 40–60)
HGB BLD-MCNC: 15.7 G/DL (ref 12–17)
IMM GRANULOCYTES # BLD AUTO: 0.03 THOUSAND/UL (ref 0–0.2)
IMM GRANULOCYTES NFR BLD AUTO: 0 % (ref 0–2)
LDLC SERPL CALC-MCNC: 113 MG/DL (ref 0–100)
LYMPHOCYTES # BLD AUTO: 0.74 THOUSANDS/ΜL (ref 0.6–4.47)
LYMPHOCYTES NFR BLD AUTO: 8 % (ref 14–44)
MAGNESIUM SERPL-MCNC: 1.7 MG/DL (ref 1.6–2.6)
MCH RBC QN AUTO: 30.8 PG (ref 26.8–34.3)
MCHC RBC AUTO-ENTMCNC: 32.9 G/DL (ref 31.4–37.4)
MCV RBC AUTO: 94 FL (ref 82–98)
MONOCYTES # BLD AUTO: 0.65 THOUSAND/ΜL (ref 0.17–1.22)
MONOCYTES NFR BLD AUTO: 7 % (ref 4–12)
NEUTROPHILS # BLD AUTO: 7.11 THOUSANDS/ΜL (ref 1.85–7.62)
NEUTS SEG NFR BLD AUTO: 82 % (ref 43–75)
NRBC BLD AUTO-RTO: 0 /100 WBCS
PLATELET # BLD AUTO: 220 THOUSANDS/UL (ref 149–390)
PMV BLD AUTO: 9.2 FL (ref 8.9–12.7)
POTASSIUM SERPL-SCNC: 4.8 MMOL/L (ref 3.5–5.3)
PROT SERPL-MCNC: 6.6 G/DL (ref 6.4–8.2)
RBC # BLD AUTO: 5.09 MILLION/UL (ref 3.88–5.62)
SODIUM SERPL-SCNC: 134 MMOL/L (ref 136–145)
TRIGL SERPL-MCNC: 64 MG/DL
WBC # BLD AUTO: 8.77 THOUSAND/UL (ref 4.31–10.16)

## 2019-06-26 PROCEDURE — 92522 EVALUATE SPEECH PRODUCTION: CPT

## 2019-06-26 PROCEDURE — 80053 COMPREHEN METABOLIC PANEL: CPT | Performed by: PHYSICIAN ASSISTANT

## 2019-06-26 PROCEDURE — 80061 LIPID PANEL: CPT | Performed by: PHYSICIAN ASSISTANT

## 2019-06-26 PROCEDURE — 83735 ASSAY OF MAGNESIUM: CPT | Performed by: PHYSICIAN ASSISTANT

## 2019-06-26 PROCEDURE — 99232 SBSQ HOSP IP/OBS MODERATE 35: CPT | Performed by: PHYSICIAN ASSISTANT

## 2019-06-26 PROCEDURE — 99223 1ST HOSP IP/OBS HIGH 75: CPT | Performed by: PHYSICAL MEDICINE & REHABILITATION

## 2019-06-26 PROCEDURE — 71045 X-RAY EXAM CHEST 1 VIEW: CPT

## 2019-06-26 PROCEDURE — 83036 HEMOGLOBIN GLYCOSYLATED A1C: CPT | Performed by: PHYSICIAN ASSISTANT

## 2019-06-26 PROCEDURE — 93306 TTE W/DOPPLER COMPLETE: CPT | Performed by: INTERNAL MEDICINE

## 2019-06-26 PROCEDURE — 82948 REAGENT STRIP/BLOOD GLUCOSE: CPT

## 2019-06-26 PROCEDURE — 85025 COMPLETE CBC W/AUTO DIFF WBC: CPT | Performed by: PHYSICIAN ASSISTANT

## 2019-06-26 PROCEDURE — 99291 CRITICAL CARE FIRST HOUR: CPT | Performed by: PHYSICIAN ASSISTANT

## 2019-06-26 PROCEDURE — 70551 MRI BRAIN STEM W/O DYE: CPT

## 2019-06-26 PROCEDURE — 70450 CT HEAD/BRAIN W/O DYE: CPT

## 2019-06-26 PROCEDURE — 92610 EVALUATE SWALLOWING FUNCTION: CPT

## 2019-06-26 PROCEDURE — 94640 AIRWAY INHALATION TREATMENT: CPT

## 2019-06-26 PROCEDURE — 93306 TTE W/DOPPLER COMPLETE: CPT

## 2019-06-26 PROCEDURE — 94760 N-INVAS EAR/PLS OXIMETRY 1: CPT

## 2019-06-26 RX ORDER — QUETIAPINE FUMARATE 25 MG/1
25 TABLET, FILM COATED ORAL ONCE AS NEEDED
Status: COMPLETED | OUTPATIENT
Start: 2019-06-26 | End: 2019-06-26

## 2019-06-26 RX ORDER — LEVALBUTEROL 1.25 MG/.5ML
1.25 SOLUTION, CONCENTRATE RESPIRATORY (INHALATION)
Status: DISCONTINUED | OUTPATIENT
Start: 2019-06-26 | End: 2019-06-26

## 2019-06-26 RX ORDER — QUETIAPINE FUMARATE 25 MG/1
25 TABLET, FILM COATED ORAL ONCE
Status: COMPLETED | OUTPATIENT
Start: 2019-06-26 | End: 2019-06-26

## 2019-06-26 RX ORDER — SODIUM CHLORIDE FOR INHALATION 0.9 %
3 VIAL, NEBULIZER (ML) INHALATION
Status: DISCONTINUED | OUTPATIENT
Start: 2019-06-26 | End: 2019-06-26

## 2019-06-26 RX ORDER — QUETIAPINE FUMARATE 25 MG/1
50 TABLET, FILM COATED ORAL ONCE AS NEEDED
Status: DISCONTINUED | OUTPATIENT
Start: 2019-06-26 | End: 2019-06-26

## 2019-06-26 RX ORDER — MAGNESIUM SULFATE HEPTAHYDRATE 40 MG/ML
4 INJECTION, SOLUTION INTRAVENOUS ONCE
Status: COMPLETED | OUTPATIENT
Start: 2019-06-26 | End: 2019-06-26

## 2019-06-26 RX ORDER — DIPHENHYDRAMINE HYDROCHLORIDE 50 MG/ML
50 INJECTION INTRAMUSCULAR; INTRAVENOUS ONCE AS NEEDED
Status: DISCONTINUED | OUTPATIENT
Start: 2019-06-26 | End: 2019-06-29 | Stop reason: HOSPADM

## 2019-06-26 RX ORDER — METHYLPREDNISOLONE SODIUM SUCCINATE 40 MG/ML
40 INJECTION, POWDER, LYOPHILIZED, FOR SOLUTION INTRAMUSCULAR; INTRAVENOUS EVERY 8 HOURS SCHEDULED
Status: DISCONTINUED | OUTPATIENT
Start: 2019-06-26 | End: 2019-06-28

## 2019-06-26 RX ORDER — LEVALBUTEROL 1.25 MG/.5ML
1.25 SOLUTION, CONCENTRATE RESPIRATORY (INHALATION)
Status: DISCONTINUED | OUTPATIENT
Start: 2019-06-27 | End: 2019-06-29

## 2019-06-26 RX ORDER — QUETIAPINE FUMARATE 25 MG/1
25 TABLET, FILM COATED ORAL
Status: DISCONTINUED | OUTPATIENT
Start: 2019-06-26 | End: 2019-06-26

## 2019-06-26 RX ORDER — SODIUM CHLORIDE FOR INHALATION 0.9 %
3 VIAL, NEBULIZER (ML) INHALATION
Status: DISCONTINUED | OUTPATIENT
Start: 2019-06-27 | End: 2019-06-29

## 2019-06-26 RX ORDER — ALBUTEROL SULFATE 2.5 MG/3ML
2.5 SOLUTION RESPIRATORY (INHALATION) EVERY 4 HOURS PRN
Status: DISCONTINUED | OUTPATIENT
Start: 2019-06-26 | End: 2019-06-29 | Stop reason: HOSPADM

## 2019-06-26 RX ADMIN — SODIUM CHLORIDE, SODIUM GLUCONATE, SODIUM ACETATE, POTASSIUM CHLORIDE, MAGNESIUM CHLORIDE, SODIUM PHOSPHATE, DIBASIC, AND POTASSIUM PHOSPHATE 100 ML/HR: .53; .5; .37; .037; .03; .012; .00082 INJECTION, SOLUTION INTRAVENOUS at 23:45

## 2019-06-26 RX ADMIN — ISODIUM CHLORIDE 3 ML: 0.03 SOLUTION RESPIRATORY (INHALATION) at 21:19

## 2019-06-26 RX ADMIN — IPRATROPIUM BROMIDE 0.5 MG: 0.5 SOLUTION RESPIRATORY (INHALATION) at 01:59

## 2019-06-26 RX ADMIN — SODIUM CHLORIDE, SODIUM GLUCONATE, SODIUM ACETATE, POTASSIUM CHLORIDE, MAGNESIUM CHLORIDE, SODIUM PHOSPHATE, DIBASIC, AND POTASSIUM PHOSPHATE 100 ML/HR: .53; .5; .37; .037; .03; .012; .00082 INJECTION, SOLUTION INTRAVENOUS at 12:34

## 2019-06-26 RX ADMIN — LEVALBUTEROL 1.25 MG: 1.25 SOLUTION, CONCENTRATE RESPIRATORY (INHALATION) at 08:00

## 2019-06-26 RX ADMIN — QUETIAPINE FUMARATE 25 MG: 25 TABLET ORAL at 18:26

## 2019-06-26 RX ADMIN — METHYLPREDNISOLONE SODIUM SUCCINATE 40 MG: 40 INJECTION, POWDER, FOR SOLUTION INTRAMUSCULAR; INTRAVENOUS at 14:47

## 2019-06-26 RX ADMIN — MAGNESIUM SULFATE HEPTAHYDRATE 4 G: 40 INJECTION, SOLUTION INTRAVENOUS at 06:35

## 2019-06-26 RX ADMIN — INSULIN LISPRO 4 UNITS: 100 INJECTION, SOLUTION INTRAVENOUS; SUBCUTANEOUS at 17:23

## 2019-06-26 RX ADMIN — LEVALBUTEROL 1.25 MG: 1.25 SOLUTION, CONCENTRATE RESPIRATORY (INHALATION) at 01:59

## 2019-06-26 RX ADMIN — CHLORHEXIDINE GLUCONATE 0.12% ORAL RINSE 15 ML: 1.2 LIQUID ORAL at 21:57

## 2019-06-26 RX ADMIN — SODIUM CHLORIDE, SODIUM GLUCONATE, SODIUM ACETATE, POTASSIUM CHLORIDE, MAGNESIUM CHLORIDE, SODIUM PHOSPHATE, DIBASIC, AND POTASSIUM PHOSPHATE 100 ML/HR: .53; .5; .37; .037; .03; .012; .00082 INJECTION, SOLUTION INTRAVENOUS at 02:16

## 2019-06-26 RX ADMIN — ISODIUM CHLORIDE 3 ML: 0.03 SOLUTION RESPIRATORY (INHALATION) at 11:38

## 2019-06-26 RX ADMIN — INSULIN LISPRO 2 UNITS: 100 INJECTION, SOLUTION INTRAVENOUS; SUBCUTANEOUS at 22:35

## 2019-06-26 RX ADMIN — METHYLPREDNISOLONE SODIUM SUCCINATE 40 MG: 40 INJECTION, POWDER, FOR SOLUTION INTRAMUSCULAR; INTRAVENOUS at 21:57

## 2019-06-26 RX ADMIN — LEVALBUTEROL 1.25 MG: 1.25 SOLUTION, CONCENTRATE RESPIRATORY (INHALATION) at 11:38

## 2019-06-26 RX ADMIN — ATORVASTATIN CALCIUM 40 MG: 40 TABLET, FILM COATED ORAL at 17:24

## 2019-06-26 RX ADMIN — LEVALBUTEROL 1.25 MG: 1.25 SOLUTION, CONCENTRATE RESPIRATORY (INHALATION) at 21:20

## 2019-06-26 RX ADMIN — QUETIAPINE FUMARATE 25 MG: 25 TABLET ORAL at 18:48

## 2019-06-26 RX ADMIN — IPRATROPIUM BROMIDE 0.5 MG: 0.5 SOLUTION RESPIRATORY (INHALATION) at 08:00

## 2019-06-26 RX ADMIN — CHLORHEXIDINE GLUCONATE 0.12% ORAL RINSE 15 ML: 1.2 LIQUID ORAL at 09:22

## 2019-06-26 RX ADMIN — SENNOSIDES 8.6 MG: 8.6 TABLET, FILM COATED ORAL at 21:57

## 2019-06-26 RX ADMIN — TIOTROPIUM BROMIDE 18 MCG: 18 CAPSULE ORAL; RESPIRATORY (INHALATION) at 09:22

## 2019-06-26 RX ADMIN — METHYLPREDNISOLONE SODIUM SUCCINATE 40 MG: 40 INJECTION, POWDER, FOR SOLUTION INTRAMUSCULAR; INTRAVENOUS at 09:15

## 2019-06-27 ENCOUNTER — APPOINTMENT (INPATIENT)
Dept: ULTRASOUND IMAGING | Facility: HOSPITAL | Age: 69
DRG: 062 | End: 2019-06-27
Payer: COMMERCIAL

## 2019-06-27 LAB
ANION GAP SERPL CALCULATED.3IONS-SCNC: 7 MMOL/L (ref 4–13)
BUN SERPL-MCNC: 13 MG/DL (ref 5–25)
CALCIUM SERPL-MCNC: 8.7 MG/DL (ref 8.3–10.1)
CHLORIDE SERPL-SCNC: 98 MMOL/L (ref 100–108)
CO2 SERPL-SCNC: 28 MMOL/L (ref 21–32)
CREAT SERPL-MCNC: 0.88 MG/DL (ref 0.6–1.3)
ERYTHROCYTE [DISTWIDTH] IN BLOOD BY AUTOMATED COUNT: 14.4 % (ref 11.6–15.1)
GFR SERPL CREATININE-BSD FRML MDRD: 89 ML/MIN/1.73SQ M
GLUCOSE SERPL-MCNC: 128 MG/DL (ref 65–140)
GLUCOSE SERPL-MCNC: 148 MG/DL (ref 65–140)
GLUCOSE SERPL-MCNC: 154 MG/DL (ref 65–140)
GLUCOSE SERPL-MCNC: 164 MG/DL (ref 65–140)
GLUCOSE SERPL-MCNC: 246 MG/DL (ref 65–140)
HCT VFR BLD AUTO: 47.3 % (ref 36.5–49.3)
HGB BLD-MCNC: 15.3 G/DL (ref 12–17)
MAGNESIUM SERPL-MCNC: 2 MG/DL (ref 1.6–2.6)
MCH RBC QN AUTO: 30.5 PG (ref 26.8–34.3)
MCHC RBC AUTO-ENTMCNC: 32.3 G/DL (ref 31.4–37.4)
MCV RBC AUTO: 94 FL (ref 82–98)
PLATELET # BLD AUTO: 164 THOUSANDS/UL (ref 149–390)
PMV BLD AUTO: 9.4 FL (ref 8.9–12.7)
POTASSIUM SERPL-SCNC: 4.9 MMOL/L (ref 3.5–5.3)
RBC # BLD AUTO: 5.01 MILLION/UL (ref 3.88–5.62)
SODIUM SERPL-SCNC: 133 MMOL/L (ref 136–145)
WBC # BLD AUTO: 6.95 THOUSAND/UL (ref 4.31–10.16)

## 2019-06-27 PROCEDURE — G8978 MOBILITY CURRENT STATUS: HCPCS

## 2019-06-27 PROCEDURE — 94640 AIRWAY INHALATION TREATMENT: CPT

## 2019-06-27 PROCEDURE — 83735 ASSAY OF MAGNESIUM: CPT | Performed by: NURSE PRACTITIONER

## 2019-06-27 PROCEDURE — G8979 MOBILITY GOAL STATUS: HCPCS

## 2019-06-27 PROCEDURE — 97163 PT EVAL HIGH COMPLEX 45 MIN: CPT

## 2019-06-27 PROCEDURE — 99222 1ST HOSP IP/OBS MODERATE 55: CPT | Performed by: INTERNAL MEDICINE

## 2019-06-27 PROCEDURE — 82948 REAGENT STRIP/BLOOD GLUCOSE: CPT

## 2019-06-27 PROCEDURE — 92526 ORAL FUNCTION THERAPY: CPT

## 2019-06-27 PROCEDURE — 97167 OT EVAL HIGH COMPLEX 60 MIN: CPT

## 2019-06-27 PROCEDURE — 94760 N-INVAS EAR/PLS OXIMETRY 1: CPT

## 2019-06-27 PROCEDURE — 80048 BASIC METABOLIC PNL TOTAL CA: CPT | Performed by: NURSE PRACTITIONER

## 2019-06-27 PROCEDURE — G8987 SELF CARE CURRENT STATUS: HCPCS

## 2019-06-27 PROCEDURE — 85027 COMPLETE CBC AUTOMATED: CPT | Performed by: NURSE PRACTITIONER

## 2019-06-27 PROCEDURE — G8988 SELF CARE GOAL STATUS: HCPCS

## 2019-06-27 PROCEDURE — 99291 CRITICAL CARE FIRST HOUR: CPT | Performed by: ANESTHESIOLOGY

## 2019-06-27 RX ORDER — HEPARIN SODIUM 5000 [USP'U]/ML
5000 INJECTION, SOLUTION INTRAVENOUS; SUBCUTANEOUS EVERY 8 HOURS SCHEDULED
Status: DISCONTINUED | OUTPATIENT
Start: 2019-06-27 | End: 2019-06-29 | Stop reason: HOSPADM

## 2019-06-27 RX ORDER — LEVALBUTEROL 1.25 MG/.5ML
SOLUTION, CONCENTRATE RESPIRATORY (INHALATION)
Status: COMPLETED
Start: 2019-06-27 | End: 2019-06-27

## 2019-06-27 RX ORDER — SODIUM CHLORIDE FOR INHALATION 0.9 %
VIAL, NEBULIZER (ML) INHALATION
Status: COMPLETED
Start: 2019-06-27 | End: 2019-06-27

## 2019-06-27 RX ADMIN — LEVALBUTEROL 1.25 MG: 1.25 SOLUTION, CONCENTRATE RESPIRATORY (INHALATION) at 02:22

## 2019-06-27 RX ADMIN — TIOTROPIUM BROMIDE 18 MCG: 18 CAPSULE ORAL; RESPIRATORY (INHALATION) at 10:42

## 2019-06-27 RX ADMIN — ISODIUM CHLORIDE 3 ML: 0.03 SOLUTION RESPIRATORY (INHALATION) at 02:22

## 2019-06-27 RX ADMIN — METHYLPREDNISOLONE SODIUM SUCCINATE 40 MG: 40 INJECTION, POWDER, FOR SOLUTION INTRAMUSCULAR; INTRAVENOUS at 05:20

## 2019-06-27 RX ADMIN — METHYLPREDNISOLONE SODIUM SUCCINATE 40 MG: 40 INJECTION, POWDER, FOR SOLUTION INTRAMUSCULAR; INTRAVENOUS at 21:14

## 2019-06-27 RX ADMIN — INSULIN LISPRO 4 UNITS: 100 INJECTION, SOLUTION INTRAVENOUS; SUBCUTANEOUS at 10:49

## 2019-06-27 RX ADMIN — LEVALBUTEROL 1.25 MG: 1.25 SOLUTION, CONCENTRATE RESPIRATORY (INHALATION) at 15:14

## 2019-06-27 RX ADMIN — ISODIUM CHLORIDE 3 ML: 0.03 SOLUTION RESPIRATORY (INHALATION) at 20:22

## 2019-06-27 RX ADMIN — CHLORHEXIDINE GLUCONATE 0.12% ORAL RINSE 15 ML: 1.2 LIQUID ORAL at 10:45

## 2019-06-27 RX ADMIN — METHYLPREDNISOLONE SODIUM SUCCINATE 40 MG: 40 INJECTION, POWDER, FOR SOLUTION INTRAMUSCULAR; INTRAVENOUS at 14:09

## 2019-06-27 RX ADMIN — ISODIUM CHLORIDE 3 ML: 0.03 SOLUTION RESPIRATORY (INHALATION) at 07:52

## 2019-06-27 RX ADMIN — SENNOSIDES 8.6 MG: 8.6 TABLET, FILM COATED ORAL at 21:14

## 2019-06-27 RX ADMIN — LEVALBUTEROL 1.25 MG: 1.25 SOLUTION, CONCENTRATE RESPIRATORY (INHALATION) at 20:21

## 2019-06-27 RX ADMIN — LEVALBUTEROL 1.25 MG: 1.25 SOLUTION, CONCENTRATE RESPIRATORY (INHALATION) at 07:52

## 2019-06-27 RX ADMIN — SODIUM CHLORIDE, SODIUM GLUCONATE, SODIUM ACETATE, POTASSIUM CHLORIDE, MAGNESIUM CHLORIDE, SODIUM PHOSPHATE, DIBASIC, AND POTASSIUM PHOSPHATE 100 ML/HR: .53; .5; .37; .037; .03; .012; .00082 INJECTION, SOLUTION INTRAVENOUS at 21:48

## 2019-06-27 RX ADMIN — INSULIN LISPRO 2 UNITS: 100 INJECTION, SOLUTION INTRAVENOUS; SUBCUTANEOUS at 21:17

## 2019-06-27 RX ADMIN — SODIUM CHLORIDE, SODIUM GLUCONATE, SODIUM ACETATE, POTASSIUM CHLORIDE, MAGNESIUM CHLORIDE, SODIUM PHOSPHATE, DIBASIC, AND POTASSIUM PHOSPHATE 100 ML/HR: .53; .5; .37; .037; .03; .012; .00082 INJECTION, SOLUTION INTRAVENOUS at 10:46

## 2019-06-27 RX ADMIN — ISODIUM CHLORIDE 3 ML: 0.03 SOLUTION RESPIRATORY (INHALATION) at 15:14

## 2019-06-27 RX ADMIN — PANTOPRAZOLE SODIUM 40 MG: 40 TABLET, DELAYED RELEASE ORAL at 10:45

## 2019-06-27 RX ADMIN — METOPROLOL TARTRATE 12.5 MG: 25 TABLET ORAL at 21:14

## 2019-06-27 RX ADMIN — HEPARIN SODIUM 5000 UNITS: 5000 INJECTION INTRAVENOUS; SUBCUTANEOUS at 21:14

## 2019-06-27 RX ADMIN — ATORVASTATIN CALCIUM 40 MG: 40 TABLET, FILM COATED ORAL at 17:12

## 2019-06-28 ENCOUNTER — APPOINTMENT (INPATIENT)
Dept: ULTRASOUND IMAGING | Facility: HOSPITAL | Age: 69
DRG: 062 | End: 2019-06-28
Payer: COMMERCIAL

## 2019-06-28 ENCOUNTER — TELEPHONE (OUTPATIENT)
Dept: VASCULAR SURGERY | Facility: CLINIC | Age: 69
End: 2019-06-28

## 2019-06-28 ENCOUNTER — ANESTHESIA EVENT (INPATIENT)
Dept: NON INVASIVE DIAGNOSTICS | Facility: HOSPITAL | Age: 69
DRG: 062 | End: 2019-06-28
Payer: COMMERCIAL

## 2019-06-28 ENCOUNTER — APPOINTMENT (INPATIENT)
Dept: INTERVENTIONAL RADIOLOGY/VASCULAR | Facility: HOSPITAL | Age: 69
DRG: 062 | End: 2019-06-28
Payer: COMMERCIAL

## 2019-06-28 PROBLEM — I48.91 ATRIAL FIBRILLATION (HCC): Status: ACTIVE | Noted: 2019-06-28

## 2019-06-28 LAB
ANION GAP SERPL CALCULATED.3IONS-SCNC: 7 MMOL/L (ref 4–13)
BUN SERPL-MCNC: 20 MG/DL (ref 5–25)
CALCIUM SERPL-MCNC: 9.2 MG/DL (ref 8.3–10.1)
CHLORIDE SERPL-SCNC: 96 MMOL/L (ref 100–108)
CO2 SERPL-SCNC: 30 MMOL/L (ref 21–32)
CREAT SERPL-MCNC: 0.96 MG/DL (ref 0.6–1.3)
ERYTHROCYTE [DISTWIDTH] IN BLOOD BY AUTOMATED COUNT: 14.4 % (ref 11.6–15.1)
GFR SERPL CREATININE-BSD FRML MDRD: 81 ML/MIN/1.73SQ M
GLUCOSE SERPL-MCNC: 133 MG/DL (ref 65–140)
GLUCOSE SERPL-MCNC: 147 MG/DL (ref 65–140)
GLUCOSE SERPL-MCNC: 151 MG/DL (ref 65–140)
GLUCOSE SERPL-MCNC: 154 MG/DL (ref 65–140)
GLUCOSE SERPL-MCNC: 154 MG/DL (ref 65–140)
HCT VFR BLD AUTO: 48.9 % (ref 36.5–49.3)
HGB BLD-MCNC: 15.9 G/DL (ref 12–17)
MAGNESIUM SERPL-MCNC: 2 MG/DL (ref 1.6–2.6)
MCH RBC QN AUTO: 30.6 PG (ref 26.8–34.3)
MCHC RBC AUTO-ENTMCNC: 32.5 G/DL (ref 31.4–37.4)
MCV RBC AUTO: 94 FL (ref 82–98)
NT-PROBNP SERPL-MCNC: 2233 PG/ML
PLATELET # BLD AUTO: 204 THOUSANDS/UL (ref 149–390)
PMV BLD AUTO: 9.5 FL (ref 8.9–12.7)
POTASSIUM SERPL-SCNC: 4.8 MMOL/L (ref 3.5–5.3)
RBC # BLD AUTO: 5.2 MILLION/UL (ref 3.88–5.62)
SODIUM SERPL-SCNC: 133 MMOL/L (ref 136–145)
WBC # BLD AUTO: 9.28 THOUSAND/UL (ref 4.31–10.16)

## 2019-06-28 PROCEDURE — 82948 REAGENT STRIP/BLOOD GLUCOSE: CPT

## 2019-06-28 PROCEDURE — 99222 1ST HOSP IP/OBS MODERATE 55: CPT | Performed by: CLINICAL NURSE SPECIALIST

## 2019-06-28 PROCEDURE — 85027 COMPLETE CBC AUTOMATED: CPT | Performed by: NURSE PRACTITIONER

## 2019-06-28 PROCEDURE — 94640 AIRWAY INHALATION TREATMENT: CPT

## 2019-06-28 PROCEDURE — 93880 EXTRACRANIAL BILAT STUDY: CPT | Performed by: SURGERY

## 2019-06-28 PROCEDURE — 83735 ASSAY OF MAGNESIUM: CPT | Performed by: NURSE PRACTITIONER

## 2019-06-28 PROCEDURE — 83880 ASSAY OF NATRIURETIC PEPTIDE: CPT | Performed by: PHYSICIAN ASSISTANT

## 2019-06-28 PROCEDURE — NC001 PR NO CHARGE: Performed by: CLINICAL NURSE SPECIALIST

## 2019-06-28 PROCEDURE — 80048 BASIC METABOLIC PNL TOTAL CA: CPT | Performed by: NURSE PRACTITIONER

## 2019-06-28 PROCEDURE — 99232 SBSQ HOSP IP/OBS MODERATE 35: CPT | Performed by: INTERNAL MEDICINE

## 2019-06-28 PROCEDURE — 97112 NEUROMUSCULAR REEDUCATION: CPT

## 2019-06-28 PROCEDURE — 93880 EXTRACRANIAL BILAT STUDY: CPT

## 2019-06-28 PROCEDURE — 99232 SBSQ HOSP IP/OBS MODERATE 35: CPT | Performed by: ANESTHESIOLOGY

## 2019-06-28 PROCEDURE — 94760 N-INVAS EAR/PLS OXIMETRY 1: CPT

## 2019-06-28 RX ORDER — ASPIRIN 81 MG/1
81 TABLET ORAL DAILY
Status: DISCONTINUED | OUTPATIENT
Start: 2019-06-28 | End: 2019-06-29 | Stop reason: HOSPADM

## 2019-06-28 RX ORDER — AMLODIPINE BESYLATE 5 MG/1
5 TABLET ORAL DAILY
Status: DISCONTINUED | OUTPATIENT
Start: 2019-06-28 | End: 2019-06-29 | Stop reason: HOSPADM

## 2019-06-28 RX ORDER — PREDNISONE 10 MG/1
10 TABLET ORAL ONCE
Status: DISCONTINUED | OUTPATIENT
Start: 2019-07-04 | End: 2019-06-29 | Stop reason: HOSPADM

## 2019-06-28 RX ORDER — PREDNISONE 10 MG/1
10 TABLET ORAL 2 TIMES DAILY WITH MEALS
Status: DISCONTINUED | OUTPATIENT
Start: 2019-07-03 | End: 2019-06-29 | Stop reason: HOSPADM

## 2019-06-28 RX ORDER — PREDNISONE 20 MG/1
40 TABLET ORAL 2 TIMES DAILY WITH MEALS
Status: DISCONTINUED | OUTPATIENT
Start: 2019-06-30 | End: 2019-06-29 | Stop reason: HOSPADM

## 2019-06-28 RX ORDER — METHYLPREDNISOLONE SODIUM SUCCINATE 40 MG/ML
40 INJECTION, POWDER, LYOPHILIZED, FOR SOLUTION INTRAMUSCULAR; INTRAVENOUS EVERY 8 HOURS SCHEDULED
Status: COMPLETED | OUTPATIENT
Start: 2019-06-28 | End: 2019-06-28

## 2019-06-28 RX ORDER — PREDNISONE 20 MG/1
20 TABLET ORAL 2 TIMES DAILY WITH MEALS
Status: DISCONTINUED | OUTPATIENT
Start: 2019-07-02 | End: 2019-06-29 | Stop reason: HOSPADM

## 2019-06-28 RX ORDER — IPRATROPIUM BROMIDE AND ALBUTEROL SULFATE 2.5; .5 MG/3ML; MG/3ML
3 SOLUTION RESPIRATORY (INHALATION) ONCE
Status: COMPLETED | OUTPATIENT
Start: 2019-06-28 | End: 2019-06-28

## 2019-06-28 RX ADMIN — CHLORHEXIDINE GLUCONATE 0.12% ORAL RINSE 15 ML: 1.2 LIQUID ORAL at 09:56

## 2019-06-28 RX ADMIN — ASPIRIN 81 MG: 81 TABLET, COATED ORAL at 09:54

## 2019-06-28 RX ADMIN — HEPARIN SODIUM 5000 UNITS: 5000 INJECTION INTRAVENOUS; SUBCUTANEOUS at 16:07

## 2019-06-28 RX ADMIN — ISODIUM CHLORIDE 3 ML: 0.03 SOLUTION RESPIRATORY (INHALATION) at 19:45

## 2019-06-28 RX ADMIN — AMLODIPINE BESYLATE 5 MG: 5 TABLET ORAL at 14:59

## 2019-06-28 RX ADMIN — IPRATROPIUM BROMIDE AND ALBUTEROL SULFATE 3 ML: .5; 3 SOLUTION RESPIRATORY (INHALATION) at 14:12

## 2019-06-28 RX ADMIN — ISODIUM CHLORIDE 3 ML: 0.03 SOLUTION RESPIRATORY (INHALATION) at 07:41

## 2019-06-28 RX ADMIN — HEPARIN SODIUM 5000 UNITS: 5000 INJECTION INTRAVENOUS; SUBCUTANEOUS at 21:37

## 2019-06-28 RX ADMIN — METHYLPREDNISOLONE SODIUM SUCCINATE 40 MG: 40 INJECTION, POWDER, FOR SOLUTION INTRAMUSCULAR; INTRAVENOUS at 16:07

## 2019-06-28 RX ADMIN — ISODIUM CHLORIDE 3 ML: 0.03 SOLUTION RESPIRATORY (INHALATION) at 01:12

## 2019-06-28 RX ADMIN — LEVALBUTEROL 1.25 MG: 1.25 SOLUTION, CONCENTRATE RESPIRATORY (INHALATION) at 07:41

## 2019-06-28 RX ADMIN — METHYLPREDNISOLONE SODIUM SUCCINATE 40 MG: 40 INJECTION, POWDER, FOR SOLUTION INTRAMUSCULAR; INTRAVENOUS at 21:37

## 2019-06-28 RX ADMIN — LEVALBUTEROL 1.25 MG: 1.25 SOLUTION, CONCENTRATE RESPIRATORY (INHALATION) at 01:12

## 2019-06-28 RX ADMIN — LABETALOL 20 MG/4 ML (5 MG/ML) INTRAVENOUS SYRINGE 10 MG: at 10:04

## 2019-06-28 RX ADMIN — HEPARIN SODIUM 5000 UNITS: 5000 INJECTION INTRAVENOUS; SUBCUTANEOUS at 05:05

## 2019-06-28 RX ADMIN — METOPROLOL TARTRATE 12.5 MG: 25 TABLET ORAL at 09:54

## 2019-06-28 RX ADMIN — INSULIN LISPRO 2 UNITS: 100 INJECTION, SOLUTION INTRAVENOUS; SUBCUTANEOUS at 21:37

## 2019-06-28 RX ADMIN — ATORVASTATIN CALCIUM 40 MG: 40 TABLET, FILM COATED ORAL at 19:49

## 2019-06-28 RX ADMIN — METHYLPREDNISOLONE SODIUM SUCCINATE 40 MG: 40 INJECTION, POWDER, FOR SOLUTION INTRAMUSCULAR; INTRAVENOUS at 05:05

## 2019-06-28 RX ADMIN — LABETALOL 20 MG/4 ML (5 MG/ML) INTRAVENOUS SYRINGE 10 MG: at 01:17

## 2019-06-28 RX ADMIN — LEVALBUTEROL 1.25 MG: 1.25 SOLUTION, CONCENTRATE RESPIRATORY (INHALATION) at 19:45

## 2019-06-28 RX ADMIN — LABETALOL 20 MG/4 ML (5 MG/ML) INTRAVENOUS SYRINGE 10 MG: at 16:49

## 2019-06-28 RX ADMIN — METOPROLOL TARTRATE 25 MG: 25 TABLET, FILM COATED ORAL at 20:56

## 2019-06-28 RX ADMIN — LABETALOL 20 MG/4 ML (5 MG/ML) INTRAVENOUS SYRINGE 10 MG: at 20:56

## 2019-06-28 RX ADMIN — TIOTROPIUM BROMIDE 18 MCG: 18 CAPSULE ORAL; RESPIRATORY (INHALATION) at 10:00

## 2019-06-28 RX ADMIN — PANTOPRAZOLE SODIUM 40 MG: 40 TABLET, DELAYED RELEASE ORAL at 09:54

## 2019-06-28 RX ADMIN — SODIUM CHLORIDE, SODIUM GLUCONATE, SODIUM ACETATE, POTASSIUM CHLORIDE, MAGNESIUM CHLORIDE, SODIUM PHOSPHATE, DIBASIC, AND POTASSIUM PHOSPHATE 100 ML/HR: .53; .5; .37; .037; .03; .012; .00082 INJECTION, SOLUTION INTRAVENOUS at 07:42

## 2019-06-28 RX ADMIN — LABETALOL 20 MG/4 ML (5 MG/ML) INTRAVENOUS SYRINGE 10 MG: at 05:19

## 2019-06-29 VITALS
DIASTOLIC BLOOD PRESSURE: 85 MMHG | HEART RATE: 88 BPM | SYSTOLIC BLOOD PRESSURE: 148 MMHG | BODY MASS INDEX: 34.36 KG/M2 | WEIGHT: 259.26 LBS | HEIGHT: 73 IN | OXYGEN SATURATION: 96 % | RESPIRATION RATE: 21 BRPM | TEMPERATURE: 98.3 F

## 2019-06-29 LAB
GLUCOSE SERPL-MCNC: 124 MG/DL (ref 65–140)
GLUCOSE SERPL-MCNC: 157 MG/DL (ref 65–140)

## 2019-06-29 PROCEDURE — 99239 HOSP IP/OBS DSCHRG MGMT >30: CPT | Performed by: FAMILY MEDICINE

## 2019-06-29 PROCEDURE — 94640 AIRWAY INHALATION TREATMENT: CPT

## 2019-06-29 PROCEDURE — 99232 SBSQ HOSP IP/OBS MODERATE 35: CPT | Performed by: INTERNAL MEDICINE

## 2019-06-29 PROCEDURE — 82948 REAGENT STRIP/BLOOD GLUCOSE: CPT

## 2019-06-29 PROCEDURE — 94760 N-INVAS EAR/PLS OXIMETRY 1: CPT

## 2019-06-29 RX ORDER — SODIUM CHLORIDE FOR INHALATION 0.9 %
3 VIAL, NEBULIZER (ML) INHALATION
Status: DISCONTINUED | OUTPATIENT
Start: 2019-06-29 | End: 2019-06-29 | Stop reason: HOSPADM

## 2019-06-29 RX ORDER — LEVALBUTEROL 1.25 MG/.5ML
1.25 SOLUTION, CONCENTRATE RESPIRATORY (INHALATION)
Status: DISCONTINUED | OUTPATIENT
Start: 2019-06-29 | End: 2019-06-29 | Stop reason: HOSPADM

## 2019-06-29 RX ORDER — PREDNISONE 10 MG/1
TABLET ORAL
Qty: 40 TABLET | Refills: 0 | Status: SHIPPED | OUTPATIENT
Start: 2019-06-29 | End: 2019-07-10 | Stop reason: HOSPADM

## 2019-06-29 RX ADMIN — LEVALBUTEROL 1.25 MG: 1.25 SOLUTION, CONCENTRATE RESPIRATORY (INHALATION) at 13:12

## 2019-06-29 RX ADMIN — METOPROLOL TARTRATE 25 MG: 25 TABLET, FILM COATED ORAL at 09:19

## 2019-06-29 RX ADMIN — PREDNISONE 50 MG: 10 TABLET ORAL at 09:19

## 2019-06-29 RX ADMIN — HEPARIN SODIUM 5000 UNITS: 5000 INJECTION INTRAVENOUS; SUBCUTANEOUS at 05:03

## 2019-06-29 RX ADMIN — ISODIUM CHLORIDE 3 ML: 0.03 SOLUTION RESPIRATORY (INHALATION) at 13:12

## 2019-06-29 RX ADMIN — LEVALBUTEROL 1.25 MG: 1.25 SOLUTION, CONCENTRATE RESPIRATORY (INHALATION) at 07:20

## 2019-06-29 RX ADMIN — INSULIN LISPRO 2 UNITS: 100 INJECTION, SOLUTION INTRAVENOUS; SUBCUTANEOUS at 12:19

## 2019-06-29 RX ADMIN — AMLODIPINE BESYLATE 5 MG: 5 TABLET ORAL at 09:18

## 2019-06-29 RX ADMIN — ASPIRIN 81 MG: 81 TABLET, COATED ORAL at 09:19

## 2019-06-29 RX ADMIN — LABETALOL 20 MG/4 ML (5 MG/ML) INTRAVENOUS SYRINGE 10 MG: at 05:03

## 2019-06-29 RX ADMIN — CHLORHEXIDINE GLUCONATE 0.12% ORAL RINSE 15 ML: 1.2 LIQUID ORAL at 09:20

## 2019-06-29 RX ADMIN — PANTOPRAZOLE SODIUM 40 MG: 40 TABLET, DELAYED RELEASE ORAL at 09:20

## 2019-06-29 RX ADMIN — TIOTROPIUM BROMIDE 18 MCG: 18 CAPSULE ORAL; RESPIRATORY (INHALATION) at 09:21

## 2019-06-29 RX ADMIN — ISODIUM CHLORIDE 3 ML: 0.03 SOLUTION RESPIRATORY (INHALATION) at 07:20

## 2019-06-30 LAB
ATRIAL RATE: 29 BPM
ATRIAL RATE: 69 BPM
QRS AXIS: 73 DEGREES
QRS AXIS: 75 DEGREES
QRSD INTERVAL: 90 MS
QRSD INTERVAL: 94 MS
QT INTERVAL: 364 MS
QT INTERVAL: 372 MS
QTC INTERVAL: 434 MS
QTC INTERVAL: 447 MS
T WAVE AXIS: 2 DEGREES
T WAVE AXIS: 31 DEGREES
VENTRICULAR RATE: 82 BPM
VENTRICULAR RATE: 91 BPM

## 2019-06-30 PROCEDURE — 93010 ELECTROCARDIOGRAM REPORT: CPT | Performed by: INTERNAL MEDICINE

## 2019-07-01 ENCOUNTER — TELEPHONE (OUTPATIENT)
Dept: FAMILY MEDICINE CLINIC | Facility: MEDICAL CENTER | Age: 69
End: 2019-07-01

## 2019-07-01 ENCOUNTER — TRANSITIONAL CARE MANAGEMENT (OUTPATIENT)
Dept: FAMILY MEDICINE CLINIC | Facility: MEDICAL CENTER | Age: 69
End: 2019-07-01

## 2019-07-01 NOTE — TELEPHONE ENCOUNTER
Hospital records reviewed  It appears patient will need to be seen for a FIDE  In regards to being cleared for driving, after reviewing the chart patient will need to see Neurology to be clear to drive as it appears he suffered a stroke

## 2019-07-01 NOTE — TELEPHONE ENCOUNTER
Pt RIANA stating that he was seen in the ER in [facility] on 6/25/19, admitted, and now needs to see you to be cleared for driving    Please advise

## 2019-07-03 ENCOUNTER — APPOINTMENT (EMERGENCY)
Dept: CT IMAGING | Facility: HOSPITAL | Age: 69
DRG: 091 | End: 2019-07-03
Payer: COMMERCIAL

## 2019-07-03 ENCOUNTER — HOSPITAL ENCOUNTER (INPATIENT)
Facility: HOSPITAL | Age: 69
LOS: 6 days | Discharge: NURSING, REHAB OR LONG-TERM CARE | DRG: 091 | End: 2019-07-10
Attending: EMERGENCY MEDICINE | Admitting: STUDENT IN AN ORGANIZED HEALTH CARE EDUCATION/TRAINING PROGRAM
Payer: COMMERCIAL

## 2019-07-03 ENCOUNTER — APPOINTMENT (EMERGENCY)
Dept: RADIOLOGY | Facility: HOSPITAL | Age: 69
DRG: 091 | End: 2019-07-03
Payer: COMMERCIAL

## 2019-07-03 ENCOUNTER — APPOINTMENT (OUTPATIENT)
Dept: MRI IMAGING | Facility: HOSPITAL | Age: 69
DRG: 091 | End: 2019-07-03
Payer: COMMERCIAL

## 2019-07-03 DIAGNOSIS — R29.6 MULTIPLE FALLS: ICD-10-CM

## 2019-07-03 DIAGNOSIS — R41.82 ALTERED MENTAL STATUS: Primary | ICD-10-CM

## 2019-07-03 DIAGNOSIS — I63.9 CVA (CEREBRAL VASCULAR ACCIDENT) (HCC): ICD-10-CM

## 2019-07-03 DIAGNOSIS — I25.10 CORONARY ARTERY DISEASE INVOLVING NATIVE CORONARY ARTERY OF NATIVE HEART WITHOUT ANGINA PECTORIS: Chronic | ICD-10-CM

## 2019-07-03 DIAGNOSIS — I48.91 ATRIAL FIBRILLATION, UNSPECIFIED TYPE (HCC): ICD-10-CM

## 2019-07-03 DIAGNOSIS — I65.29 CAROTID STENOSIS: ICD-10-CM

## 2019-07-03 DIAGNOSIS — E78.5 HYPERLIPIDEMIA: ICD-10-CM

## 2019-07-03 PROBLEM — G93.41 ACUTE METABOLIC ENCEPHALOPATHY: Status: ACTIVE | Noted: 2019-07-03

## 2019-07-03 LAB
ALBUMIN SERPL BCP-MCNC: 3.2 G/DL (ref 3.5–5)
ALP SERPL-CCNC: 78 U/L (ref 46–116)
ALT SERPL W P-5'-P-CCNC: 34 U/L (ref 12–78)
AMPHETAMINES SERPL QL SCN: NEGATIVE
ANION GAP SERPL CALCULATED.3IONS-SCNC: 6 MMOL/L (ref 4–13)
ARTERIAL PATENCY WRIST A: YES
AST SERPL W P-5'-P-CCNC: 27 U/L (ref 5–45)
ATRIAL RATE: 277 BPM
BACTERIA UR QL AUTO: ABNORMAL /HPF
BARBITURATES UR QL: NEGATIVE
BASE EX.OXY STD BLDV CALC-SCNC: 66.2 % (ref 60–80)
BASE EXCESS BLDA CALC-SCNC: 2.6 MMOL/L
BASE EXCESS BLDV CALC-SCNC: 3.5 MMOL/L
BASOPHILS # BLD AUTO: 0.01 THOUSANDS/ΜL (ref 0–0.1)
BASOPHILS NFR BLD AUTO: 0 % (ref 0–1)
BENZODIAZ UR QL: NEGATIVE
BILIRUB SERPL-MCNC: 1.3 MG/DL (ref 0.2–1)
BILIRUB UR QL STRIP: NEGATIVE
BUN SERPL-MCNC: 20 MG/DL (ref 5–25)
CALCIUM SERPL-MCNC: 8.9 MG/DL (ref 8.3–10.1)
CHLORIDE SERPL-SCNC: 97 MMOL/L (ref 100–108)
CLARITY UR: CLEAR
CO2 SERPL-SCNC: 30 MMOL/L (ref 21–32)
COCAINE UR QL: NEGATIVE
COLOR UR: YELLOW
CREAT SERPL-MCNC: 0.93 MG/DL (ref 0.6–1.3)
EOSINOPHIL # BLD AUTO: 0.14 THOUSAND/ΜL (ref 0–0.61)
EOSINOPHIL NFR BLD AUTO: 2 % (ref 0–6)
ERYTHROCYTE [DISTWIDTH] IN BLOOD BY AUTOMATED COUNT: 14.5 % (ref 11.6–15.1)
ETHANOL SERPL-MCNC: <3 MG/DL (ref 0–3)
GFR SERPL CREATININE-BSD FRML MDRD: 85 ML/MIN/1.73SQ M
GLUCOSE SERPL-MCNC: 109 MG/DL (ref 65–140)
GLUCOSE UR STRIP-MCNC: NEGATIVE MG/DL
HCO3 BLDA-SCNC: 28.3 MMOL/L (ref 22–28)
HCO3 BLDV-SCNC: 30.6 MMOL/L (ref 24–30)
HCT VFR BLD AUTO: 50.9 % (ref 36.5–49.3)
HGB BLD-MCNC: 16.7 G/DL (ref 12–17)
HGB UR QL STRIP.AUTO: NEGATIVE
IMM GRANULOCYTES # BLD AUTO: 0.03 THOUSAND/UL (ref 0–0.2)
IMM GRANULOCYTES NFR BLD AUTO: 0 % (ref 0–2)
INR PPP: 1.04 (ref 0.84–1.19)
KETONES UR STRIP-MCNC: NEGATIVE MG/DL
LEUKOCYTE ESTERASE UR QL STRIP: NEGATIVE
LYMPHOCYTES # BLD AUTO: 0.76 THOUSANDS/ΜL (ref 0.6–4.47)
LYMPHOCYTES NFR BLD AUTO: 11 % (ref 14–44)
MCH RBC QN AUTO: 30.6 PG (ref 26.8–34.3)
MCHC RBC AUTO-ENTMCNC: 32.8 G/DL (ref 31.4–37.4)
MCV RBC AUTO: 93 FL (ref 82–98)
METHADONE UR QL: NEGATIVE
MONOCYTES # BLD AUTO: 0.78 THOUSAND/ΜL (ref 0.17–1.22)
MONOCYTES NFR BLD AUTO: 11 % (ref 4–12)
NEUTROPHILS # BLD AUTO: 5.27 THOUSANDS/ΜL (ref 1.85–7.62)
NEUTS SEG NFR BLD AUTO: 76 % (ref 43–75)
NITRITE UR QL STRIP: NEGATIVE
NON VENT ROOM AIR: 21 %
NON-SQ EPI CELLS URNS QL MICRO: ABNORMAL /HPF
NRBC BLD AUTO-RTO: 0 /100 WBCS
O2 CT BLDA-SCNC: 21.7 ML/DL (ref 16–23)
O2 CT BLDV-SCNC: 15.9 ML/DL
OPIATES UR QL SCN: NEGATIVE
OXYHGB MFR BLDA: 89.9 % (ref 94–97)
PCO2 BLDA: 46.8 MM HG (ref 36–44)
PCO2 BLDV: 55.2 MM HG (ref 42–50)
PCP UR QL: NEGATIVE
PH BLDA: 7.4 [PH] (ref 7.35–7.45)
PH BLDV: 7.36 [PH] (ref 7.3–7.4)
PH UR STRIP.AUTO: 6 [PH]
PLATELET # BLD AUTO: 211 THOUSANDS/UL (ref 149–390)
PMV BLD AUTO: 9.9 FL (ref 8.9–12.7)
PO2 BLDA: 63.8 MM HG (ref 75–129)
PO2 BLDV: 37.3 MM HG (ref 35–45)
POTASSIUM SERPL-SCNC: 4.2 MMOL/L (ref 3.5–5.3)
PROT SERPL-MCNC: 6.4 G/DL (ref 6.4–8.2)
PROT UR STRIP-MCNC: ABNORMAL MG/DL
PROTHROMBIN TIME: 13.6 SECONDS (ref 11.6–14.5)
QRS AXIS: 52 DEGREES
QRSD INTERVAL: 92 MS
QT INTERVAL: 370 MS
QTC INTERVAL: 457 MS
RBC # BLD AUTO: 5.46 MILLION/UL (ref 3.88–5.62)
RBC #/AREA URNS AUTO: ABNORMAL /HPF
SODIUM SERPL-SCNC: 133 MMOL/L (ref 136–145)
SP GR UR STRIP.AUTO: 1.02 (ref 1–1.03)
SPECIMEN SOURCE: ABNORMAL
T WAVE AXIS: 90 DEGREES
THC UR QL: NEGATIVE
TROPONIN I SERPL-MCNC: <0.02 NG/ML
UROBILINOGEN UR QL STRIP.AUTO: 4 E.U./DL
VENTRICULAR RATE: 92 BPM
WBC # BLD AUTO: 6.99 THOUSAND/UL (ref 4.31–10.16)
WBC #/AREA URNS AUTO: ABNORMAL /HPF

## 2019-07-03 PROCEDURE — 84484 ASSAY OF TROPONIN QUANT: CPT | Performed by: EMERGENCY MEDICINE

## 2019-07-03 PROCEDURE — 99220 PR INITIAL OBSERVATION CARE/DAY 70 MINUTES: CPT | Performed by: FAMILY MEDICINE

## 2019-07-03 PROCEDURE — 93010 ELECTROCARDIOGRAM REPORT: CPT | Performed by: INTERNAL MEDICINE

## 2019-07-03 PROCEDURE — 99220 PR INITIAL OBSERVATION CARE/DAY 70 MINUTES: CPT | Performed by: PSYCHIATRY & NEUROLOGY

## 2019-07-03 PROCEDURE — 70551 MRI BRAIN STEM W/O DYE: CPT

## 2019-07-03 PROCEDURE — 85025 COMPLETE CBC W/AUTO DIFF WBC: CPT | Performed by: EMERGENCY MEDICINE

## 2019-07-03 PROCEDURE — 71046 X-RAY EXAM CHEST 2 VIEWS: CPT

## 2019-07-03 PROCEDURE — 70450 CT HEAD/BRAIN W/O DYE: CPT

## 2019-07-03 PROCEDURE — 81001 URINALYSIS AUTO W/SCOPE: CPT | Performed by: EMERGENCY MEDICINE

## 2019-07-03 PROCEDURE — 82805 BLOOD GASES W/O2 SATURATION: CPT | Performed by: FAMILY MEDICINE

## 2019-07-03 PROCEDURE — 99285 EMERGENCY DEPT VISIT HI MDM: CPT

## 2019-07-03 PROCEDURE — 99214 OFFICE O/P EST MOD 30 MIN: CPT | Performed by: PHYSICIAN ASSISTANT

## 2019-07-03 PROCEDURE — 82805 BLOOD GASES W/O2 SATURATION: CPT | Performed by: EMERGENCY MEDICINE

## 2019-07-03 PROCEDURE — 94760 N-INVAS EAR/PLS OXIMETRY 1: CPT

## 2019-07-03 PROCEDURE — 80320 DRUG SCREEN QUANTALCOHOLS: CPT | Performed by: EMERGENCY MEDICINE

## 2019-07-03 PROCEDURE — 85610 PROTHROMBIN TIME: CPT | Performed by: EMERGENCY MEDICINE

## 2019-07-03 PROCEDURE — 80307 DRUG TEST PRSMV CHEM ANLYZR: CPT | Performed by: FAMILY MEDICINE

## 2019-07-03 PROCEDURE — 36415 COLL VENOUS BLD VENIPUNCTURE: CPT | Performed by: EMERGENCY MEDICINE

## 2019-07-03 PROCEDURE — 99285 EMERGENCY DEPT VISIT HI MDM: CPT | Performed by: EMERGENCY MEDICINE

## 2019-07-03 PROCEDURE — 80053 COMPREHEN METABOLIC PANEL: CPT | Performed by: EMERGENCY MEDICINE

## 2019-07-03 PROCEDURE — 93005 ELECTROCARDIOGRAM TRACING: CPT

## 2019-07-03 PROCEDURE — 36600 WITHDRAWAL OF ARTERIAL BLOOD: CPT

## 2019-07-03 PROCEDURE — 94664 DEMO&/EVAL PT USE INHALER: CPT

## 2019-07-03 RX ORDER — AMLODIPINE BESYLATE 5 MG/1
5 TABLET ORAL DAILY
Status: DISCONTINUED | OUTPATIENT
Start: 2019-07-04 | End: 2019-07-10 | Stop reason: HOSPADM

## 2019-07-03 RX ORDER — NICOTINE 21 MG/24HR
1 PATCH, TRANSDERMAL 24 HOURS TRANSDERMAL EVERY 24 HOURS
Status: DISCONTINUED | OUTPATIENT
Start: 2019-07-03 | End: 2019-07-10 | Stop reason: HOSPADM

## 2019-07-03 RX ORDER — ACETAMINOPHEN 325 MG/1
650 TABLET ORAL EVERY 6 HOURS PRN
Status: DISCONTINUED | OUTPATIENT
Start: 2019-07-03 | End: 2019-07-10 | Stop reason: HOSPADM

## 2019-07-03 RX ORDER — BUDESONIDE AND FORMOTEROL FUMARATE DIHYDRATE 80; 4.5 UG/1; UG/1
2 AEROSOL RESPIRATORY (INHALATION) 2 TIMES DAILY
Status: DISCONTINUED | OUTPATIENT
Start: 2019-07-03 | End: 2019-07-10 | Stop reason: HOSPADM

## 2019-07-03 RX ORDER — LOSARTAN POTASSIUM 50 MG/1
100 TABLET ORAL DAILY
Status: DISCONTINUED | OUTPATIENT
Start: 2019-07-04 | End: 2019-07-10 | Stop reason: HOSPADM

## 2019-07-03 RX ORDER — ASPIRIN 81 MG/1
81 TABLET ORAL DAILY
Status: DISCONTINUED | OUTPATIENT
Start: 2019-07-04 | End: 2019-07-10 | Stop reason: HOSPADM

## 2019-07-03 RX ORDER — ALBUTEROL SULFATE 90 UG/1
1 AEROSOL, METERED RESPIRATORY (INHALATION) EVERY 6 HOURS PRN
Status: DISCONTINUED | OUTPATIENT
Start: 2019-07-03 | End: 2019-07-10 | Stop reason: HOSPADM

## 2019-07-03 RX ORDER — ATORVASTATIN CALCIUM 40 MG/1
40 TABLET, FILM COATED ORAL DAILY
Status: DISCONTINUED | OUTPATIENT
Start: 2019-07-04 | End: 2019-07-10 | Stop reason: HOSPADM

## 2019-07-03 RX ORDER — PANTOPRAZOLE SODIUM 40 MG/1
40 TABLET, DELAYED RELEASE ORAL
Status: DISCONTINUED | OUTPATIENT
Start: 2019-07-04 | End: 2019-07-10 | Stop reason: HOSPADM

## 2019-07-03 RX ORDER — HYDRALAZINE HYDROCHLORIDE 20 MG/ML
5 INJECTION INTRAMUSCULAR; INTRAVENOUS EVERY 6 HOURS PRN
Status: DISCONTINUED | OUTPATIENT
Start: 2019-07-03 | End: 2019-07-10 | Stop reason: HOSPADM

## 2019-07-03 NOTE — CONSULTS
Consultation - Neurology   Manuel Barber 81 y o  male MRN: [id number]  Unit/Bed#: ED 18 Encounter: [id number]      Assessment/Plan   Assessment:  81year old male past medical history of hypertension, coronary artery disease, recent admission for right embolic appearing ischemic stroke, atrial fibrillation and right carotid stenosis who presents with altered mental status as well as increased dysarthria  Etiology of symptoms unclear  Differential includes recurrence stroke verses sleep-wake cycle disruption versus infectious etiology  Plan:  1  Altered mental status and increased dysarthria    - Recommend admission for further evaluation     - Check repeat MRI brain without contrast to evaluate for recurrent ischemia  - Do not need to repeat TTE, hemoglobin A1c and fasting lipid panel at this time and these were completed during recent admission     - Echocardiogram 6/26/19:  EF 60%  Technically difficult study  No regional wall motion abnormalities  Left atrium moderately dilated, right atrium mildly dilated  Mitral and tricuspid valves with trace regurgitation    - Hemoglobin A1c 6 1 on June 26, 2019     - Fasting lipid panel completed on June 26, 2019 reviewed, total cholesterol 172, triglycerides 64, HDL 46,      - Continue on ASA 81 mg QD and atorvastatin  - Goal normotension, normothermia and euglycemia  - Stroke education     - PT/OT eval- patient would benefit from rehab placement  - Monitor for metabolic/infectious derangements  - Monitor exam and notify with changes  2  Recent embolic R ischemic infarcts    - Continue on ASA/statin at this time  3  HTN    - Goal normotension     - Antihypertensives as per medicine team      4  Atrial fibrillation    - Would hold off on AC at this time  Will check repeat MRI brain without contrast to evaluate for recurrent ischemia and pending those results will make recommendations regarding AC   If negative for acute ischemia, would start AC     - Rate control as per medicine team      5  R carotid stenosis    - Vascular surgery consult pending  6  History of ETOH abuse    - Would monitor for symptoms of alcohol withdrawal      History of Present Illness     Reason for Consult / Principal Problem: Altered mental status   Hx and PE limited by: Mental status   HPI: Giovanni Ocampo is a 81 y o  male with PMH HTN, CAD, recent admission for R embolic appearing ischemic stroke, atrial fibrillation, and R carotid stenosis who presents with altered mental status  He notes that he had multiple falls at home and he feels weak on his left side  To review, patient was seen on June 25, 2019 after stroke alert was activated due to onset of L sided weakness, facial droop and difficulty speaking  He was noted to be in atrial fibrillation on admission and IV tPA was administered  MRI brain was completed and demonstrated multifocal discontinuous punctate foci of diffusion restriction throughout the majority of the right MCA territory indicative of a multifocal acute lacunar infarctions  Overall infarction burden is mild-to-moderate discontinuous, possibly related to embolic showering of the branches of the right MCA from a more proximal thrombus  He had a CTA head and neck with and without contrast during the stroke alert and demonstrated no large vessel occlusion in the intracranial circulation  Complex plaquing was seen within the right ICA in its proximal portion with narrowing in the range of 70% by NASCET criteria as well as mild narrowing in the range of 40-50% of the left ICA by NASCET criteria  Justino Navarro He was to start anticoagulation 7 days post-stroke per review of critical care note from 6/28/19  He was also seen by vascular surgery in the hospital for R carotid stenosis and was to follow-up as an outpatient in the office per review of notes  Cardiology recommended ELLY with DC CV in 3 weeks       Inpatient consult to Neurology  Consult performed by: Mary Greene PA-C  Consult ordered by: Brionna Agee MD          Review of Systems   Constitutional: Positive for fatigue  Negative for chills and fever  HENT: Negative for trouble swallowing  Respiratory: Positive for cough  Negative for shortness of breath  Cardiovascular: Negative for chest pain  Gastrointestinal: Negative for abdominal pain, nausea and vomiting  Genitourinary: Negative for difficulty urinating and dysuria  Musculoskeletal: Positive for gait problem  Negative for back pain and neck pain  Neurological: Positive for speech difficulty and weakness  Psychiatric/Behavioral: Negative for confusion  Historical Information   Past Medical History:   Diagnosis Date    [facility] ([facility] )     Hyperlipidemia     Hypertension     Myocardial infarction ([facility] )     Pneumonia 2/2/2017    Pulmonary emphysema (HCC)     Shortness of breath      Past Surgical History:   Procedure Laterality Date    ANKLE SURGERY Right 1978    CAROTID STENT  02/13/2018    COLONOSCOPY      HAND SURGERY Left 1964    ID COLONOSCOPY FLX DX W/COLLJ SPEC WHEN PFRMD N/A 5/9/2017    Procedure: EGD AND COLONOSCOPY;  Surgeon: Shira Menchaca MD;  Location[facility];   Service: Gastroenterology     Social History   Social History     Substance and Sexual Activity   Alcohol Use Yes    Alcohol/week: 7 0 - 21 0 standard drinks    Types: 7 - 21 Cans of beer per week    Comment: 1-3 daily      Social History     Substance and Sexual Activity   Drug Use No     Social History     Tobacco Use   Smoking Status Current Every Day Smoker    Packs/day: 0 50    Years: 45 00    Pack years: 22 50    Types: Cigarettes   Smokeless Tobacco Never Used   Tobacco Comment    last smoked 6/26     Family History:   Family History   Problem Relation Age of Onset    Hypertension Mother     Emphysema Father     Hypertension Father     Arthritis Brother     Diabetes Brother     Other Other Cardiac Disorder    Hypertension Other     Arrhythmia Neg Hx     Anuerysm Neg Hx     Asthma Neg Hx     Clotting disorder Neg Hx     Fainting Neg Hx        Review of previous medical records was completed  Please see HPI  Meds/Allergies   Scheduled Meds:  Continuous Infusions:  No current facility-administered medications for this encounter  PRN Meds:  Objective   Vitals:Blood pressure (!) 185/88, pulse 91, temperature 97 8 °F (36 6 °C), temperature source Oral, resp  rate (!) 26, weight 123 kg (271 lb 13 2 oz), SpO2 (!) 89 %  ,Body mass index is 35 86 kg/m²  Intake/Output Summary (Last 24 hours) at 7/3/2019 1531  Last data filed at 7/3/2019 1528  Gross per 24 hour   Intake    Output 1075 ml   Net -1075 ml       Invasive Devices: Invasive Devices     Peripheral Intravenous Line            Peripheral IV 07/03/19 Right Forearm less than 1 day    Peripheral IV 07/03/19 Right Wrist less than 1 day          Airway            Non-Surgical Airway Oral pharyngeal airway 90 mm 504 days                Physical Exam   Constitutional: He appears well-developed and well-nourished  No distress  HENT:   Head: Normocephalic and atraumatic  Eyes: Pupils are equal, round, and reactive to light  Conjunctivae and EOM are normal  Right eye exhibits no discharge  Left eye exhibits no discharge  No scleral icterus  Neck: Normal range of motion  Neck supple  Cardiovascular: Normal rate and regular rhythm  Pulmonary/Chest: Effort normal  No respiratory distress  Coarse breath sounds B/L     Abdominal: Soft  He exhibits no distension  There is no tenderness  Musculoskeletal: He exhibits edema  He exhibits no deformity  Neurological:   Reflex Scores:       Bicep reflexes are 1+ on the right side and 2+ on the left side  Brachioradialis reflexes are 1+ on the right side and 2+ on the left side  Patellar reflexes are 0 on the right side and 0 on the left side         Achilles reflexes are 0 on the right side and 0 on the left side  Skin: Skin is warm and dry  No rash noted  He is not diaphoretic  No erythema  Psychiatric: He has a normal mood and affect  His behavior is normal      Neurologic Exam     Mental Status   Oriented to person  Oriented to place  Oriented to time  Attention: decreased  Concentration: decreased  Speech: (Moderate dysarthria noted    )  Level of consciousness: alert  Limited assessment secondary to patient lethargy  Patient is oriented and awakens easily, but does tend to fall asleep throughout exam at times when not stimulated  Cranial Nerves     CN III, IV, VI   Pupils are equal, round, and reactive to light  Extraocular motions are normal    Right pupil: Size: 4 mm  Shape: regular  Reactivity: brisk  Consensual response: intact  Left pupil: Size: 4 mm  Shape: regular  Reactivity: brisk  Consensual response: intact  Nystagmus: none   Diplopia: none  Ophthalmoparesis: none  Upgaze: normal  Downgaze: normal  Conjugate gaze: present    CN V   Right facial sensation deficit: none  Left facial sensation deficit: none    CN VII   Right facial weakness: none  Left facial weakness: central    CN VIII   Hearing: intact    CN XII   Tongue: not atrophic  Fasciculations: absent  Tongue deviation: none  Limited assessment secondary to patient mental status  Motor Exam   Muscle bulk: normal  Overall muscle tone: normal  Right arm tone: normal  Left arm tone: normal  Right leg tone: normal  Left leg tone: normalMotor strength 5/5 RUE and B/L LE, 4/5 LUE        Sensory Exam   Right arm light touch: normal  Left arm light touch: normal  Right leg light touch: normal  Left leg light touch: normal  Right arm vibration: normal  Left arm vibration: normal  Right leg vibration: decreased from knee  Left leg vibration: decreased from knee    Gait, Coordination, and Reflexes     Tremor   Resting tremor: absent    Reflexes   Right brachioradialis: 1+  Left brachioradialis: 2+  Right biceps: 1+  Left biceps: 2+  Right patellar: 0  Left patellar: 0  Right achilles: 0  Left achilles: 0  Right plantar: normal  Left plantar: normalLimited exam secondary to mental status  Gait deferred secondary to fall risk         Lab Results:   Recent Results (from the past 24 hour(s))   CBC and differential    Collection Time: 07/03/19 10:26 AM   Result Value Ref Range    WBC 6 99 4 31 - 10 16 Thousand/uL    RBC 5 46 3 88 - 5 62 Million/uL    Hemoglobin 16 7 12 0 - 17 0 g/dL    Hematocrit 50 9 (H) 36 5 - 49 3 %    MCV 93 82 - 98 fL    MCH 30 6 26 8 - 34 3 pg    MCHC 32 8 31 4 - 37 4 g/dL    RDW 14 5 11 6 - 15 1 %    MPV 9 9 8 9 - 12 7 fL    Platelets 070 794 - 182 Thousands/uL    nRBC 0 /100 WBCs    Neutrophils Relative 76 (H) 43 - 75 %    Immat GRANS % 0 0 - 2 %    Lymphocytes Relative 11 (L) 14 - 44 %    Monocytes Relative 11 4 - 12 %    Eosinophils Relative 2 0 - 6 %    Basophils Relative 0 0 - 1 %    Neutrophils Absolute 5 27 1 85 - 7 62 Thousands/µL    Immature Grans Absolute 0 03 0 00 - 0 20 Thousand/uL    Lymphocytes Absolute 0 76 0 60 - 4 47 Thousands/µL    Monocytes Absolute 0 78 0 17 - 1 22 Thousand/µL    Eosinophils Absolute 0 14 0 00 - 0 61 Thousand/µL    Basophils Absolute 0 01 0 00 - 0 10 Thousands/µL   Comprehensive metabolic panel    Collection Time: 07/03/19 10:26 AM   Result Value Ref Range    Sodium 133 (L) 136 - 145 mmol/L    Potassium 4 2 3 5 - 5 3 mmol/L    Chloride 97 (L) 100 - 108 mmol/L    CO2 30 21 - 32 mmol/L    ANION GAP 6 4 - 13 mmol/L    BUN 20 5 - 25 mg/dL    Creatinine 0 93 0 60 - 1 30 mg/dL    Glucose 109 65 - 140 mg/dL    Calcium 8 9 8 3 - 10 1 mg/dL    AST 27 5 - 45 U/L    ALT 34 12 - 78 U/L    Alkaline Phosphatase 78 46 - 116 U/L    Total Protein 6 4 6 4 - 8 2 g/dL    Albumin 3 2 (L) 3 5 - 5 0 g/dL    Total Bilirubin 1 30 (H) 0 20 - 1 00 mg/dL    eGFR 85 ml/min/1 73sq m   Protime-INR    Collection Time: 07/03/19 10:26 AM   Result Value Ref Range    Protime 13 6 11 6 - 14 5 seconds    INR 1 04 0 84 - 1 19   Ethanol    Collection Time: 07/03/19 10:26 AM   Result Value Ref Range    Ethanol Lvl <3 0 - 3 mg/dL   Blood gas, venous    Collection Time: 07/03/19 10:26 AM   Result Value Ref Range    pH, Foster 7 362 7 300 - 7 400    pCO2, Foster 55 2 (H) 42 0 - 50 0 mm Hg    pO2, Foster 37 3 35 0 - 45 0 mm Hg    HCO3, Foster 30 6 (H) 24 - 30 mmol/L    Base Excess, Foster 3 5 mmol/L    O2 Content, Foster 15 9 ml/dL    O2 HGB, VENOUS 66 2 60 0 - 80 0 %   ECG 12 lead    Collection Time: 07/03/19 10:40 AM   Result Value Ref Range    Ventricular Rate 92 BPM    Atrial Rate 277 BPM    SC Interval  ms    QRSD Interval 92 ms    QT Interval 370 ms    QTC Interval 457 ms    P Axis  degrees    QRS Axis 52 degrees    T Wave Axis 90 degrees   UA w Reflex to Microscopic w Reflex to Culture    Collection Time: 07/03/19  1:23 PM   Result Value Ref Range    Color, UA Yellow     Clarity, UA Clear     Specific Gravity, UA 1 025 1 003 - 1 030    pH, UA 6 0 4 5, 5 0, 5 5, 6 0, 6 5, 7 0, 7 5, 8 0    Leukocytes, UA Negative Negative    Nitrite, UA Negative Negative    Protein,  (2+) (A) Negative mg/dl    Glucose, UA Negative Negative mg/dl    Ketones, UA Negative Negative mg/dl    Urobilinogen, UA 4 0 (A) 0 2, 1 0 E U /dl E U /dl    Bilirubin, UA Negative Negative    Blood, UA Negative Negative   Urine Microscopic    Collection Time: 07/03/19  1:23 PM   Result Value Ref Range    RBC, UA 0-1 (A) None Seen, 0-5 /hpf    WBC, UA None Seen None Seen, 0-5, 5-55, 5-65 /hpf    Epithelial Cells Occasional None Seen, Occasional /hpf    Bacteria, UA None Seen None Seen, Occasional /hpf   Troponin I    Collection Time: 07/03/19  1:34 PM   Result Value Ref Range    Troponin I <0 02 <=0 04 ng/mL   Rapid drug screen, urine    Collection Time: 07/03/19  2:33 PM   Result Value Ref Range    Amph/Meth UR Negative Negative    Barbiturate Ur Negative Negative    Benzodiazepine Urine Negative Negative    Cocaine Urine Negative Negative    Methadone Urine Negative Negative    Opiate Urine Negative Negative    PCP Ur Negative Negative    THC Urine Negative Negative   Blood gas, arterial    Collection Time: 07/03/19  3:14 PM   Result Value Ref Range    pH, Arterial 7 399 7 350 - 7 450    pCO2, Arterial 46 8 (H) 36 0 - 44 0 mm Hg    pO2, Arterial 63 8 (L) 75 0 - 129 0 mm Hg    HCO3, Arterial 28 3 (H) 22 0 - 28 0 mmol/L    Base Excess, Arterial 2 6 mmol/L    O2 Content, Arterial 21 7 16 0 - 23 0 mL/dL    O2 HGB,Arterial  89 9 (L) 94 0 - 97 0 %    SOURCE Radial, Left     UBALDO TEST Yes     ROOM AIR FIO2 21 %       Imaging Studies: I have personally reviewed pertinent reports  and I have personally reviewed pertinent films in PACS  CTH- No acute intracranial abnormality  Microangiopathic changes

## 2019-07-03 NOTE — ASSESSMENT & PLAN NOTE
Acute metabolic encephalopathy  Will check an ABG  The ABG was okay  May need to consider BiPAP trial although the CO2 was not significantly elevated  Patient appears to be a little lethargic  Continue to monitor  Discussed with Neurology  Given recent strokes the we may need to rule out another acute stroke     May need to consider EEG if the patient does not improve but he is easily arousable and can answer questions appropriately

## 2019-07-03 NOTE — H&P
H&P- Bakari Hilton 1950, 81 y o  male MRN: [id number]    Unit/Bed#: ED 18 Encounter: [id number]    Primary Care Provider: Xochitl Butt,    Date and time admitted to hospital: 7/3/2019  9:48 AM        Atrial fibrillation [facility])  Assessment & Plan  AFib  This was diagnosed last time  His stroke was likely embolic but given that he did have tPA he will need to be started on Eliquis in the next 2 days which would be 10 days after his tPA  Continue to monitor neurological status closely  Keep on telemetry for now  However the MRI that we do this time comes back okay and is stable to we can start him on his anticoagulation now  Alcohol abuse  Assessment & Plan  Patient's alcohol level in his normal here  Will put him on some vitamin supplements    CVA (cerebral vascular accident) [facility])  Assessment & Plan  Patient was discharged last week with stroke  The patient was supposed to start anticoagulation on 7/5/2019 but we can start sooner as long as the MRI is okay  Hyperlipidemia  Assessment & Plan  Statin  Will put the patient on higher dose statin    COPD (chronic obstructive pulmonary disease) ([facility] [address] )  Assessment & Plan  Patient has COPD  This appears to be without exacerbation  Continue monitor closely  Monitor  * Acute metabolic encephalopathy  Assessment & Plan  Acute metabolic encephalopathy  Will check an ABG  The ABG was okay  May need to consider BiPAP trial although the CO2 was not significantly elevated  Patient appears to be a little lethargic  Continue to monitor  Discussed with Neurology  Given recent strokes the we may need to rule out another acute stroke     May need to consider EEG if the patient does not improve but he is easily arousable and can answer questions appropriately    Will check a ammonia level        VTE Prophylaxis: Heparin  / sequential compression device   Code Status:  DNR level 3  POLST: POLST is not applicable to this patient  Discussion with family: Patient    Anticipated Length of Stay:  Patient will be admitted on an Observation basis with an anticipated length of stay of  less than 2 midnights  Justification for Hospital Stay:  As of now anticipate less than 2 midnights but will see patient does overnight  If his encephalopathy does not improve then we will re-evaluate the situation  I will put the patient on neuro checks for now  Check an ammonia level  Total Time for Visit, including Counseling / Coordination of Care: 45 minutes  Greater than 50% of this total time spent on direct patient counseling and coordination of care  Chief Complaint:   Encephalopathic    History of Present Illness:    Travis Gordon is a 81 y o  male who presents with multiple falls  Patient came in here originally for multiple falls  According to the ER patient has been declining a little bit more since he has been here  He has been more lethargic  Easily arousable but very lethargic  He is answering questions appropriately  Patient just states that he has been falling a lot at home  He is alert to time and place  There is no seizure-like activity noted the patient states that he had little bit of left-sided weakness after his last admission and he was just here last week with a stroke  The patient was supposed to start anticoagulation on 7/5/2019 secondary to atrial fibrillation which they found last time  Patient does have a history of alcohol abuse but states that he has not been knee using it  Review of Systems:    Review of Systems   Constitutional: Positive for activity change and appetite change  Musculoskeletal: Positive for gait problem  Neurological: Positive for dizziness and weakness  All other systems reviewed and are negative        Past Medical and Surgical History:     Past Medical History:   Diagnosis Date    [facility] obstructive [facility] disease) ([facility] [address] )     Hyperlipidemia     Hypertension     [facility]     Pneumonia 2/2/2017    Pulmonary emphysema (HCC)     Shortness of breath        Past Surgical History:   Procedure Laterality Date    ANKLE SURGERY Right 1978    CAROTID STENT  02/13/2018    COLONOSCOPY      HAND SURGERY Left 1964    LA COLONOSCOPY FLX DX W/COLLJ SPEC WHEN PFRMD N/A 5/9/2017    Procedure: EGD AND COLONOSCOPY;  Surgeon: Osiris Irene MD;  Location: [facility]; Service: Gastroenterology       Meds/Allergies:    Prior to Admission medications    Medication Sig Start Date End Date Taking? Authorizing Provider   amLODIPine (NORVASC) 5 mg tablet Take 1 tablet (5 mg total) by mouth daily 4/17/19   Darryle Morales, DO   apixaban (ELIQUIS) 5 mg Take 1 tablet (5 mg total) by mouth 2 (two) times a day 7/5/19   Meli Hector MD   aspirin (ECOTRIN LOW STRENGTH) 81 mg EC tablet Take 81 mg by mouth daily    Historical Provider, MD   atorvastatin (LIPITOR) 20 mg tablet Take 1 tablet (20 mg total) by mouth daily 8/21/18   Hiwot Rogers MD   budesonide-formoterol (SYMBICORT) 80-4 5 MCG/ACT inhaler Inhale 2 puffs 2 (two) times a day Rinse mouth after use    Patient not taking: Reported on 6/25/2019 12/20/18   Linda Hensley PA-C   losartan (COZAAR) 100 MG tablet Take 1 tablet (100 mg total) by mouth daily 5/17/19   Darryle Morales, DO   metoprolol tartrate (LOPRESSOR) 25 mg tablet Take 1 tablet (25 mg total) by mouth every 12 (twelve) hours 6/29/19   Meli Hector MD   nicotine (NICODERM CQ) 21 mg/24 hr TD 24 hr patch Place 1 patch on the skin every 24 hours 10/23/18   Christina Lozoya, DO   nicotine polacrilex (COMMIT) 4 MG lozenge Apply 1 lozenge (4 mg total) to the mouth or throat as needed for smoking cessation 10/23/18   Christina Lozoya, DO   nitroglycerin (NITROSTAT) 0 4 mg SL tablet Place 1 tablet (0 4 mg total) under the tongue every 5 (five) minutes as needed for chest pain 2/15/18   TAYLOR Edmondson   pantoprazole (PROTONIX) 40 mg tablet Take 1 tablet (40 mg total) by mouth daily 1/25/19 Aleksandra Irvin DO   predniSONE 10 mg tablet Take 4 tabs BID x 2 days; 3 tabs BID x 2 days; 2 tabs BID x 2 days; 1 tab BID x 2 days, 1 tab daily x 2 days 6/29/19   David Em MD   sodium chloride 1 g tablet Take 1 tablet (1 g total) by mouth daily for 90 days 3/7/19 6/5/19  Jeyson Harley MD   tiotropium (SPIRIVA RESPIMAT) 2 5 MCG/ACT AERS inhaler Inhale 2 puffs daily 3/6/19   Tiny Mckeon DO   VENTOLIN  (90 Base) MCG/ACT inhaler INHALE 1-2 PUFFS EVERY 4 HOURS AS NEEDED FOR WHEEZING 4/2/19   Tiny Mckeon DO     I have reviewed home medications with patient personally      Allergies: No Known Allergies    Social History:     Marital Status:    Occupation:  Retired  Patient Pre-hospital Living Situation:  Lives alone  Patient Pre-hospital Level of Mobility:  Was normally independent    Substance Use History:   Social History     Substance and Sexual Activity   Alcohol Use Yes    Alcohol/week: 7 0 - 21 0 standard drinks    Types: 7 - 21 Cans of beer per week    Comment: 1-3 daily      Social History     Tobacco Use   Smoking Status Current Every Day Smoker    Packs/day: 0 50    Years: 45 00    Pack years: 22 50    Types: Cigarettes   Smokeless Tobacco Never Used   Tobacco Comment    last smoked 6/26     Social History     Substance and Sexual Activity   Drug Use No       Family History:    Family History   Problem Relation Age of Onset    Hypertension Mother     Emphysema Father     Hypertension Father     Arthritis Brother     Diabetes Brother     Other Other         Cardiac Disorder    Hypertension Other     Arrhythmia Neg Hx     Anuerysm Neg Hx     Asthma Neg Hx     Clotting disorder Neg Hx     Fainting Neg Hx        Physical Exam:     Vitals:   Blood Pressure: (!) 185/88 (07/03/19 1400)  Pulse: 91 (07/03/19 1400)  Temperature: 97 8 °F (36 6 °C) (07/03/19 0953)  Temp Source: Oral (07/03/19 0953)  Respirations: (!) 26 (07/03/19 1400)  Weight - Scale: 123 kg (271 lb 13 2 oz) (07/03/19 0953)  SpO2: (!) 89 %(pt is asleep) (07/03/19 1400)    Physical Exam    (   General Appearance:    Alert, lethargic but easily arousable   Head:    Normocephalic, without obvious abnormality, atraumatic   Eyes:    PERRL, conjunctiva/corneas clear, EOM's intact,             Nose:   Nares normal, septum midline, mucosa normal   Throat:   Lips, mucosa, and tongue normal; teeth and gums normal   Neck:   Supple, symmetrical, no adenopathy;        thyroid:  No enlargement/tenderness/nodules; no carotid    bruit or JVD   Back:     Symmetric, no curvature, ROM normal, no CVA tenderness   Lungs:     Clear to auscultation bilaterally, respirations unlabored       Heart:    Regular rate and rhythm, S1 and S2 normal, no murmur, rub    or gallop   Abdomen:     Soft, non-tender, bowel sounds active all four quadrants,     no masses, no organomegaly           Extremities:   Extremities normal, atraumatic, no cyanosis or edema   Pulses:   2+ and symmetric all extremities   Skin:   Skin color, texture, turgor normal, no rashes or lesions   Lymph nodes:   Cervical, supraclavicular, and axillary nodes normal   Neurologic:   Mild left-sided hemiparesis      Additional Data:     Lab Results: I have personally reviewed pertinent reports        Results from last 7 days   Lab Units 07/03/19  1026   WBC Thousand/uL 6 99   HEMOGLOBIN g/dL 16 7   HEMATOCRIT % 50 9*   PLATELETS Thousands/uL 211   NEUTROS PCT % 76*   LYMPHS PCT % 11*   MONOS PCT % 11   EOS PCT % 2     Results from last 7 days   Lab Units 07/03/19  1026   SODIUM mmol/L 133*   POTASSIUM mmol/L 4 2   CHLORIDE mmol/L 97*   CO2 mmol/L 30   BUN mg/dL 20   CREATININE mg/dL 0 93   ANION GAP mmol/L 6   CALCIUM mg/dL 8 9   ALBUMIN g/dL 3 2*   TOTAL BILIRUBIN mg/dL 1 30*   ALK PHOS U/L 78   ALT U/L 34   AST U/L 27   GLUCOSE RANDOM mg/dL 109     Results from last 7 days   Lab Units 07/03/19  1026   INR  1 04     Results from last 7 days   Lab Units 06/29/19  1133 06/29/19  0747 06/28/19  2100 06/28/19  1611 06/28/19  1100 06/28/19  0707 06/27/19  2117 06/27/19  1708 06/27/19  1048 06/27/19  0706 06/26/19  2234 06/26/19  2115   POC GLUCOSE mg/dl 157* 124 154* 133 147* 154* 154* 128 246* 148* 166* 173*               Imaging: I have personally reviewed pertinent reports  XR chest 2 views   Final Result by Gopal Crandall MD (07/03 1303)      No acute cardiopulmonary disease  Workstation performed: [id number]         CT head without contrast   Final Result by Bunny Mccarty MD (07/03 1052)      No acute intracranial abnormality  Microangiopathic changes  Workstation performed: [id number]         MRI inpatient order    (Results Pending)       EKG, Pathology, and Other Studies Reviewed on Admission:   · CT scan reviewed    Allscripts / Epic Records Reviewed: Yes     ** Please Note: This note has been constructed using a voice recognition system   **

## 2019-07-03 NOTE — ASSESSMENT & PLAN NOTE
AFib   This was diagnosed last time  His stroke was likely embolic but given that he did have tPA he will need to be started on Eliquis in the next 2 days which would be 10 days after his tPA  Continue to monitor neurological status closely  Keep on telemetry for now  However the MRI that we do this time comes back okay and is stable to we can start him on his anticoagulation now

## 2019-07-03 NOTE — ASSESSMENT & PLAN NOTE
Patient was discharged last week with stroke  The patient was supposed to start anticoagulation on 7/5/2019 but we can start sooner as long as the MRI is okay

## 2019-07-03 NOTE — ED PROVIDER NOTES
History  Chief Complaint   Patient presents with    Multiple Falls     pt fell twice this morning  was seen here recently for stroke and signed out AMA  left sided weakness is causing him to fall  pt is on eliquis  denies any head injury     HPI     57-year-old male presenting for evaluation of multiple falls  Patient was discharged from the hospital on June 29th after an MCA stroke producing left-sided facial droop and left arm weakness  He received tPA and was admitted to the ICU  Per Neurology, he is supposed to be started on systemic anticoagulation 10 days after tPA administration  He is currently on a statin and aspirin  He was also found to have AFib during that admission, and has cardioversion scheduled as an outpatient in 3 weeks  He was discharged from the hospital with nursing assistance, physical therapy, and occupational therapy  He presents today after falling multiple times over the last 48 hours  States he falls because his left side feels weak  He is ambulating with a walker, but despite this continues to fall and feel unsteady on his feet  Denies dizziness  Denies worsening weakness or sensory deficits  He has severely slurred speech which makes him difficult to understand, but he is able to provide a history and states this is his new baseline since his stroke  He denies blurry vision  States that he lives by himself at home  States that when he was in the hospital before he did not want to be admitted to a rehab unit, however now he feels like he needs to be because he is not safe at home  Other medical history includes rate controlled AFib, CAD, hypertension, hyperlipidemia, and COPD on chronic steroids  Patient denies any pain, fevers, chills, or additional symptoms  He is noted to have a wet sounding cough on arrival     Prior to Admission Medications   Prescriptions Last Dose Informant Patient Reported? Taking?    VENTOLIN  (90 Base) MCG/ACT inhaler   No No   Sig: INHALE 1-2 PUFFS EVERY 4 HOURS AS NEEDED FOR WHEEZING   amLODIPine (NORVASC) 5 mg tablet   No No   Sig: Take 1 tablet (5 mg total) by mouth daily   apixaban (ELIQUIS) 5 mg   No No   Sig: Take 1 tablet (5 mg total) by mouth 2 (two) times a day   aspirin (ECOTRIN LOW STRENGTH) 81 mg EC tablet  Self Yes No   Sig: Take 81 mg by mouth daily   atorvastatin (LIPITOR) 20 mg tablet  Self No No   Sig: Take 1 tablet (20 mg total) by mouth daily   budesonide-formoterol (SYMBICORT) 80-4 5 MCG/ACT inhaler   No No   Sig: Inhale 2 puffs 2 (two) times a day Rinse mouth after use     Patient not taking: Reported on 6/25/2019   losartan (COZAAR) 100 MG tablet   No No   Sig: Take 1 tablet (100 mg total) by mouth daily   metoprolol tartrate (LOPRESSOR) 25 mg tablet   No No   Sig: Take 1 tablet (25 mg total) by mouth every 12 (twelve) hours   nicotine (NICODERM CQ) 21 mg/24 hr TD 24 hr patch   No No   Sig: Place 1 patch on the skin every 24 hours   nicotine polacrilex (COMMIT) 4 MG lozenge   No No   Sig: Apply 1 lozenge (4 mg total) to the mouth or throat as needed for smoking cessation   nitroglycerin (NITROSTAT) 0 4 mg SL tablet  Self No No   Sig: Place 1 tablet (0 4 mg total) under the tongue every 5 (five) minutes as needed for chest pain   pantoprazole (PROTONIX) 40 mg tablet   No No   Sig: Take 1 tablet (40 mg total) by mouth daily   predniSONE 10 mg tablet   No No   Sig: Take 4 tabs BID x 2 days; 3 tabs BID x 2 days; 2 tabs BID x 2 days; 1 tab BID x 2 days, 1 tab daily x 2 days   sodium chloride 1 g tablet   No No   Sig: Take 1 tablet (1 g total) by mouth daily for 90 days   tiotropium (SPIRIVA RESPIMAT) 2 5 MCG/ACT AERS inhaler   No No   Sig: Inhale 2 puffs daily      Facility-Administered Medications: None       Past Medical History:   Diagnosis Date    COPD (chronic obstructive pulmonary disease) (HCC)     Hyperlipidemia     Hypertension     Myocardial infarction ([facility] [place] [facility] )     Pneumonia 2/2/2017    Pulmonary emphysema ([facility] [place] [facility] )  Shortness of breath        Past Surgical History:   Procedure Laterality Date    ANKLE SURGERY Right 1978    CAROTID STENT  02/13/2018    COLONOSCOPY      HAND SURGERY Left 1964    MT COLONOSCOPY FLX DX W/COLLJ SPEC WHEN PFRMD N/A 5/9/2017    Procedure: EGD AND COLONOSCOPY;  Surgeon: Giulia Ramos MD;  Location: [facility]; Service: Gastroenterology       Family History   Problem Relation Age of Onset    Hypertension Mother     Emphysema Father     Hypertension Father     Arthritis Brother     Diabetes Brother     Other Other         Cardiac Disorder    Hypertension Other     Arrhythmia Neg Hx     Anuerysm Neg Hx     Asthma Neg Hx     Clotting disorder Neg Hx     Fainting Neg Hx      I have reviewed and agree with the history as documented  Social History     Tobacco Use    Smoking status: Current Every Day Smoker     Packs/day: 0 50     Years: 45 00     Pack years: 22 50     Types: Cigarettes    Smokeless tobacco: Never Used    Tobacco comment: last smoked 6/26   Substance Use Topics    Alcohol use: Yes     Alcohol/week: 7 0 - 21 0 standard drinks     Types: 7 - 21 Cans of beer per week     Comment: 1-3 daily     Drug use: No        Review of Systems   Constitutional: Negative for chills and fever  HENT: Negative for congestion  Eyes: Negative for visual disturbance  Respiratory: Positive for cough  Negative for shortness of breath  Cardiovascular: Negative for chest pain and leg swelling  Gastrointestinal: Negative for abdominal pain, diarrhea, nausea and vomiting  Genitourinary: Negative for dysuria and frequency  Musculoskeletal: Negative for arthralgias, back pain, neck pain and neck stiffness  Skin: Negative for rash  Neurological: Positive for speech difficulty and weakness  Negative for numbness and headaches  Numerous falls   Psychiatric/Behavioral: Negative for agitation, behavioral problems and confusion         Physical Exam  Physical Exam Constitutional: He is oriented to person, place, and time  He appears well-developed and well-nourished  No distress  Somnolent, but awakens to voice  Falls asleep in between questioning   HENT:   Head: Normocephalic and atraumatic  Right Ear: External ear normal    Left Ear: External ear normal    Nose: Nose normal    Mouth/Throat: Oropharynx is clear and moist    Eyes: Pupils are equal, round, and reactive to light  Conjunctivae and EOM are normal    Neck: Normal range of motion  Neck supple  Cardiovascular: Normal rate, regular rhythm and normal heart sounds  Exam reveals no gallop and no friction rub  No murmur heard  Pulmonary/Chest: Effort normal  No respiratory distress  He has no wheezes  He has rales (scattered)  Abdominal: Soft  Bowel sounds are normal  He exhibits no distension  There is no tenderness  There is no guarding  Musculoskeletal: Normal range of motion  He exhibits no edema or deformity  Neurological: He is alert and oriented to person, place, and time  He exhibits normal muscle tone  Dysarthric speech  Mild left-sided facial droop  4/5 strength in the left gisela body, 5/5 strength in the right gisela body  Decreased sensation to light touch in the left lower extremity  Normal finger-to-nose and heel-to-shin bilaterally  Skin: Skin is warm and dry  He is not diaphoretic         Vital Signs  ED Triage Vitals [07/03/19 0953]   Temperature Pulse Respirations Blood Pressure SpO2   97 8 °F (36 6 °C) (!) 50 16 160/75 96 %      Temp Source Heart Rate Source Patient Position - Orthostatic VS BP Location FiO2 (%)   Oral Monitor Sitting Right arm --      Pain Score       No Pain           Vitals:    07/03/19 1230 07/03/19 1400 07/03/19 1530 07/03/19 1600   BP: 136/63 (!) 185/88 (!) 185/109 159/75   Pulse: 75 91 93 98   Patient Position - Orthostatic VS: Sitting Lying Sitting Lying         Visual Acuity  Visual Acuity      Most Recent Value   L Pupil Size (mm)  5   R Pupil Size (mm) 5   L Pupil Shape  Round   R Pupil Shape  Round          ED Medications  Medications - No data to display    Diagnostic Studies  Results Reviewed     Procedure Component Value Units Date/Time    Blood gas, arterial [[id number]]  (Abnormal) Collected:  07/03/19 1514    Lab Status:  Final result Specimen:  Blood, Arterial from Radial, Left Updated:  07/03/19 1520     pH, Arterial 7 399     pCO2, Arterial 46 8 mm Hg      pO2, Arterial 63 8 mm Hg      HCO3, Arterial 28 3 mmol/L      Base Excess, Arterial 2 6 mmol/L      O2 Content, Arterial 21 7 mL/dL      O2 HGB,Arterial  89 9 %      SOURCE Radial, Left     UBALDO TEST Yes     ROOM AIR FIO2 21 %     Rapid drug screen, urine [[id number]]  (Normal) Collected:  07/03/19 1433    Lab Status:  Final result Specimen:  Urine, Catheter Updated:  07/03/19 1452     Amph/Meth UR Negative     Barbiturate Ur Negative     Benzodiazepine Urine Negative     Cocaine Urine Negative     Methadone Urine Negative     Opiate Urine Negative     PCP Ur Negative     THC Urine Negative    Narrative:       FOR MEDICAL PURPOSES ONLY  IF CONFIRMATION NEEDED PLEASE CONTACT THE LAB WITHIN 5 DAYS      Drug Screen Cutoff Levels:  AMPHETAMINE/METHAMPHETAMINES  1000 ng/mL  BARBITURATES     200 ng/mL  BENZODIAZEPINES     200 ng/mL  COCAINE      300 ng/mL  METHADONE      300 ng/mL  OPIATES      300 ng/mL  PHENCYCLIDINE     25 ng/mL  THC       50 ng/mL      Urine Microscopic [[id number]]  (Abnormal) Collected:  07/03/19 1323    Lab Status:  Final result Specimen:  Urine, Straight Cath Updated:  07/03/19 1413     RBC, UA 0-1 /hpf      WBC, UA None Seen /hpf      Epithelial Cells Occasional /hpf      Bacteria, UA None Seen /hpf     Troponin I [[id number]]  (Normal) Collected:  07/03/19 1334    Lab Status:  Final result Specimen:  Blood from Arm, Right Updated:  07/03/19 1400     Troponin I <0 02 ng/mL     UA w Reflex to Microscopic w Reflex to Culture [[id number]]  (Abnormal) Collected:  07/03/19 1323    Lab Status:  Final result Specimen:  Urine, Straight Cath Updated:  07/03/19 1352     Color, UA Yellow     Clarity, UA Clear     Specific Gravity, UA 1 025     pH, UA 6 0     Leukocytes, UA Negative     Nitrite, UA Negative     Protein,  (2+) mg/dl      Glucose, UA Negative mg/dl      Ketones, UA Negative mg/dl      Urobilinogen, UA 4 0 E U /dl      Bilirubin, UA Negative     Blood, UA Negative    Ethanol [[id number]]  (Normal) Collected:  07/03/19 1026    Lab Status:  Final result Specimen:  Blood from Arm, Right Updated:  07/03/19 1105     Ethanol Lvl <3 mg/dL     Comprehensive metabolic panel [[id number]]  (Abnormal) Collected:  07/03/19 1026    Lab Status:  Final result Specimen:  Blood from Arm, Right Updated:  07/03/19 1056     Sodium 133 mmol/L      Potassium 4 2 mmol/L      Chloride 97 mmol/L      CO2 30 mmol/L      ANION GAP 6 mmol/L      BUN 20 mg/dL      Creatinine 0 93 mg/dL      Glucose 109 mg/dL      Calcium 8 9 mg/dL      AST 27 U/L      ALT 34 U/L      Alkaline Phosphatase 78 U/L      Total Protein 6 4 g/dL      Albumin 3 2 g/dL      Total Bilirubin 1 30 mg/dL      eGFR 85 ml/min/1 73sq m     Narrative:       [place] guidelines for Chronic Kidney Disease (CKD):     Stage 1 with normal or high GFR (GFR > 90 mL/min/1 73 square meters)    Stage 2 Mild CKD (GFR = 60-89 mL/min/1 73 square meters)    Stage 3A Moderate CKD (GFR = 45-59 mL/min/1 73 square meters)    Stage 3B Moderate CKD (GFR = 30-44 mL/min/1 73 square meters)    Stage 4 Severe CKD (GFR = 15-29 mL/min/1 73 square meters)    Stage 5 End Stage CKD (GFR <15 mL/min/1 73 square meters)  Note: GFR calculation is accurate only with a steady state creatinine    Protime-INR [[id number]]  (Normal) Collected:  07/03/19 1026    Lab Status:  Final result Specimen:  Blood from Arm, Right Updated:  07/03/19 1045     Protime 13 6 seconds      INR 1 04    Blood gas, venous [[id number]]  (Abnormal) Collected:  07/03/19 1026 Lab Status:  Final result Specimen:  Blood from Arm, Right Updated:  07/03/19 1034     pH, [place] 7 362     pCO2, [place] 55 2 mm Hg      pO2, [place] 37 3 mm Hg      HCO3, [place] 30 6 mmol/L      Base Excess, Foster 3 5 mmol/L      O2 Content, Foster 15 9 ml/dL      O2 HGB, VENOUS 66 2 %     CBC and differential [[id number]]  (Abnormal) Collected:  07/03/19 1026    Lab Status:  Final result Specimen:  Blood from Arm, Right Updated:  07/03/19 1034     WBC 6 99 Thousand/uL      RBC 5 46 Million/uL      Hemoglobin 16 7 g/dL      Hematocrit 50 9 %      MCV 93 fL      MCH 30 6 pg      MCHC 32 8 g/dL      RDW 14 5 %      MPV 9 9 fL      Platelets 811 Thousands/uL      nRBC 0 /100 WBCs      Neutrophils Relative 76 %      Immat GRANS % 0 %      Lymphocytes Relative 11 %      Monocytes Relative 11 %      Eosinophils Relative 2 %      Basophils Relative 0 %      Neutrophils Absolute 5 27 Thousands/µL      Immature Grans Absolute 0 03 Thousand/uL      Lymphocytes Absolute 0 76 Thousands/µL      Monocytes Absolute 0 78 Thousand/µL      Eosinophils Absolute 0 14 Thousand/µL      Basophils Absolute 0 01 Thousands/µL                  XR chest 2 views   Final Result by Wilbert Saba MD (07/03 1303)      No acute cardiopulmonary disease  Workstation performed: [id number]         CT head without contrast   Final Result by Grazyna Lazo MD (07/03 1052)      No acute intracranial abnormality  Microangiopathic changes  Workstation performed: [id number]         MRI brain w wo contrast    (Results Pending)              Procedures  Procedures       ED Course                               MDM  Number of Diagnoses or Management Options  Altered mental status: new and requires workup  Multiple falls: new and requires workup  Diagnosis management comments: On arrival the patient is afebrile and hemodynamically stable  His neuro exam reveals some mild weakness to the left gisela body compared to the right, normal cerebellar testing  Patient does have a wet sounding cough  Chest x-ray with no focal consolidation to suggest pneumonia  Patient is mildly hypercarbic to 55, but has a normal pH, suspect that this is chronic with his history of COPD  CT head obtained with no acute intracranial abnormalities  CBC unremarkable, creatinine at baseline  No elevation in liver enzymes  Ethanol level normal     On reassessment, patient is now somnolent  He awakens to voice, but then falls asleep with questioning  He does desat to the high 80s when sleeping, but again has a history of COPD  He tells me that he has been way more tired than usual over the last few days  Multiple attempts were made to ambulate the patient, he is generally weak and unable to ambulate  This does reflect an acute change since he was discharged from the hospital, which warrants a 2nd medical admission for altered mental status, somnolence, and generalized weakness of so far unknown etiology  UA not indicative of UTI  I personally interpreted the patient's EKG, which reveals a rhythm that is likely AFib, with normal rate, frequent PVCs, new Q-wave in leads V2 in aVL, otherwise unchanged from prior  Patient denies chest pain or shortness of breath, however given EKG changes and generalized weakness, troponin was ordered which is undetectable  HEART score not calculated in the absence of chest pain  Amount and/or Complexity of Data Reviewed  Clinical lab tests: ordered and reviewed  Tests in the radiology section of CPT®: ordered and reviewed  Review and summarize past medical records: yes  Independent visualization of images, tracings, or specimens: yes    Patient Progress  Patient progress: stable           Disposition  Final diagnoses:    Altered mental status   Multiple falls     Time reflects when diagnosis was documented in both MDM as applicable and the Disposition within this note     Time User Action Codes Description Comment    7/3/2019  2:06 PM Amy Deiters Add [R41 82] Altered mental status     7/3/2019  2:06 PM Amy Deiters Add [R29 6] Multiple falls     7/3/2019  2:37 PM Jen Nydia Add [I65 29] Carotid stenosis       ED Disposition     ED Disposition Condition Date/Time Comment    Admit Stable Wed Jul 3, 2019  2:06 PM Case was discussed with MICHAEL and the patient's admission status was agreed to be Admission Status: observation status to the service of Dr Levi Bey   Follow-up Information    None         Patient's Medications   Discharge Prescriptions    No medications on file     No discharge procedures on file      ED Provider  Electronically Signed by           Eric Dumont MD  07/03/19 0957

## 2019-07-03 NOTE — ED NOTES
1  CC- altered mental status, multiple falls   2  Orientation status alert and oriented x 4   3  Abnormal labs/focused assessment/ vitals- L sided weakness, slight facial droop, sodium 133,   4  Medication/ drips  5  Narcotic time/ pain  6  IV lines/ drains/ etc- 18 R forearm, 22 R wrist   7  Isolation status- none  8  Skin  9  Ambulation status- bed   10   Phone number [phone]       Elizabeth Jones RN  07/03/19 2063

## 2019-07-03 NOTE — PLAN OF CARE
Problem: Potential for Falls  Goal: Patient will remain free of falls  Description  INTERVENTIONS:  - Assess patient frequently for physical needs  -  Identify cognitive and physical deficits and behaviors that affect risk of falls  -  [place] fall precautions as indicated by assessment   - Educate patient/family on patient safety including physical limitations  - Instruct patient to call for assistance with activity based on assessment  - Modify environment to reduce risk of injury  - Consider OT/PT consult to assist with strengthening/mobility  Outcome: Progressing     Problem: CARDIOVASCULAR - ADULT  Goal: Maintains optimal cardiac output and hemodynamic stability  Description  INTERVENTIONS:  - Monitor I/O, vital signs and rhythm  - Monitor for S/S and trends of decreased cardiac output i e  bleeding, hypotension  - Administer and titrate ordered vasoactive medications to optimize hemodynamic stability  - Assess quality of pulses, skin color and temperature  - Assess for signs of decreased coronary artery perfusion - ex   Angina  - Instruct patient to report change in severity of symptoms  Outcome: Progressing  Goal: Absence of cardiac dysrhythmias or at baseline rhythm  Description  INTERVENTIONS:  - Continuous cardiac monitoring, monitor vital signs, obtain 12 lead EKG if indicated  - Administer antiarrhythmic and heart rate control medications as ordered  - Monitor electrolytes and administer replacement therapy as ordered  Outcome: Progressing     Problem: MUSCULOSKELETAL - ADULT  Goal: Maintain or return mobility to safest level of function  Description  INTERVENTIONS:  - Assess patient's ability to carry out ADLs; assess patient's baseline for ADL function and identify physical deficits which impact ability to perform ADLs (bathing, care of mouth/teeth, toileting, grooming, dressing, etc )  - Assess/evaluate cause of self-care deficits   - Assess range of motion  - Assess patient's mobility; develop plan if impaired  - Assess patient's need for assistive devices and provide as appropriate  - Encourage maximum independence but intervene and supervise when necessary  - Involve family in performance of ADLs  - Assess for home care needs following discharge   - Request OT consult to assist with ADL evaluation and planning for discharge  - Provide patient education as appropriate  Outcome: Progressing  Goal: Maintain proper alignment of affected body part  Description  INTERVENTIONS:  - Support, maintain and protect limb and body alignment  - Provide pt/fam with appropriate education  Outcome: Progressing     Problem: PAIN - ADULT  Goal: Verbalizes/displays adequate comfort level or baseline comfort level  Description  Interventions:  - Encourage patient to monitor pain and request assistance  - Assess pain using appropriate pain scale  - Administer analgesics based on type and severity of pain and evaluate response  - Implement non-pharmacological measures as appropriate and evaluate response  - Consider cultural and social influences on pain and pain management  - Notify physician/advanced practitioner if interventions unsuccessful or patient reports new pain  Outcome: Progressing     Problem: INFECTION - ADULT  Goal: Absence or prevention of progression during hospitalization  Description  INTERVENTIONS:  - Assess and monitor for signs and symptoms of infection  - Monitor lab/diagnostic results  - Monitor all insertion sites, i e  indwelling lines, tubes, and drains  - [place] appropriate cooling/warming therapies per order  - Administer medications as ordered  - Instruct and encourage patient and family to use good hand hygiene technique  - Identify and instruct in appropriate isolation precautions for identified infection/condition   Outcome: Progressing     Problem: SAFETY ADULT  Goal: Maintain or return to baseline ADL function  Description  INTERVENTIONS:  -  Assess patient's ability to carry out ADLs; assess patient's baseline for ADL function and identify physical deficits which impact ability to perform ADLs (bathing, care of mouth/teeth, toileting, grooming, dressing, etc )  - Assess/evaluate cause of self-care deficits   - Assess range of motion  - Assess patient's mobility; develop plan if impaired  - Assess patient's need for assistive devices and provide as appropriate  - Encourage maximum independence but intervene and supervise when necessary  ¯ Involve family in performance of ADLs  ¯ Assess for home care needs following discharge   ¯ Request OT consult to assist with ADL evaluation and planning for discharge  ¯ Provide patient education as appropriate  Outcome: Progressing  Goal: Maintain or return mobility status to optimal level  Description  INTERVENTIONS:  - Assess patient's baseline mobility status (ambulation, transfers, stairs, etc )    - Identify cognitive and physical deficits and behaviors that affect mobility  - Identify mobility aids required to assist with transfers and/or ambulation (gait belt, sit-to-stand, lift, walker, cane, etc )  - [place] fall precautions as indicated by assessment  - Record patient progress and toleration of activity level on Mobility SBAR; progress patient to next Phase/Stage  - Instruct patient to call for assistance with activity based on assessment  - Request Rehabilitation consult to assist with strengthening/weightbearing, etc   Outcome: Progressing     Problem: DISCHARGE PLANNING  Goal: Discharge to home or other facility with appropriate resources  Description  INTERVENTIONS:  - Identify barriers to discharge w/patient and caregiver  - Arrange for needed discharge resources and transportation as appropriate  - Identify discharge learning needs (meds, wound care, etc )  - Refer to Case Management Department for coordinating discharge planning if the patient needs post-hospital services based on physician/advanced practitioner order or complex needs related to functional status, cognitive ability, or social support system   Outcome: Progressing     Problem: Knowledge Deficit  Goal: Patient/family/caregiver demonstrates understanding of disease process, treatment plan, medications, and discharge instructions  Description  Complete learning assessment and assess knowledge base    Interventions:  - Provide teaching at level of understanding  - Provide teaching via preferred learning methods  Outcome: Progressing

## 2019-07-03 NOTE — CONSULTS
Consultation - Vascular Surgery   Jessica Zarate 81 y o  male MRN: [id number]  Unit/Bed#: ED 18 Encounter: [id number]      Assessment/Plan   R Carotid artery stenosis, symptomatic  - CTA 6/28/19 showed right ICA 70% stenosis; CT Head wo contrast shows microangiopathic changes, but no acute intracranial abnormalities  - 6/28/19 VAS carotid complete study: shows extensive calcific shadowing plaque noted in the internal carotid artery, making visualization of blood flow difficult to evaluate  Plaque is  heterogenous and irregular   - patient is currently on ASA 81mg, atorvastatin 40mg, prophylactic enoxaparin   recent CVA/acute ischemic stroke status post tPA 6/25/19  -  recent admission for right embolic ischemic stroke now with increased dysarthria and AMS  Concern for recurrence of CVA  LUE 4/5, RUE 5/5, B/LE 5/5  - persistent left upper extremity weakness:   - MRI pending  Acute mental status change  Hypertension  Hyperlipidemia   - on atorvastatin 40mg  Coronary artery disease  - status post stent placement  Atrial fibrillation  - newly diagnosed, to start [facility] 7/5/19, given tPa 6/25/19  Tobacco abuse  COPD  Alcohol abuse    Plan:  -- will follow MRI  Patient may require surgical intervention during this stay  Recommend cardiology consultation for further testing and surgical clearance  continue with medical therapy of ASA 81 and high-dose statin  -- medical optimization of co morbidities including COPD  -- Will follow Neurology and Cardiology recommendations  ________________________________________________________________________    History of Present Illness  :  Jessica Zarate is a 81 y o  male with past medical history of HTN, HLD, CAD, COPD, tobacco abuse, alcohol abuse, and new onset of atrial fibrillation during last admission    He presents today with multiple falls over the last 2 days, however patient states that he has not been falling he just felt that every time he sat down he would slide out of his seat or not be able to find the right place to sit  He denies any increasing weakness or trouble walking, or instability at this time  He denies any confusion or tingling or numbness in extremities  He denies any chest pain, shortness of breath, or palpitations  Of note, patient is somewhat somnolent on interview  He does answer questions appropriately and does not require prompting however in between questions and conversations he seems to drift off  Patient was recently admitted last week with acute onset of left-sided facial droop and left upper extremity weakness, dysarthria and difficulty handling secretions  He was given tPA therapy on 6/25/2019 and symptoms were noted to have improved  Echo showed LVEF of 60% with no regional wall motion abnormalities  ELLY and cardioversion or scheduled for outpatient  VAS carotid complete study noted extensive calcific shadowing plaque in the right ICA which made visualization of blood flow difficult for evaluation  Plaque was noted to be heterogenous and irregular  Patient was recommended for discharge to rehab but per for discharge to home and home physical therapy was set up  Patient was scheduled to see his PCP this Friday  He is also scheduled to start anticoagulation at this time (10 days post tPA administration)  He does have smoking history, but states he quit on Monday  He has alcohol abuse history  States he has had a total of 3 beers since his discharge over the weekend  Physician Requesting Consult: Ching Encinas MD  Reason for Consult / Principal Problem: carotid artery stenosis    Inpatient consult to Vascular Surgery  Consult performed by: Ching Toure PA-C  Consult ordered by: Tylor Nelson MD        Review of Systems   Constitutional: Positive for fatigue  Negative for activity change, appetite change, chills and fever  Eyes: Negative for visual disturbance  Respiratory: Negative for apnea and shortness of breath  Cardiovascular: Positive for leg swelling  Negative for chest pain and palpitations  Gastrointestinal: Negative for abdominal distention, abdominal pain, nausea and vomiting  Neurological: Positive for speech difficulty and weakness  Negative for dizziness, syncope, facial asymmetry, light-headedness, numbness and headaches  Psychiatric/Behavioral: Negative for confusion  Historical Information   Past Medical History:   Diagnosis Date    [facility]ch[facility] obstructive [facility] disease[facility] )     Hyperlipidemia     Hypertension     Myocardial infarction ([facility] )     Pneumonia 2/2/2017    Pulmonary emphysema (HCC)     Shortness of breath      Past Surgical History:   Procedure Laterality Date    ANKLE SURGERY Right 1978    CAROTID STENT  02/13/2018    COLONOSCOPY      HAND SURGERY Left 1964    MA COLONOSCOPY FLX DX W/COLLJ SPEC WHEN PFRMD N/A 5/9/2017    Procedure: EGD AND COLONOSCOPY;  Surgeon: Geovanni Modi MD;  Location: [facility]; Service: Gastroenterology     Social History   Social History     Substance and Sexual Activity   Alcohol Use Yes    Alcohol/week: 7 0 - 21 0 standard drinks    Types: 7 - 21 Cans of beer per week    Comment: 1-3 daily      Social History     Substance and Sexual Activity   Drug Use No     Social History     Tobacco Use   Smoking Status Current Every Day Smoker    Packs/day: 0 50    Years: 45 00    Pack years: 22 50    Types: Cigarettes   Smokeless Tobacco Never Used   Tobacco Comment    last smoked 6/26     Family History: non-contributory    Meds/Allergies   current meds:   No current facility-administered medications for this encounter  No Known Allergies    Objective   Vitals: Blood pressure (!) 185/88, pulse 91, temperature 97 8 °F (36 6 °C), temperature source Oral, resp  rate (!) 26, weight 123 kg (271 lb 13 2 oz), SpO2 (!) 89 %  ,Body mass index is 35 86 kg/m²      Intake/Output Summary (Last 24 hours) at 7/3/2019 0977  Last data filed at 7/3/2019 1322  Gross per 24 hour   Intake    Output 950 ml   Net -950 ml     Invasive Devices     Peripheral Intravenous Line            Peripheral IV 07/03/19 Right Forearm less than 1 day    Peripheral IV 07/03/19 Right Wrist less than 1 day          Airway            Non-Surgical Airway Oral pharyngeal airway 90 mm 504 days                Physical Exam   Constitutional: He is oriented to person, place, and time  He appears well-developed  He is easily aroused  He has a sickly appearance  No distress  HENT:   Mouth/Throat: No oropharyngeal exudate  Eyes: Pupils are equal, round, and reactive to light  Right conjunctiva is injected  Left conjunctiva is injected  Scleral icterus is present  Neck: Trachea normal  Carotid bruit is not present  Decreased range of motion present  Cardiovascular: Normal pulses  An irregularly irregular rhythm present  Occasional extrasystoles are present  Exam reveals distant heart sounds  Pulses:       Radial pulses are 2+ on the right side, and 2+ on the left side  Dorsalis pedis pulses are 2+ on the right side, and 2+ on the left side  Posterior tibial pulses are 2+ on the right side, and 2+ on the left side  Pulmonary/Chest: No accessory muscle usage  No respiratory distress  He has rhonchi (diffuse)  Abdominal: Soft  Bowel sounds are normal  There is no tenderness  There is no rigidity and no guarding  Neurological: He is alert, oriented to person, place, and time and easily aroused     Strength: RUE 5/5, LUE 3/5, B/LE 5/5   b/l LE edema    Lab Results:   CBC with diff:   Lab Results   Component Value Date    WBC 6 99 07/03/2019    HGB 16 7 07/03/2019    HCT 50 9 (H) 07/03/2019    MCV 93 07/03/2019     07/03/2019    MCH 30 6 07/03/2019    MCHC 32 8 07/03/2019    RDW 14 5 07/03/2019    MPV 9 9 07/03/2019    NRBC 0 07/03/2019   , BMP/CMP:   Lab Results   Component Value Date    SODIUM 133 (L) 07/03/2019    K 4 2 07/03/2019    CL 97 (L) 07/03/2019    CO2 30 07/03/2019    BUN 20 07/03/2019    CREATININE 0 93 07/03/2019    CALCIUM 8 9 07/03/2019    AST 27 07/03/2019    ALT 34 07/03/2019    ALKPHOS 78 07/03/2019    EGFR 85 07/03/2019   , Coags:   Lab Results   Component Value Date    INR 1 04 07/03/2019    Urinalysis:   Lab Results   Component Value Date    COLORU Yellow 07/03/2019    CLARITYU Clear 07/03/2019    SPECGRAV 1 025 07/03/2019    PHUR 6 0 07/03/2019    LEUKOCYTESUR Negative 07/03/2019    NITRITE Negative 07/03/2019    GLUCOSEU Negative 07/03/2019    KETONESU Negative 07/03/2019    BILIRUBINUR Negative 07/03/2019    BLOODU Negative 07/03/2019     Imaging Studies: I have personally reviewed pertinent reports  EKG, Pathology, and Other Studies: I have personally reviewed pertinent reports      VTE Prophylaxis: Sequential compression device (Venodyne)  and Enoxaparin (Lovenox)     Neris Asencio PA-C  7/3/2019

## 2019-07-04 PROBLEM — I65.29 CAROTID ARTERY STENOSIS: Status: ACTIVE | Noted: 2019-07-04

## 2019-07-04 LAB
AMMONIA PLAS-SCNC: 17 UMOL/L (ref 11–35)
ANION GAP SERPL CALCULATED.3IONS-SCNC: 5 MMOL/L (ref 4–13)
BUN SERPL-MCNC: 19 MG/DL (ref 5–25)
CALCIUM SERPL-MCNC: 8.9 MG/DL (ref 8.3–10.1)
CHLORIDE SERPL-SCNC: 98 MMOL/L (ref 100–108)
CO2 SERPL-SCNC: 33 MMOL/L (ref 21–32)
CREAT SERPL-MCNC: 0.95 MG/DL (ref 0.6–1.3)
ERYTHROCYTE [DISTWIDTH] IN BLOOD BY AUTOMATED COUNT: 14.5 % (ref 11.6–15.1)
GFR SERPL CREATININE-BSD FRML MDRD: 82 ML/MIN/1.73SQ M
GLUCOSE SERPL-MCNC: 153 MG/DL (ref 65–140)
GLUCOSE SERPL-MCNC: 90 MG/DL (ref 65–140)
HCT VFR BLD AUTO: 51.6 % (ref 36.5–49.3)
HGB BLD-MCNC: 16.4 G/DL (ref 12–17)
MAGNESIUM SERPL-MCNC: 1.9 MG/DL (ref 1.6–2.6)
MCH RBC QN AUTO: 30.6 PG (ref 26.8–34.3)
MCHC RBC AUTO-ENTMCNC: 31.8 G/DL (ref 31.4–37.4)
MCV RBC AUTO: 96 FL (ref 82–98)
PLATELET # BLD AUTO: 211 THOUSANDS/UL (ref 149–390)
PMV BLD AUTO: 10 FL (ref 8.9–12.7)
POTASSIUM SERPL-SCNC: 4.5 MMOL/L (ref 3.5–5.3)
RBC # BLD AUTO: 5.36 MILLION/UL (ref 3.88–5.62)
SODIUM SERPL-SCNC: 136 MMOL/L (ref 136–145)
WBC # BLD AUTO: 6.54 THOUSAND/UL (ref 4.31–10.16)

## 2019-07-04 PROCEDURE — 83735 ASSAY OF MAGNESIUM: CPT | Performed by: FAMILY MEDICINE

## 2019-07-04 PROCEDURE — 85027 COMPLETE CBC AUTOMATED: CPT | Performed by: FAMILY MEDICINE

## 2019-07-04 PROCEDURE — 82948 REAGENT STRIP/BLOOD GLUCOSE: CPT

## 2019-07-04 PROCEDURE — 82140 ASSAY OF AMMONIA: CPT | Performed by: FAMILY MEDICINE

## 2019-07-04 PROCEDURE — 99222 1ST HOSP IP/OBS MODERATE 55: CPT | Performed by: INTERNAL MEDICINE

## 2019-07-04 PROCEDURE — 80048 BASIC METABOLIC PNL TOTAL CA: CPT | Performed by: FAMILY MEDICINE

## 2019-07-04 PROCEDURE — 99232 SBSQ HOSP IP/OBS MODERATE 35: CPT | Performed by: PHYSICIAN ASSISTANT

## 2019-07-04 PROCEDURE — 99232 SBSQ HOSP IP/OBS MODERATE 35: CPT | Performed by: STUDENT IN AN ORGANIZED HEALTH CARE EDUCATION/TRAINING PROGRAM

## 2019-07-04 RX ADMIN — LOSARTAN POTASSIUM 100 MG: 50 TABLET, FILM COATED ORAL at 09:41

## 2019-07-04 RX ADMIN — TIOTROPIUM BROMIDE 18 MCG: 18 CAPSULE ORAL; RESPIRATORY (INHALATION) at 09:43

## 2019-07-04 RX ADMIN — PANTOPRAZOLE SODIUM 40 MG: 40 TABLET, DELAYED RELEASE ORAL at 05:47

## 2019-07-04 RX ADMIN — BUDESONIDE AND FORMOTEROL FUMARATE DIHYDRATE 2 PUFF: 80; 4.5 AEROSOL RESPIRATORY (INHALATION) at 17:39

## 2019-07-04 RX ADMIN — ATORVASTATIN CALCIUM 40 MG: 40 TABLET, FILM COATED ORAL at 09:41

## 2019-07-04 RX ADMIN — NICOTINE 1 PATCH: 21 PATCH TRANSDERMAL at 00:26

## 2019-07-04 RX ADMIN — ENOXAPARIN SODIUM 40 MG: 40 INJECTION SUBCUTANEOUS at 09:41

## 2019-07-04 RX ADMIN — ALBUTEROL SULFATE 1 PUFF: 90 AEROSOL, METERED RESPIRATORY (INHALATION) at 21:42

## 2019-07-04 RX ADMIN — BUDESONIDE AND FORMOTEROL FUMARATE DIHYDRATE 2 PUFF: 80; 4.5 AEROSOL RESPIRATORY (INHALATION) at 09:43

## 2019-07-04 RX ADMIN — ASPIRIN 81 MG: 81 TABLET, COATED ORAL at 09:41

## 2019-07-04 RX ADMIN — NICOTINE 1 PATCH: 21 PATCH TRANSDERMAL at 19:34

## 2019-07-04 RX ADMIN — METOPROLOL TARTRATE 25 MG: 25 TABLET, FILM COATED ORAL at 09:41

## 2019-07-04 RX ADMIN — AMLODIPINE BESYLATE 5 MG: 5 TABLET ORAL at 09:41

## 2019-07-04 NOTE — PROGRESS NOTES
Progress Note - Gretchen Rowland 1950, 81 y o  male MRN: [id number]    Unit/Bed#: -01 Encounter: [id number]    Primary Care Provider: Arturo Bello DO   Date and time admitted to hospital: 7/3/2019  9:48 AM        * Acute metabolic encephalopathy  Assessment & Plan  Patient presented lethargic  Currently awake, alert, oriented at baseline  Frustrated for being in the hospital again  Wants to be discharg as soon as possible  Symptoms likely due to sleep-wake cycle disturbance as well as worsening of previous stroke symptoms per Neurology  Repeat MRI brain shows right MCA distribution now subacute similar to previous imaging finding  Previous CTA head showed on significant right carotid artery stenosis  Per Neurology recommendation noted to continue aspirin, statin, anticoagulation  Vascular surgery consultation for carotid artery disease  Vascular surgery planning surgical intervention for carotid artery disease pending Cardiology clearance  Will hold off on starting on anticoagulation pending vascular surgical intervention  PT eval  Better sleep-wake cycle and sleep hygiene  Carotid artery stenosis  Assessment & Plan  CTA 06/28 shows significant right ICA stenosis of 70%  Vascular surgery recommendation noted  Planning for vascular intervention once cleared by Cardiology  Atrial fibrillation ([address] )  Assessment & Plan  Currently normal sinus rhythm with intermittent PVCs  Neurology had cleared patient to start on anticoagulation  History of previous CVA status post tPA  Plan was to start Eliquis on 07/05/2019  Will hold off on starting on anticoagulation for now, pending planned vascular surgical intervention for severe carotid artery disease  Alcohol abuse  Assessment & Plan  Patient's alcohol level in his normal here  Will put him on some vitamin supplements  CVA (cerebral vascular accident) [facility])  Assessment & Plan  Patient was discharged last week with stroke s/p tPA  MRI shows right MCA distribution of subacute similar to prior imaging  Neurology cleared patient to start on anticoagulation in settings of AFib for stroke prevention  Continue aspirin, statin, PT eval  Vascular surgery consult for carotid disease    Hyperlipidemia  Assessment & Plan  Continue statin    COPD (chronic obstructive pulmonary disease) ([facility] )  Assessment & Plan  Patient has COPD  Currently not in acute exacerbation  Not in acute respiratory distress  O2 saturation 94-95% on room air  VTE Pharmacologic Prophylaxis:  Pharmacologic:  Lovenox    Patient Centered Rounds: I have performed bedside rounds with nursing staff today  Discussions with Specialists or Other Care Team Provider:  Neurology    Education and Discussions with Family / Patient:  Patient    Time Spent for Care: 30 minutes  More than 50% of total time spent on counseling and coordination of care as described above  Current Length of Stay: 0 day(s)    Current Patient Status: Observation   Certification Statement: The patient will continue to require additional inpatient hospital stay due to Vascular surgery planning for surgical intervention for right carotid artery stenosis awaiting cardiology clearance    Discharge Plan: tbd    Code Status: Level 3 - DNAR and DNI      Subjective:   Currently awake, alert, oriented at baseline  Currently patient reports feeling much better  He states that he was tired yesterday  Patient appears frustrated for being in the hospital and reports that "I want out of here"  Reports that his speech and weakness have returned to his baseline  No other new complaints  Objective:     Vitals:   Temp (24hrs), Av 7 °F (36 5 °C), Min:96 3 °F (35 7 °C), Max:98 4 °F (36 9 °C)    Temp:  [96 3 °F (35 7 °C)-98 4 °F (36 9 °C)] 98 4 °F (36 9 °C)  HR:  [41-99] 55  Resp:  [18-22] 18  BP: (122-184)/(59-90) 129/77  SpO2:  [92 %-97 %] 94 %  Body mass index is 35 78 kg/m²       Input and Output Summary (last 24 hours): Intake/Output Summary (Last 24 hours) at 7/4/2019 1604  Last data filed at 7/4/2019 1100  Gross per 24 hour   Intake    Output 700 ml   Net -700 ml       Physical Exam:     Physical Exam   Constitutional: No distress  Chronically ill appearing, nontoxic appearing   HENT:   Head: Normocephalic and atraumatic  Eyes: Pupils are equal, round, and reactive to light  EOM are normal    Neck: Normal range of motion  Neck supple  Cardiovascular: Normal rate and regular rhythm  Pulmonary/Chest: Effort normal and breath sounds normal  No stridor  No respiratory distress  Abdominal: Soft  Bowel sounds are normal  He exhibits no distension  There is no tenderness  There is no guarding  Musculoskeletal: Normal range of motion  He exhibits edema  Neurological: He is alert  Awake, alert, oriented, at baseline  Frustrated about being in the hospital again         Additional Data:     Labs:    Results from last 7 days   Lab Units 07/04/19  0454 07/03/19  1026   WBC Thousand/uL 6 54 6 99   HEMOGLOBIN g/dL 16 4 16 7   HEMATOCRIT % 51 6* 50 9*   PLATELETS Thousands/uL 211 211   NEUTROS PCT %  --  76*   LYMPHS PCT %  --  11*   MONOS PCT %  --  11   EOS PCT %  --  2     Results from last 7 days   Lab Units 07/04/19  0454 07/03/19  1026   SODIUM mmol/L 136 133*   POTASSIUM mmol/L 4 5 4 2   CHLORIDE mmol/L 98* 97*   CO2 mmol/L 33* 30   BUN mg/dL 19 20   CREATININE mg/dL 0 95 0 93   ANION GAP mmol/L 5 6   CALCIUM mg/dL 8 9 8 9   ALBUMIN g/dL  --  3 2*   TOTAL BILIRUBIN mg/dL  --  1 30*   ALK PHOS U/L  --  78   ALT U/L  --  34   AST U/L  --  27   GLUCOSE RANDOM mg/dL 153* 109     Results from last 7 days   Lab Units 07/03/19  1026   INR  1 04     Results from last 7 days   Lab Units 07/04/19  0337 06/29/19  1133 06/29/19  0747 06/28/19  2100 06/28/19  1611 06/28/19  1100 06/28/19  0707 06/27/19  2117 06/27/19  1708   POC GLUCOSE mg/dl 90 157* 124 154* 133 147* 154* 154* 128                   * I Have Reviewed All Lab Data Listed Above  * Additional Pertinent Lab Tests Reviewed: All Labs Within Last 24 Hours Reviewed    Imaging:    Imaging Reports Reviewed Today Include:  MRI brain report noted  Recent Cultures (last 7 days):           Last 24 Hours Medication List:     Current Facility-Administered Medications:  acetaminophen 650 mg Oral Q6H PRN Kevin Daley MD   albuterol 1 puff Inhalation Q6H PRN Kevin Daley MD   amLODIPine 5 mg Oral Daily Kevin Daley MD   aspirin 81 mg Oral Daily Kevin Daley MD   atorvastatin 40 mg Oral Daily Kevin Daley MD   budesonide-formoterol 2 puff Inhalation BID Kevin Daley MD   enoxaparin 40 mg Subcutaneous Daily Kevin Daley MD   hydrALAZINE 5 mg Intravenous Q6H PRN Salvador Ho MD   losartan 100 mg Oral Daily Kevin Daley MD   metoprolol tartrate 25 mg Oral Q12H [address] Kevin Daley MD   nicotine 1 patch Transdermal Q24H Kevin Daley MD   pantoprazole 40 mg Oral Early Morning Kevin Daley MD   tiotropium 18 mcg Inhalation Daily Kevin Daley MD        Today, Patient Was Seen By: Artem Lew MD    ** Please Note: Dictation voice to text software may have been used in the creation of this document   **

## 2019-07-04 NOTE — ASSESSMENT & PLAN NOTE
CTA 06/28 shows significant right ICA stenosis of 70%  Vascular surgery recommendation noted  Planning for vascular intervention once cleared by Cardiology

## 2019-07-04 NOTE — CONSULTS
Consultation - Cardiology   Lida Ackerman 81 y o  male MRN: [id number]  Unit/Bed#: -01 Encounter: [id number]  07/04/19  11:56 AM    Assessment/ Plan:  1- Preoperative cardiac risk assessment  Patient does not possess any absolute contraindications for right carotid endarterectomy procedure such as ACS, acute CHF, severe symptomatic aortic stenosis and/or life-threatening arrhythmias  Difficult to assess functional capacity given patient with gait instability and left-sided weakness  Patient is deemed moderate risk of perioperative cardiac event given comorbidities  Perioperative use of beta-blocker strongly recommended  No further cardiac workup is necessary at this time  2- atrial fibrillation, rates adequetely controlled  Continue Lopressor 25 mg BID  Plan to initiate NOAC on 7/5/2019 s/p TpA on 6/25/2019  Recent echocardiogram from 06/26/2019 shows EF 60% without regional wall motion abnormalities, mild LVH, moderate LA dilation, mild RA dilation, trace MR      3- CAD, RCA   Stable without chest pain or anginal equivalent  Patient had recent cardiac catheterization on 01/31/2018 which showed noncritical 50% stenosis of mid LAD with RCA  with unsuccessful PCI to repair on 02/14/2018  Continue medications  4- hypertension  Stable, continue medications    5- hyperlipidemia  Continue statin    6- tobacco abuse  Strongly encouraged to quit  History of Present Illness   Physician Requesting Consult: Clayton James MD  Reason for Consult / Principal Problem:  Preoperative cardiac risk assessment  HPI: Lida Ackerman is a 81y o  year old male who presents with multiple falls over last 48 hours  Patient was discharged for CVA with left-sided facial droop and was given tPA on 06/25/2019  He was also found to be in atrial fibrillation during that time which has been rate controlled prior to discharge  He was set to initiate anticoagulation therapy 10 days post tPA    Patient states his left side feels weak any feels unsteady on his feet  Patient denies any loss of consciousness, near syncope or syncopal events  He denies any chest pain or discomfort, palpitations, shortness of breath, pain or swelling in the extremities since discharge  Patient presents for preoperative cardiac risk assessment for right carotid endarterectomy  Inpatient consult to Cardiology  Consult performed by: Glenys Cummings PA-C  Consult ordered by: Noni Umana PA-C          EKG:  Atrial fibrillation with premature or aberrantly conducted complexes, septal infarct age undetermined  Review of Systems   Constitutional: Positive for fatigue  Negative for appetite change, chills, diaphoresis and fever  Respiratory: Negative for cough, chest tightness and shortness of breath  Cardiovascular: Negative for chest pain, palpitations and leg swelling  Gastrointestinal: Negative for diarrhea, nausea and vomiting  Endocrine: Negative for cold intolerance and heat intolerance  Genitourinary: Negative for difficulty urinating, dysuria and enuresis  Musculoskeletal: Positive for gait problem  Negative for arthralgias and back pain  Allergic/Immunologic: Negative for environmental allergies and food allergies  Neurological: Positive for speech difficulty and weakness  Negative for dizziness, facial asymmetry and headaches  Hematological: Negative for adenopathy  Does not bruise/bleed easily  Psychiatric/Behavioral: Negative for agitation, behavioral problems and confusion         Historical Information   Past Medical History:   Diagnosis Date    COPD (chronic obstructive pulmonary disease) ([address]     Hyperlipidemia     Hypertension     Myocardial infarction ([address] )     Pneumonia 2/2/2017    Pulmonary emphysema (HCC)     Shortness of breath      Past Surgical History:   Procedure Laterality Date    ANKLE SURGERY Right 1978    CAROTID STENT  02/13/2018    COLONOSCOPY      HAND SURGERY Left 1964    FL COLONOSCOPY FLX DX W/COLLJ SPEC WHEN PFRMD N/A 5/9/2017    Procedure: EGD AND COLONOSCOPY;  Surgeon: Reggie Geiger MD;  Location: [facility];   Service: Gastroenterology     Social History     Substance and Sexual Activity   Alcohol Use Yes    Alcohol/week: 7 0 - 21 0 standard drinks    Types: 7 - 21 Cans of beer per week    Comment: 1-3 daily      Social History     Substance and Sexual Activity   Drug Use No     Social History     Tobacco Use   Smoking Status Current Every Day Smoker    Packs/day: 0 50    Years: 45 00    Pack years: 22 50    Types: Cigarettes   Smokeless Tobacco Never Used   Tobacco Comment    last smoked 6/26       Family History:   Family History   Problem Relation Age of Onset    Hypertension Mother     Emphysema Father     Hypertension Father     Arthritis Brother     Diabetes Brother     Other Other         Cardiac Disorder    Hypertension Other     Arrhythmia Neg Hx     Anuerysm Neg Hx     Asthma Neg Hx     Clotting disorder Neg Hx     Fainting Neg Hx        Meds/Allergies   current meds:   Current Facility-Administered Medications   Medication Dose Route Frequency    acetaminophen (TYLENOL) tablet 650 mg  650 mg Oral Q6H PRN    albuterol (PROVENTIL HFA,VENTOLIN HFA) inhaler 1 puff  1 puff Inhalation Q6H PRN    amLODIPine (NORVASC) tablet 5 mg  5 mg Oral Daily    aspirin (ECOTRIN LOW STRENGTH) EC tablet 81 mg  81 mg Oral Daily    atorvastatin (LIPITOR) tablet 40 mg  40 mg Oral Daily    budesonide-formoterol (SYMBICORT) 80-4 5 MCG/ACT inhaler 2 puff  2 puff Inhalation BID    enoxaparin (LOVENOX) subcutaneous injection 40 mg  40 mg Subcutaneous Daily    hydrALAZINE (APRESOLINE) injection 5 mg  5 mg Intravenous Q6H PRN    losartan (COZAAR) tablet 100 mg  100 mg Oral Daily    metoprolol tartrate (LOPRESSOR) tablet 25 mg  25 mg Oral Q12H [facility]    nicotine (NICODERM CQ) 21 mg/24 hr TD 24 hr patch 1 patch  1 patch Transdermal Q24H    pantoprazole (PROTONIX) EC tablet 40 mg  40 mg Oral Early Morning    tiotropium (SPIRIVA) capsule for inhaler 18 mcg  18 mcg Inhalation Daily     No Known Allergies    Objective   Vitals: Blood pressure 129/77, pulse 55, temperature 98 4 °F (36 9 °C), resp  rate 18, height 6' 1" (1 854 m), weight 123 kg (271 lb 13 2 oz), SpO2 94 %  , Body mass index is 35 78 kg/m² ,   Orthostatic Blood Pressures      Most Recent Value   Blood Pressure  129/77 filed at 07/04/2019 1107   Patient Position - Orthostatic VS  Lying filed at 07/03/2019 4184          Systolic (16UPJ), XLO:105 , Min:119 , DXT:961     Diastolic (34UHI), SJN:47, Min:59, Max:109        Intake/Output Summary (Last 24 hours) at 7/4/2019 1156  Last data filed at 7/4/2019 1100  Gross per 24 hour   Intake    Output 1775 ml   Net -1775 ml       Invasive Devices     Peripheral Intravenous Line            Peripheral IV 07/03/19 Right Forearm 1 day    Peripheral IV 07/03/19 Right Wrist less than 1 day          Airway            Non-Surgical Airway Oral pharyngeal airway 90 mm 504 days                    Physical Exam:  GEN: Alert and oriented x 3, in no acute distress  Well appearing and well nourished  HEENT: Sclera anicteric, conjunctivae pink, mucous membranes moist  Oropharynx clear  NECK: Supple, no carotid bruits, no significant JVD  Trachea midline, no thyromegaly  HEART: +irregular rhythm, normal S1 and S2, no murmurs, clicks, gallops or rubs  PMI nondisplaced, no thrills  LUNGS: Clear to auscultation bilaterally; no wheezes, rales, or rhonchi  No increased work of breathing or signs of respiratory distress  ABDOMEN: Soft, nontender, nondistended, normoactive bowel sounds  EXTREMITIES: Skin warm and well perfused, no clubbing, cyanosis, or edema  NEURO: + left-sided facial droop, slurred speech  SKIN: Normal without suspicious lesions on exposed skin        Lab Results:     Troponins:   Results from last 7 days   Lab Units 07/03/19  1334   TROPONIN I ng/mL <0 02       CBC with diff:   Results from last 7 days   Lab Units 07/04/19  0454 07/03/19  1026 06/28/19  0508   WBC Thousand/uL 6 54 6 99 9 28   HEMOGLOBIN g/dL 16 4 16 7 15 9   HEMATOCRIT % 51 6* 50 9* 48 9   MCV fL 96 93 94   PLATELETS Thousands/uL 211 211 204   MCH pg 30 6 30 6 30 6   MCHC g/dL 31 8 32 8 32 5   RDW % 14 5 14 5 14 4   MPV fL 10 0 9 9 9 5   NRBC AUTO /100 WBCs  --  0  --          CMP:   Results from last 7 days   Lab Units 07/04/19  0454 07/03/19  1026 06/28/19  0508   POTASSIUM mmol/L 4 5 4 2 4 8   CHLORIDE mmol/L 98* 97* 96*   CO2 mmol/L 33* 30 30   BUN mg/dL 19 20 20   CREATININE mg/dL 0 95 0 93 0 96   CALCIUM mg/dL 8 9 8 9 9 2   AST U/L  --  27  --    ALT U/L  --  34  --    ALK PHOS U/L  --  78  --    EGFR ml/min/1 73sq m 82 85 81

## 2019-07-04 NOTE — PROGRESS NOTES
Progress Note - Neurology   Clebaron Raw 81 y o  male MRN: [id number]  Unit/Bed#: -01 Encounter: [id number]    Assessment:  37-year-old male past medical history of hypertension, coronary artery disease, recent admission for right embolic appearing ischemic stroke, atrial fibrillation and right carotid stenosis who presents with altered mental status as well as increased dysarthria  Plan:  1  Altered mental status and increased dysarthria    - Markedly improved today and appears to be at baseline  Suspect sleep-wake cycle disturbance and worsening of prior stroke symptoms in setting of this  - MRI brain completed and no acute infarcts noted  - Continue on ASA 81 mg QD and atorvastatin 40 mg QPM     - Cleared from neurology standpoint to start NOAC for stroke prevention in setting of atrial fibrillation     - Goal normotension, normothermia and euglycemia     - PT/OT- patient would likely benefit from rehab placement  - Stroke education     - Patient will need outpatient neurology follow-up  Office has been contacted to request appointment  2  Recent embolic R ischemic infarcts    - Given appearance of infarcts, possibly secondary to symptoms R carotid stenosis  Await further evaluation with vascular surgery team      3  HTN    - Goal normotension     - Antihypertensives as per medicine team     4  Atrial fibrillation   - Cleared from neurology standpoint to initiate NOAC at this time  MRI brain completed and no evidence of acute infarcts     - Rate control as per cardiology team      5  R carotid stenosis    - Vascular surgery eval pending  6  History of ETOH abuse    - Monitor for symptoms of alcohol withdrawal      Subjective:   Patient notes he feels significantly improved compared to yesterday and notes that yesterday he was simply tired and that is why he appeared so lethargic  He notes he is frustrated being in the hospital again and is anxious to go home   He notes that his speech and LUE weakness has returned to his baseline  ROS:  History obtained from the patient  General ROS: negative for - chills, fever, fatigue  Ophthalmic ROS: negative for - blurry vision, decreased vision or double vision  Respiratory ROS: negative for - shortness of breath  Cardiovascular ROS: negative for - chest pain  Gastrointestinal ROS: negative for - abdominal pain or nausea/vomiting  Musculoskeletal ROS: negative for - muscular weakness  Neurological ROS: negative for - confusion, dizziness, headaches, numbness/tingling, speech problems, visual changes or weakness    Medications  Scheduled Meds:  Current Facility-Administered Medications:  acetaminophen 650 mg Oral Q6H PRN Samreen Mckeon MD   albuterol 1 puff Inhalation Q6H PRN Samreen Mckeon MD   amLODIPine 5 mg Oral Daily Samreen Mckeon MD   aspirin 81 mg Oral Daily Samreen Mckeon MD   atorvastatin 40 mg Oral Daily Samreen Mckeon MD   budesonide-formoterol 2 puff Inhalation BID Samreen Mckeon MD   enoxaparin 40 mg Subcutaneous Daily Samreen Mckeon MD   hydrALAZINE 5 mg Intravenous Q6H PRN Rosibel Hilton MD   losartan 100 mg Oral Daily Samreen Mckeon MD   metoprolol tartrate 25 mg Oral Q12H [address] Samreen Mckeon MD   nicotine 1 patch Transdermal Q24H Samreen Mckeon MD   pantoprazole 40 mg Oral Early Morning Samreen Mckeon MD   tiotropium 18 mcg Inhalation Daily Samreen Mckeon MD     Continuous Infusions:   PRN Meds:   acetaminophen    albuterol    hydrALAZINE    Vitals: Blood pressure 122/75, pulse 91, temperature 97 7 °F (36 5 °C), resp  rate 18, height 6' 1" (1 854 m), weight 123 kg (271 lb 13 2 oz), SpO2 95 %  ,Body mass index is 35 78 kg/m²      Physical Exam: /77   Pulse 55   Temp 98 4 °F (36 9 °C)   Resp 18   Ht 6' 1" (1 854 m)   Wt 123 kg (271 lb 13 2 oz)   SpO2 94%   BMI 35 78 kg/m²   General appearance: alert and oriented, in no acute distress  Head: Normocephalic, without obvious abnormality, atraumatic  Eyes: negative findings: lids and lashes normal, conjunctivae and sclerae normal and pupils equal, round, reactive to light and accomodation  Lungs: clear to auscultation bilaterally  Heart: irregularly irregular rhythm  Abdomen: soft, non-tender; bowel sounds normal; no masses,  no organomegaly  Extremities: extremities normal, warm and well-perfused; no cyanosis, clubbing, or edema and areas of abrasion B/L LE  Skin: Skin color, texture, turgor normal  No rashes or lesions  Neurologic: Mental status: Alert, oriented, thought content appropriate, speech mildly dysarthric at times but overall speech significantly improved compared to exam completed yesterday  Cranial nerves: II: pupils equal, round, reactive to light and accommodation, III,VII: ptosis no ptosis noted, III,IV,VI: extraocular muscles extra-ocular motions intact, VII: upper facial muscle function normal bilaterally, VII: lower facial muscle function normal bilaterally, XII: tongue strength normal  Sensory: normal, Grossly intact to crude touch  Motor: Motor strength 5/5 RUE and B/L LE, LUE 4+/5  Coordination: finger to nose normal bilaterally, slightly clumsier on LUE      Labs  Recent Results (from the past 24 hour(s))   UA w Reflex to Microscopic w Reflex to Culture    Collection Time: 07/03/19  1:23 PM   Result Value Ref Range    Color, UA Yellow     Clarity, UA Clear     Specific Gravity, UA 1 025 1 003 - 1 030    pH, UA 6 0 4 5, 5 0, 5 5, 6 0, 6 5, 7 0, 7 5, 8 0    Leukocytes, UA Negative Negative    Nitrite, UA Negative Negative    Protein,  (2+) (A) Negative mg/dl    Glucose, UA Negative Negative mg/dl    Ketones, UA Negative Negative mg/dl    Urobilinogen, UA 4 0 (A) 0 2, 1 0 E U /dl E U /dl    Bilirubin, UA Negative Negative    Blood, UA Negative Negative   Urine Microscopic    Collection Time: 07/03/19  1:23 PM   Result Value Ref Range    RBC, UA 0-1 (A) None Seen, 0-5 /hpf    WBC, UA None Seen None Seen, 0-5, 5-55, 5-65 /hpf    Epithelial Cells Occasional None Seen, Occasional /hpf Bacteria, UA None Seen None Seen, Occasional /hpf   Troponin I    Collection Time: 07/03/19  1:34 PM   Result Value Ref Range    Troponin I <0 02 <=0 04 ng/mL   Rapid drug screen, urine    Collection Time: 07/03/19  2:33 PM   Result Value Ref Range    Amph/Meth UR Negative Negative    Barbiturate Ur Negative Negative    Benzodiazepine Urine Negative Negative    Cocaine Urine Negative Negative    Methadone Urine Negative Negative    Opiate Urine Negative Negative    PCP Ur Negative Negative    THC Urine Negative Negative   Blood gas, arterial    Collection Time: 07/03/19  3:14 PM   Result Value Ref Range    pH, Arterial 7 399 7 350 - 7 450    pCO2, Arterial 46 8 (H) 36 0 - 44 0 mm Hg    pO2, Arterial 63 8 (L) 75 0 - 129 0 mm Hg    HCO3, Arterial 28 3 (H) 22 0 - 28 0 mmol/L    Base Excess, Arterial 2 6 mmol/L    O2 Content, Arterial 21 7 16 0 - 23 0 mL/dL    O2 HGB,Arterial  89 9 (L) 94 0 - 97 0 %    SOURCE Radial, Left     UBALDO TEST Yes     ROOM AIR FIO2 21 %   Fingerstick Glucose (POCT)    Collection Time: 07/04/19  3:37 AM   Result Value Ref Range    POC Glucose 90 65 - 140 mg/dl   Ammonia    Collection Time: 07/04/19  4:54 AM   Result Value Ref Range    Ammonia 17 11 - 35 umol/L   Basic metabolic panel    Collection Time: 07/04/19  4:54 AM   Result Value Ref Range    Sodium 136 136 - 145 mmol/L    Potassium 4 5 3 5 - 5 3 mmol/L    Chloride 98 (L) 100 - 108 mmol/L    CO2 33 (H) 21 - 32 mmol/L    ANION GAP 5 4 - 13 mmol/L    BUN 19 5 - 25 mg/dL    Creatinine 0 95 0 60 - 1 30 mg/dL    Glucose 153 (H) 65 - 140 mg/dL    Calcium 8 9 8 3 - 10 1 mg/dL    eGFR 82 ml/min/1 73sq m   CBC (With Platelets)    Collection Time: 07/04/19  4:54 AM   Result Value Ref Range    WBC 6 54 4 31 - 10 16 Thousand/uL    RBC 5 36 3 88 - 5 62 Million/uL    Hemoglobin 16 4 12 0 - 17 0 g/dL    Hematocrit 51 6 (H) 36 5 - 49 3 %    MCV 96 82 - 98 fL    MCH 30 6 26 8 - 34 3 pg    MCHC 31 8 31 4 - 37 4 g/dL    RDW 14 5 11 6 - 15 1 % Platelets 729 772 - 399 Thousands/uL    MPV 10 0 8 9 - 12 7 fL   Magnesium    Collection Time: 07/04/19  4:54 AM   Result Value Ref Range    Magnesium 1 9 1 6 - 2 6 mg/dL     Imaging   Personally reviewed images and reports in PACs  MRI brain without contrast: Right MCA distribution subacute infarcts similar to prior exam June 26, 2019  VTE Prophylaxis: Enoxaparin (Lovenox)    Counseling / Coordination of Care  Total time spent today 25 minutes  Greater than 50% of total time was spent with the patient and / or family counseling and / or coordination of care  A description of the counseling / coordination of care: Discussed plan of care with patient at bedside  Will await vascular surgery eval  Agree with anticoagulation for atrial fibrillation   Patient will need eventual outpatient neurologist

## 2019-07-04 NOTE — ASSESSMENT & PLAN NOTE
Patient was discharged last week with stroke s/p tPA  MRI shows right MCA distribution of subacute similar to prior imaging  Neurology cleared patient to start on anticoagulation in settings of AFib for stroke prevention    Continue aspirin, statin, PT eval  Vascular surgery consult for carotid disease

## 2019-07-04 NOTE — PROGRESS NOTES
Patient found on bed at first round  Body cool, /90 manual, with 96 3 F, rectal   Patient very lethargic, unable to follow command  Patient cleaned and multiple warm blanket applied  At 2300 on 7/3/2019, patient body felt  Warm to touch, temp is 98 4F, pt still very lethargic   0200: Total change in pt's condition  Patient woke up to call, able to state name, location and who is the president  Denies feeling pain  Able to follow command, weakness noticed on left arm  Able to squeeze my arms with let and right hands  Again, weakness on left upper extremity  Able to lift left and right lower extremities  Very slight slurred on speech  Lungs revealed a coarse sound  Multiple moist cough with excessive drooling and thick white sputum expectorated  No signs or symptoms of distress at this moment  Will continue to monitor

## 2019-07-04 NOTE — UTILIZATION REVIEW
Initial Clinical Review    Admission: Date/Time/Statement: 7/3 @ 1407 OBSERVATION  Changed to INPATIENT 7/4  @ 1608 due to  symptomatic Right carotid artery stenosis     07/04/19 1608  Inpatient Admission Once     Transfer Service: Hospitalist       Question Answer Comment   Admitting Physician Mali Mosher V    Level of Care Med Surg    Estimated length of stay More than 2 Midnights    Certification I certify that inpatient services are medically necessary for this patient for a duration of greater than two midnights  See H&P and MD Progress Notes for additional information about the patient's course of treatment  ED Arrival Information     Expected Arrival Acuity Means of Arrival Escorted By Service Admission Type    - 7/3/2019 09:47 Emergent Ambulance [address] Emergency    Arrival Complaint    -        Chief Complaint   Patient presents with   Jean Marieodilon Munoz Multiple Falls     pt fell twice this morning  was seen here recently for stroke and signed out AMA  left sided weakness is causing him to fall  pt is on eliquis  denies any head injury     Assessment/Plan:  82 yo male presents to ED via EMS from home due to multiple falls over past 48 hours and c/o weakness  Pt became more somnolent in ED and unable to ambulate  Increased dysarthria  PMH significant for MCA stroke 6/29/19 treated with TPA  Resultant left side weakness and left facial droop  Other medical history includes rate controlled AFib, CAD, hypertension, hyperlipidemia, and COPD on chronic steroids  Pt admitted as OBSERVATION to med surg for  Acute encephalopathy in setting of recent CVA    Neurology consulted and recommends repeat MRI brain to assess for additive stroke and vascular surgery consult  secondary to right internal carotid artery extensive plaque  7/4 Vascular surgery consult notes Symptomatic right carotid stenosis 70% with irregular calcified plaque at the right carotid bifurcation   Patient is readmitted with progressive decline and metabolic encephalopathy  Will plan for right carotid intervention once acute issues resolved  Please obtain cardiology risk stratification for carotid intervention  7/4 cardiology consult  Patient has no active cardiac conditions (such as unstable cardiac syndrome, decompensated heart failure, significant arrhythmias, severe valvular disease)  Patient is at least a moderate cardiac risk patient going in for a carotid surgery  No cardiac contraindications for surgery  Perioperative use of beta-blocker is highly recommended  ED Triage Vitals [07/03/19 0953]   Temperature Pulse Respirations Blood Pressure SpO2   97 8 °F (36 6 °C) (!) 50 16 160/75 96 %      Temp Source Heart Rate Source Patient Position - Orthostatic VS BP Location FiO2 (%)   Oral Monitor Sitting Right arm --      Pain Score       No Pain        Wt Readings from Last 1 Encounters:   07/03/19 123 kg (271 lb 13 2 oz)     Additional Vital Signs:   Pertinent Labs/Diagnostic Test Results:   EKG  Atrial fibrillation with premature ventricular or aberrantly conducted complexes  Septal infarct , age undetermined    MRI  Right MCA distribution now subacute infarcts similar to the prior exam June 26, 2019  CXR  No acute cardiopulmonary disease  CT head No acute intracranial abnormality  Microangiopathic changes       Results from last 7 days   Lab Units 07/04/19  0454 07/03/19  1026 06/28/19  0508   WBC Thousand/uL 6 54 6 99 9 28   HEMOGLOBIN g/dL 16 4 16 7 15 9   HEMATOCRIT % 51 6* 50 9* 48 9   PLATELETS Thousands/uL 211 211 204   NEUTROS ABS Thousands/µL  --  5 27  --          Results from last 7 days   Lab Units 07/04/19  0454 07/03/19  1026 06/28/19  0508   SODIUM mmol/L 136 133* 133*   POTASSIUM mmol/L 4 5 4 2 4 8   CHLORIDE mmol/L 98* 97* 96*   CO2 mmol/L 33* 30 30   ANION GAP mmol/L 5 6 7   BUN mg/dL 19 20 20   CREATININE mg/dL 0 95 0 93 0 96   EGFR ml/min/1 73sq m 82 85 81   CALCIUM mg/dL 8 9 8 9 9 2 MAGNESIUM mg/dL 1 9  --  2 0     Results from last 7 days   Lab Units 07/04/19  0454 07/03/19  1026   AST U/L  --  27   ALT U/L  --  34   ALK PHOS U/L  --  78   TOTAL PROTEIN g/dL  --  6 4   ALBUMIN g/dL  --  3 2*   TOTAL BILIRUBIN mg/dL  --  1 30*   AMMONIA umol/L 17  --      Results from last 7 days   Lab Units 07/04/19  0337 06/29/19  1133 06/29/19  0747 06/28/19  2100 06/28/19  1611 06/28/19  1100 06/28/19  0707 06/27/19  2117 06/27/19  1708 06/27/19  1048   POC GLUCOSE mg/dl 90 157* 124 154* 133 147* 154* 154* 128 246*     Results from last 7 days   Lab Units 07/04/19  0454 07/03/19  1026 06/28/19  0508   GLUCOSE RANDOM mg/dL 153* 109 151*             Results from last 7 days   Lab Units 07/03/19  1514   PH ART  7 399   PCO2 ART mm Hg 46 8*   PO2 ART mm Hg 63 8*   HCO3 ART mmol/L 28 3*   BASE EXC ART mmol/L 2 6   O2 CONTENT ART mL/dL 21 7   O2 HGB, ARTERIAL % 89 9*   ABG SOURCE  Radial, Left     Results from last 7 days   Lab Units 07/03/19  1026   PH ALEXIS  7 362   PCO2 ALEXIS mm Hg 55 2*   PO2 ALEXIS mm Hg 37 3   HCO3 ALEXIS mmol/L 30 6*   BASE EXC ALEXIS mmol/L 3 5   O2 CONTENT ALEXIS ml/dL 15 9   O2 HGB, VENOUS % 66 2             Results from last 7 days   Lab Units 07/03/19  1334   TROPONIN I ng/mL <0 02         Results from last 7 days   Lab Units 07/03/19  1026   PROTIME seconds 13 6   INR  1 04               Results from last 7 days   Lab Units 06/28/19  0508   NT-PRO BNP pg/mL 2,233*       Results from last 7 days   Lab Units 07/03/19  1323   CLARITY UA  Clear   COLOR UA  Yellow   SPEC GRAV UA  1 025   PH UA  6 0   GLUCOSE UA mg/dl Negative   KETONES UA mg/dl Negative   BLOOD UA  Negative   PROTEIN UA mg/dl 100 (2+)*   NITRITE UA  Negative   BILIRUBIN UA  Negative   UROBILINOGEN UA E U /dl 4 0*   LEUKOCYTES UA  Negative   WBC UA /hpf None Seen   RBC UA /hpf 0-1*   BACTERIA UA /hpf None Seen   EPITHELIAL CELLS WET PREP /hpf Occasional         Results from last 7 days   Lab Units 07/03/19  1433   AMPH/METH  Negative BARBITURATE UR  Negative   BENZODIAZEPINE UR  Negative   COCAINE UR  Negative   METHADONE URINE  Negative   OPIATE UR  Negative   PCP UR  Negative   THC UR  Negative     Results from last 7 days   Lab Units 07/03/19  1026   ETHANOL LVL mg/dL <3         ED Treatment:   Medication Administration from 07/03/2019 0947 to 07/03/2019 1852     None        Past Medical History:   Diagnosis Date    [facility]chronic obstructive pulmonary disease) ([facility] )     Hyperlipidemia     Hypertension     Myocardial infarction ([facility] )     Pneumonia 2/2/2017    Pulmonary emphysema (HCC)     Shortness of breath      Present on Admission:   Acute metabolic encephalopathy   Alcohol abuse   Atrial fibrillation (HCC)   COPD (chronic obstructive pulmonary disease) (HCC)   CVA (cerebral vascular accident) ([facility] )   Hyperlipidemia      Admitting Diagnosis: Altered mental status [R41 82]  Carotid stenosis [I65 29]  Multiple falls [R29 6]  Coronary artery disease involving native coronary artery of native heart without angina pectoris [I25 10]  Atrial fibrillation, unspecified type ([place] [address] ) [I48 91]  Age/Sex: 81 y o  male  Admission Orders:    Current Facility-Administered Medications:  acetaminophen 650 mg Oral Q6H PRN Julissa Doshi MD   albuterol 1 puff Inhalation Q6H PRN Julissa Doshi MD   amLODIPine 5 mg Oral Daily Julissa Doshi MD   aspirin 81 mg Oral Daily Julissa Doshi MD   atorvastatin 40 mg Oral Daily Julissa Doshi MD   budesonide-formoterol 2 puff Inhalation BID Julissa Doshi MD   enoxaparin 40 mg Subcutaneous Daily Julissa Doshi MD   hydrALAZINE 5 mg Intravenous Q6H PRN Massiel El MD   losartan 100 mg Oral Daily Julissa Dosih MD   metoprolol tartrate 25 mg Oral Q12H [address] Julissa Doshi MD   nicotine 1 patch Transdermal Q24H Julissa oDshi MD   pantoprazole 40 mg Oral Early Morning Julissa Doshi MD   tiotropium 18 mcg Inhalation Daily Julissa Doshi MD     Neuro checks q 4 hours  Routine VS  Speech /swallowing study   Telemetry  SCD's   IP CONSULT TO NEUROLOGY  IP CONSULT TO VASCULAR SURGERY  IP CONSULT TO CARDIOLOGY    Network Utilization Review Department  Phone: [phone]; Fax [phone]  [email or web address]  ATTENTION: Please call with any questions or concerns to [phone]  and carefully listen to the prompts so that you are directed to the right person  Send all requests for admission clinical reviews, approved or denied determinations and any other requests to fax [phone]   All voicemails are confidential

## 2019-07-04 NOTE — ASSESSMENT & PLAN NOTE
Patient presented lethargic  Currently awake, alert, oriented at baseline  Frustrated for being in the hospital again  Wants to be discharg as soon as possible  Symptoms likely due to sleep-wake cycle disturbance as well as worsening of previous stroke symptoms per Neurology  Repeat MRI brain shows right MCA distribution now subacute similar to previous imaging finding  Previous CTA head showed on significant right carotid artery stenosis  Per Neurology recommendation noted to continue aspirin, statin, anticoagulation  Vascular surgery consultation for carotid artery disease  Vascular surgery planning surgical intervention for carotid artery disease pending Cardiology clearance  Will hold off on starting on anticoagulation pending vascular surgical intervention  PT eval  Better sleep-wake cycle and sleep hygiene

## 2019-07-04 NOTE — RESPIRATORY THERAPY NOTE
RT Protocol Note  Cherelle Magaña 81 y o  male MRN: [id number]  Unit/Bed#: -01 Encounter: [id number]    Assessment    Principal Problem:    Acute metabolic encephalopathy  Active Problems:    COPD (chronic obstructive pulmonary disease) ([facility])    Hyperlipidemia    CVA (cerebral vascular accident) ([place] [address] )    Alcohol abuse    Atrial fibrillation ([place] [address] )      Home Pulmonary Medications:  Spiriva  Symbicort  Albuterol MDI PRN       Past Medical History:   Diagnosis Date    COPD (chronic obstructive pulmonary disease) ([place] [address] )     Hyperlipidemia     Hypertension     Myocardial infarction ([place] [address] )     Pneumonia 2/2/2017    Pulmonary emphysema ([facility])     Shortness of breath      Social History     Socioeconomic History    Marital status:      Spouse name: None    Number of children: None    Years of education: None    Highest education level: None   Occupational History    None   Social Needs    Financial resource strain: None    Food insecurity:     Worry: None     Inability: None    Transportation needs:     Medical: None     Non-medical: None   Tobacco Use    Smoking status: Current Every Day Smoker     Packs/day: 0 50     Years: 45 00     Pack years: 22 50     Types: Cigarettes    Smokeless tobacco: Never Used    Tobacco comment: last smoked 6/26   Substance and Sexual Activity    Alcohol use:  Yes     Alcohol/week: 7 0 - 21 0 standard drinks     Types: 7 - 21 Cans of beer per week     Comment: 1-3 daily     Drug use: No    Sexual activity: None   Lifestyle    Physical activity:     Days per week: None     Minutes per session: None    Stress: None   Relationships    Social connections:     Talks on phone: None     Gets together: None     Attends Hoah[employer] service: None     Active member of club or organization: None     Attends meetings of clubs or organizations: None     Relationship status: None    Intimate partner violence:     Fear of current or ex partner: None     Emotionally abused: None     Physically abused: None     Forced sexual activity: None   Other Topics Concern    None   Social History Narrative    Always uses a seat belt    Caffeine use - Daily caffeine consumption, 2-3 servings a day           Subjective         Objective    Physical Exam:   Assessment Type: Assess only  General Appearance: Drowsy  Respiratory Pattern: Normal  Chest Assessment: Chest expansion symmetrical  Bilateral Breath Sounds: Diminished, Coarse  Cough: Non-productive  O2 Device: 2LNC    Vitals:  Blood pressure 159/75, pulse 98, temperature 97 8 °F (36 6 °C), temperature source Oral, resp  rate 20, height 6' 1" (1 854 m), weight 123 kg (271 lb 13 2 oz), SpO2 91 %  Results from last 7 days   Lab Units 07/03/19  1514   PH ART  7 399   PCO2 ART mm Hg 46 8*   PO2 ART mm Hg 63 8*   HCO3 ART mmol/L 28 3*   BASE EXC ART mmol/L 2 6   O2 CONTENT ART mL/dL 21 7   O2 HGB, ARTERIAL % 89 9*   ABG SOURCE  Radial, Left   UBALDO TEST  Yes   NON VENT ROOM AIR % 21       Imaging and other studies: I have personally reviewed pertinent reports  O2 Device: 2LNC     Plan             Resp Comments: pt was seen for resp protocol pt seems to be slightly confused about his history he is unaware of a resp diagnosis at this time all information was pulled from the patient's chart  There is no resp diagnosis listed, pts home meds do include a PRN inhaler as well as spiriva and symbicort  per records pt does not take the symbicort  Will continue with home meds at this time, no idication to add scheduled txs

## 2019-07-04 NOTE — ASSESSMENT & PLAN NOTE
Currently normal sinus rhythm with intermittent PVCs  Neurology had cleared patient to start on anticoagulation  History of previous CVA status post tPA  Plan was to start Eliquis on 07/05/2019  Will hold off on starting on anticoagulation for now, pending planned vascular surgical intervention for severe carotid artery disease

## 2019-07-04 NOTE — PROGRESS NOTES
Patient passed the dysphagia test    Blood glucose 90  Pt states that he is hungry  Two bread toast with Jelly and peanut butter provided, 1 peanut butter snack provided, 16 ounces of Orange juice provided, 1 vanilla ice cream provided  Patient stated satisfaction

## 2019-07-04 NOTE — ASSESSMENT & PLAN NOTE
Patient has COPD  Currently not in acute exacerbation  Not in acute respiratory distress  O2 saturation 94-95% on room air

## 2019-07-05 PROCEDURE — 99232 SBSQ HOSP IP/OBS MODERATE 35: CPT | Performed by: STUDENT IN AN ORGANIZED HEALTH CARE EDUCATION/TRAINING PROGRAM

## 2019-07-05 PROCEDURE — 99232 SBSQ HOSP IP/OBS MODERATE 35: CPT | Performed by: PHYSICIAN ASSISTANT

## 2019-07-05 PROCEDURE — 99232 SBSQ HOSP IP/OBS MODERATE 35: CPT | Performed by: INTERNAL MEDICINE

## 2019-07-05 RX ADMIN — BUDESONIDE AND FORMOTEROL FUMARATE DIHYDRATE 2 PUFF: 80; 4.5 AEROSOL RESPIRATORY (INHALATION) at 09:07

## 2019-07-05 RX ADMIN — ATORVASTATIN CALCIUM 40 MG: 40 TABLET, FILM COATED ORAL at 09:06

## 2019-07-05 RX ADMIN — LOSARTAN POTASSIUM 100 MG: 50 TABLET, FILM COATED ORAL at 09:06

## 2019-07-05 RX ADMIN — NICOTINE 1 PATCH: 21 PATCH TRANSDERMAL at 18:34

## 2019-07-05 RX ADMIN — ALBUTEROL SULFATE 1 PUFF: 90 AEROSOL, METERED RESPIRATORY (INHALATION) at 21:15

## 2019-07-05 RX ADMIN — AMLODIPINE BESYLATE 5 MG: 5 TABLET ORAL at 09:06

## 2019-07-05 RX ADMIN — HYDRALAZINE HYDROCHLORIDE 5 MG: 20 INJECTION INTRAMUSCULAR; INTRAVENOUS at 20:27

## 2019-07-05 RX ADMIN — METOPROLOL TARTRATE 25 MG: 25 TABLET, FILM COATED ORAL at 09:06

## 2019-07-05 RX ADMIN — BUDESONIDE AND FORMOTEROL FUMARATE DIHYDRATE 2 PUFF: 80; 4.5 AEROSOL RESPIRATORY (INHALATION) at 17:13

## 2019-07-05 RX ADMIN — TIOTROPIUM BROMIDE 18 MCG: 18 CAPSULE ORAL; RESPIRATORY (INHALATION) at 09:07

## 2019-07-05 RX ADMIN — ENOXAPARIN SODIUM 40 MG: 40 INJECTION SUBCUTANEOUS at 09:06

## 2019-07-05 RX ADMIN — ASPIRIN 81 MG: 81 TABLET, COATED ORAL at 09:06

## 2019-07-05 RX ADMIN — PANTOPRAZOLE SODIUM 40 MG: 40 TABLET, DELAYED RELEASE ORAL at 05:29

## 2019-07-05 NOTE — DISCHARGE INSTR - OTHER ORDERS
1 Cleanse b/l buttocks and sacrum with soap and water, pat dry, apply thin layer of calazime to sacral slit and apply hydraguard to b/l buttocks TID and PRN   2  Apply skin nourishing cream the entire skin daily for moisture  3  Turn and reposition patient every  2 hours   4  Elevate heels off of bed with pillows to offload pressure   5  Apply EHOB waffle cushion to chair when OOB, if able  6    Apply hydraguard to b/l heels BID and PRN for prevention and protection

## 2019-07-05 NOTE — PROGRESS NOTES
Cardiology Progress Note - Sumeet Ann 81 y o  male MRN: [id number]    Unit/Bed#: -01 Encounter: [id number]      Assessment/Plan:  1- Atrial fibrillation, rates adequetely controlled  Continue Lopressor 25 mg BID  Holding NOAC in anticipation of surgery  Plan to initiate when cleared by vascular surgery        2- CAD, RCA   Stable without chest pain or anginal equivalent  Patient had recent cardiac catheterization on 01/31/2018 which showed noncritical 50% stenosis of mid LAD with RCA  with unsuccessful PCI to repair on 02/14/2018  Continue aspirin, statin and beta blocker      3- Hypertension  Stable, continue Norvasc 5 mg daily, losartan 100 mg daily and Lopressor 25 b i d      4- Hyperlipidemia  Continue statin     5- Tobacco abuse  Strongly encouraged to quit        Subjective:   Patient seen and examined  No significant events overnight  Objective:     Vitals: Blood pressure (!) 144/104, pulse 78, temperature 97 5 °F (36 4 °C), resp  rate 17, height 6' 1" (1 854 m), weight 123 kg (271 lb 13 2 oz), SpO2 94 %  , Body mass index is 35 78 kg/m² ,   Orthostatic Blood Pressures      Most Recent Value   Blood Pressure  (!) 144/104 filed at 07/05/2019 7244   Patient Position - Orthostatic VS  Sitting filed at 07/05/2019 0331            Intake/Output Summary (Last 24 hours) at 7/5/2019 1103  Last data filed at 7/5/2019 0900  Gross per 24 hour   Intake 490 ml   Output 1050 ml   Net -560 ml         Physical Exam:    GEN: Patricio A Huyler +L facial droop   Appears well, alert and oriented x 3, pleasant and cooperative   HEENT: pupils equal, round, and reactive to light; extraocular muscles intact  NECK: supple, no carotid bruits   HEART: regular rhythm, normal S1 and S2, no murmurs, clicks, gallops or rubs   LUNGS: clear to auscultation bilaterally; no wheezes, rales, or rhonchi   ABDOMEN: normal bowel sounds, soft, no tenderness, no distention  EXTREMITIES: peripheral pulses normal; no clubbing, cyanosis, or edema      Medications:      Current Facility-Administered Medications:     acetaminophen (TYLENOL) tablet 650 mg, 650 mg, Oral, Q6H PRN, Abhijit Caldwell MD    albuterol (PROVENTIL HFA,VENTOLIN HFA) inhaler 1 puff, 1 puff, Inhalation, Q6H PRN, Abhijit Caldwell MD, 1 puff at 07/04/19 2142    amLODIPine (NORVASC) tablet 5 mg, 5 mg, Oral, Daily, Abhijit Caldwell MD, 5 mg at 07/05/19 0906    aspirin (ECOTRIN LOW STRENGTH) EC tablet 81 mg, 81 mg, Oral, Daily, Abhijit Caldwell MD, 81 mg at 07/05/19 0906    atorvastatin (LIPITOR) tablet 40 mg, 40 mg, Oral, Daily, Abhijit Caldwell MD, 40 mg at 07/05/19 0906    budesonide-formoterol (SYMBICORT) 80-4 5 MCG/ACT inhaler 2 puff, 2 puff, Inhalation, BID, Abhijit Caldwell MD, 2 puff at 07/05/19 0907    enoxaparin (LOVENOX) subcutaneous injection 40 mg, 40 mg, Subcutaneous, Daily, Abhijit Caldwell MD, 40 mg at 07/05/19 0906    hydrALAZINE (APRESOLINE) injection 5 mg, 5 mg, Intravenous, Q6H PRN, Crispin Pardo MD    losartan (COZAAR) tablet 100 mg, 100 mg, Oral, Daily, Abhijit Caldwell MD, 100 mg at 07/05/19 0906    metoprolol tartrate (LOPRESSOR) tablet 25 mg, 25 mg, Oral, Q12H [facility], Abhijit Caldwell MD, 25 mg at 07/05/19 0906    nicotine (NICODERM CQ) 21 mg/24 hr TD 24 hr patch 1 patch, 1 patch, Transdermal, Q24H, Abhijit Caldwell MD, 1 patch at 07/04/19 1934    pantoprazole (PROTONIX) EC tablet 40 mg, 40 mg, Oral, Early Morning, Abhijit Caldwell MD, 40 mg at 07/05/19 0529    tiotropium (SPIRIVA) capsule for inhaler 18 mcg, 18 mcg, Inhalation, Daily, Abhijit Caldwell MD, 18 mcg at 07/05/19 0907     Labs & Results:    Results from last 7 days   Lab Units 07/03/19  1334   TROPONIN I ng/mL <0 02     Results from last 7 days   Lab Units 07/04/19  0454 07/03/19  1026   WBC Thousand/uL 6 54 6 99   HEMOGLOBIN g/dL 16 4 16 7   HEMATOCRIT % 51 6* 50 9*   PLATELETS Thousands/uL 211 211         Results from last 7 days   Lab Units 07/04/19  0454 07/03/19  1026   POTASSIUM mmol/L 4 5 4 2   CHLORIDE mmol/L 98* 97*   CO2 mmol/L 33* 30   BUN mg/dL 19 20   CREATININE mg/dL 0 95 0 93   CALCIUM mg/dL 8 9 8 9   ALK PHOS U/L  --  78   ALT U/L  --  34   AST U/L  --  27     Results from last 7 days   Lab Units 07/03/19  1026   INR  1 04     Results from last 7 days   Lab Units 07/04/19  0454   MAGNESIUM mg/dL 1 9

## 2019-07-05 NOTE — UTILIZATION REVIEW
Continued Stay Review    Date:  7/5/2019                           Current Patient Class: inpt   Current Level of Care: [facility]    HPI:69 y o  male initially admitted on 7/3/2019    Assessment/Plan:  Pt is AAOx4, at baseline  His encephalopathic symptoms (likely due to sleep-wake cycle disturbance as well as worsening of previous stroke symptom - per Neurology)  subsided  Vascular surgery planning surgical intervention for carotid artery disease pending Cardiology clearance    7/5 Cardiology -Stable without chest pain or anginal equivalent  Afib w/rates adequately controlled  Continue aspirin, statin and beta blocker  Holding NOAC in anticipation of surgery   Continue ASA, continue Norvasc 5 mg daily, losartan 100 mg daily and Lopressor 25 b i d  Patient does not possess any absolute contraindications for right carotid endarterectomy procedure such as ACS, acute CHF, severe symptomatic aortic stenosis and/or life-threatening arrhythmias  Difficult to assess functional capacity given patient with gait instability and left-sided weakness  Patient is deemed moderate risk of perioperative cardiac event given comorbidities  Perioperative use of beta-blocker strongly recommended  No further cardiac workup is necessary at this time          Pertinent Labs/Diagnostic Results:   Results from last 7 days   Lab Units 07/04/19  0454 07/03/19  1026   WBC Thousand/uL 6 54 6 99   HEMOGLOBIN g/dL 16 4 16 7   HEMATOCRIT % 51 6* 50 9*   PLATELETS Thousands/uL 211 211   NEUTROS ABS Thousands/µL  --  5 27         Results from last 7 days   Lab Units 07/04/19  0454 07/03/19  1026   SODIUM mmol/L 136 133*   POTASSIUM mmol/L 4 5 4 2   CHLORIDE mmol/L 98* 97*   CO2 mmol/L 33* 30   ANION GAP mmol/L 5 6   BUN mg/dL 19 20   CREATININE mg/dL 0 95 0 93   EGFR ml/min/1 73sq m 82 85   CALCIUM mg/dL 8 9 8 9   MAGNESIUM mg/dL 1 9  --      Results from last 7 days   Lab Units 07/04/19  0454 07/03/19  1026   AST U/L  --  27   ALT U/L  --  34 ALK PHOS U/L  --  78   TOTAL PROTEIN g/dL  --  6 4   ALBUMIN g/dL  --  3 2*   TOTAL BILIRUBIN mg/dL  --  1 30*   AMMONIA umol/L 17  --      Results from last 7 days   Lab Units 07/04/19  0337 06/29/19  1133 06/29/19  0747 06/28/19  2100 06/28/19  1611   POC GLUCOSE mg/dl 90 157* 124 154* 133     Results from last 7 days   Lab Units 07/04/19  0454 07/03/19  1026   GLUCOSE RANDOM mg/dL 153* 109         Results from last 7 days   Lab Units 07/03/19  1514   PH ART  7 399   PCO2 ART mm Hg 46 8*   PO2 ART mm Hg 63 8*   HCO3 ART mmol/L 28 3*   BASE EXC ART mmol/L 2 6   O2 CONTENT ART mL/dL 21 7   O2 HGB, ARTERIAL % 89 9*   ABG SOURCE  Radial, Left     Results from last 7 days   Lab Units 07/03/19  1026   PH ALEXIS  7 362   PCO2 ALEXIS mm Hg 55 2*   PO2 ALEXIS mm Hg 37 3   HCO3 ALEXIS mmol/L 30 6*   BASE EXC ALEXIS mmol/L 3 5   O2 CONTENT ALEXIS ml/dL 15 9   O2 HGB, VENOUS % 66 2     Results from last 7 days   Lab Units 07/03/19  1334   TROPONIN I ng/mL <0 02         Results from last 7 days   Lab Units 07/03/19  1026   PROTIME seconds 13 6   INR  1 04         Results from last 7 days   Lab Units 07/03/19  1323   CLARITY UA  Clear   COLOR UA  Yellow   SPEC GRAV UA  1 025   PH UA  6 0   GLUCOSE UA mg/dl Negative   KETONES UA mg/dl Negative   BLOOD UA  Negative   PROTEIN UA mg/dl 100 (2+)*   NITRITE UA  Negative   BILIRUBIN UA  Negative   UROBILINOGEN UA E U /dl 4 0*   LEUKOCYTES UA  Negative   WBC UA /hpf None Seen   RBC UA /hpf 0-1*   BACTERIA UA /hpf None Seen   EPITHELIAL CELLS WET PREP /hpf Occasional         Results from last 7 days   Lab Units 07/03/19  1433   AMPH/METH  Negative   BARBITURATE UR  Negative   BENZODIAZEPINE UR  Negative   COCAINE UR  Negative   METHADONE URINE  Negative   OPIATE UR  Negative   PCP UR  Negative   THC UR  Negative     Results from last 7 days   Lab Units 07/03/19  1026   ETHANOL LVL mg/dL <3       Vital Signs:  07/05/19 1121  97 3 °F (36 3 °C)Abnormal   46Abnormal   17  158/97  96 %   07/05/19 0718  97 5 °F (36 4 °C)  78  17  144/104  94 %   07/05/19 0331    63    158/48           Medications:   Scheduled Meds:   Current Facility-Administered Medications:  acetaminophen 650 mg Oral Q6H PRN Kevin Daley MD   albuterol 1 puff Inhalation Q6H PRN Kevin Daley MD   amLODIPine 5 mg Oral Daily Kevin Daley MD   aspirin 81 mg Oral Daily Kevin Daley MD   atorvastatin 40 mg Oral Daily Kevin Daley MD   budesonide-formoterol 2 puff Inhalation BID Kevin Daley MD   enoxaparin 40 mg Subcutaneous Daily Kevin Daley MD   hydrALAZINE 5 mg Intravenous Q6H PRN Salvador Ho MD   losartan 100 mg Oral Daily Kevin Daley MD   metoprolol tartrate 25 mg Oral Q12H Christian Gonzalez MD   nicotine 1 patch Transdermal Q24H Kevin Daley MD   pantoprazole 40 mg Oral Early Morning Kevin Daley MD   tiotropium 18 mcg Inhalation Daily Kevin Daley MD       Discharge Plan: tbd    Network Utilization Review Department  Phone: [phone]; Fax [phone]  [email or web address]  ATTENTION: Please call with any questions or concerns to [phone]  and carefully listen to the prompts so that you are directed to the right person  Send all requests for admission clinical reviews, approved or denied determinations and any other requests to fax [phone]   All voicemails are confidential

## 2019-07-05 NOTE — WOUND OSTOMY CARE
Progress Note - Wound   [place] Citizen of [place] 81 y o  male MRN: [id number]  Unit/Bed#: -01 Encounter: [id number]      Assessment:  Wound care to see patient at request of nursing for assessment of MASD noted on admission  Patient seen in bed, cooperative with assessment  Minimal assist of one with standing using rolling walker  Per nursing incontinence of bowel and bladder, condom cath in use for urine  Mid sacrum with linear shaped partial thickness wound located deep in the sacral crease  Wound bed is 100% blanchable pink  Edges fragile and attached  Adrienne-wound intact blanchable pink skin  This wound is related to moisture  B/l buttocks are intact with no redness or wounds  Due to incontinence/ deep sacral crease  Will recommend barriers creams  Educated patient on the importance of frequent offloading of pressure via turning, repositioning and weight-shifting  Verbalized understanding of plan of care  No induration, fluctuance, redness, or purulence noted to the above noted wounds  Patient tolerated well - denies pain to wound  Primary nurse aware of plan of care  See flow sheets for more detailed assessment findings  Will follow along  Plan: 1  Cleanse b/l buttocks and sacrum with soap and water, pat dry, apply thin layer of calazime to sacral slit and apply hydraguard to b/l buttocks TID and PRN   2  Apply skin nourishing cream the entire skin daily for moisture  3  Turn and reposition patient every  2 hours   4  Elevate heels off of bed with pillows to offload pressure   5  Apply EHOB waffle cushion to chair when OOB, if able  6  Apply hydraguard to b/l heels BID and PRN for prevention and protection  7  Wound care will follow along with patient weekly, please call with questions and concerns    Objective:    Vitals: Blood pressure (!) 144/104, pulse 78, temperature 97 5 °F (36 4 °C), resp  rate 17, height 6' 1" (1 854 m), weight 123 kg (271 lb 13 2 oz), SpO2 94 %  ,Body mass index is 35 78 kg/m²  Wound 07/04/19 Moisture associated skin damage Sacrum Mid (Active)   Wound Description Pink 7/5/2019  7:18 AM   Adrienne-wound Assessment Dry; Intact 7/5/2019  7:18 AM   Wound Length (cm) 1 cm 7/5/2019  7:18 AM   Wound Width (cm) 0 1 cm 7/5/2019  7:18 AM   Wound Depth (cm) 0 1 7/5/2019  7:18 AM   Calculated Wound Area (cm^2) 0 1 cm^2 7/5/2019  7:18 AM   Calculated Wound Volume (cm^3) 0 01 cm^3 7/5/2019  7:18 AM   Tunneling 0 cm 7/5/2019  7:18 AM   Tunneling in depth located at 0 7/5/2019  7:18 AM   Undermining 0 7/5/2019  7:18 AM   Undermining is depth extending from 0 7/5/2019  7:18 AM   Closure None 7/5/2019  7:18 AM   Drainage Amount None 7/5/2019  7:18 AM   Non-staged Wound Description Partial thickness 7/5/2019  7:18 AM   Treatments Cleansed;Site care;Elevated 7/5/2019  7:18 AM   Dressing Protective barrier 7/5/2019  7:18 AM   Wound packed? No 7/5/2019  7:18 AM   Packing- # removed 0 7/5/2019  7:18 AM   Packing- # inserted 0 7/5/2019  7:18 AM   Patient Tolerance Tolerated well 7/5/2019  7:18 AM         Recommendations written as orders  AVS updated    Binu Goldstein RN BSN CWON

## 2019-07-05 NOTE — PROGRESS NOTES
Progress Note - Vascular Surgery       Assessment:  R Carotid artery stenosis, symptomatic  - MRI brain wo contrast 7/3/19: Right MCA distribution now subacute infarcts similar to the prior exam June 26, 2019   - CTA 6/28/19 showed right ICA 70% stenosis; CT Head wo contrast shows microangiopathic changes, but no acute intracranial abnormalities  - 6/28/19 VAS carotid complete study: shows extensive calcific shadowing plaque noted in the internal carotid artery, making visualization of blood flow difficult to evaluate  Plaque is  heterogenous and irregular   - patient is currently on ASA 81mg, atorvastatin 40mg, prophylactic enoxaparin   recent CVA/acute ischemic stroke status post tPA 6/25/19  -  recent admission for right embolic ischemic stroke now with increased dysarthria and AMS  Concern for recurrence of CVA  LUE 4/5, RUE 5/5, B/LE 5/5  - persistent left upper extremity weakness:   - MRI show no new findings  Acute mental status change  Hypertension  Hyperlipidemia   - on atorvastatin 40mg  Coronary artery disease  - status post stent placement  Atrial fibrillation  - newly diagnosed, to start [facility] 7/5/19, given tPa 6/25/19  Tobacco abuse  COPD  Alcohol abuse    Plan:  -- Patient still with LUE weakness, but improved  No plan for vascular surgery intervention during this stay  Patient should follow up in the next 1-2 weeks in the vascular surgery office for further evaluation and CEA planning   -- continue with antiplatelet therapy, statin, and anticoagulation  -- discharge as per primary team when medically stable  ______________________________________________________________________    Subjective:  Patient is feeling well today  He states he feels less sleepy  He states he is up in sleeping well at home and that is why he was very somnolent in the emergency department on admission  He denies any further confusion or trouble sitting down  He states he uses a walker at home    Here he has needed assistance to get up and walk around with a walker as well  Vitals:  /66   Pulse 68   Temp 98 2 °F (36 8 °C)   Resp 18   Ht 6' 1" (1 854 m)   Wt 123 kg (271 lb 13 2 oz)   SpO2 94%   BMI 35 78 kg/m²     I/Os:  I/O last 3 completed shifts: In: 250 [P O :250]  Out: 1100 [Urine:1100]  I/O this shift:   In: 480 [P O :480]  Out: 650 [Urine:650]    Lab Results and Cultures:   CBC with diff:   Lab Results   Component Value Date    WBC 6 54 07/04/2019    HGB 16 4 07/04/2019    HCT 51 6 (H) 07/04/2019    MCV 96 07/04/2019     07/04/2019    MCH 30 6 07/04/2019    MCHC 31 8 07/04/2019    RDW 14 5 07/04/2019    MPV 10 0 07/04/2019    NRBC 0 07/03/2019   ,   BMP/CMP:  Lab Results   Component Value Date    K 4 5 07/04/2019    CL 98 (L) 07/04/2019    CO2 33 (H) 07/04/2019    CO2 28 06/25/2019    BUN 19 07/04/2019    CREATININE 0 95 07/04/2019    GLUCOSE 97 06/25/2019    CALCIUM 8 9 07/04/2019    AST 27 07/03/2019    ALT 34 07/03/2019    ALKPHOS 78 07/03/2019    EGFR 82 07/04/2019     Medications:  Current Facility-Administered Medications   Medication Dose Route Frequency    acetaminophen (TYLENOL) tablet 650 mg  650 mg Oral Q6H PRN    albuterol (PROVENTIL HFA,VENTOLIN HFA) inhaler 1 puff  1 puff Inhalation Q6H PRN    amLODIPine (NORVASC) tablet 5 mg  5 mg Oral Daily    aspirin (ECOTRIN LOW STRENGTH) EC tablet 81 mg  81 mg Oral Daily    atorvastatin (LIPITOR) tablet 40 mg  40 mg Oral Daily    budesonide-formoterol (SYMBICORT) 80-4 5 MCG/ACT inhaler 2 puff  2 puff Inhalation BID    enoxaparin (LOVENOX) subcutaneous injection 40 mg  40 mg Subcutaneous Daily    hydrALAZINE (APRESOLINE) injection 5 mg  5 mg Intravenous Q6H PRN    losartan (COZAAR) tablet 100 mg  100 mg Oral Daily    metoprolol tartrate (LOPRESSOR) tablet 25 mg  25 mg Oral Q12H ADRIAN    nicotine (NICODERM CQ) 21 mg/24 hr TD 24 hr patch 1 patch  1 patch Transdermal Q24H    pantoprazole (PROTONIX) EC tablet 40 mg  40 mg Oral Early Morning  tiotropium (SPIRIVA) capsule for inhaler 18 mcg  18 mcg Inhalation Daily       Imaging:  Xr Chest 2 Views    Result Date: 7/3/2019  Impression: No acute cardiopulmonary disease  Workstation performed: [id number]     Ct Head Without Contrast    Result Date: 7/3/2019  Impression: No acute intracranial abnormality  Microangiopathic changes  Workstation performed: [id number]     Mri Brain Wo Contrast    Result Date: 7/3/2019  Impression: Right MCA distribution now subacute infarcts similar to the prior exam June 26, 2019  Workstation performed: [id number]       Physical Exam:    General:  Chronically ill-appearing patient, kyphosis of thoracic spine, exophthalmos, scleral icterus  CV:  Irregularly irregular rate, systolic murmur  Respiratory:  Decreased respiratory effort, diffuse but improved rhonchi, clears slightly with cough  Abdominal:  Normoactive bowel sounds, soft, nontender, no masses  Extremities:  Bilateral lower extremity pitting edema+2, no tenderness  Neurologic:  No facial droop, no asymmetry of face, eyes, or mouth, tongue is midline and palate rises equally  Decreased strength in left upper extremity 4/5  5/5 throughout remaining extremities   strength is intact and equal bilaterally  Unable to assess gait due to abnormal gait at baseline and patient uses walker      Pulse exam:  Radial: Right: 2+ Left[de-identified] 2+  DP: Right: 2+ Left: 2+  PT: Right: 2+ Left: 2+    Mariana De La O PA-C  7/5/2019

## 2019-07-05 NOTE — PROGRESS NOTES
Progress Note - Wing Renaldo 1950, 81 y o  male MRN: [id number]    Unit/Bed#: -01 Encounter: [id number]    Primary Care Provider: Sarina Lane DO   Date and time admitted to hospital: 7/3/2019  9:48 AM        * Acute metabolic encephalopathy  Assessment & Plan  Patient presented lethargic  Currently awake, alert, oriented at baseline  Frustrated for being in the hospital again  Wants to be discharg as soon as possible  Symptoms likely due to sleep-wake cycle disturbance as well as worsening of previous stroke symptoms per Neurology  Repeat MRI brain shows right MCA distribution now subacute similar to previous imaging finding  Previous CTA head showed on significant right carotid artery stenosis  Per Neurology recommendation noted to continue aspirin, statin, anticoagulation  Vascular surgery consultation for carotid artery disease  Vascular surgery planning surgical intervention for carotid artery disease pending Cardiology clearance  Will hold off on starting on anticoagulation pending vascular surgical intervention  PT eval  Better sleep-wake cycle and sleep hygiene  Carotid artery stenosis  Assessment & Plan  CTA 06/28 shows significant right ICA stenosis of 70%  Vascular surgery recommendation noted  Planning for vascular intervention once cleared by Cardiology  Atrial fibrillation ([address] )  Assessment & Plan  Currently normal sinus rhythm with intermittent PVCs  Neurology had cleared patient to start on anticoagulation  History of previous CVA status post tPA  Plan was to start Eliquis on 07/05/2019  Will hold off on starting on anticoagulation for now, pending planned vascular surgical intervention for severe carotid artery disease  Alcohol abuse  Assessment & Plan  Patient's alcohol level in his normal here  Will put him on some vitamin supplements  CVA (cerebral vascular accident) [facility])  Assessment & Plan  Patient was discharged last week with stroke s/p tPA  MRI shows right MCA distribution of subacute similar to prior imaging  Neurology cleared patient to start on anticoagulation in settings of AFib for stroke prevention  Continue aspirin, statin, PT eval  Vascular surgery consult for carotid disease    Hyperlipidemia  Assessment & Plan  Continue statin    COPD (chronic obstructive pulmonary disease) ([address] )  Assessment & Plan  Patient has COPD  Currently not in acute exacerbation  Not in acute respiratory distress  O2 saturation 94-95% on room air  VTE Pharmacologic Prophylaxis:   Pharmacologic: Enoxaparin (Lovenox)    Patient Centered Rounds: I have performed bedside rounds with nursing staff today  Education and Discussions with Family / Patient: pt    Time Spent for Care: 20 minutes  More than 50% of total time spent on counseling and coordination of care as described above  Current Length of Stay: 1 day(s)    Current Patient Status: Inpatient   Certification Statement: The patient will continue to require additional inpatient hospital stay due to Awaiting further vascular surgery input    Discharge Plan: tbd    Code Status: Level 3 - DNAR and DNI      Subjective:   Seen resting comfortably  Wakes up with verbal stimuli  Reports feeling better and eager to go home  Denies any other new complaints  Objective:     Vitals:   Temp (24hrs), Av 7 °F (36 5 °C), Min:97 3 °F (36 3 °C), Max:98 2 °F (36 8 °C)    Temp:  [97 3 °F (36 3 °C)-98 2 °F (36 8 °C)] 97 7 °F (36 5 °C)  HR:  [46-81] 46  Resp:  [17-20] 20  BP: (133-161)/() 161/89  SpO2:  [92 %-96 %] 95 %  Body mass index is 35 78 kg/m²  Input and Output Summary (last 24 hours): Intake/Output Summary (Last 24 hours) at 2019  Last data filed at 2019 1300  Gross per 24 hour   Intake 480 ml   Output 650 ml   Net -170 ml       Physical Exam:     Physical Exam   Constitutional: No distress     Chronically ill appearing, nontoxic appearing   HENT:   Head: Normocephalic and atraumatic  Eyes: Pupils are equal, round, and reactive to light  EOM are normal    Neck: Normal range of motion  Neck supple  Cardiovascular: Normal rate and regular rhythm  Pulmonary/Chest: Effort normal and breath sounds normal  No stridor  No respiratory distress  Abdominal: Soft  Bowel sounds are normal  He exhibits no distension  There is no tenderness  There is no guarding  Musculoskeletal: Normal range of motion  He exhibits edema  Neurological: He is alert  Awake, alert, oriented, at baseline  Additional Data:     Labs:    Results from last 7 days   Lab Units 07/04/19  0454 07/03/19  1026   WBC Thousand/uL 6 54 6 99   HEMOGLOBIN g/dL 16 4 16 7   HEMATOCRIT % 51 6* 50 9*   PLATELETS Thousands/uL 211 211   NEUTROS PCT %  --  76*   LYMPHS PCT %  --  11*   MONOS PCT %  --  11   EOS PCT %  --  2     Results from last 7 days   Lab Units 07/04/19  0454 07/03/19  1026   SODIUM mmol/L 136 133*   POTASSIUM mmol/L 4 5 4 2   CHLORIDE mmol/L 98* 97*   CO2 mmol/L 33* 30   BUN mg/dL 19 20   CREATININE mg/dL 0 95 0 93   ANION GAP mmol/L 5 6   CALCIUM mg/dL 8 9 8 9   ALBUMIN g/dL  --  3 2*   TOTAL BILIRUBIN mg/dL  --  1 30*   ALK PHOS U/L  --  78   ALT U/L  --  34   AST U/L  --  27   GLUCOSE RANDOM mg/dL 153* 109     Results from last 7 days   Lab Units 07/03/19  1026   INR  1 04     Results from last 7 days   Lab Units 07/04/19  0337 06/29/19  1133 06/29/19  0747   POC GLUCOSE mg/dl 90 157* 124                   * I Have Reviewed All Lab Data Listed Above  * Additional Pertinent Lab Tests Reviewed:  All Labs Within Last 24 Hours Reviewed    Recent Cultures (last 7 days):           Last 24 Hours Medication List:     Current Facility-Administered Medications:  acetaminophen 650 mg Oral Q6H PRN Scott Ya MD   albuterol 1 puff Inhalation Q6H PRN Scott Ya MD   amLODIPine 5 mg Oral Daily Scott Ya MD   aspirin 81 mg Oral Daily Scott Ya MD   atorvastatin 40 mg Oral Daily Scott Ya MD budesonide-formoterol 2 puff Inhalation BID Tobin Alejandra MD   enoxaparin 40 mg Subcutaneous Daily Tobin Alejandra MD   hydrALAZINE 5 mg Intravenous Q6H PRN Tommy Reyes MD   losartan 100 mg Oral Daily Tobin Alejandra MD   metoprolol tartrate 25 mg Oral Q12H [address] Tobin Alejandra MD   nicotine 1 patch Transdermal Q24H Tobin Alejandra MD   pantoprazole 40 mg Oral Early Morning Tobin Alejandra MD   tiotropium 18 mcg Inhalation Daily Tobin Alejandra MD        Today, Patient Was Seen By: Niall Shelton MD    ** Please Note: Dictation voice to text software may have been used in the creation of this document   **

## 2019-07-05 NOTE — PLAN OF CARE
Problem: Potential for Falls  Goal: Patient will remain free of falls  Description  INTERVENTIONS:  - Assess patient frequently for physical needs  -  Identify cognitive and physical deficits and behaviors that affect risk of falls  -  [place] fall precautions as indicated by assessment   - Educate patient/family on patient safety including physical limitations  - Instruct patient to call for assistance with activity based on assessment  - Modify environment to reduce risk of injury  - Consider OT/PT consult to assist with strengthening/mobility  Outcome: Progressing     Problem: CARDIOVASCULAR - ADULT  Goal: Maintains optimal cardiac output and hemodynamic stability  Description  INTERVENTIONS:  - Monitor I/O, vital signs and rhythm  - Monitor for S/S and trends of decreased cardiac output i e  bleeding, hypotension  - Administer and titrate ordered vasoactive medications to optimize hemodynamic stability  - Assess quality of pulses, skin color and temperature  - Assess for signs of decreased coronary artery perfusion - ex   Angina  - Instruct patient to report change in severity of symptoms  Outcome: Progressing  Goal: Absence of cardiac dysrhythmias or at baseline rhythm  Description  INTERVENTIONS:  - Continuous cardiac monitoring, monitor vital signs, obtain 12 lead EKG if indicated  - Administer antiarrhythmic and heart rate control medications as ordered  - Monitor electrolytes and administer replacement therapy as ordered  Outcome: Progressing     Problem: MUSCULOSKELETAL - ADULT  Goal: Maintain or return mobility to safest level of function  Description  INTERVENTIONS:  - Assess patient's ability to carry out ADLs; assess patient's baseline for ADL function and identify physical deficits which impact ability to perform ADLs (bathing, care of mouth/teeth, toileting, grooming, dressing, etc )  - Assess/evaluate cause of self-care deficits   - Assess range of motion  - Assess patient's mobility; develop plan if impaired  - Assess patient's need for assistive devices and provide as appropriate  - Encourage maximum independence but intervene and supervise when necessary  - Involve family in performance of ADLs  - Assess for home care needs following discharge   - Request OT consult to assist with ADL evaluation and planning for discharge  - Provide patient education as appropriate  Outcome: Progressing  Goal: Maintain proper alignment of affected body part  Description  INTERVENTIONS:  - Support, maintain and protect limb and body alignment  - Provide pt/fam with appropriate education  Outcome: Progressing     Problem: PAIN - ADULT  Goal: Verbalizes/displays adequate comfort level or baseline comfort level  Description  Interventions:  - Encourage patient to monitor pain and request assistance  - Assess pain using appropriate pain scale  - Administer analgesics based on type and severity of pain and evaluate response  - Implement non-pharmacological measures as appropriate and evaluate response  - Consider cultural and social influences on pain and pain management  - Notify physician/advanced practitioner if interventions unsuccessful or patient reports new pain  Outcome: Progressing     Problem: INFECTION - ADULT  Goal: Absence or prevention of progression during hospitalization  Description  INTERVENTIONS:  - Assess and monitor for signs and symptoms of infection  - Monitor lab/diagnostic results  - Monitor all insertion sites, i e  indwelling lines, tubes, and drains  - [place] appropriate cooling/warming therapies per order  - Administer medications as ordered  - Instruct and encourage patient and family to use good hand hygiene technique  - Identify and instruct in appropriate isolation precautions for identified infection/condition   Outcome: Progressing     Problem: SAFETY ADULT  Goal: Maintain or return to baseline ADL function  Description  INTERVENTIONS:  -  Assess patient's ability to carry out ADLs; assess patient's baseline for ADL function and identify physical deficits which impact ability to perform ADLs (bathing, care of mouth/teeth, toileting, grooming, dressing, etc )  - Assess/evaluate cause of self-care deficits   - Assess range of motion  - Assess patient's mobility; develop plan if impaired  - Assess patient's need for assistive devices and provide as appropriate  - Encourage maximum independence but intervene and supervise when necessary  ¯ Involve family in performance of ADLs  ¯ Assess for home care needs following discharge   ¯ Request OT consult to assist with ADL evaluation and planning for discharge  ¯ Provide patient education as appropriate  Outcome: Progressing  Goal: Maintain or return mobility status to optimal level  Description  INTERVENTIONS:  - Assess patient's baseline mobility status (ambulation, transfers, stairs, etc )    - Identify cognitive and physical deficits and behaviors that affect mobility  - Identify mobility aids required to assist with transfers and/or ambulation (gait belt, sit-to-stand, lift, walker, cane, etc )  - [place] fall precautions as indicated by assessment  - Record patient progress and toleration of activity level on Mobility SBAR; progress patient to next Phase/Stage  - Instruct patient to call for assistance with activity based on assessment  - Request Rehabilitation consult to assist with strengthening/weightbearing, etc   Outcome: Progressing     Problem: DISCHARGE PLANNING  Goal: Discharge to home or other facility with appropriate resources  Description  INTERVENTIONS:  - Identify barriers to discharge w/patient and caregiver  - Arrange for needed discharge resources and transportation as appropriate  - Identify discharge learning needs (meds, wound care, etc )  - Refer to Case Management Department for coordinating discharge planning if the patient needs post-hospital services based on physician/advanced practitioner order or complex needs related to functional status, cognitive ability, or social support system   Outcome: Progressing     Problem: Knowledge Deficit  Goal: Patient/family/caregiver demonstrates understanding of disease process, treatment plan, medications, and discharge instructions  Description  Complete learning assessment and assess knowledge base    Interventions:  - Provide teaching at level of understanding  - Provide teaching via preferred learning methods  Outcome: Progressing     Problem: Prexisting or High Potential for Compromised Skin Integrity  Goal: Skin integrity is maintained or improved  Description  INTERVENTIONS:  - Identify patients at risk for skin breakdown  - Assess and monitor skin integrity  - Assess and monitor nutrition and hydration status  - Monitor labs (i e  albumin)  - Assess for incontinence   - Turn and reposition patient  - Assist with mobility/ambulation  - Relieve pressure over bony prominences  - Avoid friction and shearing  - Provide appropriate hygiene as needed including keeping skin clean and dry  - Evaluate need for skin moisturizer/barrier cream  - Collaborate with interdisciplinary team (i e  Nutrition, Rehabilitation, etc )   - Patient/family teaching  Outcome: Progressing

## 2019-07-05 NOTE — UTILIZATION REVIEW
Notification of Inpatient Admission/Inpatient Authorization Request  This is a Notification of Inpatient Admission/Request for Inpatient Authorization for our facility [address]  Be advised that this patient was admitted to our facility under Inpatient Status  Please contact the Utilization Review Department where the patient is receiving care services for additional admission information  Place of Service Code: 24   Place of Service Name: Inpatient Hospital  Presentation Date & Time: 7/3/2019  9:48 AM  Inpatient Admission Date & Time: 7/4/19 1608  Discharge Date & Time: No discharge date for patient encounter  Discharge Disposition (if discharged): Home with [facility]  Attending Physician: Devorah Gardner Md [[id number]]   [facility], [address] [place] [address]  Phone 1: [phone]  Phone 2:   Fax: [phone]  Admission Orders (From admission, onward)    Ordered        07/04/19 1608  Inpatient Admission  Once         07/03/19 1407  Place in Observation (expected length of stay for this patient is less than two midnights)  Once             [facility]: [facility]   Utilization Review Department  Phone: [phone]; Fax [phone]  [email or web address]  ATTENTION: Please call with any questions or concerns to [phone]  and carefully listen to the prompts so that you are directed to the right person  Send all requests for admission clinical reviews, approved or denied determinations and any other requests to fax [phone]   All voicemails are confidential

## 2019-07-06 PROCEDURE — 99232 SBSQ HOSP IP/OBS MODERATE 35: CPT | Performed by: STUDENT IN AN ORGANIZED HEALTH CARE EDUCATION/TRAINING PROGRAM

## 2019-07-06 RX ORDER — LANOLIN ALCOHOL/MO/W.PET/CERES
3 CREAM (GRAM) TOPICAL
Status: DISCONTINUED | OUTPATIENT
Start: 2019-07-06 | End: 2019-07-10 | Stop reason: HOSPADM

## 2019-07-06 RX ADMIN — ATORVASTATIN CALCIUM 40 MG: 40 TABLET, FILM COATED ORAL at 09:26

## 2019-07-06 RX ADMIN — BUDESONIDE AND FORMOTEROL FUMARATE DIHYDRATE 2 PUFF: 80; 4.5 AEROSOL RESPIRATORY (INHALATION) at 18:05

## 2019-07-06 RX ADMIN — ENOXAPARIN SODIUM 40 MG: 40 INJECTION SUBCUTANEOUS at 09:25

## 2019-07-06 RX ADMIN — ASPIRIN 81 MG: 81 TABLET, COATED ORAL at 09:40

## 2019-07-06 RX ADMIN — APIXABAN 5 MG: 5 TABLET, FILM COATED ORAL at 18:05

## 2019-07-06 RX ADMIN — TIOTROPIUM BROMIDE 18 MCG: 18 CAPSULE ORAL; RESPIRATORY (INHALATION) at 09:27

## 2019-07-06 RX ADMIN — METOPROLOL TARTRATE 25 MG: 25 TABLET, FILM COATED ORAL at 09:25

## 2019-07-06 RX ADMIN — ALBUTEROL SULFATE 1 PUFF: 90 AEROSOL, METERED RESPIRATORY (INHALATION) at 11:00

## 2019-07-06 RX ADMIN — LOSARTAN POTASSIUM 100 MG: 50 TABLET, FILM COATED ORAL at 09:25

## 2019-07-06 RX ADMIN — MELATONIN 3 MG: at 22:05

## 2019-07-06 RX ADMIN — NICOTINE 1 PATCH: 21 PATCH TRANSDERMAL at 18:06

## 2019-07-06 RX ADMIN — BUDESONIDE AND FORMOTEROL FUMARATE DIHYDRATE 2 PUFF: 80; 4.5 AEROSOL RESPIRATORY (INHALATION) at 09:26

## 2019-07-06 RX ADMIN — AMLODIPINE BESYLATE 5 MG: 5 TABLET ORAL at 09:26

## 2019-07-06 RX ADMIN — PANTOPRAZOLE SODIUM 40 MG: 40 TABLET, DELAYED RELEASE ORAL at 05:48

## 2019-07-06 NOTE — ASSESSMENT & PLAN NOTE
Patient presented lethargic  Currently awake, alert, oriented at baseline  Frustrated for being in the hospital again  Wants to be discharg as soon as possible  Symptoms likely due to sleep-wake cycle disturbance as well as worsening of previous stroke symptoms per Neurology  Repeat MRI brain shows right MCA distribution now subacute similar to previous imaging finding  Previous CTA head showed on significant right carotid artery stenosis  Per Neurology recommendation noted to continue aspirin, statin, anticoagulation  Vascular surgery recommended no inpt surgical intervention and out pt follow up in 1-2 weeks  Patient is agreeing to go to inpatient short-term rehab if needed    Awaiting PT eval

## 2019-07-06 NOTE — PROGRESS NOTES
Progress Note - Yoel Perez 1950, 81 y o  male MRN: [id number]    Unit/Bed#: -01 Encounter: [id number]    Primary Care Provider: Jeny Bejarano DO   Date and time admitted to hospital: 7/3/2019  9:48 AM        * Acute metabolic encephalopathy  Assessment & Plan  Patient presented lethargic  Currently awake, alert, oriented at baseline  Frustrated for being in the hospital again  Wants to be discharg as soon as possible  Symptoms likely due to sleep-wake cycle disturbance as well as worsening of previous stroke symptoms per Neurology  Repeat MRI brain shows right MCA distribution now subacute similar to previous imaging finding  Previous CTA head showed on significant right carotid artery stenosis  Per Neurology recommendation noted to continue aspirin, statin, anticoagulation  Vascular surgery recommended no inpt surgical intervention and out pt follow up in 1-2 weeks  Patient is agreeing to go to inpatient short-term rehab if needed  Awaiting PT eval        Carotid artery stenosis  Assessment & Plan  CTA 06/28 shows significant right ICA stenosis of 70%  Vascular surgery recommendation noted for no inpatient surgical intervention  And recommended outpatient follow-up in 1-2 weeks with vascular surgery  Atrial fibrillation ([address] )  Assessment & Plan  Currently normal sinus rhythm with intermittent PVCs  Neurology had cleared patient to start on anticoagulation  History of previous CVA status post tPA  Plan was to start Eliquis on 07/05/2019  Had lengthy discussion regarding anticoagulation with patient, patient's ex wife was present at bedside as well as patient's brother Farrah Alfredo was on the speaker phone  After discussing and understanding risk and benefits off had long-term anticoagulation and different options of anticoagulation such as warfarin versus new agents like Eliquis, patient and family requesting to be on Eliquis  Will start on Eliquis 5 mg b i d    Discussed with cm for insurance coverage prior to discharge  Alcohol abuse  Assessment & Plan  Patient's alcohol level in his normal here  Currently awake, alert, oriented x3  No signs of withdrawal noted  Will put him on some vitamin supplements  CVA (cerebral vascular accident) [facility])  Assessment & Plan  Patient was discharged last week with stroke s/p tPA  MRI shows right MCA distribution of subacute similar to prior imaging  Neurology cleared patient to start on anticoagulation in settings of AFib for stroke prevention  Continue aspirin, statin, PT eval  Vascular surgery recommendation noted  Patient agreeable for inpatient short-term rehab  Awaiting PT eval    Hyperlipidemia  Assessment & Plan  Continue statin    COPD (chronic obstructive pulmonary disease) ([facility] [address] )  Assessment & Plan  Patient has COPD  Currently not in acute exacerbation  Not in acute respiratory distress  O2 saturation 94-95% on room air  VTE Pharmacologic Prophylaxis:   Pharmacologic: Apixaban (Eliquis)    Patient Centered Rounds: I have performed bedside rounds with nursing staff today  Discussions with Specialists or Other Care Team Provider:  Vascular surgery, Cardiology recommendation noted    Education and Discussions with Family / Patient:  Patient, ex wife present at bedside, brother Baron Wilde was available on speaker phone at the time  I answered all questions appropriately  Time Spent for Care: 20 minutes  More than 50% of total time spent on counseling and coordination of care as described above  Current Length of Stay: 2 day(s)    Current Patient Status: Inpatient   Certification Statement: The patient will continue to require additional inpatient hospital stay due to Awaiting PT eval, inpatient short-term rehab placement  Discharge Plan:  Awaiting for PT eval, inpatient short-term rehab placement  Code Status: Level 3 - DNAR and DNI      Subjective:   Not in acute distress    Denies chest pain, fever, chills, nausea, vomiting, dizziness, palpitation  Denies any other new complaints  Patient's ex-wife was present at bedside, patient's brother Baron Wilde was also available on speaker phone  Had lengthy discussion regarding anticoagulation  Family and patient requesting to be on Eliquis  Patient also agrees for inpatient short-term rehab  Objective:     Vitals:   Temp (24hrs), Av 7 °F (36 5 °C), Min:97 5 °F (36 4 °C), Max:97 9 °F (36 6 °C)    Temp:  [97 5 °F (36 4 °C)-97 9 °F (36 6 °C)] 97 5 °F (36 4 °C)  HR:  [46-85] 46  Resp:  [17-20] 19  BP: (134-161)/() 134/83  SpO2:  [92 %-99 %] 93 %  Body mass index is 35 78 kg/m²  Input and Output Summary (last 24 hours): Intake/Output Summary (Last 24 hours) at 2019 1728  Last data filed at 2019 0327  Gross per 24 hour   Intake    Output 2125 ml   Net -2125 ml       Physical Exam:     Physical Exam   Constitutional: He is oriented to person, place, and time  No distress  Chronically ill appearing, nontoxic appearing   HENT:   Head: Normocephalic and atraumatic  Eyes: Pupils are equal, round, and reactive to light  EOM are normal    Neck: Normal range of motion  Neck supple  Cardiovascular: Normal rate and regular rhythm  Pulmonary/Chest: Effort normal and breath sounds normal  No stridor  No respiratory distress  Abdominal: Soft  Bowel sounds are normal  He exhibits no distension  There is no tenderness  There is no guarding  Musculoskeletal: Normal range of motion  He exhibits edema  Neurological: He is alert and oriented to person, place, and time         Additional Data:     Labs:    Results from last 7 days   Lab Units 19  0454 19  1026   WBC Thousand/uL 6 54 6 99   HEMOGLOBIN g/dL 16 4 16 7   HEMATOCRIT % 51 6* 50 9*   PLATELETS Thousands/uL 211 211   NEUTROS PCT %  --  76*   LYMPHS PCT %  --  11*   MONOS PCT %  --  11   EOS PCT %  --  2     Results from last 7 days   Lab Units 19  0454 19  1026   SODIUM mmol/L 136 133*   POTASSIUM mmol/L 4 5 4 2   CHLORIDE mmol/L 98* 97*   CO2 mmol/L 33* 30   BUN mg/dL 19 20   CREATININE mg/dL 0 95 0 93   ANION GAP mmol/L 5 6   CALCIUM mg/dL 8 9 8 9   ALBUMIN g/dL  --  3 2*   TOTAL BILIRUBIN mg/dL  --  1 30*   ALK PHOS U/L  --  78   ALT U/L  --  34   AST U/L  --  27   GLUCOSE RANDOM mg/dL 153* 109     Results from last 7 days   Lab Units 07/03/19  1026   INR  1 04     Results from last 7 days   Lab Units 07/04/19  0337   POC GLUCOSE mg/dl 90                   * I Have Reviewed All Lab Data Listed Above  * Additional Pertinent Lab Tests Reviewed: All Labs Within Last 24 Hours Reviewed        Recent Cultures (last 7 days):           Last 24 Hours Medication List:     Current Facility-Administered Medications:  acetaminophen 650 mg Oral Q6H PRN Ottoniel Workman MD   albuterol 1 puff Inhalation Q6H PRN Ottoniel Workman MD   amLODIPine 5 mg Oral Daily Ottoniel Workman MD   apixaban 5 mg Oral BID Tamika Steinberg MD   aspirin 81 mg Oral Daily Ottoniel Workman MD   atorvastatin 40 mg Oral Daily Ottoniel Workman MD   budesonide-formoterol 2 puff Inhalation BID Ottoniel Workman MD   hydrALAZINE 5 mg Intravenous Q6H PRN Red Avilez MD   losartan 100 mg Oral Daily Ottoniel Workman MD   metoprolol tartrate 25 mg Oral Q12H [address] Ottoniel Workman MD   nicotine 1 patch Transdermal Q24H Ottoniel Workman MD   pantoprazole 40 mg Oral Early Morning Ottoniel Workman MD   tiotropium 18 mcg Inhalation Daily Ottoniel Workman MD        Today, Patient Was Seen By: Gualberto Pitts MD    ** Please Note: Dictation voice to text software may have been used in the creation of this document   **

## 2019-07-06 NOTE — PLAN OF CARE
Problem: Potential for Falls  Goal: Patient will remain free of falls  Description  INTERVENTIONS:  - Assess patient frequently for physical needs  -  Identify cognitive and physical deficits and behaviors that affect risk of falls  -  [place] fall precautions as indicated by assessment   - Educate patient/family on patient safety including physical limitations  - Instruct patient to call for assistance with activity based on assessment  - Modify environment to reduce risk of injury  - Consider OT/PT consult to assist with strengthening/mobility  Outcome: Progressing     Problem: CARDIOVASCULAR - ADULT  Goal: Maintains optimal cardiac output and hemodynamic stability  Description  INTERVENTIONS:  - Monitor I/O, vital signs and rhythm  - Monitor for S/S and trends of decreased cardiac output i e  bleeding, hypotension  - Administer and titrate ordered vasoactive medications to optimize hemodynamic stability  - Assess quality of pulses, skin color and temperature  - Assess for signs of decreased coronary artery perfusion - ex   Angina  - Instruct patient to report change in severity of symptoms  Outcome: Progressing  Goal: Absence of cardiac dysrhythmias or at baseline rhythm  Description  INTERVENTIONS:  - Continuous cardiac monitoring, monitor vital signs, obtain 12 lead EKG if indicated  - Administer antiarrhythmic and heart rate control medications as ordered  - Monitor electrolytes and administer replacement therapy as ordered  Outcome: Progressing     Problem: MUSCULOSKELETAL - ADULT  Goal: Maintain or return mobility to safest level of function  Description  INTERVENTIONS:  - Assess patient's ability to carry out ADLs; assess patient's baseline for ADL function and identify physical deficits which impact ability to perform ADLs (bathing, care of mouth/teeth, toileting, grooming, dressing, etc )  - Assess/evaluate cause of self-care deficits   - Assess range of motion  - Assess patient's mobility; develop plan if impaired  - Assess patient's need for assistive devices and provide as appropriate  - Encourage maximum independence but intervene and supervise when necessary  - Involve family in performance of ADLs  - Assess for home care needs following discharge   - Request OT consult to assist with ADL evaluation and planning for discharge  - Provide patient education as appropriate  Outcome: Progressing  Goal: Maintain proper alignment of affected body part  Description  INTERVENTIONS:  - Support, maintain and protect limb and body alignment  - Provide pt/fam with appropriate education  Outcome: Progressing     Problem: PAIN - ADULT  Goal: Verbalizes/displays adequate comfort level or baseline comfort level  Description  Interventions:  - Encourage patient to monitor pain and request assistance  - Assess pain using appropriate pain scale  - Administer analgesics based on type and severity of pain and evaluate response  - Implement non-pharmacological measures as appropriate and evaluate response  - Consider cultural and social influences on pain and pain management  - Notify physician/advanced practitioner if interventions unsuccessful or patient reports new pain  Outcome: Progressing     Problem: INFECTION - ADULT  Goal: Absence or prevention of progression during hospitalization  Description  INTERVENTIONS:  - Assess and monitor for signs and symptoms of infection  - Monitor lab/diagnostic results  - Monitor all insertion sites, i e  indwelling lines, tubes, and drains  - [place] appropriate cooling/warming therapies per order  - Administer medications as ordered  - Instruct and encourage patient and family to use good hand hygiene technique  - Identify and instruct in appropriate isolation precautions for identified infection/condition   Outcome: Progressing     Problem: SAFETY ADULT  Goal: Maintain or return to baseline ADL function  Description  INTERVENTIONS:  -  Assess patient's ability to carry out ADLs; assess patient's baseline for ADL function and identify physical deficits which impact ability to perform ADLs (bathing, care of mouth/teeth, toileting, grooming, dressing, etc )  - Assess/evaluate cause of self-care deficits   - Assess range of motion  - Assess patient's mobility; develop plan if impaired  - Assess patient's need for assistive devices and provide as appropriate  - Encourage maximum independence but intervene and supervise when necessary  ¯ Involve family in performance of ADLs  ¯ Assess for home care needs following discharge   ¯ Request OT consult to assist with ADL evaluation and planning for discharge  ¯ Provide patient education as appropriate  Outcome: Progressing  Goal: Maintain or return mobility status to optimal level  Description  INTERVENTIONS:  - Assess patient's baseline mobility status (ambulation, transfers, stairs, etc )    - Identify cognitive and physical deficits and behaviors that affect mobility  - Identify mobility aids required to assist with transfers and/or ambulation (gait belt, sit-to-stand, lift, walker, cane, etc )  - [place] fall precautions as indicated by assessment  - Record patient progress and toleration of activity level on Mobility SBAR; progress patient to next Phase/Stage  - Instruct patient to call for assistance with activity based on assessment  - Request Rehabilitation consult to assist with strengthening/weightbearing, etc   Outcome: Progressing     Problem: DISCHARGE PLANNING  Goal: Discharge to home or other facility with appropriate resources  Description  INTERVENTIONS:  - Identify barriers to discharge w/patient and caregiver  - Arrange for needed discharge resources and transportation as appropriate  - Identify discharge learning needs (meds, wound care, etc )  - Refer to Case Management Department for coordinating discharge planning if the patient needs post-hospital services based on physician/advanced practitioner order or complex needs related to functional status, cognitive ability, or social support system   Outcome: Progressing     Problem: Knowledge Deficit  Goal: Patient/family/caregiver demonstrates understanding of disease process, treatment plan, medications, and discharge instructions  Description  Complete learning assessment and assess knowledge base    Interventions:  - Provide teaching at level of understanding  - Provide teaching via preferred learning methods  Outcome: Progressing     Problem: Prexisting or High Potential for Compromised Skin Integrity  Goal: Skin integrity is maintained or improved  Description  INTERVENTIONS:  - Identify patients at risk for skin breakdown  - Assess and monitor skin integrity  - Assess and monitor nutrition and hydration status  - Monitor labs (i e  albumin)  - Assess for incontinence   - Turn and reposition patient  - Assist with mobility/ambulation  - Relieve pressure over bony prominences  - Avoid friction and shearing  - Provide appropriate hygiene as needed including keeping skin clean and dry  - Evaluate need for skin moisturizer/barrier cream  - Collaborate with interdisciplinary team (i e  Nutrition, Rehabilitation, etc )   - Patient/family teaching  Outcome: Progressing

## 2019-07-06 NOTE — ASSESSMENT & PLAN NOTE
Patient was discharged last week with stroke s/p tPA  MRI shows right MCA distribution of subacute similar to prior imaging  Neurology cleared patient to start on anticoagulation in settings of AFib for stroke prevention  Continue aspirin, statin, PT eval  Vascular surgery recommendation noted  Patient agreeable for inpatient short-term rehab    Awaiting PT eval

## 2019-07-06 NOTE — ASSESSMENT & PLAN NOTE
Patient's alcohol level in his normal here  Currently awake, alert, oriented x3  No signs of withdrawal noted  Will put him on some vitamin supplements

## 2019-07-06 NOTE — ASSESSMENT & PLAN NOTE
CTA 06/28 shows significant right ICA stenosis of 70%  Vascular surgery recommendation noted for no inpatient surgical intervention  And recommended outpatient follow-up in 1-2 weeks with vascular surgery

## 2019-07-06 NOTE — ASSESSMENT & PLAN NOTE
Currently normal sinus rhythm with intermittent PVCs  Neurology had cleared patient to start on anticoagulation  History of previous CVA status post tPA  Plan was to start Eliquis on 07/05/2019  Had lengthy discussion regarding anticoagulation with patient, patient's ex wife was present at bedside as well as patient's brother Quan Gabriel was on the speaker phone  After discussing and understanding risk and benefits off had long-term anticoagulation and different options of anticoagulation such as warfarin versus new agents like Eliquis, patient and family requesting to be on Eliquis  Will start on Eliquis 5 mg b i d  Discussed with cm for insurance coverage prior to discharge

## 2019-07-07 PROCEDURE — 99232 SBSQ HOSP IP/OBS MODERATE 35: CPT | Performed by: STUDENT IN AN ORGANIZED HEALTH CARE EDUCATION/TRAINING PROGRAM

## 2019-07-07 RX ADMIN — HYDRALAZINE HYDROCHLORIDE 5 MG: 20 INJECTION INTRAMUSCULAR; INTRAVENOUS at 03:25

## 2019-07-07 RX ADMIN — MELATONIN 3 MG: at 21:36

## 2019-07-07 RX ADMIN — TIOTROPIUM BROMIDE 18 MCG: 18 CAPSULE ORAL; RESPIRATORY (INHALATION) at 09:38

## 2019-07-07 RX ADMIN — APIXABAN 5 MG: 5 TABLET, FILM COATED ORAL at 18:15

## 2019-07-07 RX ADMIN — NICOTINE 1 PATCH: 21 PATCH TRANSDERMAL at 18:16

## 2019-07-07 RX ADMIN — BUDESONIDE AND FORMOTEROL FUMARATE DIHYDRATE 2 PUFF: 80; 4.5 AEROSOL RESPIRATORY (INHALATION) at 09:38

## 2019-07-07 RX ADMIN — METOPROLOL TARTRATE 25 MG: 25 TABLET, FILM COATED ORAL at 21:36

## 2019-07-07 RX ADMIN — APIXABAN 5 MG: 5 TABLET, FILM COATED ORAL at 09:38

## 2019-07-07 RX ADMIN — ATORVASTATIN CALCIUM 40 MG: 40 TABLET, FILM COATED ORAL at 09:38

## 2019-07-07 RX ADMIN — BUDESONIDE AND FORMOTEROL FUMARATE DIHYDRATE 2 PUFF: 80; 4.5 AEROSOL RESPIRATORY (INHALATION) at 18:16

## 2019-07-07 RX ADMIN — ASPIRIN 81 MG: 81 TABLET, COATED ORAL at 09:38

## 2019-07-07 RX ADMIN — PANTOPRAZOLE SODIUM 40 MG: 40 TABLET, DELAYED RELEASE ORAL at 05:56

## 2019-07-07 RX ADMIN — METOPROLOL TARTRATE 25 MG: 25 TABLET, FILM COATED ORAL at 09:38

## 2019-07-07 RX ADMIN — AMLODIPINE BESYLATE 5 MG: 5 TABLET ORAL at 09:38

## 2019-07-07 RX ADMIN — LOSARTAN POTASSIUM 100 MG: 50 TABLET, FILM COATED ORAL at 09:38

## 2019-07-07 NOTE — QUICK NOTE
Was paged by nursing staff regarding concerns that patient wanted to leave the hospital  Pt was seen and evaluated at bedside, he is calm sitting at the edge of his bed  He reports that he does not like feeling "locked up" and he wishes he could go outside  He is frustrated that PT has not seen him yet but is still agreeable to going to rehab on discharge  He does not desire to leave AMA at this time  Continue to monitor       Jerzy Kumari PA-C

## 2019-07-07 NOTE — ASSESSMENT & PLAN NOTE
Currently normal sinus rhythm with intermittent PVCs  Neurology had cleared patient to start on anticoagulation  History of previous CVA status post tPA  Plan was to start Eliquis on 07/05/2019  Had lengthy discussion regarding anticoagulation with patient, patient's ex wife was present at bedside as well as patient's brother Bety Coronado was on the speaker phone  After discussing and understanding risk and benefits off had long-term anticoagulation and different options of anticoagulation such as warfarin versus new agents like Eliquis, patient and family requesting to be on Eliquis  Will start on Eliquis 5 mg b i d  Discussed with cm for insurance coverage prior to discharge

## 2019-07-07 NOTE — PROGRESS NOTES
Progress Note - Bakari Hilton 1950, 81 y o  male MRN: [id number]    Unit/Bed#: -01 Encounter: [id number]    Primary Care Provider: Xochitl Butt DO   Date and time admitted to hospital: 7/3/2019  9:48 AM        * Acute metabolic encephalopathy  Assessment & Plan  Patient presented lethargic  Currently awake, alert, oriented at baseline  Frustrated for being in the hospital again  Wants to be discharg as soon as possible  Symptoms likely due to sleep-wake cycle disturbance as well as worsening of previous stroke symptoms per Neurology  Repeat MRI brain shows right MCA distribution now subacute similar to previous imaging finding  Previous CTA head showed on significant right carotid artery stenosis  Per Neurology recommendation noted to continue aspirin, statin, anticoagulation  Vascular surgery recommended no inpt surgical intervention and out pt follow up in 1-2 weeks  Patient is agreeing to go to inpatient short-term rehab if needed  Awaiting PT eval        Carotid artery stenosis  Assessment & Plan  CTA 06/28 shows significant right ICA stenosis of 70%  Vascular surgery recommendation noted for no inpatient surgical intervention  And recommended outpatient follow-up in 1-2 weeks with vascular surgery  Atrial fibrillation ([address] )  Assessment & Plan  Currently normal sinus rhythm with intermittent PVCs  Neurology had cleared patient to start on anticoagulation  History of previous CVA status post tPA  Plan was to start Eliquis on 07/05/2019  Had lengthy discussion regarding anticoagulation with patient, patient's ex wife was present at bedside as well as patient's brother Eufemia Nazario was on the speaker phone  After discussing and understanding risk and benefits off had long-term anticoagulation and different options of anticoagulation such as warfarin versus new agents like Eliquis, patient and family requesting to be on Eliquis  Will start on Eliquis 5 mg b i d    Discussed with cm for insurance coverage prior to discharge  Alcohol abuse  Assessment & Plan  Patient's alcohol level in his normal here  Currently awake, alert, oriented x3  No signs of withdrawal noted  Will put him on some vitamin supplements  CVA (cerebral vascular accident) [facility])  Assessment & Plan  Patient was discharged last week with stroke s/p tPA  MRI shows right MCA distribution of subacute similar to prior imaging  Neurology cleared patient to start on anticoagulation in settings of AFib for stroke prevention  Continue aspirin, statin, PT eval  Vascular surgery recommendation noted  Patient agreeable for inpatient short-term rehab  Awaiting PT eval    Hyperlipidemia  Assessment & Plan  Continue statin    COPD (chronic obstructive pulmonary disease) ([facility] [address] )  Assessment & Plan  Patient has COPD  Currently not in acute exacerbation  Not in acute respiratory distress  O2 saturation 94-95% on room air  VTE Pharmacologic Prophylaxis:   Pharmacologic: Apixaban (Eliquis)    Patient Centered Rounds: I have performed bedside rounds with nursing staff today  Education and Discussions with Family / Patient:  Patient    Time Spent for Care: 20 minutes  More than 50% of total time spent on counseling and coordination of care as described above  Current Length of Stay: 3 day(s)    Current Patient Status: Inpatient   Certification Statement: The patient will continue to require additional inpatient hospital stay due to Awaiting PT eval, short-term rehab placement    Discharge Plan:  Awaiting PT eval, short-term rehab placement    Code Status: Level 3 - DNAR and DNI      Subjective:   Seen sitting in bed  Not in acute distress  Stable  Denies new complaints  Awaiting PT eval, short-term rehab placement      Objective:     Vitals:   Temp (24hrs), Av 4 °F (36 3 °C), Min:97 3 °F (36 3 °C), Max:97 5 °F (36 4 °C)    Temp:  [97 3 °F (36 3 °C)-97 5 °F (36 4 °C)] 97 5 °F (36 4 °C)  HR:  [45-94] 68  Resp: [18-19] 19  BP: (134-181)/(80-95) 150/88  SpO2:  [80 %-99 %] 97 %  Body mass index is 35 78 kg/m²  Input and Output Summary (last 24 hours):     No intake or output data in the 24 hours ending 07/07/19 1309    Physical Exam:     Physical Exam   Constitutional: He is oriented to person, place, and time  No distress  Chronically ill appearing, nontoxic appearing   HENT:   Head: Normocephalic and atraumatic  Eyes: Pupils are equal, round, and reactive to light  EOM are normal    Neck: Normal range of motion  Neck supple  Cardiovascular: Normal rate and regular rhythm  Pulmonary/Chest: Effort normal and breath sounds normal  No stridor  No respiratory distress  Abdominal: Soft  Bowel sounds are normal  He exhibits no distension  There is no tenderness  There is no guarding  Musculoskeletal: Normal range of motion  He exhibits edema  Neurological: He is alert and oriented to person, place, and time  Additional Data:     Labs:    Results from last 7 days   Lab Units 07/04/19  0454 07/03/19  1026   WBC Thousand/uL 6 54 6 99   HEMOGLOBIN g/dL 16 4 16 7   HEMATOCRIT % 51 6* 50 9*   PLATELETS Thousands/uL 211 211   NEUTROS PCT %  --  76*   LYMPHS PCT %  --  11*   MONOS PCT %  --  11   EOS PCT %  --  2     Results from last 7 days   Lab Units 07/04/19  0454 07/03/19  1026   SODIUM mmol/L 136 133*   POTASSIUM mmol/L 4 5 4 2   CHLORIDE mmol/L 98* 97*   CO2 mmol/L 33* 30   BUN mg/dL 19 20   CREATININE mg/dL 0 95 0 93   ANION GAP mmol/L 5 6   CALCIUM mg/dL 8 9 8 9   ALBUMIN g/dL  --  3 2*   TOTAL BILIRUBIN mg/dL  --  1 30*   ALK PHOS U/L  --  78   ALT U/L  --  34   AST U/L  --  27   GLUCOSE RANDOM mg/dL 153* 109     Results from last 7 days   Lab Units 07/03/19  1026   INR  1 04     Results from last 7 days   Lab Units 07/04/19  0337   POC GLUCOSE mg/dl 90                   * I Have Reviewed All Lab Data Listed Above    * Additional Pertinent Lab Tests Reviewed: No New Labs Available For Today    Recent Cultures (last 7 days):           Last 24 Hours Medication List:     Current Facility-Administered Medications:  acetaminophen 650 mg Oral Q6H PRN Pauline Saeed MD   albuterol 1 puff Inhalation Q6H PRN Pauline Saeed MD   amLODIPine 5 mg Oral Daily Pauline Saeed MD   apixaban 5 mg Oral BID Jose De Jesus Mead MD   aspirin 81 mg Oral Daily Pauline Saeed MD   atorvastatin 40 mg Oral Daily Pauline Saeed MD   budesonide-formoterol 2 puff Inhalation BID Pauline Saeed MD   hydrALAZINE 5 mg Intravenous Q6H PRN Guero Oneill MD   losartan 100 mg Oral Daily Pauline Saeed MD   melatonin 3 mg Oral HS Margie Blackmon PA-C   metoprolol tartrate 25 mg Oral Q12H [address] Pauline Saeed MD   nicotine 1 patch Transdermal Q24H Pauline Saeed MD   pantoprazole 40 mg Oral Early Morning Pauline Saeed MD   tiotropium 18 mcg Inhalation Daily Pauline Saeed MD        Today, Patient Was Seen By: Soledad Todd MD    ** Please Note: Dictation voice to text software may have been used in the creation of this document   **

## 2019-07-08 PROCEDURE — 99232 SBSQ HOSP IP/OBS MODERATE 35: CPT | Performed by: INTERNAL MEDICINE

## 2019-07-08 PROCEDURE — 99232 SBSQ HOSP IP/OBS MODERATE 35: CPT | Performed by: STUDENT IN AN ORGANIZED HEALTH CARE EDUCATION/TRAINING PROGRAM

## 2019-07-08 RX ADMIN — ATORVASTATIN CALCIUM 40 MG: 40 TABLET, FILM COATED ORAL at 09:44

## 2019-07-08 RX ADMIN — AMLODIPINE BESYLATE 5 MG: 5 TABLET ORAL at 09:43

## 2019-07-08 RX ADMIN — BUDESONIDE AND FORMOTEROL FUMARATE DIHYDRATE 2 PUFF: 80; 4.5 AEROSOL RESPIRATORY (INHALATION) at 09:45

## 2019-07-08 RX ADMIN — BUDESONIDE AND FORMOTEROL FUMARATE DIHYDRATE 2 PUFF: 80; 4.5 AEROSOL RESPIRATORY (INHALATION) at 17:38

## 2019-07-08 RX ADMIN — ALBUTEROL SULFATE 1 PUFF: 90 AEROSOL, METERED RESPIRATORY (INHALATION) at 21:54

## 2019-07-08 RX ADMIN — MELATONIN 3 MG: at 21:51

## 2019-07-08 RX ADMIN — APIXABAN 5 MG: 5 TABLET, FILM COATED ORAL at 17:38

## 2019-07-08 RX ADMIN — ASPIRIN 81 MG: 81 TABLET, COATED ORAL at 09:44

## 2019-07-08 RX ADMIN — LOSARTAN POTASSIUM 100 MG: 50 TABLET, FILM COATED ORAL at 09:45

## 2019-07-08 RX ADMIN — ALBUTEROL SULFATE 1 PUFF: 90 AEROSOL, METERED RESPIRATORY (INHALATION) at 00:35

## 2019-07-08 RX ADMIN — NICOTINE 1 PATCH: 21 PATCH TRANSDERMAL at 17:40

## 2019-07-08 RX ADMIN — METOPROLOL TARTRATE 25 MG: 25 TABLET, FILM COATED ORAL at 21:51

## 2019-07-08 RX ADMIN — METOPROLOL TARTRATE 25 MG: 25 TABLET, FILM COATED ORAL at 09:46

## 2019-07-08 RX ADMIN — APIXABAN 5 MG: 5 TABLET, FILM COATED ORAL at 09:44

## 2019-07-08 RX ADMIN — TIOTROPIUM BROMIDE 18 MCG: 18 CAPSULE ORAL; RESPIRATORY (INHALATION) at 09:46

## 2019-07-08 NOTE — PROGRESS NOTES
Progress Note - Gretchen Rowland 1950, 81 y o  male MRN: [id number]    Unit/Bed#: -01 Encounter: [id number]    Primary Care Provider: Arturo Bello DO   Date and time admitted to hospital: 7/3/2019  9:48 AM        * Acute metabolic encephalopathy  Assessment & Plan  Patient presented lethargic  Currently awake, alert, oriented at baseline  Frustrated for being in the hospital again  Wants to be discharg as soon as possible  Symptoms likely due to sleep-wake cycle disturbance as well as worsening of previous stroke symptoms per Neurology  Repeat MRI brain shows right MCA distribution now subacute similar to previous imaging finding  Previous CTA head showed on significant right carotid artery stenosis  Per Neurology recommendation noted to continue aspirin, statin, anticoagulation  Vascular surgery recommended no inpt surgical intervention and out pt follow up in 1-2 weeks  Patient is agreeing to go to inpatient short-term rehab if needed  Awaiting PT eval        Carotid artery stenosis  Assessment & Plan  CTA 06/28 shows significant right ICA stenosis of 70%  Vascular surgery recommendation noted for no inpatient surgical intervention  And recommended outpatient follow-up in 1-2 weeks with vascular surgery  Atrial fibrillation ([address] )  Assessment & Plan  Currently normal sinus rhythm with intermittent PVCs  Neurology had cleared patient to start on anticoagulation  History of previous CVA status post tPA  Plan was to start Eliquis on 07/05/2019  Had lengthy discussion regarding anticoagulation with patient, patient's ex wife was present at bedside as well as patient's brother Yuli Route was on the speaker phone  After discussing and understanding risk and benefits off had long-term anticoagulation and different options of anticoagulation such as warfarin versus new agents like Eliquis, patient and family requesting to be on Eliquis  Will start on Eliquis 5 mg b i d    Discussed with cm for insurance coverage prior to discharge  Alcohol abuse  Assessment & Plan  Patient's alcohol level in his normal here  Currently awake, alert, oriented x3  No signs of withdrawal noted  Will put him on some vitamin supplements  CVA (cerebral vascular accident) [facility])  Assessment & Plan  Patient was discharged last week with stroke s/p tPA  MRI shows right MCA distribution of subacute similar to prior imaging  Neurology cleared patient to start on anticoagulation in settings of AFib for stroke prevention  Continue aspirin, statin, PT eval  Vascular surgery recommendation noted  Patient agreeable for inpatient short-term rehab  Awaiting PT eval    Hyperlipidemia  Assessment & Plan  Continue statin    COPD (chronic obstructive pulmonary disease) ([facility] [address] )  Assessment & Plan  Patient has COPD  Currently not in acute exacerbation  Not in acute respiratory distress  O2 saturation 94-95% on room air  VTE Pharmacologic Prophylaxis:   Pharmacologic: Apixaban (Eliquis)    Patient Centered Rounds: I have performed bedside rounds with nursing staff today  Education and Discussions with Family / Patient:  Patient    Time Spent for Care: 20 minutes  More than 50% of total time spent on counseling and coordination of care as described above  Current Length of Stay: 4 day(s)    Current Patient Status: Inpatient   Certification Statement: The patient will continue to require additional inpatient hospital stay due to Awaiting rehab placement, awaiting PT eval    Discharge Plan:  Awaiting rehab placement, awaiting PT eval  CM working for placement  Code Status: Level 3 - DNAR and DNI      Subjective:   Awake, alert, oriented, stable at baseline  Denies chest pain, dizziness, fever, chills, nausea, vomiting abdominal pain, diarrhea  Denies any other new complaints  Awaiting PT eval, awaiting short-term rehab placement      Objective:     Vitals:   Temp (24hrs), Av 7 °F (36 5 °C), Min:97 3 °F (36 3 °C), Max:97 9 °F (36 6 °C)    Temp:  [97 3 °F (36 3 °C)-97 9 °F (36 6 °C)] 97 9 °F (36 6 °C)  HR:  [48-65] 64  Resp:  [20] 20  BP: (133-152)/(62-77) 150/77  SpO2:  [90 %-98 %] 92 %  Body mass index is 35 78 kg/m²  Input and Output Summary (last 24 hours): Intake/Output Summary (Last 24 hours) at 7/8/2019 1734  Last data filed at 7/8/2019 1300  Gross per 24 hour   Intake 480 ml   Output    Net 480 ml       Physical Exam:     Physical Exam   Constitutional: He is oriented to person, place, and time  No distress  Chronically ill appearing, nontoxic appearing   HENT:   Head: Normocephalic and atraumatic  Eyes: Pupils are equal, round, and reactive to light  EOM are normal    Neck: Normal range of motion  Neck supple  Cardiovascular: Normal rate and regular rhythm  Pulmonary/Chest: Effort normal and breath sounds normal  No stridor  No respiratory distress  Abdominal: Soft  Bowel sounds are normal  He exhibits no distension  There is no tenderness  There is no guarding  Musculoskeletal: Normal range of motion  He exhibits edema  Neurological: He is alert and oriented to person, place, and time         Additional Data:     Labs:    Results from last 7 days   Lab Units 07/04/19  0454 07/03/19  1026   WBC Thousand/uL 6 54 6 99   HEMOGLOBIN g/dL 16 4 16 7   HEMATOCRIT % 51 6* 50 9*   PLATELETS Thousands/uL 211 211   NEUTROS PCT %  --  76*   LYMPHS PCT %  --  11*   MONOS PCT %  --  11   EOS PCT %  --  2     Results from last 7 days   Lab Units 07/04/19  0454 07/03/19  1026   SODIUM mmol/L 136 133*   POTASSIUM mmol/L 4 5 4 2   CHLORIDE mmol/L 98* 97*   CO2 mmol/L 33* 30   BUN mg/dL 19 20   CREATININE mg/dL 0 95 0 93   ANION GAP mmol/L 5 6   CALCIUM mg/dL 8 9 8 9   ALBUMIN g/dL  --  3 2*   TOTAL BILIRUBIN mg/dL  --  1 30*   ALK PHOS U/L  --  78   ALT U/L  --  34   AST U/L  --  27   GLUCOSE RANDOM mg/dL 153* 109     Results from last 7 days   Lab Units 07/03/19  1026   INR  1 04     Results from last 7 days   Lab Units 07/04/19  0337   POC GLUCOSE mg/dl 90                   * I Have Reviewed All Lab Data Listed Above  * Additional Pertinent Lab Tests Reviewed: No New Labs Available For Today      Recent Cultures (last 7 days):           Last 24 Hours Medication List:     Current Facility-Administered Medications:  acetaminophen 650 mg Oral Q6H PRN Barnie Simmonds, MD   albuterol 1 puff Inhalation Q6H PRN Barnie Simmonds, MD   amLODIPine 5 mg Oral Daily Barnie Simmonds, MD   apixaban 5 mg Oral BID Rocío Tony MD   aspirin 81 mg Oral Daily Barnie Simmonds, MD   atorvastatin 40 mg Oral Daily Barnie Simmonds, MD   budesonide-formoterol 2 puff Inhalation BID Barnie Simmonds, MD   hydrALAZINE 5 mg Intravenous Q6H PRN Tobi Calvin MD   losartan 100 mg Oral Daily Barnie Simmonds, MD   melatonin 3 mg Oral HS Margie Blackmon PA-C   metoprolol tartrate 25 mg Oral Q12H [address] Barnie Simmonds, MD   nicotine 1 patch Transdermal Q24H Barnie Simmonds, MD   pantoprazole 40 mg Oral Early Morning Barnie Simmonds, MD   tiotropium 18 mcg Inhalation Daily Barnie Simmonds, MD        Today, Patient Was Seen By: Mary Lou Garcia MD    ** Please Note: Dictation voice to text software may have been used in the creation of this document   **

## 2019-07-08 NOTE — ASSESSMENT & PLAN NOTE
Currently normal sinus rhythm with intermittent PVCs  Neurology had cleared patient to start on anticoagulation  History of previous CVA status post tPA  Plan was to start Eliquis on 07/05/2019  Had lengthy discussion regarding anticoagulation with patient, patient's ex wife was present at bedside as well as patient's brother Jackie Anderson was on the speaker phone  After discussing and understanding risk and benefits off had long-term anticoagulation and different options of anticoagulation such as warfarin versus new agents like Eliquis, patient and family requesting to be on Eliquis  Will start on Eliquis 5 mg b i d  Discussed with cm for insurance coverage prior to discharge

## 2019-07-08 NOTE — PROGRESS NOTES
Cardiology Progress Note - Florecita Coulter 81 y o  male MRN: [id number]    Unit/Bed#: -01 Encounter: [id number]      Assessment/Plan:  1- Atrial fibrillation, rates adequetely controlled  Continue Lopressor 25mg BID  Continue Eliquis 5mg BID  Denies bleeding episodes        2- CAD, RCA   Stable without chest pain or anginal equivalent  Patient had recent cardiac catheterization on 01/31/2018 which showed noncritical 50% stenosis of mid LAD with RCA  with unsuccessful PCI to repair on 02/14/2018  Continue aspirin, statin and beta blocker      3- Hypertension  Stable, continue Norvasc 5 mg daily, losartan 100 mg daily and Lopressor 25mg BID     4- Hyperlipidemia  Continue statin     5- Tobacco abuse   Strongly encouraged to quit       Subjective:   Patient seen and examined  No new complaints today  Awaiting PT evaluation and transfer to short-term rehab facility  Objective:     Vitals: Blood pressure 152/77, pulse 64, temperature 97 9 °F (36 6 °C), resp  rate 20, height 6' 1" (1 854 m), weight 123 kg (271 lb 13 2 oz), SpO2 92 %  , Body mass index is 35 78 kg/m² ,   Orthostatic Blood Pressures      Most Recent Value   Blood Pressure  152/77 filed at 07/08/2019 0721   Patient Position - Orthostatic VS  Lying filed at 07/07/2019 1500            Intake/Output Summary (Last 24 hours) at 7/8/2019 1250  Last data filed at 7/8/2019 0900  Gross per 24 hour   Intake 240 ml   Output    Net 240 ml         Physical Exam:    GEN: Florecita Coulter appears well, alert and oriented x 3, pleasant and cooperative   HEENT: pupils equal, round, and reactive to light; extraocular muscles intact  NECK: supple, no carotid bruits   HEART: +irregular rhythm, normal S1 and S2, no murmurs, clicks, gallops or rubs   LUNGS: clear to auscultation bilaterally; no wheezes, rales, or rhonchi   ABDOMEN: normal bowel sounds, soft, no tenderness, no distention  EXTREMITIES: peripheral pulses normal; no clubbing, cyanosis, or edema      Medications:      Current Facility-Administered Medications:     acetaminophen (TYLENOL) tablet 650 mg, 650 mg, Oral, Q6H PRN, Ran De León MD    albuterol (PROVENTIL HFA,VENTOLIN HFA) inhaler 1 puff, 1 puff, Inhalation, Q6H PRN, Ran De León MD, 1 puff at 07/08/19 0035    amLODIPine (NORVASC) tablet 5 mg, 5 mg, Oral, Daily, Ran De León MD, 5 mg at 07/08/19 0943    apixaban (ELIQUIS) tablet 5 mg, 5 mg, Oral, BID, Kahlil ROCK MD, 5 mg at 07/08/19 0944    aspirin (ECOTRIN LOW STRENGTH) EC tablet 81 mg, 81 mg, Oral, Daily, Ran De León MD, 81 mg at 07/08/19 0944    atorvastatin (LIPITOR) tablet 40 mg, 40 mg, Oral, Daily, Ran De León MD, 40 mg at 07/08/19 0944    budesonide-formoterol (SYMBICORT) 80-4 5 MCG/ACT inhaler 2 puff, 2 puff, Inhalation, BID, Ran De León MD, 2 puff at 07/08/19 0945    hydrALAZINE (APRESOLINE) injection 5 mg, 5 mg, Intravenous, Q6H PRN, Rosanna Boyer MD, 5 mg at 07/07/19 0325    losartan (COZAAR) tablet 100 mg, 100 mg, Oral, Daily, Ran De León MD, 100 mg at 07/08/19 0945    melatonin tablet 3 mg, 3 mg, Oral, HS, Margie Blackmon PA-C, 3 mg at 07/07/19 2136    metoprolol tartrate (LOPRESSOR) tablet 25 mg, 25 mg, Oral, Q12H [address], Ran De León MD, 25 mg at 07/08/19 0946    nicotine (NICODERM CQ) 21 mg/24 hr TD 24 hr patch 1 patch, 1 patch, Transdermal, Q24H, Ran De León MD, 1 patch at 07/07/19 1816    pantoprazole (PROTONIX) EC tablet 40 mg, 40 mg, Oral, Early Morning, Ran De León MD, 40 mg at 07/07/19 0556    tiotropium (SPIRIVA) capsule for inhaler 18 mcg, 18 mcg, Inhalation, Daily, Ran De León MD, 18 mcg at 07/08/19 0946     Labs & Results:    Results from last 7 days   Lab Units 07/03/19  1334   TROPONIN I ng/mL <0 02     Results from last 7 days   Lab Units 07/04/19  0454 07/03/19  1026   WBC Thousand/uL 6 54 6 99   HEMOGLOBIN g/dL 16 4 16 7   HEMATOCRIT % 51 6* 50 9*   PLATELETS Thousands/uL 211 211         Results from last 7 days   Lab Units 07/04/19  0454 07/03/19  1026   POTASSIUM mmol/L 4 5 4 2   CHLORIDE mmol/L 98* 97*   CO2 mmol/L 33* 30   BUN mg/dL 19 20   CREATININE mg/dL 0 95 0 93   CALCIUM mg/dL 8 9 8 9   ALK PHOS U/L  --  78   ALT U/L  --  34   AST U/L  --  27     Results from last 7 days   Lab Units 07/03/19  1026   INR  1 04     Results from last 7 days   Lab Units 07/04/19  0454   MAGNESIUM mg/dL 1 9

## 2019-07-09 PROCEDURE — G8978 MOBILITY CURRENT STATUS: HCPCS

## 2019-07-09 PROCEDURE — 97167 OT EVAL HIGH COMPLEX 60 MIN: CPT

## 2019-07-09 PROCEDURE — 97163 PT EVAL HIGH COMPLEX 45 MIN: CPT

## 2019-07-09 PROCEDURE — 99232 SBSQ HOSP IP/OBS MODERATE 35: CPT | Performed by: INTERNAL MEDICINE

## 2019-07-09 PROCEDURE — G8987 SELF CARE CURRENT STATUS: HCPCS

## 2019-07-09 PROCEDURE — G8979 MOBILITY GOAL STATUS: HCPCS

## 2019-07-09 PROCEDURE — G8988 SELF CARE GOAL STATUS: HCPCS

## 2019-07-09 RX ORDER — PREDNISONE 20 MG/1
20 TABLET ORAL DAILY
Status: DISCONTINUED | OUTPATIENT
Start: 2019-07-09 | End: 2019-07-10 | Stop reason: HOSPADM

## 2019-07-09 RX ADMIN — APIXABAN 5 MG: 5 TABLET, FILM COATED ORAL at 17:45

## 2019-07-09 RX ADMIN — METOPROLOL TARTRATE 25 MG: 25 TABLET, FILM COATED ORAL at 22:48

## 2019-07-09 RX ADMIN — LOSARTAN POTASSIUM 100 MG: 50 TABLET, FILM COATED ORAL at 09:51

## 2019-07-09 RX ADMIN — MELATONIN 3 MG: at 22:48

## 2019-07-09 RX ADMIN — BUDESONIDE AND FORMOTEROL FUMARATE DIHYDRATE 2 PUFF: 80; 4.5 AEROSOL RESPIRATORY (INHALATION) at 17:45

## 2019-07-09 RX ADMIN — METOPROLOL TARTRATE 25 MG: 25 TABLET, FILM COATED ORAL at 09:51

## 2019-07-09 RX ADMIN — PREDNISONE 20 MG: 20 TABLET ORAL at 13:58

## 2019-07-09 RX ADMIN — PANTOPRAZOLE SODIUM 40 MG: 40 TABLET, DELAYED RELEASE ORAL at 06:34

## 2019-07-09 RX ADMIN — AMLODIPINE BESYLATE 5 MG: 5 TABLET ORAL at 09:51

## 2019-07-09 RX ADMIN — ASPIRIN 81 MG: 81 TABLET, COATED ORAL at 09:51

## 2019-07-09 RX ADMIN — ATORVASTATIN CALCIUM 40 MG: 40 TABLET, FILM COATED ORAL at 09:51

## 2019-07-09 RX ADMIN — BUDESONIDE AND FORMOTEROL FUMARATE DIHYDRATE 2 PUFF: 80; 4.5 AEROSOL RESPIRATORY (INHALATION) at 09:50

## 2019-07-09 RX ADMIN — TIOTROPIUM BROMIDE 18 MCG: 18 CAPSULE ORAL; RESPIRATORY (INHALATION) at 09:50

## 2019-07-09 RX ADMIN — NICOTINE 1 PATCH: 21 PATCH TRANSDERMAL at 17:46

## 2019-07-09 RX ADMIN — APIXABAN 5 MG: 5 TABLET, FILM COATED ORAL at 09:51

## 2019-07-09 NOTE — SOCIAL WORK
LACE   88   LOS 5 0  The pt is a 30 day readmit due to acute metabolic encephlaopathy  The pt was recommended for rehab during the last hospital stay, but declined the pt is agreeable to STR this time  The pt resides in alone in a ranch home located in [place] with 1 DIANA  The pt uses a walker as he feels he needs it  The pt has no Hx of VNA/STR  The pt fills his Rx at [facility] located in [place] and has no issues with affording his medications  The pt has no hx of MH/SA  The pt is retired and transports himself to and from [facility]  A post acute care recommendation was made by your care team for STR  Discussed Freedom of Choice with patient  List of facilities given to patient via in person  patient aware the list is custom filtered for them by zip code location and that [facility] post acute providers are designated  CM reviewed discharge planning process including the following: identifying caregivers at home, preference for d/c planning needs, availability of treatment team to discuss questions or concerns patient and/or family may have regarding diagnosis, plan of care, old or new medications and discharge planning  Discharge Checklist reviewed and CM will continue to monitor for progress toward discharge goals in nursing and provider rounds

## 2019-07-09 NOTE — PLAN OF CARE
Problem: PHYSICAL THERAPY ADULT  Goal: Performs mobility at highest level of function for planned discharge setting  See evaluation for individualized goals  Description  Treatment/Interventions: Functional transfer training, LE strengthening/ROM, Elevations, Therapeutic exercise, Endurance training, Patient/family training, Equipment eval/education, Bed mobility, Gait training, Spoke to nursing, Spoke to case management, OT  Equipment Recommended: Walker(RW)       See flowsheet documentation for full assessment, interventions and recommendations  Note:   Prognosis: Good  Problem List: Decreased strength, Decreased endurance, Impaired balance, Decreased mobility, Decreased safety awareness  Assessment: Pt is 81 y o  male seen for PT evaluation on 7/9/2019 s/p admit to [facility] on 9/6/9995 w/ Acute metabolic encephalopathy  Pt presents with AMS, pt was having  Multiple falls at home; pt was here in June 2019 for CVA 2* L sided weakness, facial droop, and difficulty speaking- tpa was administered that admission  PT consulted to assess pt's functional mobility and d/c needs  Order placed for PT eval and tx, w/ up w/ A order  Performed at least 2 patient identifiers during session: Name and wristband  Comorbidities affecting pt's physical performance at time of assessment include: COPD, HLD, HTN, MI, pneumonia, PE, SOB  PTA, pt was independent w/ all functional mobility w/ no AD/DME, ambulates unrestricted distances and all terrain, has 1 DIANA, lives alone in 1 level apartment and retired  Personal factors affecting pt at time of IE include: inaccessible home environment, ambulating w/ assistive device, stairs to enter home, inability to navigate community distances, inability to navigate level surfaces w/o external assistance, limited home support and positive fall history   Please find objective findings from PT assessment regarding body systems outlined above with impairments and limitations including weakness, impaired balance, decreased endurance, gait deviations, decreased activity tolerance and fall risk, as well as mobility assessment (need for SBA-CGA assist w/ all phases of mobility when usually ambulating independently and need for cueing for mobility technique)  The following objective measures performed on IE also reveal limitations: Barthel Index: 55/100, Modified Shara: 4 (moderate/severe disability) and Tinetti/ELIJAH: 20/28 (moderate risk for falls)  Pt's clinical presentation is currently unstable/unpredictable seen in pt's presentation of abnormal lab value(s), need for input for task focus and mobility technique, on telemetry monitoring and ongoing medical assessment  Pt to benefit from continued PT tx to address deficits as defined above and maximize level of functional independent mobility and consistency  From PT/mobility standpoint, recommendation at time of d/c would be STR pending progress in order to facilitate return to PLOF  Barriers to Discharge: Inaccessible home environment, Decreased caregiver support     Recommendation: Short-term skilled PT, Rehab consult     PT - OK to Discharge: Yes(when medically cleared if to STR)    See flowsheet documentation for full assessment

## 2019-07-09 NOTE — PHYSICAL THERAPY NOTE
Physical Therapy Evaluation     Patient's Name: Alan Rowe    Admitting Diagnosis  Altered mental status [R41 82]  Carotid stenosis [I65 29]  Multiple falls [R29 6]  Coronary artery disease involving native coronary artery of native heart without angina pectoris [I25 10]  Atrial fibrillation, unspecified type ([facility] ) [I48 91]    Problem List  Patient Active Problem List   Diagnosis    Palpitations    Coronary artery disease involving native coronary artery    ULLOA (dyspnea on exertion)    Abnormal EKG    Colon polyps    COPD (chronic obstructive pulmonary disease) (HCC)    Elevated hematocrit    Elevated hemoglobin (HCC)    Emphysema of lung (HCC)    Hiatal hernia    Hyperlipidemia    Hypertension    Hyponatremia    Mild concentric left ventricular hypertrophy (LVH)    Osteoarthritis of left glenohumeral joint    Osteoarthritis of right glenohumeral joint    Pityriasis rosea    PVC (premature ventricular contraction)    Thoracic ascending aortic aneurysm (HCC)    Elevated glucose    Elevated CO2 level    Microalbuminuria    Obesity    CVA (cerebral vascular accident) ([facility] )    Tobacco abuse    Alcohol abuse    Atrial fibrillation ([place] [address] )    Acute metabolic encephalopathy    Carotid artery stenosis       Past Medical History  Past Medical History:   Diagnosis Date    COPD (chronic obstructive pulmonary disease) ([place] [address] )     Hyperlipidemia     Hypertension     Myocardial infarction ([facility] )     Pneumonia 2/2/2017    Pulmonary emphysema (HCC)     Shortness of breath        Past Surgical History  Past Surgical History:   Procedure Laterality Date    ANKLE SURGERY Right 1978    CAROTID STENT  02/13/2018    COLONOSCOPY      HAND SURGERY Left 1964    VA COLONOSCOPY FLX DX W/COLLJ SPEC WHEN PFRMD N/A 5/9/2017    Procedure: EGD AND COLONOSCOPY;  Surgeon: Shamika Welsh MD;  [facility];   Service: Gastroenterology        07/09/19 0856   Note Type   Note type Eval only   Pain Assessment Pain Assessment No/denies pain   Pain Score No Pain  (pt reporting bilateral "big toe gout pain"- RN notified)   Home Living   Type of [address] One level;Performs ADLs on one level  (1 DIANA)   Bathroom Shower/Tub Walk-in shower   Bathroom Toilet Standard   Bathroom Equipment Other (Comment)  ( no DME at baseline)   [address] Walker;Sock aid  ( rolling walker)   Prior Function   Level of [place] Independent with ADLs and functional mobility   Lives With Alone   Receives Help From Friend(s)   ADL Assistance Independent   IADLs Independent   Falls in the last 6 months 5 to 10  ("sliding" "happy feet" could not get up when fell )   Vocational Retired   Comments Per pt, last time he left the hospital he went home, did not go to [employer]  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 Restrictions/Precautions   Weight Bearing Precautions Per Order No   Braces or Orthoses   (none per pt)   Other Precautions Fall Risk;Bed Alarm; Chair Alarm   General   Family/Caregiver Present No   Cognition   Overall Cognitive Status WFL   Arousal/Participation Alert   Attention Within functional limits   Orientation Level Oriented X4   Memory Within functional limits   Following Commands Follows multistep commands with increased time or repetition   Comments pt agreeable to PT evaluation   RUE Assessment   RUE Assessment X  (defer to OT eval for comments)   LUE Assessment   LUE Assessment X  (defer to OT eval for comments)   RLE Assessment   RLE Assessment WFL   Strength RLE   R Hip Flexion 5/5   R Knee Extension 5/5   LLE Assessment   LLE Assessment WFL   Strength LLE   L Hip Flexion 4/5   L Knee Extension 4/5   Coordination   Movements are Fluid and Coordinated 1   Sensation WFL   Light Touch   RLE Light Touch Grossly intact   LLE Light Touch Grossly intact   Bed Mobility   Additional Comments pt received seated at EOB upon arrival   Transfers   Sit to Stand 5  Supervision   Additional items Assist x 1; Increased time required;Verbal cues   Stand to Sit 5  Supervision   Additional items Assist x 1; Increased time required;Verbal cues   Ambulation/Elevation   Gait pattern Decreased foot clearance; Short stride; Step to;Narrow SHIRA   Gait Assistance 4  Minimal assist  (CGA)   Additional items Assist x 1;Verbal cues   Assistive Device Rolling walker   Distance 15' + 25'   Balance   Static Sitting Good   Dynamic Sitting Fair +   Static Standing Fair   Dynamic Standing Fair   Ambulatory Fair -   Higher level balance   ((-) Romberg  Tinetti: 20/28 (moderate risk for falls))   Endurance Deficit   Endurance Deficit Yes   Activity Tolerance   Activity Tolerance Patient tolerated treatment well   Medical Staff Made Aware OT Narda Merrill   Nurse Made Aware RN Celina verbalized pt appropriate to see, made aware of session outcome/recs   Assessment   Prognosis Good   Problem List Decreased strength;Decreased endurance; Impaired balance;Decreased mobility; Decreased safety awareness   Assessment Pt is 81 y o  male seen for PT evaluation on 7/9/2019 s/p admit to [facility] on 4/1/9421 w/ Acute metabolic encephalopathy  Pt presents with AMS, pt was having  Multiple falls at home; pt was here in June 2019 for CVA 2* L sided weakness, facial droop, and difficulty speaking- tpa was administered that admission  PT consulted to assess pt's functional mobility and d/c needs  Order placed for PT eval and tx, w/ up w/ A order  Performed at least 2 patient identifiers during session: Name and wristband  Comorbidities affecting pt's physical performance at time of assessment include: COPD, HLD, HTN, MI, pneumonia, PE, SOB  PTA, pt was independent w/ all functional mobility w/ no AD/DME, ambulates unrestricted distances and all terrain, has 1 DIANA, lives alone in 1 level apartment and retired   Personal factors affecting pt at time of IE include: inaccessible home environment, ambulating w/ assistive device, stairs to enter home, inability to navigate community distances, inability to navigate level surfaces w/o external assistance, limited home support and positive fall history  Please find objective findings from PT assessment regarding body systems outlined above with impairments and limitations including weakness, impaired balance, decreased endurance, gait deviations, decreased activity tolerance and fall risk, as well as mobility assessment (need for SBA-CGA assist w/ all phases of mobility when usually ambulating independently and need for cueing for mobility technique)  The following objective measures performed on IE also reveal limitations: Barthel Index: 55/100, Modified Clifford: 4 (moderate/severe disability) and Tinetti/ELIJAH: 20/28 (moderate risk for falls)  Pt's clinical presentation is currently unstable/unpredictable seen in pt's presentation of abnormal lab value(s), need for input for task focus and mobility technique, on telemetry monitoring and ongoing medical assessment  Pt to benefit from continued PT tx to address deficits as defined above and maximize level of functional independent mobility and consistency  From PT/mobility standpoint, recommendation at time of d/c would be STR pending progress in order to facilitate return to PLOF     Barriers to Discharge Inaccessible home environment;Decreased caregiver support   Goals   Patient Goals to return home   STG Expiration Date 07/19/19   Short Term Goal #1 In 7-10 days: Increase L LE strength 1/2 grade to facilitate independent mobility, Perform all bed mobility tasks modified independent to decrease caregiver burden, Perform all transfers modified independent to improve independence, Ambulate > 150 ft  with least restrictive assistive device modified independent w/o LOB and w/ normalized gait pattern 100% of the time, Navigate 1 stairs modified independent with unilateral handrail to facilitate return to previous living environment, Increase all balance 1/2 grade to decrease risk for falls, Complete exercise program independently, Tolerate 4 hr OOB to faciliate upright tolerance, Improve Barthel Index score to 70 or greater to facilitate independence and Improve Tinetti/ELIJAH score by 4 points to decrease risk for falls   Treatment Day 0   Plan   Treatment/Interventions Functional transfer training;LE strengthening/ROM; Elevations; Therapeutic exercise; Endurance training;Patient/family training;Equipment eval/education; Bed mobility;Gait training;Spoke to nursing;Spoke to case management;OT   PT Frequency 5x/wk   Recommendation   Recommendation Short-term skilled PT;Rehab consult   Equipment Recommended Walker  (RW)   PT - OK to Discharge Yes  (when medically cleared if to STR)   Modified [place] Scale   Modified [place] Scale 4   Barthel Index   Feeding 10   Bathing 0   Grooming Score 5   Dressing Score 5   Bladder Score 10   Bowels Score 10   Toilet Use Score 5   Transfers (Bed/Chair) Score 10   Mobility (Level Surface) Score 0   Stairs Score 0   Barthel Index Score 55           Glorious Sports, PT, DPT

## 2019-07-09 NOTE — OCCUPATIONAL THERAPY NOTE
Occupational Therapy Evaluation        Patient Name: Mariola Brewer  YFAOO'B Date: 7/9/2019 07/09/19 7103   Note Type   Note type Eval only   Restrictions/Precautions   Weight Bearing Precautions Per Order No   Braces or Orthoses   (none per pt)   Other Precautions Fall Risk;Bed Alarm; Chair Alarm   Pain Assessment   Pain Assessment No/denies pain   Pain Score No Pain  (pt reporting bilateral "big toe gout pain"- RN notified)   Diversional Activities Television  (tv on in room but pt  was sitting EOB not watching )   Home Living   Type of [address] One level;Performs ADLs on one level  (1 step to enter)   Bathroom Shower/Tub Walk-in shower   Bathroom Toilet Standard   Bathroom Equipment   (no DME at baseline)   [address] [facility] aid; Walker  (rolling walker)   Prior Function   Level of [place] Independent with ADLs and functional mobility   Lives With Alone   Receives Help From Friend(s)   ADL Assistance Independent   IADLs Independent   Falls in the last 6 months 5 to 10  ("sliding" "happy feet" could not get up when fell )   Vocational Retired   Comments Per pt, last time he left the hospital he went home, did not go to [employer]  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 Lifestyle   Autonomy Patient reporting he was not able to do much  for himselg because of the weakness of his left arm, " I kept on falling down, more like sliding down to the floor but not able to get up on my own"  Chart review - previous OT eval on 6/27/2019- "Patient ambulatory with no AD, indepependnet with ADLs/ IADLs  Patient lives alone in a one story apartment, no DIANA   Patient drives"   Reciprocal Relationships Supportive friends   Psychosocial   Psychosocial (WDL) WDL   ADL   Eating Assistance 7  Independent   Grooming Assistance 5  Supervision/Setup   UB Bathing Assistance 4  Minimal Assistance   LB Bathing Assistance 2  Maximal Assistance   UB Dressing Assistance 4  Minimal Assistance   LB Dressing Assistance 2  Maximal Assistance   Toileting Assistance  3  Moderate Assistance   Functional Assistance 3  Moderate Assistance   Bed Mobility   Additional Comments pt received seated at EOB upon arrival   Transfers   Sit to Stand 5  Supervision   Additional items Assist x 1; Increased time required;Verbal cues   Stand to Sit 5  Supervision   Additional items Assist x 1; Increased time required;Verbal cues   Functional Mobility   Functional Mobility 4  Minimal assistance   Additional items Rolling walker   Balance   Static Sitting Good   Dynamic Sitting Fair +   Static Standing Fair   Dynamic Standing Fair   Ambulatory Fair -   Activity Tolerance   Activity Tolerance Patient tolerated treatment well   Nurse Made Aware RN Celina verbalized pt appropriate to see, made aware of session outcome/recs   RUE Assessment   RUE Assessment X  (OA in shoulder/ limited overhead movement)   RUE Overall AROM   R Shoulder Flexion ~90 degrees of flexion, limited  horizontal abd   LUE Assessment   LUE Assessment X  (PROM WFL/ AROM as follows)   LUE Overall AROM   L Shoulder Flexion ~90 degrees, chronic shoulder DJD with limited horizontal abd, presents with intact shoulder elevation, and    L Shoulder ABduction 60 degrees   L Elbow Flexion 110   L Elbow Extension 0   L Elbow Supination 90   L Elbow Pronation 90   L Wrist Flexion 70   L Wrist Extension 45   L Mass Grasp incomplete finger flexion with active use of right thumb     Hand Function   Gross Motor Coordination Functional  (OA in shoulder)   Fine Motor Coordination Impaired  (difficulity with strength and grasping)   Sensation   Light Touch No apparent deficits   Proprioception   Proprioception No apparent deficits   Vision-Basic Assessment   Current Vision Does not wear glasses   Patient Visual Report Other (Comment)  (no significant changes reported)   Vision - Complex Assessment Ocular Range of Motion WFL   Additional Comments "cheater" glasses   Cognition   Overall Cognitive Status WFL   Arousal/Participation Responsive   Attention Within functional limits   Orientation Level Oriented X4   Memory Within functional limits   Following Commands Follows multistep commands without difficulty   Assessment   Limitation Decreased ADL status; Decreased UE ROM; Decreased UE strength;Decreased endurance;Decreased fine motor control;Decreased self-care trans;Decreased high-level ADLs   Prognosis Good   Goals   Patient Goals to return home   Plan   Treatment Interventions ADL retraining;Functional transfer training;UE strengthening/ROM; Endurance training;Cognitive reorientation;Patient/family training;Equipment evaluation/education; Fine motor coordination activities; Compensatory technique education;Continued evaluation;Cardiac education; Energy conservation; Activityengagement   OT Frequency 3-5x/wk   Recommendation   OT Discharge Recommendation Short Term Rehab   Barthel Index   Feeding 10   Bathing 0   Grooming Score 0   Dressing Score 5   Bladder Score 10   Bowels Score 10   Toilet Use Score 5   Transfers (Bed/Chair) Score 10   Mobility (Level Surface) Score 0   Stairs Score 0   Barthel Index Score 50   Modified [place] Scale   Modified Smyth Scale 4     Occupational Therapy goals: In 7-14 days:     1- Patient will verbalize and demonstrate use of energy conservation/ deep breathing technique and work simplification skills during functional activity with no verbal cues  2-Patient will verbalize and demonstrate good body mechanics and joint protection techniques during  ADLs/ IADLs with no verbal cues  3- Patient will increase OOB/ sitting tolerance to 2-4 hours per day for increased participation in self care and leisure tasks with no s/s of exertion    4-Patient will increase standing tolerance time to 5  minutes with unilateral UE support to complete sink level ADLs@ mod I level   5- Patient will increase sitting tolerance at edge of bed to 20 minutes to complete UB ADLs @ set up assist level  6- Patient will transfer bed to Chair / toilet at Set up assist level with AD as indicated  7- Patient will complete UB ADLs with set up assist  8- Patient will complete LB ADLs with min assist with the use of adaptive equipment  9- Patient will complete toileting hygiene with set up assist/ supervision for thoroughness    10-Patient/ Family  will demonstrate competency with UE Home Exercise Program

## 2019-07-09 NOTE — PLAN OF CARE
Problem: DISCHARGE PLANNING - CARE MANAGEMENT  Goal: Discharge to post-acute care or home with appropriate resources  Description  INTERVENTIONS:  - Conduct assessment to determine patient/family and health care team treatment goals, and need for post-acute services based on payer coverage, community resources, and patient preferences, and barriers to discharge  - Address psychosocial, clinical, and financial barriers to discharge as identified in assessment in conjunction with the patient/family and health care team  - Arrange appropriate level of post-acute services according to patients   needs and preference and payer coverage in collaboration with the physician and health care team  - Communicate with and update the patient/family, physician, and health care team regarding progress on the discharge plan  - Arrange appropriate transportation to post-acute venues  Outcome: Progressing  Note:   LACE   86   LOS 5 0  The pt is a 30 day readmit due to acute metabolic encephlaopathy  The pt was recommended for rehab during the last hospital stay, but declined the pt is agreeable to STR this time  The pt resides in alone in a ranch home located in [place] with 1 DIANA  The pt uses a walker as he feels he needs it  The pt has no Hx of VNA/STR  The pt fills his Rx at [facility] located in [place] and has no issues with affording his medications  The pt has no hx of MH/SA  The pt is retired and transports himself to and from [facility]  A post acute care recommendation was made by your care team for STR  Discussed Freedom of Choice with patient  List of facilities given to patient via in person  patient aware the list is custom filtered for them by zip code location and that [facility] post acute providers are designated    CM reviewed discharge planning process including the following: identifying caregivers at home, preference for d/c planning needs, availability of treatment team to discuss questions or concerns patient and/or family may have regarding diagnosis, plan of care, old or new medications and discharge planning  Discharge Checklist reviewed and CM will continue to monitor for progress toward discharge goals in nursing and provider rounds

## 2019-07-09 NOTE — ASSESSMENT & PLAN NOTE
Patient presented lethargic  Currently awake, alert, oriented at baseline  Frustrated for being in the hospital again  Wants to be discharg as soon as possible  Symptoms likely due to sleep-wake cycle disturbance as well as worsening of previous stroke symptoms per Neurology  Repeat MRI brain shows right MCA distribution now subacute similar to previous imaging finding  Previous CTA head showed on significant right carotid artery stenosis  Per Neurology recommendation noted to continue aspirin, statin, Eliquis  Vascular surgery recommended no inpt surgical intervention and out pt follow up in 1-2 weeks  Patient is agreeing to go to inpatient short-term rehab if needed    Awaiting PT eval

## 2019-07-09 NOTE — SOCIAL WORK
Val at [facility] to accept  Pt in agreement with going to same for STR  CM informed [facility] who will submit for auth  Pt indicates he has ride to facility on discharge  Pt updated regarding plan  CM to follow

## 2019-07-09 NOTE — PROGRESS NOTES
Progress Note - Josselin Mckenzie 1950, 81 y o  male MRN: [id number]    Unit/Bed#: -01 Encounter: [id number]    Primary Care Provider: Cabrera Rivers DO   Date and time admitted to hospital: 7/3/2019  9:48 AM        CVA (cerebral vascular accident) [facility])  Assessment & Plan  Patient was discharged last week with stroke s/p tPA  MRI shows right MCA distribution of subacute similar to prior imaging  Neurology cleared patient to start on anticoagulation in settings of AFib for stroke prevention  Continue aspirin, statin, Eliquis  Vascular surgery recommendation noted  Patient agreeable for inpatient short-term rehab awaiting placement      Atrial fibrillation [facility])  Assessment & Plan  Currently normal sinus rhythm with intermittent PVCs  Continue Eliquis    Carotid artery stenosis  Assessment & Plan  CTA 06/28 shows significant right ICA stenosis of 70%  Vascular surgery recommendation noted for no inpatient surgical intervention  And recommended outpatient follow-up in 1-2 weeks with vascular surgery  Alcohol abuse  Assessment & Plan  Patient's alcohol level in his normal here  Currently awake, alert, oriented x3  No signs of withdrawal noted  Will put him on some vitamin supplements  Obesity  Assessment & Plan  Counseled on lifestyle and dietary changes  Nutrition consult    Hypertension  Assessment & Plan  Blood pressure well controlled continue current medication    Hyperlipidemia  Assessment & Plan  Continue statin    COPD (chronic obstructive pulmonary disease) ([facility] [address] )  Assessment & Plan  Patient has COPD  Currently not in acute exacerbation  Not in acute respiratory distress  O2 saturation 94-95% on room air  Coronary artery disease involving native coronary artery  Assessment & Plan  Stable  Continue medical management including aspirin, Eliquis, metoprolol and statin    * Acute metabolic encephalopathy  Assessment & Plan  Patient presented lethargic    Currently awake, alert, oriented at baseline  Frustrated for being in the hospital again  Wants to be discharg as soon as possible  Symptoms likely due to sleep-wake cycle disturbance as well as worsening of previous stroke symptoms per Neurology  Repeat MRI brain shows right MCA distribution now subacute similar to previous imaging finding  Previous CTA head showed on significant right carotid artery stenosis  Per Neurology recommendation noted to continue aspirin, statin, Eliquis  Vascular surgery recommended no inpt surgical intervention and out pt follow up in 1-2 weeks  Patient is agreeing to go to inpatient short-term rehab if needed  Awaiting PT eval          VTE Pharmacologic Prophylaxis:   Pharmacologic: Apixaban (Eliquis)  Mechanical VTE Prophylaxis in Place: Yes    Patient Centered Rounds: I have performed bedside rounds with nursing staff today  Discussions with Specialists or Other Care Team Provider:  Case management, Neurology    Education and Discussions with Family / Patient patient    Time Spent for Care: 30 minutes  More than 50% of total time spent on counseling and coordination of care as described above  Current Length of Stay: 5 day(s)    Current Patient Status: Inpatient   Certification Statement: The patient will continue to require additional inpatient hospital stay due to Pending placement    Discharge Plan:  Pending placement    Code Status: Level 3 - DNAR and DNI      Subjective:   Patient seen and examined  No complaints at this time  Objective:     Vitals:   Temp (24hrs), Av 1 °F (36 7 °C), Min:97 9 °F (36 6 °C), Max:98 4 °F (36 9 °C)    Temp:  [97 9 °F (36 6 °C)-98 4 °F (36 9 °C)] 98 4 °F (36 9 °C)  HR:  [58-95] 58  Resp:  [18-20] 20  BP: (144-151)/(77-98) 151/98  SpO2:  [92 %-100 %] 100 %  Body mass index is 35 78 kg/m²       Input and Output Summary (last 24 hours):     No intake or output data in the 24 hours ending 19 1422    Physical Exam:     Physical Exam Constitutional: He is oriented to person, place, and time  He appears well-developed and well-nourished  Eyes: Pupils are equal, round, and reactive to light  EOM are normal    Neck: Normal range of motion  Neck supple  Cardiovascular: Normal rate and regular rhythm  Pulmonary/Chest: Effort normal and breath sounds normal    Abdominal: Soft  Bowel sounds are normal    Musculoskeletal: Normal range of motion  Neurological: He is alert and oriented to person, place, and time  Skin: Skin is warm and dry  Additional Data:     Labs:    Results from last 7 days   Lab Units 07/04/19  0454 07/03/19  1026   WBC Thousand/uL 6 54 6 99   HEMOGLOBIN g/dL 16 4 16 7   HEMATOCRIT % 51 6* 50 9*   PLATELETS Thousands/uL 211 211   NEUTROS PCT %  --  76*   LYMPHS PCT %  --  11*   MONOS PCT %  --  11   EOS PCT %  --  2     Results from last 7 days   Lab Units 07/04/19  0454 07/03/19  1026   SODIUM mmol/L 136 133*   POTASSIUM mmol/L 4 5 4 2   CHLORIDE mmol/L 98* 97*   CO2 mmol/L 33* 30   BUN mg/dL 19 20   CREATININE mg/dL 0 95 0 93   ANION GAP mmol/L 5 6   CALCIUM mg/dL 8 9 8 9   ALBUMIN g/dL  --  3 2*   TOTAL BILIRUBIN mg/dL  --  1 30*   ALK PHOS U/L  --  78   ALT U/L  --  34   AST U/L  --  27   GLUCOSE RANDOM mg/dL 153* 109     Results from last 7 days   Lab Units 07/03/19  1026   INR  1 04     Results from last 7 days   Lab Units 07/04/19  0337   POC GLUCOSE mg/dl 90                   * I Have Reviewed All Lab Data Listed Above  * Additional Pertinent Lab Tests Reviewed:  [facility] 68 Admission Reviewed    Imaging:    Imaging Reports Reviewed Today Include:   Imaging Personally Reviewed by Myself Includes: mri brain, ct head    Recent Cultures (last 7 days):           Last 24 Hours Medication List:     Current Facility-Administered Medications:  acetaminophen 650 mg Oral Q6H PRN Urvashi Felix MD   albuterol 1 puff Inhalation Q6H PRN Urvashi Felix MD   amLODIPine 5 mg Oral Daily Urvashi Felix MD apixaban 5 mg Oral BID Joanne Boyle MD   aspirin 81 mg Oral Daily Tobin Alejandra MD   atorvastatin 40 mg Oral Daily Tobin Alejandra MD   budesonide-formoterol 2 puff Inhalation BID Tobin Alejandra MD   hydrALAZINE 5 mg Intravenous Q6H PRN Tommy Reyes MD   losartan 100 mg Oral Daily Tobin Alejandra MD   melatonin 3 mg Oral HS Margie Blackmon PA-C   metoprolol tartrate 25 mg Oral Q12H [facility] Tobin Alejandra MD   nicotine 1 patch Transdermal Q24H Tobin Alejandra MD   pantoprazole 40 mg Oral Early Morning Tobin Alejandra MD   predniSONE 20 mg Oral Daily Sharmaine Ya MD   tiotropium 18 mcg Inhalation Daily Tobin Alejandra MD        Today, Patient Was Seen By: Sharmaine Ya MD    ** Please Note: Dictation voice to text software may have been used in the creation of this document   **

## 2019-07-09 NOTE — PROGRESS NOTES
General Cardiology   Progress Note   Sumeet Ann 81 y o  male MRN: [id number]  Unit/Bed#: -01 Encounter: [id number]    Assessment/Plan:  1  Atrial fibrillation, rate controlled currently  -continue Lopressor 25 mg b i d   -continue Eliquis for anticoagulation  -continue telemetry monitoring    2  CAD with RCA chronic total occlusion  -no chest pain currently  -catheterization and 01/31/2018 with noncritical 50% stenosis of mid LAD and RCA chronic total occlusion, PCI attempted on 02/14/2018 without success  -continue aspirin, statin, beta-blocker  -cardiac diet  -continue telemetry monitoring    3  Hypertension, currently well controlled  -continue amlodipine and losartan  -continue to monitor blood pressures    4  Hyperlipidemia  -continue statin    5  Tobacco abuse  -cessation encouraged    Subjective:    Patient seen and examined  No significant events since the last encounter  Patient denies any chest pain currently  REVIEW OF SYSTEMS:  Constitutional:  Denies fever or chills   Eyes:  Denies change in visual acuity   HENT:  Denies nasal congestion or sore throat   Respiratory:  Denies cough or shortness of breath   Cardiovascular:  Denies chest pain or edema   GI:  Denies abdominal pain, nausea, vomiting, bloody stools or diarrhea   :  Denies dysuria, frequency, difficulty in micturition and nocturia  Musculoskeletal:  Denies back pain or joint pain   Neurologic:  Denies headache, focal weakness or sensory changes   Endocrine:  Denies polyuria or polydipsia   Lymphatic:  Denies swollen glands   Psychiatric:  Denies depression or anxiety     Objective:   Vitals:  Vitals:    07/09/19 0638   BP: 151/98   Pulse: 58   Resp: 20   Temp: 98 4 °F (36 9 °C)   SpO2:        Body mass index is 35 78 kg/m²    Systolic (63MEM), OKA:428 , Min:144 , SZV:593     Diastolic (37XLZ), OFH:91, Min:77, Max:98    No intake or output data in the 24 hours ending 07/09/19 1331  Weight (last 2 days)     None PHYSICAL EXAMS:  General:  Patient is not in acute distress, laying in the bed comfortably, awake, alert responding to commands  Head: Normocephalic, Atraumatic  HEENT: White sclera, pink conjunctiva,  PERRLA,pharynx benign  Neck:  Supple, no neck vein distention, carotids+2/+2 no bruits, thyromegaly, adenopathy  Respiratory: clear to P/A  Cardiovascular:  PMI normal, S1-S2 normal, No  Murmurs, thrills, gallops, rubs   Regular rhythm  GI:  Abdomen soft nontender   No hepatosplenomegaly, adenopathy, ascites,or rebound tenderness  Extremities: No edema, normal pulses, no calf tenderness, no joint deformities, no venous disease   Integument:  No skin rashes or ulceration  Lymphatic:  No cervical or inguinal lymphadenopathy  Neurologic:  Patient is awake alert, responding to command, well-oriented to time and place and person moving all extremities      LABORATORY RESULTS:  Results from last 7 days   Lab Units 07/03/19  1334   TROPONIN I ng/mL <0 02     CBC with diff:   Results from last 7 days   Lab Units 07/04/19  0454 07/03/19  1026   WBC Thousand/uL 6 54 6 99   HEMOGLOBIN g/dL 16 4 16 7   HEMATOCRIT % 51 6* 50 9*   MCV fL 96 93   PLATELETS Thousands/uL 211 211   MCH pg 30 6 30 6   MCHC g/dL 31 8 32 8   RDW % 14 5 14 5   MPV fL 10 0 9 9   NRBC AUTO /100 WBCs  --  0       CMP:  Results from last 7 days   Lab Units 07/04/19  0454 07/03/19  1026   POTASSIUM mmol/L 4 5 4 2   CHLORIDE mmol/L 98* 97*   CO2 mmol/L 33* 30   BUN mg/dL 19 20   CREATININE mg/dL 0 95 0 93   CALCIUM mg/dL 8 9 8 9   AST U/L  --  27   ALT U/L  --  34   ALK PHOS U/L  --  78   EGFR ml/min/1 73sq m 82 85       BMP:  Results from last 7 days   Lab Units 07/04/19  0454 07/03/19  1026   POTASSIUM mmol/L 4 5 4 2   CHLORIDE mmol/L 98* 97*   CO2 mmol/L 33* 30   BUN mg/dL 19 20   CREATININE mg/dL 0 95 0 93   CALCIUM mg/dL 8 9 8 9              Results from last 7 days   Lab Units 07/04/19  0454   MAGNESIUM mg/dL 1 9             Results from last 7 days   Lab Units 19  1026   INR  1 04       Lipid Profile:   No results found for: CHOL  Lab Results   Component Value Date    HDL 46 2019    HDL 49 2018    HDL 54 2018     Lab Results   Component Value Date    LDLCALC 113 (H) 2019    LDLCALC 81 2018    LDLCALC 72 2018     Lab Results   Component Value Date    TRIG 64 2019    TRIG 62 2018    TRIG 60 2018       Cardiac testing:   Results for orders placed during the hospital encounter of 19   Echo complete with contrast if indicated    Narrative [address] [id number]  [phone]    Transthoracic Echocardiogram  2D, M-mode, Doppler, and Color Doppler    Study date:  2019    Patient: Alpa Asif  MR number: [id number]  Account number: [de-identified]  : 1950  Age: 81 years  Gender: Male  Status: Inpatient  Location: Bedside  Height: 73 in  Weight: 260 lb  BP: 160/ 90 mmHg    Indications: Cerebral thrombosis, Artery occlusion, Assess left ventricular function  Diagnoses: I63 9 - Cerebral infarction, unspecified    Sonographer:   [facility] , RDCS  Referring Physician:  Chapis Massey PA-C  Group:  [id number]  Interpreting Physician:  Ros Vega MD    SUMMARY    PROCEDURE INFORMATION:  This was a technically difficult study  Echocardiographic views were limited due to poor acoustic window availability, decreased penetration, and lung interference  LEFT VENTRICLE:  Systolic function was normal  Ejection fraction was estimated to be 60 %  There were no regional wall motion abnormalities  Wall thickness was mildly increased  There was mild concentric hypertrophy  LEFT ATRIUM:  The atrium was moderately dilated  RIGHT ATRIUM:  The atrium was mildly dilated  MITRAL VALVE:  There was trace regurgitation  TRICUSPID VALVE:  There was trace regurgitation      HISTORY: Consistent with transient ischemic attack or stroke  PRIOR HISTORY: CAD, Palpitations, Myocardial infarction  Risk factors: hypertension, hypercholesterolemia, alcohol abuse, and a history of current cigarette use (within the last  month)  Chronic lung disease  PROCEDURE: The procedure was performed at the bedside  This was a routine study  The transthoracic approach was used  The study included complete 2D imaging, M-mode, complete spectral Doppler, and color Doppler  The heart rate was 87 bpm,  at the start of the study  Images were obtained from the parasternal, apical, subcostal, and suprasternal notch acoustic windows  Echocardiographic views were limited due to poor acoustic window availability, decreased penetration, and  lung interference  This was a technically difficult study  LEFT VENTRICLE: Size was normal  Systolic function was normal  Ejection fraction was estimated to be 60 %  There were no regional wall motion abnormalities  Wall thickness was mildly increased  There was mild concentric hypertrophy  No  evidence of apical thrombus  DOPPLER: Left ventricular diastolic function parameters were normal     RIGHT VENTRICLE: The size was normal  Systolic function was normal  Wall thickness was normal     LEFT ATRIUM: The atrium was moderately dilated  RIGHT ATRIUM: The atrium was mildly dilated  MITRAL VALVE: Valve structure was normal  There was normal leaflet separation  DOPPLER: The transmitral velocity was within the normal range  There was no evidence for stenosis  There was trace regurgitation  AORTIC VALVE: The valve was trileaflet  Leaflets exhibited mildly increased thickness and normal cuspal separation  DOPPLER: Transaortic velocity was within the normal range  There was no evidence for stenosis  There was no significant  regurgitation  TRICUSPID VALVE: The valve structure was normal  There was normal leaflet separation  DOPPLER: The transtricuspid velocity was within the normal range   There was no evidence for stenosis  There was trace regurgitation  PULMONIC VALVE: Leaflets exhibited normal thickness, no calcification, and normal cuspal separation  DOPPLER: The transpulmonic velocity was within the normal range  There was no significant regurgitation  PERICARDIUM: There was no pericardial effusion  The pericardium was normal in appearance  AORTA: The root exhibited normal size  SYSTEMIC VEINS: IVC: The inferior vena cava was normal in size  Respirophasic changes were normal     SYSTEM MEASUREMENT TABLES    2D  %FS: 27 3 %  Ao Diam: 3 4 cm  EDV(Teich): 144 9 ml  EF(Teich): 52 7 %  ESV(Teich): 68 6 ml  IVSd: 1 3 cm  LA Area: 40 9 cm2  LA Diam: 5 2 cm  LVEDV MOD A4C: 162 ml  LVEF MOD A4C: 57 4 %  LVESV MOD A4C: 69 ml  LVIDd: 5 5 cm  LVIDs: 4 cm  LVLd A4C: 10 3 cm  LVLs A4C: 8 cm  LVPWd: 1 2 cm  RA Area: 21 7 cm2  RVIDd: 3 6 cm  SV MOD A4C: 93 ml  SV(Teich): 76 3 ml    MM  TAPSE: 1 4 cm    PW  E': 0 1 m/s    [employer] Accredited Echocardiography Laboratory    Prepared and electronically signed by    Barbara Wylie MD  Signed 26-Jun-2019 15:27:05       No results found for this or any previous visit  No results found for this or any previous visit  No procedure found  No results found for this or any previous visit      Meds/Allergies   all current active meds have been reviewed  Medications Prior to Admission   Medication    amLODIPine (NORVASC) 5 mg tablet    apixaban (ELIQUIS) 5 mg    aspirin (ECOTRIN LOW STRENGTH) 81 mg EC tablet    atorvastatin (LIPITOR) 20 mg tablet    budesonide-formoterol (SYMBICORT) 80-4 5 MCG/ACT inhaler    losartan (COZAAR) 100 MG tablet    metoprolol tartrate (LOPRESSOR) 25 mg tablet    nicotine (NICODERM CQ) 21 mg/24 hr TD 24 hr patch    nicotine polacrilex (COMMIT) 4 MG lozenge    nitroglycerin (NITROSTAT) 0 4 mg SL tablet    pantoprazole (PROTONIX) 40 mg tablet    predniSONE 10 mg tablet    sodium chloride 1 g tablet    tiotropium (SPIRIVA RESPIMAT) 2 5 MCG/ACT AERS inhaler    VENTOLIN  (90 Base) MCG/ACT inhaler                  Counseling / Coordination of Care  Total floor / unit time spent today 15 minutes  Greater than 50% of total time was spent with the patient and / or family counseling and / or coordination of care  ** Please Note: Dragon 360 Dictation voice to text software may have been used in the creation of this document   **

## 2019-07-09 NOTE — ASSESSMENT & PLAN NOTE
Patient was discharged last week with stroke s/p tPA  MRI shows right MCA distribution of subacute similar to prior imaging  Neurology cleared patient to start on anticoagulation in settings of AFib for stroke prevention  Continue aspirin, statin, Eliquis  Vascular surgery recommendation noted    Patient agreeable for inpatient short-term rehab awaiting placement

## 2019-07-09 NOTE — UTILIZATION REVIEW
Continued Stay Review    Date: 7/9/19                          Current Patient Class: Inpatient  Current Level of Care: Med Surg    HPI:69 y o  male initially admitted on 7/3/19 due to Acute Metabolic Encephalopathy    Assessment/Plan: Symptoms likely due to sleep-wake cycle disturbance as well as worsening of previous stroke symptoms per Neurology; Repeat MRI brain shows right MCA distribution now subacute similar to previous imaging finding  Previous CTA head showed on significant right carotid artery stenosis  Patient was discharged last week with stroke s/p tPA ; MRI shows right MCA distribution of subacute similar to prior imaging; Neurology cleared patient to start on anticoagulation in settings of AFib for stroke prevention; Continue aspirin, statin, Eliquis; Vascular surgery recommendation noted  Patient agreeable for inpatient short-term rehab awaiting placement      Pertinent Labs/Diagnostic Results:   Results from last 7 days   Lab Units 07/04/19 0454 07/03/19  1026   WBC Thousand/uL 6 54 6 99   HEMOGLOBIN g/dL 16 4 16 7   HEMATOCRIT % 51 6* 50 9*   PLATELETS Thousands/uL 211 211   NEUTROS ABS Thousands/µL  --  5 27     Results from last 7 days   Lab Units 07/04/19  0454 07/03/19  1026   SODIUM mmol/L 136 133*   POTASSIUM mmol/L 4 5 4 2   CHLORIDE mmol/L 98* 97*   CO2 mmol/L 33* 30   ANION GAP mmol/L 5 6   BUN mg/dL 19 20   CREATININE mg/dL 0 95 0 93   EGFR ml/min/1 73sq m 82 85   CALCIUM mg/dL 8 9 8 9   MAGNESIUM mg/dL 1 9  --      Results from last 7 days   Lab Units 07/04/19  0454 07/03/19  1026   AST U/L  --  27   ALT U/L  --  34   ALK PHOS U/L  --  78   TOTAL PROTEIN g/dL  --  6 4   ALBUMIN g/dL  --  3 2*   TOTAL BILIRUBIN mg/dL  --  1 30*   AMMONIA umol/L 17  --      Results from last 7 days   Lab Units 07/04/19  0337   POC GLUCOSE mg/dl 90     Results from last 7 days   Lab Units 07/04/19  0454 07/03/19  1026   GLUCOSE RANDOM mg/dL 153* 109     Results from last 7 days   Lab Units 07/03/19  1514   [facility] ART  7 399   PCO2 ART mm Hg 46 8*   PO2 ART mm Hg 63 8*   HCO3 ART mmol/L 28 3*   BASE EXC ART mmol/L 2 6   O2 CONTENT ART mL/dL 21 7   O2 HGB, ARTERIAL % 89 9*   ABG SOURCE  Radial, Left     Results from last 7 days   Lab Units 07/03/19  1026   PH ALEXIS  7 362   PCO2 ALEXIS mm Hg 55 2*   PO2 ALEXIS mm Hg 37 3   HCO3 ALEXIS mmol/L 30 6*   BASE EXC ALEXIS mmol/L 3 5   O2 CONTENT ALEXIS ml/dL 15 9   O2 HGB, VENOUS % 66 2     Results from last 7 days   Lab Units 07/03/19  1334   TROPONIN I ng/mL <0 02     Results from last 7 days   Lab Units 07/03/19  1026   PROTIME seconds 13 6   INR  1 04     Results from last 7 days   Lab Units 07/03/19  1323   CLARITY UA  Clear   COLOR UA  Yellow   SPEC GRAV UA  1 025   PH UA  6 0   GLUCOSE UA mg/dl Negative   KETONES UA mg/dl Negative   BLOOD UA  Negative   PROTEIN UA mg/dl 100 (2+)*   NITRITE UA  Negative   BILIRUBIN UA  Negative   UROBILINOGEN UA E U /dl 4 0*   LEUKOCYTES UA  Negative   WBC UA /hpf None Seen   RBC UA /hpf 0-1*   BACTERIA UA /hpf None Seen   EPITHELIAL CELLS WET PREP /hpf Occasional     Results from last 7 days   Lab Units 07/03/19  1433   AMPH/METH  Negative   BARBITURATE UR  Negative   BENZODIAZEPINE UR  Negative   COCAINE UR  Negative   METHADONE URINE  Negative   OPIATE UR  Negative   PCP UR  Negative   THC UR  Negative     Results from last 7 days   Lab Units 07/03/19  1026   ETHANOL LVL mg/dL <3     Vital Signs:   Date/Time  Temp  Pulse  Resp  BP  SpO2  O2 Device  Patient Position - Orthostatic VS   07/09/19 0638  98 4 °F (36 9 °C)  58  20  151/98      Lying   07/08/19 2307  98 1 °F (36 7 °C)  60  18  144/78  100 %  None (Room air)  Lying   07/08/19 2306  98 1 °F (36 7 °C)  59    144/78  95 %       07/08/19 2151    95    148/77         07/08/19 1520  97 9 °F (36 6 °C)  64  20  150/77  92 %  None (Room air)  Lying   07/08/19 0800          92 %  None (Room air)     07/08/19 0721  97 9 °F (36 6 °C)  64  20  152/77         07/07/19 0879   55      90 %       07/07/19 2228  97 3 °F (36 3 °C)Abnormal   48Abnormal   20  137/72  93 %       07/07/19 2136    65    133/62         07/07/19 2041          98 %  None (Room air)     07/07/19 1500  97 5 °F (36 4 °C)  85    140/95  96 %  None (Room air)  Lying   07/07/19 1100  97 5 °F (36 4 °C)  68  19  150/88  97 %  None (Room air)  Sitting   07/07/19 0846      18  140/80      Sitting   07/07/19 0310    62  19  181/80Abnormal   97 %       07/07/19 0235    58      95 %       07/07/19 0230    45Abnormal       96 %       07/07/19 0225    49Abnormal       95 %       07/07/19 0220    74      96 %       07/07/19 0215    56      96 %       07/07/19 0210    48Abnormal       96 %       07/07/19 0205    45Abnormal       94 %       07/07/19 0200    49Abnormal       95 %       07/07/19 0155    49Abnormal       94 %       07/07/19 0150    83      95 %       07/07/19 0145    59      95 %       07/07/19 0140    47Abnormal       94 %       07/07/19 0135    56      94 %       07/07/19 0130    67      97 %       07/07/19 0125    56      95 %       07/07/19 0120    61      96 %       07/07/19 0115    51Abnormal       94 %       07/07/19 0110    58      96 %       07/07/19 0105    52Abnormal       95 %       07/07/19 0100    54      95 %            Medications:   Scheduled Meds:   Current Facility-Administered Medications:  acetaminophen 650 mg Oral Q6H PRN   albuterol 1 puff Inhalation Q6H PRN   amLODIPine 5 mg Oral Daily   apixaban 5 mg Oral BID   aspirin 81 mg Oral Daily   atorvastatin 40 mg Oral Daily   budesonide-formoterol 2 puff Inhalation BID   hydrALAZINE 5 mg Intravenous Q6H PRN   losartan 100 mg Oral Daily   melatonin 3 mg Oral HS   metoprolol tartrate 25 mg Oral Q12H [facility]   nicotine 1 patch Transdermal Q24H   pantoprazole 40 mg Oral Early Morning   predniSONE 20 mg Oral Daily   tiotropium 18 mcg Inhalation Daily       Discharge Plan: short term rehab    Network Utilization Review Department  Phone: [phone]; Fax [phone]  [email or web address]  ATTENTION: Please call with any questions or concerns to [phone]  and carefully listen to the prompts so that you are directed to the right person  Send all requests for admission clinical reviews, approved or denied determinations and any other requests to fax [phone]   All voicemails are confidential

## 2019-07-10 VITALS
SYSTOLIC BLOOD PRESSURE: 171 MMHG | DIASTOLIC BLOOD PRESSURE: 82 MMHG | WEIGHT: 271.83 LBS | BODY MASS INDEX: 36.03 KG/M2 | OXYGEN SATURATION: 95 % | HEART RATE: 69 BPM | HEIGHT: 73 IN | TEMPERATURE: 98.2 F | RESPIRATION RATE: 18 BRPM

## 2019-07-10 PROCEDURE — 99232 SBSQ HOSP IP/OBS MODERATE 35: CPT | Performed by: INTERNAL MEDICINE

## 2019-07-10 PROCEDURE — 99239 HOSP IP/OBS DSCHRG MGMT >30: CPT | Performed by: INTERNAL MEDICINE

## 2019-07-10 RX ORDER — PREDNISONE 20 MG/1
20 TABLET ORAL DAILY
Qty: 1 TABLET | Refills: 0
Start: 2019-07-11 | End: 2019-07-12

## 2019-07-10 RX ORDER — ATORVASTATIN CALCIUM 40 MG/1
40 TABLET, FILM COATED ORAL DAILY
Refills: 0
Start: 2019-07-11 | End: 2019-07-30

## 2019-07-10 RX ADMIN — LOSARTAN POTASSIUM 100 MG: 50 TABLET, FILM COATED ORAL at 08:58

## 2019-07-10 RX ADMIN — PREDNISONE 20 MG: 20 TABLET ORAL at 08:58

## 2019-07-10 RX ADMIN — BUDESONIDE AND FORMOTEROL FUMARATE DIHYDRATE 2 PUFF: 80; 4.5 AEROSOL RESPIRATORY (INHALATION) at 18:08

## 2019-07-10 RX ADMIN — APIXABAN 5 MG: 5 TABLET, FILM COATED ORAL at 08:58

## 2019-07-10 RX ADMIN — ASPIRIN 81 MG: 81 TABLET, COATED ORAL at 08:58

## 2019-07-10 RX ADMIN — APIXABAN 5 MG: 5 TABLET, FILM COATED ORAL at 18:08

## 2019-07-10 RX ADMIN — METOPROLOL TARTRATE 25 MG: 25 TABLET, FILM COATED ORAL at 08:58

## 2019-07-10 RX ADMIN — AMLODIPINE BESYLATE 5 MG: 5 TABLET ORAL at 08:58

## 2019-07-10 RX ADMIN — TIOTROPIUM BROMIDE 18 MCG: 18 CAPSULE ORAL; RESPIRATORY (INHALATION) at 09:00

## 2019-07-10 RX ADMIN — BUDESONIDE AND FORMOTEROL FUMARATE DIHYDRATE 2 PUFF: 80; 4.5 AEROSOL RESPIRATORY (INHALATION) at 09:00

## 2019-07-10 RX ADMIN — NICOTINE 1 PATCH: 21 PATCH TRANSDERMAL at 18:08

## 2019-07-10 RX ADMIN — ATORVASTATIN CALCIUM 40 MG: 40 TABLET, FILM COATED ORAL at 08:58

## 2019-07-10 NOTE — DISCHARGE SUMMARY
Discharge Summary - [facility] Internal Medicine    Patient Information: Florecita Coulter 81 y o  male MRN: [id number]  Unit/Bed#: -01 Encounter: [id number]    Discharging Physician / Practitioner: Jorge Urrutia MD  PCP: Jordyn Nj DO  Admission Date: 7/3/2019  Discharge Date: 07/10/19    Disposition:     Other: STR    Reason for Admission:     Discharge Diagnoses:     Principal Problem:    Acute metabolic encephalopathy  Active Problems:    CVA (cerebral vascular accident) [facility])    Atrial fibrillation ([place] [address] )    Coronary artery disease involving native coronary artery    COPD (chronic obstructive pulmonary disease) ([place] [address] )    Hyperlipidemia    Hypertension    Obesity    Alcohol abuse    Carotid artery stenosis  Resolved Problems:    * No resolved hospital problems  *      Consultations During Hospital Stay:  Neurology  Vascular surgery  Cardiology  Procedures Performed:     · None    Significant Findings / Test Results:   MRI of the brain  Right MCA distribution now subacute infarcts similar to the prior exam June 26, 2019    Chest x-ray no acute cardiopulmonary disease    CT of the head no acute intracranial abnormality  Incidental Findings:   · None    Test Results Pending at Discharge (will require follow up): · None     Outpatient Tests Requested:  · None    Complications:  None    Hospital Course:     Florecita Coulter is a 81 y o  male patient with past medical history of obesity, hypertension, AFib, significant right carotid stenosis recent history of stroke status post tPA 2 weeks ago prior to this admission who originally presented to the hospital on 7/3/2019 due to multiple falls, lethargy and change in mental status, dysarthria  MRI of the brain did not demonstrate any evidence of new stroke  It did show subacute strokes similar to his admission 2 weeks ago  Neurology believes his symptoms likely secondary to stroke recrudesscense vs possible f sleep-wake cycle disruption    No infectious etiology identified  Carotid Dopplers demonstrated extensive calcific leg in right internal carotid artery  Vascular surgery evaluated patient  Recommended outpatient follow-up after discharge  Patient was seen by Cardiology as well for AFib, was started on anticoagulation  His mental status significantly improved did not have any deficits on the day of discharge  Plan of care was discussed with Cardiology Neurology who cleared patient for discharge  Recommended close outpatient follow-up with vascular surgery  He was evaluated by PT/OT in the discharged to rehab    Condition at Discharge: stable     Discharge Day Visit / Exam:     Subjective:  Patient seen and examined  No complaints at this time  Anxious for dischargeVitals: Blood Pressure: (!) 171/82 (07/10/19 0703)  Pulse: 69 (07/10/19 0703)  Temperature: 98 2 °F (36 8 °C) (07/09/19 2348)  Temp Source: Axillary (07/09/19 2348)  Respirations: 18 (07/10/19 0703)  Height: 6' 1" (185 4 cm) (07/03/19 1900)  Weight - Scale: 123 kg (271 lb 13 2 oz) (07/03/19 0953)  SpO2: 95 % (07/10/19 0703)  Exam:   Physical Exam   Constitutional: He is oriented to person, place, and time  He appears well-developed and well-nourished  No distress  HENT:   Head: Normocephalic and atraumatic  Eyes: Pupils are equal, round, and reactive to light  EOM are normal    Neck: Normal range of motion  Neck supple  Cardiovascular: Normal rate and regular rhythm  Pulmonary/Chest: Effort normal and breath sounds normal    Abdominal: Bowel sounds are normal    Musculoskeletal: Normal range of motion  Neurological: He is alert and oriented to person, place, and time  Skin: Skin is warm  Discussion with Family:  Patient    Discharge instructions/Information to patient and family:   See after visit summary for information provided to patient and family        Provisions for Follow-Up Care:  See after visit summary for information related to follow-up care and any pertinent home health orders  Planned Readmission:  None     Discharge Statement:  I spent 35 minutes discharging the patient  This time was spent on the day of discharge  I had direct contact with the patient on the day of discharge  Greater than 50% of the total time was spent examining patient, answering all patient questions, arranging and discussing plan of care with patient as well as directly providing post-discharge instructions  Additional time then spent on discharge activities  Discharge Medications:  See after visit summary for reconciled discharge medications provided to patient and family        ** Please Note: This note has been constructed using a voice recognition system **

## 2019-07-10 NOTE — PLAN OF CARE
Problem: Potential for Falls  Goal: Patient will remain free of falls  Description  INTERVENTIONS:  - Assess patient frequently for physical needs  -  Identify cognitive and physical deficits and behaviors that affect risk of falls  -  [place] fall precautions as indicated by assessment   - Educate patient/family on patient safety including physical limitations  - Instruct patient to call for assistance with activity based on assessment  - Modify environment to reduce risk of injury  - Consider OT/PT consult to assist with strengthening/mobility  Outcome: Adequate for Discharge     Problem: CARDIOVASCULAR - ADULT  Goal: Maintains optimal cardiac output and hemodynamic stability  Description  INTERVENTIONS:  - Monitor I/O, vital signs and rhythm  - Monitor for S/S and trends of decreased cardiac output i e  bleeding, hypotension  - Administer and titrate ordered vasoactive medications to optimize hemodynamic stability  - Assess quality of pulses, skin color and temperature  - Assess for signs of decreased coronary artery perfusion - ex   Angina  - Instruct patient to report change in severity of symptoms  Outcome: Adequate for Discharge  Goal: Absence of cardiac dysrhythmias or at baseline rhythm  Description  INTERVENTIONS:  - Continuous cardiac monitoring, monitor vital signs, obtain 12 lead EKG if indicated  - Administer antiarrhythmic and heart rate control medications as ordered  - Monitor electrolytes and administer replacement therapy as ordered  Outcome: Adequate for Discharge     Problem: MUSCULOSKELETAL - ADULT  Goal: Maintain or return mobility to safest level of function  Description  INTERVENTIONS:  - Assess patient's ability to carry out ADLs; assess patient's baseline for ADL function and identify physical deficits which impact ability to perform ADLs (bathing, care of mouth/teeth, toileting, grooming, dressing, etc )  - Assess/evaluate cause of self-care deficits   - Assess range of motion  - Assess patient's mobility; develop plan if impaired  - Assess patient's need for assistive devices and provide as appropriate  - Encourage maximum independence but intervene and supervise when necessary  - Involve family in performance of ADLs  - Assess for home care needs following discharge   - Request OT consult to assist with ADL evaluation and planning for discharge  - Provide patient education as appropriate  Outcome: Adequate for Discharge  Goal: Maintain proper alignment of affected body part  Description  INTERVENTIONS:  - Support, maintain and protect limb and body alignment  - Provide pt/fam with appropriate education  Outcome: Adequate for Discharge     Problem: PAIN - ADULT  Goal: Verbalizes/displays adequate comfort level or baseline comfort level  Description  Interventions:  - Encourage patient to monitor pain and request assistance  - Assess pain using appropriate pain scale  - Administer analgesics based on type and severity of pain and evaluate response  - Implement non-pharmacological measures as appropriate and evaluate response  - Consider cultural and social influences on pain and pain management  - Notify physician/advanced practitioner if interventions unsuccessful or patient reports new pain  Outcome: Adequate for Discharge     Problem: INFECTION - ADULT  Goal: Absence or prevention of progression during hospitalization  Description  INTERVENTIONS:  - Assess and monitor for signs and symptoms of infection  - Monitor lab/diagnostic results  - Monitor all insertion sites, i e  indwelling lines, tubes, and drains  - [place] appropriate cooling/warming therapies per order  - Administer medications as ordered  - Instruct and encourage patient and family to use good hand hygiene technique  - Identify and instruct in appropriate isolation precautions for identified infection/condition   Outcome: Adequate for Discharge     Problem: SAFETY ADULT  Goal: Maintain or return to baseline ADL function  Description  INTERVENTIONS:  -  Assess patient's ability to carry out ADLs; assess patient's baseline for ADL function and identify physical deficits which impact ability to perform ADLs (bathing, care of mouth/teeth, toileting, grooming, dressing, etc )  - Assess/evaluate cause of self-care deficits   - Assess range of motion  - Assess patient's mobility; develop plan if impaired  - Assess patient's need for assistive devices and provide as appropriate  - Encourage maximum independence but intervene and supervise when necessary  ¯ Involve family in performance of ADLs  ¯ Assess for home care needs following discharge   ¯ Request OT consult to assist with ADL evaluation and planning for discharge  ¯ Provide patient education as appropriate  Outcome: Adequate for Discharge  Goal: Maintain or return mobility status to optimal level  Description  INTERVENTIONS:  - Assess patient's baseline mobility status (ambulation, transfers, stairs, etc )    - Identify cognitive and physical deficits and behaviors that affect mobility  - Identify mobility aids required to assist with transfers and/or ambulation (gait belt, sit-to-stand, lift, walker, cane, etc )  - [place] fall precautions as indicated by assessment  - Record patient progress and toleration of activity level on Mobility SBAR; progress patient to next Phase/Stage  - Instruct patient to call for assistance with activity based on assessment  - Request Rehabilitation consult to assist with strengthening/weightbearing, etc   Outcome: Adequate for Discharge     Problem: DISCHARGE PLANNING  Goal: Discharge to home or other facility with appropriate resources  Description  INTERVENTIONS:  - Identify barriers to discharge w/patient and caregiver  - Arrange for needed discharge resources and transportation as appropriate  - Identify discharge learning needs (meds, wound care, etc )  - Refer to Case Management Department for coordinating discharge planning if the patient needs post-hospital services based on physician/advanced practitioner order or complex needs related to functional status, cognitive ability, or social support system   Outcome: Adequate for Discharge     Problem: Knowledge Deficit  Goal: Patient/family/caregiver demonstrates understanding of disease process, treatment plan, medications, and discharge instructions  Description  Complete learning assessment and assess knowledge base    Interventions:  - Provide teaching at level of understanding  - Provide teaching via preferred learning methods  Outcome: Adequate for Discharge     Problem: Prexisting or High Potential for Compromised Skin Integrity  Goal: Skin integrity is maintained or improved  Description  INTERVENTIONS:  - Identify patients at risk for skin breakdown  - Assess and monitor skin integrity  - Assess and monitor nutrition and hydration status  - Monitor labs (i e  albumin)  - Assess for incontinence   - Turn and reposition patient  - Assist with mobility/ambulation  - Relieve pressure over bony prominences  - Avoid friction and shearing  - Provide appropriate hygiene as needed including keeping skin clean and dry  - Evaluate need for skin moisturizer/barrier cream  - Collaborate with interdisciplinary team (i e  Nutrition, Rehabilitation, etc )   - Patient/family teaching  Outcome: Adequate for Discharge     Problem: DISCHARGE PLANNING - CARE MANAGEMENT  Goal: Discharge to post-acute care or home with appropriate resources  Description  INTERVENTIONS:  - Conduct assessment to determine patient/family and health care team treatment goals, and need for post-acute services based on payer coverage, community resources, and patient preferences, and barriers to discharge  - Address psychosocial, clinical, and financial barriers to discharge as identified in assessment in conjunction with the patient/family and health care team  - Arrange appropriate level of post-acute services according to patients   needs and preference and payer coverage in collaboration with the physician and health care team  - Communicate with and update the patient/family, physician, and health care team regarding progress on the discharge plan  - Arrange appropriate transportation to post-acute venues  Outcome: Adequate for Discharge

## 2019-07-10 NOTE — SOCIAL WORK
Per Bobbye Nyhan at [facility] they have received auth from pt insurance and they can take pt today  Pt made aware of same  He is in agreement and reports a friend can transport him at 1700  Dr Jairo Rashid aware and will discharge  Nurse report can be called to [phone] and AVS can be faxed to [phone]  Nurse aware of same  Bobbye Nyhan aware of transport time  IMM reviewed and signed by pt

## 2019-07-10 NOTE — PROGRESS NOTES
General Cardiology   Progress Note   Mariola Brewer 81 y o  male MRN: [id number]  Unit/Bed#: -01 Encounter: [id number]    Assessment/Plan:    1  Atrial fibrillation, rate controlled currently  -continue Lopressor 25 mg b i d   -continue Eliquis for anticoagulation      2  CAD with RCA chronic total occlusion  -catheterization on 01/31/2018 with noncritical 50% stenosis of mid LAD and RCA chronic total occlusion, PCI attempt on 02/14/2018 without success  -continue aspirin, statin, beta-blocker  -continue cardiac diet    3  Hypertension, currently well controlled  -continue amlodipine and losartan  -continue to monitor blood pressures      Subjective:    Patient seen and examined  No significant events since the last encounter  REVIEW OF SYSTEMS:  Constitutional:  Denies fever or chills   Eyes:  Denies change in visual acuity   HENT:  Denies nasal congestion or sore throat   Respiratory:  Denies cough or shortness of breath   Cardiovascular:  Denies chest pain or edema   GI:  Denies abdominal pain, nausea, vomiting, bloody stools or diarrhea   :  Denies dysuria, frequency, difficulty in micturition and nocturia  Musculoskeletal:  Denies back pain or joint pain   Neurologic:  Denies headache, focal weakness or sensory changes   Endocrine:  Denies polyuria or polydipsia   Lymphatic:  Denies swollen glands   Psychiatric:  Denies depression or anxiety     Objective:   Vitals:  Vitals:    07/10/19 0703   BP: (!) 171/82   Pulse: 69   Resp: 18   Temp:    SpO2: 95%       Body mass index is 35 78 kg/m²    Systolic (27LFB), LXM:969 , Min:127 , RBL:973     Diastolic (70AJC), WSH:22, Min:60, Max:82      Intake/Output Summary (Last 24 hours) at 7/10/2019 1030  Last data filed at 7/10/2019 2697  Gross per 24 hour   Intake 180 ml   Output    Net 180 ml     Weight (last 2 days)     None          Telemetry Review:  Patient not on telemetry        PHYSICAL EXAMS:  General:  Patient is not in acute distress, laying in the bed comfortably, awake, alert responding to commands  Head: Normocephalic, Atraumatic  HEENT: White sclera, pink conjunctiva,  PERRLA,pharynx benign  Neck:  Supple, no neck vein distention, carotids+2/+2 no bruits, thyromegaly, adenopathy  Respiratory: clear to P/A  Cardiovascular:  PMI normal, S1-S2 normal, No  Murmurs, thrills, gallops, rubs   Regular rhythm  GI:  Abdomen soft nontender   No hepatosplenomegaly, adenopathy, ascites,or rebound tenderness  Extremities: No edema, normal pulses, no calf tenderness, no joint deformities, no venous disease   Integument:  No skin rashes or ulceration  Lymphatic:  No cervical or inguinal lymphadenopathy  Neurologic:  Patient is awake alert, responding to command, well-oriented to time and place and person moving all extremities      LABORATORY RESULTS:  Results from last 7 days   Lab Units 07/03/19  1334   TROPONIN I ng/mL <0 02     CBC with diff:   Results from last 7 days   Lab Units 07/04/19  0454   WBC Thousand/uL 6 54   HEMOGLOBIN g/dL 16 4   HEMATOCRIT % 51 6*   MCV fL 96   PLATELETS Thousands/uL 211   MCH pg 30 6   MCHC g/dL 31 8   RDW % 14 5   MPV fL 10 0       CMP:  Results from last 7 days   Lab Units 07/04/19  0454   POTASSIUM mmol/L 4 5   CHLORIDE mmol/L 98*   CO2 mmol/L 33*   BUN mg/dL 19   CREATININE mg/dL 0 95   CALCIUM mg/dL 8 9   EGFR ml/min/1 73sq m 82       BMP:  Results from last 7 days   Lab Units 07/04/19  0454   POTASSIUM mmol/L 4 5   CHLORIDE mmol/L 98*   CO2 mmol/L 33*   BUN mg/dL 19   CREATININE mg/dL 0 95   CALCIUM mg/dL 8 9              Results from last 7 days   Lab Units 07/04/19  0454   MAGNESIUM mg/dL 1 9                   Lipid Profile:   No results found for: CHOL  Lab Results   Component Value Date    HDL 46 06/26/2019    HDL 49 09/26/2018    HDL 54 02/14/2018     Lab Results   Component Value Date    LDLCALC 113 (H) 06/26/2019    LDLCALC 81 09/26/2018    LDLCALC 72 02/14/2018     Lab Results   Component Value Date    TRIG 64 06/26/2019 TRIG 62 2018    TRIG 60 2018       Cardiac testing:   Results for orders placed during the hospital encounter of 19   Echo complete with contrast if indicated    Narrative [address] [place] [id number] [phone]    Transthoracic Echocardiogram  2D, M-mode, Doppler, and Color Doppler    Study date:  2019    Patient: Melissa Blas  MR number: [id number]  Account number: [de-identified]  : 1950  Age: 81 years  Gender: Male  Status: Inpatient  Location: Bedside  Height: 73 in  Weight: 260 lb  BP: 160/ 90 mmHg    Indications: Cerebral thrombosis, Artery occlusion, Assess left ventricular function  Diagnoses: I63 9 - Cerebral infarction, unspecified    Sonographer:   [facility] , RDMAIKEL  Referring Physician:  Luh Mendez PA-C  Group:  [facility]  Interpreting Physician:  Isi Haji MD    SUMMARY    PROCEDURE INFORMATION:  This was a technically difficult study  Echocardiographic views were limited due to poor acoustic window availability, decreased penetration, and lung interference  LEFT VENTRICLE:  Systolic function was normal  Ejection fraction was estimated to be 60 %  There were no regional wall motion abnormalities  Wall thickness was mildly increased  There was mild concentric hypertrophy  LEFT ATRIUM:  The atrium was moderately dilated  RIGHT ATRIUM:  The atrium was mildly dilated  MITRAL VALVE:  There was trace regurgitation  TRICUSPID VALVE:  There was trace regurgitation  HISTORY: Consistent with transient ischemic attack or stroke  PRIOR HISTORY: CAD, Palpitations, Myocardial infarction  Risk factors: hypertension, hypercholesterolemia, alcohol abuse, and a history of current cigarette use (within the last  month)  Chronic lung disease  PROCEDURE: The procedure was performed at the bedside  This was a routine study  The transthoracic approach was used   The study included complete 2D imaging, M-mode, complete spectral Doppler, and color Doppler  The heart rate was 87 bpm,  at the start of the study  Images were obtained from the parasternal, apical, subcostal, and suprasternal notch acoustic windows  Echocardiographic views were limited due to poor acoustic window availability, decreased penetration, and  lung interference  This was a technically difficult study  LEFT VENTRICLE: Size was normal  Systolic function was normal  Ejection fraction was estimated to be 60 %  There were no regional wall motion abnormalities  Wall thickness was mildly increased  There was mild concentric hypertrophy  No  evidence of apical thrombus  DOPPLER: Left ventricular diastolic function parameters were normal     RIGHT VENTRICLE: The size was normal  Systolic function was normal  Wall thickness was normal     LEFT ATRIUM: The atrium was moderately dilated  RIGHT ATRIUM: The atrium was mildly dilated  MITRAL VALVE: Valve structure was normal  There was normal leaflet separation  DOPPLER: The transmitral velocity was within the normal range  There was no evidence for stenosis  There was trace regurgitation  AORTIC VALVE: The valve was trileaflet  Leaflets exhibited mildly increased thickness and normal cuspal separation  DOPPLER: Transaortic velocity was within the normal range  There was no evidence for stenosis  There was no significant  regurgitation  TRICUSPID VALVE: The valve structure was normal  There was normal leaflet separation  DOPPLER: The transtricuspid velocity was within the normal range  There was no evidence for stenosis  There was trace regurgitation  PULMONIC VALVE: Leaflets exhibited normal thickness, no calcification, and normal cuspal separation  DOPPLER: The transpulmonic velocity was within the normal range  There was no significant regurgitation  PERICARDIUM: There was no pericardial effusion  The pericardium was normal in appearance      AORTA: The root exhibited normal size  SYSTEMIC VEINS: IVC: The inferior vena cava was normal in size  Respirophasic changes were normal     SYSTEM MEASUREMENT TABLES    2D  %FS: 27 3 %  Ao Diam: 3 4 cm  EDV(Teich): 144 9 ml  EF(Teich): 52 7 %  ESV(Teich): 68 6 ml  IVSd: 1 3 cm  LA Area: 40 9 cm2  LA Diam: 5 2 cm  LVEDV MOD A4C: 162 ml  LVEF MOD A4C: 57 4 %  LVESV MOD A4C: 69 ml  LVIDd: 5 5 cm  LVIDs: 4 cm  LVLd A4C: 10 3 cm  LVLs A4C: 8 cm  LVPWd: 1 2 cm  RA Area: 21 7 cm2  RVIDd: 3 6 cm  SV MOD A4C: 93 ml  SV(Teich): 76 3 ml    MM  TAPSE: 1 4 cm    PW  E': 0 1 m/s    [facility]    Prepared and electronically signed by    Judy Bailey MD  Signed 26-Jun-2019 15:27:05           Meds/Allergies   all current active meds have been reviewed  Medications Prior to Admission   Medication    amLODIPine (NORVASC) 5 mg tablet    apixaban (ELIQUIS) 5 mg    aspirin (ECOTRIN LOW STRENGTH) 81 mg EC tablet    atorvastatin (LIPITOR) 20 mg tablet    budesonide-formoterol (SYMBICORT) 80-4 5 MCG/ACT inhaler    losartan (COZAAR) 100 MG tablet    metoprolol tartrate (LOPRESSOR) 25 mg tablet    nicotine (NICODERM CQ) 21 mg/24 hr TD 24 hr patch    nicotine polacrilex (COMMIT) 4 MG lozenge    nitroglycerin (NITROSTAT) 0 4 mg SL tablet    pantoprazole (PROTONIX) 40 mg tablet    predniSONE 10 mg tablet    sodium chloride 1 g tablet    tiotropium (SPIRIVA RESPIMAT) 2 5 MCG/ACT AERS inhaler    VENTOLIN  (90 Base) MCG/ACT inhaler                  Counseling / Coordination of Care  Total floor / unit time spent today 15 minutes  Greater than 50% of total time was spent with the patient and / or family counseling and / or coordination of care  ** Please Note: Dragon 360 Dictation voice to text software may have been used in the creation of this document   **

## 2019-07-11 ENCOUNTER — TRANSITIONAL CARE MANAGEMENT (OUTPATIENT)
Dept: FAMILY MEDICINE CLINIC | Facility: MEDICAL CENTER | Age: 69
End: 2019-07-11

## 2019-07-11 ENCOUNTER — TELEPHONE (OUTPATIENT)
Dept: NEUROLOGY | Facility: CLINIC | Age: 69
End: 2019-07-11

## 2019-07-11 NOTE — UTILIZATION REVIEW
Notification of Discharge  This is a Notification of Discharge from our facility [address]  Please be advised that this patient has been discharge from our facility  Below you will find the admission and discharge date and time including the patients disposition  PRESENTATION DATE: 7/3/2019  9:48 AM  OBS ADMISSION DATE:   IP ADMISSION DATE: 7/4/19 1608   DISCHARGE DATE: 7/10/2019  6:19 PM  DISPOSITION: Non [facility] Non [facility]   Admission Orders listed below:  Admission Orders (From admission, onward)    Ordered        07/04/19 1608  Inpatient Admission  Once         07/03/19 1407  Place in Observation (expected length of stay for this patient is less than two midnights)  Once             Please contact the UR Department if additional information is required to close this patient's authorization/case  [address]  Utilization Review Department  Phone: [phone]; Fax [phone]  [email or web address]  ATTENTION: Please call with any questions or concerns to [phone]  and carefully listen to the prompts so that you are directed to the right person  Send all requests for admission clinical reviews, approved or denied determinations and any other requests to fax [phone]   All voicemails are confidential

## 2019-07-11 NOTE — TELEPHONE ENCOUNTER
----- Message from Yvonne Cid PA-C sent at 7/4/2019 12:26 PM EDT -----  Regarding: HFU    Diagnosis/Reason for follow-up: Embolic right sided infarcts  Subspecialty for follow-up: Stroke  Existing neurologist: None- Was seen by Dr Johnny Kevin in hospital   Recommended timing for follow-up: 4-6 weeks  Tests/Labs/Imaging ordered: None  Orders placed electronically: None  AP/Attending: Attending would be preferred  Additional notes: Patient with embolic R sided strokes in setting of R carotid stenosis and atrial fibrillation  He is being evaluated by vascular surgery for possible R carotid intervention

## 2019-07-18 ENCOUNTER — TRANSITIONAL CARE MANAGEMENT (OUTPATIENT)
Dept: FAMILY MEDICINE CLINIC | Facility: MEDICAL CENTER | Age: 69
End: 2019-07-18

## 2019-07-18 ENCOUNTER — TELEPHONE (OUTPATIENT)
Dept: FAMILY MEDICINE CLINIC | Facility: MEDICAL CENTER | Age: 69
End: 2019-07-18

## 2019-07-18 NOTE — TELEPHONE ENCOUNTER
----- Message from Le De Leon MA sent at 7/18/2019 12:25 PM EDT -----  Regarding: [facility] phone call to make  Patient is scheduled for TCM  Being discharged from SNF today   Insisted on going home early

## 2019-07-18 NOTE — TELEPHONE ENCOUNTER
Pt is being dc'd from skilled nursing sometime today  They told him he needs to set up his appt with you asap  I wanted to check with you to make sure it was ok to  have him schedule the week your back from vacation

## 2019-07-19 ENCOUNTER — TELEPHONE (OUTPATIENT)
Dept: FAMILY MEDICINE CLINIC | Facility: MEDICAL CENTER | Age: 69
End: 2019-07-19

## 2019-07-19 NOTE — TELEPHONE ENCOUNTER
Saumya Holley from [facility] called to let Dr Elmer Ojeda know that pt will not be admitted to their care because he is not homebound  He was out buying a new car

## 2019-07-27 DIAGNOSIS — K21.9 GASTROESOPHAGEAL REFLUX DISEASE, ESOPHAGITIS PRESENCE NOT SPECIFIED: ICD-10-CM

## 2019-07-27 DIAGNOSIS — E78.00 PURE HYPERCHOLESTEROLEMIA: ICD-10-CM

## 2019-07-27 DIAGNOSIS — I10 ESSENTIAL HYPERTENSION: ICD-10-CM

## 2019-07-29 RX ORDER — PANTOPRAZOLE SODIUM 40 MG/1
TABLET, DELAYED RELEASE ORAL
Qty: 30 TABLET | Refills: 0 | Status: SHIPPED | OUTPATIENT
Start: 2019-07-29 | End: 2019-12-12 | Stop reason: SDUPTHER

## 2019-07-29 RX ORDER — LISINOPRIL 20 MG/1
TABLET ORAL
Qty: 90 TABLET | Refills: 1 | OUTPATIENT
Start: 2019-07-29

## 2019-07-30 ENCOUNTER — OFFICE VISIT (OUTPATIENT)
Dept: VASCULAR SURGERY | Facility: CLINIC | Age: 69
End: 2019-07-30
Payer: COMMERCIAL

## 2019-07-30 VITALS
RESPIRATION RATE: 18 BRPM | SYSTOLIC BLOOD PRESSURE: 130 MMHG | HEART RATE: 82 BPM | HEIGHT: 73 IN | WEIGHT: 237 LBS | BODY MASS INDEX: 31.41 KG/M2 | DIASTOLIC BLOOD PRESSURE: 70 MMHG

## 2019-07-30 DIAGNOSIS — E78.5 HYPERLIPIDEMIA: ICD-10-CM

## 2019-07-30 DIAGNOSIS — I48.91 ATRIAL FIBRILLATION, UNSPECIFIED TYPE (HCC): ICD-10-CM

## 2019-07-30 DIAGNOSIS — I65.21 STENOSIS OF RIGHT CAROTID ARTERY: Primary | ICD-10-CM

## 2019-07-30 DIAGNOSIS — I63.89 CEREBROVASCULAR ACCIDENT (CVA) DUE TO OTHER MECHANISM (HCC): ICD-10-CM

## 2019-07-30 DIAGNOSIS — J44.9 CHRONIC OBSTRUCTIVE PULMONARY DISEASE, UNSPECIFIED COPD TYPE (HCC): ICD-10-CM

## 2019-07-30 DIAGNOSIS — F10.10 ALCOHOL ABUSE: ICD-10-CM

## 2019-07-30 PROBLEM — F17.210 DEPENDENCE ON NICOTINE FROM CIGARETTES: Status: ACTIVE | Noted: 2019-07-30

## 2019-07-30 PROCEDURE — 99204 OFFICE O/P NEW MOD 45 MIN: CPT | Performed by: SURGERY

## 2019-07-30 RX ORDER — ATORVASTATIN CALCIUM 40 MG/1
40 TABLET, FILM COATED ORAL DAILY
Qty: 90 TABLET | Refills: 1 | Status: SHIPPED | OUTPATIENT
Start: 2019-07-30 | End: 2019-08-15

## 2019-07-30 RX ORDER — CLOPIDOGREL BISULFATE 75 MG/1
75 TABLET ORAL DAILY
Qty: 90 TABLET | Refills: 3 | Status: SHIPPED | OUTPATIENT
Start: 2019-07-30 | End: 2019-08-14 | Stop reason: SDUPTHER

## 2019-07-30 NOTE — ASSESSMENT & PLAN NOTE
Counseled on the ill effects of continued smoking  Patient is not interested in smoking cessation adjunct at present time

## 2019-07-30 NOTE — PATIENT INSTRUCTIONS
Carotid Artery Disease   AMBULATORY CARE:   Carotid artery disease  is a condition that causes narrow or blocked carotid arteries  Your carotid arteries are the blood vessels that supply your brain with most of the blood it needs to work  You have 2 carotid arteries, one on each side of your neck  Call 911 for any of the following:   · You have any of the following signs of a stroke:      ¨ Numbness or drooping on one side of your face     ¨ Weakness in an arm or leg    ¨ Confusion or difficulty speaking    ¨ Dizziness, a severe headache, or vision loss    · You have any of the following signs of a heart attack:      ¨ Squeezing, pressure, or pain in your chest that lasts longer than 5 minutes or returns    ¨ Discomfort or pain in your back, neck, jaw, stomach, or arm     ¨ Trouble breathing    ¨ Nausea or vomiting    ¨ Lightheadedness or a sudden cold sweat, especially with chest pain or trouble breathing  Contact your healthcare provider if:   · You have questions or concerns about your condition or care  Signs and symptoms of carotid artery disease: You may have no signs or symptoms  Most commonly, carotid artery disease causes transient ischemic attacks (TIAs), or mini-strokes  You may have numbness, weakness, lack of movement, or vision or speech problems  A TIA goes away quickly and does not cause permanent damage  A TIA may be a warning sign that you are about to have a stroke  If you have any symptoms of a TIA or stroke, seek care immediately  Warning signs of a stroke: The word F A S T  can help you remember and recognize warning signs of a stroke  · F = Face:  One side of the face droops  · A = Arms:  One arm starts to drop when both arms are raised  · S = Speech:  Speech is slurred or sounds different than usual     · T = Time:  A person who is having a stroke needs to be seen immediately  A stroke is a medical emergency that needs immediate treatment   Some medicines and treatments work best if given within a few hours of a stroke  Treatment  for carotid artery disease depends on how narrow your arteries have become, your symptoms, and your general health  The goal of treatment is to lower your risk for a stroke  You may need any of the following:  · Take aspirin if directed  Your healthcare provider may suggest that you take an aspirin a day to prevent blood clots from forming in the carotid arteries  If your healthcare provider wants you to take aspirin daily, do not take acetaminophen or ibuprofen instead  · Control risk factors  High blood pressure, high cholesterol, heart disease, diabetes, and being overweight increase your risk for atherosclerosis  · Procedures can help open blocked arteries  A carotid endarterectomy is used to cut plaque out of the artery  An angioplasty is used to push the plaque against the artery wall with a balloon device  Sometimes a stent is placed during an angioplasty  A stent is a metal mesh tube that is placed in the artery to keep it open  Manage carotid artery disease:   · Eat a variety of healthy foods  Healthy foods include fruit, vegetables, whole-grain breads, low-fat dairy products, lean meat, and fish  Choose fish that are high in omega-3 fatty acids, such as salmon and fresh tuna  Ask your healthcare provider for more information on a heart healthy diet and the DASH eating plan  · Limit sodium (salt)  Sodium may increase your blood pressure  Add less table salt to your foods  Read food labels and choose foods that are low in sodium  Your healthcare provider may suggest you follow a low sodium diet  · Reach or maintain a healthy weight  Extra weight makes your heart work harder  Ask your healthcare provider how much you should weight  He can help you create a safe weight loss plan  Even a weight loss of 10% of your body weight can help your heart function better  · Exercise as directed    Exercise helps improve heart function and can help you manage your weight  Exercise can also help lower your cholesterol and blood sugar levels  Try to get at least 30 minutes of exercise at least 5 times each week  Try to be active every day  Your healthcare provider can help you create an exercise plan that works best for you  · Limit alcohol  Alcohol can increase your blood pressure and triglyceride levels  Men should limit alcohol to 2 drinks per day  Women should limit alcohol to 1 drink per day  A drink of alcohol is 12 ounces of beer, 5 ounces of wine, or 1½ ounces of liquor  · Do not smoke  Nicotine and other chemicals in cigarettes and cigars can cause heart and lung damage  Ask your healthcare provider for information if you currently smoke and need help to quit  E-cigarettes or smokeless tobacco still contain nicotine  Talk to your healthcare provider before you use these products  Follow up with your healthcare provider as directed:  Write down your questions so you remember to ask them during your visits  © 2017 [facility] Information is for End User's use only and may not be sold, redistributed or otherwise used for commercial purposes  All illustrations and images included in CareNotes® are the copyrighted property of [employer]  or Prashanth [employer]  The above information is an  only  It is not intended as medical advice for individual conditions or treatments  Talk to your doctor, nurse or pharmacist before following any medical regimen to see if it is safe and effective for you

## 2019-07-30 NOTE — PROGRESS NOTES
Assessment/Plan:    Carotid artery stenosis   Symptomatic right carotid stenosis approximately 70% with bulky eccentric plaque  Recommend right transcervical carotid artery revascularization (stent) secondary to underlying comorbidities and high carotid bifurcation  will initiate on Plavix  Patient is already on atorvastatin  I have asked the patient discontinue his aspirin as he is on apixaban  He will also need to hold apixaban 2 days prior to procedure  Diagnoses and all orders for this visit:    Atrial fibrillation, unspecified type [facility])    Cerebrovascular accident (CVA) due to other mechanism [facility])    Stenosis of right carotid artery          Subjective:      Patient ID: Sumeet Ann is a 81 y o  male  Patient had CTA 6/25, CV 6/28, CT head 7/3 and MRI Brain 7/3  Patient presented to ED on 6/25 with altered mental status and lefted sided weakness with Facial droopiness  He reported again to ED on 7/3 with altered mental status  Patient today denies TIA and stroke symptoms, he does have some facial droopiness that he says is residual  Patient have CVA 6/25  Darin Rios is a 26-year-old smoker who recently presented  To hospital (at end of June) with complaints of left arm weakness / numbness  Workup revealed right MCA distribution infarcts  CT imaging demonstrated right carotid 70% stenosis with bulky calcification  He was ultimately discharged however presented again a few weeks ago with altered mental status /recurrent falls  Neurology felt symptoms perhaps represented recrudence of CVA  The following portions of the patient's history were reviewed and updated as appropriate: allergies, current medications, past family history, past medical history, past social history, past surgical history and problem list     Review of Systems   Constitutional: Positive for unexpected weight change (40 lbs in last few weeks)  Eyes: Negative      Respiratory: Positive for cough, choking, chest tightness, shortness of breath, wheezing and stridor  Cardiovascular: Negative  Gastrointestinal: Negative  Endocrine: Negative  Genitourinary: Negative  Musculoskeletal: Negative  Skin: Negative  Allergic/Immunologic: Negative  Neurological: Positive for weakness  Hematological: Negative  Psychiatric/Behavioral: Negative  I have personally reviewed the ROS entered by MA and agree as documented  Objective:      /70 (BP Location: Left arm, Patient Position: Sitting)   Pulse 82   Resp 18   Ht 6' 1" (1 854 m)   Wt 108 kg (237 lb)   BMI 31 27 kg/m²          Physical Exam   Constitutional: He is oriented to person, place, and time  He appears well-developed and well-nourished  No distress  HENT:   Head: Normocephalic and atraumatic  Eyes: Conjunctivae and EOM are normal  No scleral icterus  Neck: Normal range of motion  Neck supple  No tracheal deviation present  Cardiovascular: Normal rate, regular rhythm and normal heart sounds  Pulmonary/Chest: Effort normal  He has rales (  Scattered rales bilateral bases)  Abdominal: Soft  He exhibits no distension  Mass: no appreciable aortic pulstion/aneurysm  There is no tenderness  There is no rebound and no guarding  Musculoskeletal: Normal range of motion  Neurological: He is alert and oriented to person, place, and time  No cranial nerve deficit  Skin: Skin is warm and dry  Psychiatric: He has a normal mood and affect  His behavior is normal  Judgment and thought content normal            All pertinent images including CT a head and neck, CT head, and  MR personally reviewed by me      Operative Scheduling Information:    [facility]:  [facility]    Physician:  Lavinia Rand and Any other surgeon    Surgery:   Right transcervical carotid artery revascularization (stent)    Urgency:  Standard    Case Length:  Normal    Post-op Bed:  Stepdown    OR Table:  Discovery    Equipment Needs:  Rep: Layo Torres    Medication Instructions:  Plavix:  Continue (do not hold)   apixaban, hold 2 days prior to procedure  Hydration:  No

## 2019-07-31 ENCOUNTER — TELEPHONE (OUTPATIENT)
Dept: ADMINISTRATIVE | Facility: HOSPITAL | Age: 69
End: 2019-07-31

## 2019-07-31 ENCOUNTER — OFFICE VISIT (OUTPATIENT)
Dept: FAMILY MEDICINE CLINIC | Facility: MEDICAL CENTER | Age: 69
End: 2019-07-31
Payer: COMMERCIAL

## 2019-07-31 ENCOUNTER — TELEPHONE (OUTPATIENT)
Dept: FAMILY MEDICINE CLINIC | Facility: MEDICAL CENTER | Age: 69
End: 2019-07-31

## 2019-07-31 VITALS
DIASTOLIC BLOOD PRESSURE: 76 MMHG | BODY MASS INDEX: 33.64 KG/M2 | SYSTOLIC BLOOD PRESSURE: 136 MMHG | RESPIRATION RATE: 22 BRPM | HEART RATE: 96 BPM | WEIGHT: 255 LBS

## 2019-07-31 DIAGNOSIS — I48.91 ATRIAL FIBRILLATION, UNSPECIFIED TYPE (HCC): ICD-10-CM

## 2019-07-31 DIAGNOSIS — R73.9 HYPERGLYCEMIA: ICD-10-CM

## 2019-07-31 DIAGNOSIS — I65.21 STENOSIS OF RIGHT INTERNAL CAROTID ARTERY: ICD-10-CM

## 2019-07-31 DIAGNOSIS — I63.89 CEREBROVASCULAR ACCIDENT (CVA) DUE TO OTHER MECHANISM (HCC): Primary | ICD-10-CM

## 2019-07-31 DIAGNOSIS — I10 ESSENTIAL HYPERTENSION: ICD-10-CM

## 2019-07-31 DIAGNOSIS — G93.41 ACUTE METABOLIC ENCEPHALOPATHY: ICD-10-CM

## 2019-07-31 DIAGNOSIS — J44.9 CHRONIC OBSTRUCTIVE PULMONARY DISEASE, UNSPECIFIED COPD TYPE (HCC): ICD-10-CM

## 2019-07-31 DIAGNOSIS — I25.10 CORONARY ARTERY DISEASE INVOLVING NATIVE CORONARY ARTERY OF NATIVE HEART WITHOUT ANGINA PECTORIS: Chronic | ICD-10-CM

## 2019-07-31 PROBLEM — Z72.0 TOBACCO ABUSE: Status: RESOLVED | Noted: 2019-06-25 | Resolved: 2019-07-31

## 2019-07-31 PROCEDURE — 1111F DSCHRG MED/CURRENT MED MERGE: CPT | Performed by: FAMILY MEDICINE

## 2019-07-31 PROCEDURE — 99495 TRANSJ CARE MGMT MOD F2F 14D: CPT | Performed by: FAMILY MEDICINE

## 2019-07-31 NOTE — TELEPHONE ENCOUNTER
Patient seen today for hospital follow-up  He tells me he is not taking his Plavix  Based on your note it appears he should be taking his Plavix  Please advise and we will contact patient  Thanks

## 2019-07-31 NOTE — PROGRESS NOTES
Assessment/Plan:    No problem-specific Assessment & Plan notes found for this encounter  Diagnoses and all orders for this visit:    Cerebrovascular accident (CVA) due to other mechanism [facility])  Stenosis of right internal carotid artery  Acute metabolic encephalopathy  CVA likely secondary to stenosis of right internal carotid artery which could cause acute encephalopathy  Encephalopathy appears to have resolved  Patient was seen by vascular surgery yesterday and there is plan for intervention on the right internal carotid artery  Review of notes from vascular surgery does show that patient should be taking Plavix but he is not currently taking the medication  I will send a message to patient's vascular surgeon to inquire about this  Aspirin was stopped according to the note  Eliquis continued however will likely be discontinued prior to surgical intervention  It appears vascular surgery is seeking cardiac clearance  Coronary artery disease involving native coronary artery of native heart without angina pectoris  Atrial fibrillation, unspecified type [facility])  Patient is followed by Cardiology  Chronic obstructive pulmonary disease, unspecified COPD type [facility])  Patient is followed by pulmonology  Essential hypertension  Well controlled on repeat measurement  Continue losartan, metoprolol and amlodipine  Hyperglycemia  Glucose elevated on recent labs  In office A1c was 6 1%  I informed patient he is at risk of developing diabetes due to the elevation in his A1c  Likely due to poor diet, lack of exercise and excess alcohol intake  Patient was instructed on low-calorie and low-carbohydrate diet as well as exercising regularly and cutting back on his alcohol intake  BMI Counseling: Body mass index is 33 64 kg/m²  Discussed the patient's BMI with him  The BMI is above average  BMI counseling and education was provided to the patient   Nutrition recommendations include decreasing overall calorie intake  Exercise recommendations include moderate aerobic physical activity for 150 minutes/week  Follow-up in three months for a Medicare wellness exam or sooner if needed  Subjective:      Patient ID: Windy Santoro is a 81 y o  male  Patient presents for hospital follow-up  He states he collapsed while at home and his friend called the ambulance to have him taken to the hospital   Patient states he was found to have a stroke on the right  He does have blockage in the carotid artery on the right and is scheduled to have surgery to remove the blockage  He was seen by his vascular surgeon yesterday  He tells me he is not currently taking Plavix as he was told to start the medication after surgery  He was told to stop his aspirin but continue his Eliquis  He states he has no residual effects from his stroke  Denies any weakness  No slurred speech  He feels fine  Denies shortness of breath  Denies chest pain  He was discharged from the hospital to a rehab facility and tells me he gave them a hard time until they discharged him  No new concerns today  The following portions of the patient's history were reviewed and updated as appropriate: He  has a past medical history of COPD (chronic obstructive pulmonary disease) ([facility] ), Hyperlipidemia, Hypertension, Myocardial infarction [facility]), Pneumonia (2/2/2017), Pulmonary emphysema ([facility] ), Shortness of breath, and Tobacco abuse (6/25/2019)    He   Patient Active Problem List    Diagnosis Date Noted    Dependence on nicotine from cigarettes 07/30/2019    Stenosis of right internal carotid artery 07/04/2019    Acute metabolic encephalopathy 06/20/0748    Atrial fibrillation ([facility] ) 06/28/2019    CVA (cerebral vascular accident) ([place] [address] ) 06/25/2019    Alcohol abuse 06/25/2019    Microalbuminuria 04/17/2019    Obesity 04/17/2019    Elevated CO2 level 09/27/2018    Elevated glucose 08/24/2018    Thoracic ascending aortic aneurysm ([facility] ) 03/22/2018    Coronary artery disease involving native coronary artery 01/30/2018    ULLOA (dyspnea on exertion) 01/30/2018    Osteoarthritis of left glenohumeral joint 06/01/2017    Osteoarthritis of right glenohumeral joint 06/01/2017    Colon polyps 05/15/2017    Palpitations 04/26/2017    Abnormal EKG 03/03/2017    PVC (premature ventricular contraction) 03/03/2017    Elevated hematocrit 02/02/2017    Elevated hemoglobin (HCC) 02/02/2017    Emphysema of lung ([facility] ) 02/02/2017    Hyperlipidemia 02/02/2017    Hyponatremia 02/02/2017    Mild concentric left ventricular hypertrophy (LVH) 02/02/2017    Hiatal hernia 01/19/2017    COPD (chronic obstructive pulmonary disease) ([place] [address] ) 01/18/2017    Hypertension 01/18/2017    Pityriasis rosea 01/18/2017     He  has a past surgical history that includes Ankle surgery (Right, 1978); Hand surgery (Left, 1964); Colonoscopy; pr colonoscopy flx dx w/collj spec when pfrmd (N/A, 5/9/2017); and Carotid stent (02/13/2018)  His family history includes Arthritis in his brother; Diabetes in his brother; Emphysema in his father; Hypertension in his father, mother, and other; Other in his other  He  reports that he quit smoking about 4 weeks ago  His smoking use included cigarettes  He has a 22 50 pack-year smoking history  He has never used smokeless tobacco  He reports that he drinks about 7 0 - 21 0 standard drinks of alcohol per week  He reports that he does not use drugs  Current Outpatient Medications   Medication Sig Dispense Refill    amLODIPine (NORVASC) 5 mg tablet Take 1 tablet (5 mg total) by mouth daily 90 tablet 0    apixaban (ELIQUIS) 5 mg Take 1 tablet (5 mg total) by mouth 2 (two) times a day 60 tablet 0    atorvastatin (LIPITOR) 40 mg tablet Take 1 tablet (40 mg total) by mouth daily 90 tablet 1    budesonide-formoterol (SYMBICORT) 80-4 5 MCG/ACT inhaler Inhale 2 puffs 2 (two) times a day Rinse mouth after use   1 Inhaler 5    losartan (COZAAR) 100 MG tablet Take 1 tablet (100 mg total) by mouth daily 90 tablet 0    metoprolol tartrate (LOPRESSOR) 25 mg tablet Take 1 tablet (25 mg total) by mouth every 12 (twelve) hours 60 tablet 0    nicotine (NICODERM CQ) 21 mg/24 hr TD 24 hr patch Place 1 patch on the skin every 24 hours 28 patch 0    pantoprazole (PROTONIX) 40 mg tablet TAKE 1 TABLET BY MOUTH DAILY 30 tablet 0    tiotropium (SPIRIVA RESPIMAT) 2 5 MCG/ACT AERS inhaler Inhale 2 puffs daily 4 g 5    VENTOLIN  (90 Base) MCG/ACT inhaler INHALE 1-2 PUFFS EVERY 4 HOURS AS NEEDED FOR WHEEZING 18 g 3    clopidogrel (PLAVIX) 75 mg tablet Take 1 tablet (75 mg total) by mouth daily (Patient not taking: Reported on 7/31/2019) 90 tablet 3    nitroglycerin (NITROSTAT) 0 4 mg SL tablet Place 1 tablet (0 4 mg total) under the tongue every 5 (five) minutes as needed for chest pain (Patient not taking: Reported on 7/31/2019) 90 tablet 0    sodium chloride 1 g tablet Take 1 tablet (1 g total) by mouth daily for 90 days 90 tablet 2     No current facility-administered medications for this visit  Current Outpatient Medications on File Prior to Visit   Medication Sig    amLODIPine (NORVASC) 5 mg tablet Take 1 tablet (5 mg total) by mouth daily    apixaban (ELIQUIS) 5 mg Take 1 tablet (5 mg total) by mouth 2 (two) times a day    atorvastatin (LIPITOR) 40 mg tablet Take 1 tablet (40 mg total) by mouth daily    budesonide-formoterol (SYMBICORT) 80-4 5 MCG/ACT inhaler Inhale 2 puffs 2 (two) times a day Rinse mouth after use      losartan (COZAAR) 100 MG tablet Take 1 tablet (100 mg total) by mouth daily    metoprolol tartrate (LOPRESSOR) 25 mg tablet Take 1 tablet (25 mg total) by mouth every 12 (twelve) hours    nicotine (NICODERM CQ) 21 mg/24 hr TD 24 hr patch Place 1 patch on the skin every 24 hours    pantoprazole (PROTONIX) 40 mg tablet TAKE 1 TABLET BY MOUTH DAILY    tiotropium (SPIRIVA RESPIMAT) 2 5 MCG/ACT AERS inhaler Inhale 2 puffs daily    VENTOLIN  (90 Base) MCG/ACT inhaler INHALE 1-2 PUFFS EVERY 4 HOURS AS NEEDED FOR WHEEZING    clopidogrel (PLAVIX) 75 mg tablet Take 1 tablet (75 mg total) by mouth daily (Patient not taking: Reported on 7/31/2019)    nitroglycerin (NITROSTAT) 0 4 mg SL tablet Place 1 tablet (0 4 mg total) under the tongue every 5 (five) minutes as needed for chest pain (Patient not taking: Reported on 7/31/2019)    sodium chloride 1 g tablet Take 1 tablet (1 g total) by mouth daily for 90 days    [DISCONTINUED] nicotine polacrilex (COMMIT) 4 MG lozenge Apply 1 lozenge (4 mg total) to the mouth or throat as needed for smoking cessation     No current facility-administered medications on file prior to visit  He has No Known Allergies       Review of Systems   Constitutional: Negative for fever  Respiratory: Negative for shortness of breath  Cardiovascular: Negative for chest pain  Objective:      /76 (BP Location: Left arm, Patient Position: Sitting, Cuff Size: Large)   Pulse 96   Resp 22   Wt 116 kg (255 lb)   BMI 33 64 kg/m²          Physical Exam   Constitutional: He is oriented to person, place, and time  He appears well-developed and well-nourished  Cardiovascular: Normal rate and normal heart sounds  An irregularly irregular rhythm present  Pulmonary/Chest: Effort normal and breath sounds normal    Neurological: He is alert and oriented to person, place, and time  He has normal strength  No cranial nerve deficit or sensory deficit  GCS eye subscore is 4  GCS verbal subscore is 5  GCS motor subscore is 6

## 2019-07-31 NOTE — TELEPHONE ENCOUNTER
Patient was seen on 07/30/19 by Dr Lavinia Rand and needs cardiac clearance for Right transcervical carotid artery revascularization  Patient is schedule to Dr Melania Estrada on 08/14/19@ [facility] office  Spoke with Cecilio Dent and faxed forms to [phone]

## 2019-08-05 RX ORDER — ATORVASTATIN CALCIUM 20 MG/1
TABLET, FILM COATED ORAL
Qty: 90 TABLET | Refills: 3 | Status: SHIPPED | OUTPATIENT
Start: 2019-08-05

## 2019-08-07 ENCOUNTER — OFFICE VISIT (OUTPATIENT)
Dept: NEUROLOGY | Facility: CLINIC | Age: 69
End: 2019-08-07
Payer: COMMERCIAL

## 2019-08-07 VITALS
DIASTOLIC BLOOD PRESSURE: 74 MMHG | WEIGHT: 247.2 LBS | SYSTOLIC BLOOD PRESSURE: 128 MMHG | HEIGHT: 71 IN | HEART RATE: 50 BPM | BODY MASS INDEX: 34.61 KG/M2

## 2019-08-07 DIAGNOSIS — I65.21 STENOSIS OF RIGHT INTERNAL CAROTID ARTERY: ICD-10-CM

## 2019-08-07 DIAGNOSIS — I65.21 CAROTID STENOSIS, SYMPTOMATIC W/O INFARCT, RIGHT: Primary | ICD-10-CM

## 2019-08-07 PROCEDURE — 99215 OFFICE O/P EST HI 40 MIN: CPT | Performed by: PSYCHIATRY & NEUROLOGY

## 2019-08-07 RX ORDER — COVID-19 ANTIGEN TEST
2 KIT MISCELLANEOUS
COMMUNITY
End: 2019-10-17

## 2019-08-07 NOTE — PROGRESS NOTES
Maximo Mcdonald is a 81 y o  male who presents today with recent stroke    Assessment:  1  Carotid stenosis, symptomatic w/o infarct, right    2  Stenosis of right internal carotid artery        Plan:  Continue Eliquis, Plavix and Lipitor  Follow-up with vascular surgery  Return in 3 months    Discussion:  Gonzalez Flores had a right hemisphere CVA about 6 weeks ago and was found to be in atrial fibrillation as well as having a severe right carotid stenosis  He has been placed on Eliquis and is anticipated undergo carotid stenting at some point in the near future  He has minimal left hemiparesis at this point  I will see him back in follow-up in a few months      Subjective:    HPI  Gonzalez Flores is a right-handed man who presents with the above complaints  He states that on June 25th he suddenly noted right-sided weakness  He was seen in the emergency room at [facility] and was given tPA  He reports that his weakness has improved  During his evaluation it was found that he had severe right carotid stenosis as well as was in atrial fibrillation  He was readmitted to the hospital about a week later with some confusion which was felt secondary to sleep-wake cycle disturbance and this has not recurred  He has not had any new symptoms of stroke  He is presently on Eliquis and it is anticipated that he will undergo stent surgery for his carotid artery in the near future    In the interim he remains on Plavix and Lipitor      Past Medical History:   Diagnosis Date    [facility]) [facility] [address] [facility]     Hyperlipidemia     Hypertension     [facility] infarction [facility] [address] [facility]     Pneumonia 2/2/2017    Pulmonary emphysema (HCC)     Shortness of breath     Stroke [facility] [address] [facility]     Tobacco abuse 6/25/2019       Family History:  Family History   Problem Relation Age of Onset    Hypertension Mother     Emphysema Father     Hypertension Father     Arthritis Brother     Diabetes Brother     Other Other Cardiac Disorder    Hypertension Other     Arrhythmia Neg Hx     Anuerysm Neg Hx     Asthma Neg Hx     Clotting disorder Neg Hx     Fainting Neg Hx        Past Surgical History:  Past Surgical History:   Procedure Laterality Date    ANKLE SURGERY Right 1978    CAROTID STENT  02/13/2018    COLONOSCOPY      HAND SURGERY Left 1964    HAND SURGERY Left 1964    fingers replaced    ID COLONOSCOPY FLX DX W/COLLJ SPEC WHEN PFRMD N/A 5/9/2017    Procedure: EGD AND COLONOSCOPY;  Surgeon: Oliver Munoz MD;  Location: [facility]; Service: Gastroenterology       Social History:   reports that he quit smoking about 5 weeks ago  His smoking use included cigarettes  He has a 22 50 pack-year smoking history  He has never used smokeless tobacco  He reports that he drinks about 7 0 - 21 0 standard drinks of alcohol per week  He reports that he does not use drugs  Allergies:  Patient has no known allergies        Current Outpatient Medications:     amLODIPine (NORVASC) 5 mg tablet, Take 1 tablet (5 mg total) by mouth daily, Disp: 90 tablet, Rfl: 0    apixaban (ELIQUIS) 5 mg, Take 1 tablet (5 mg total) by mouth 2 (two) times a day, Disp: 60 tablet, Rfl: 0    atorvastatin (LIPITOR) 40 mg tablet, Take 1 tablet (40 mg total) by mouth daily, Disp: 90 tablet, Rfl: 1    budesonide-formoterol (SYMBICORT) 80-4 5 MCG/ACT inhaler, Inhale 2 puffs 2 (two) times a day Rinse mouth after use , Disp: 1 Inhaler, Rfl: 5    losartan (COZAAR) 100 MG tablet, Take 1 tablet (100 mg total) by mouth daily, Disp: 90 tablet, Rfl: 0    metoprolol tartrate (LOPRESSOR) 25 mg tablet, Take 1 tablet (25 mg total) by mouth every 12 (twelve) hours, Disp: 60 tablet, Rfl: 0    Naproxen Sodium (ALEVE) 220 MG CAPS, Take 2 capsules by mouth daily at bedtime as needed, Disp: , Rfl:     nicotine (NICODERM CQ) 21 mg/24 hr TD 24 hr patch, Place 1 patch on the skin every 24 hours, Disp: 28 patch, Rfl: 0    pantoprazole (PROTONIX) 40 mg tablet, TAKE 1 TABLET BY MOUTH DAILY, Disp: 30 tablet, Rfl: 0    sodium chloride 1 g tablet, Take 1 tablet (1 g total) by mouth daily for 90 days, Disp: 90 tablet, Rfl: 2    tiotropium (SPIRIVA RESPIMAT) 2 5 MCG/ACT AERS inhaler, Inhale 2 puffs daily, Disp: 4 g, Rfl: 5    VENTOLIN  (90 Base) MCG/ACT inhaler, INHALE 1-2 PUFFS EVERY 4 HOURS AS NEEDED FOR WHEEZING, Disp: 18 g, Rfl: 3    atorvastatin (LIPITOR) 20 mg tablet, take 1 tablet by mouth once daily (Patient not taking: Reported on 8/7/2019), Disp: 90 tablet, Rfl: 3    clopidogrel (PLAVIX) 75 mg tablet, Take 1 tablet (75 mg total) by mouth daily (Patient not taking: Reported on 7/31/2019), Disp: 90 tablet, Rfl: 3    nitroglycerin (NITROSTAT) 0 4 mg SL tablet, Place 1 tablet (0 4 mg total) under the tongue every 5 (five) minutes as needed for chest pain (Patient not taking: Reported on 7/31/2019), Disp: 90 tablet, Rfl: 0    I have reviewed the past medical, social and family history, current medications, allergies, vitals, review of systems and updated this information as appropriate today     Objective:    Vitals:  Blood pressure 128/74, pulse (!) 50, height 5' 11" (1 803 m), weight 112 kg (247 lb 3 2 oz)  Physical Exam    Neurological Exam    GENERAL:  Cooperative in no acute distress  Well-developed and well-nourished    HEAD and NECK   Head is atraumatic normocephalic with no lesions or masses  Neck is supple with full range of motion    CARDIOVASCULAR  Carotid Arteries-no carotid bruits  NEUROLOGIC:  Mental Status-the patient is awake alert and oriented without aphasia or apraxia  Cranial Nerves: Visual fields are full to confrontation  Discs are flat  Extraocular movements are full without nystagmus  Pupils are 2-1/2 mm and reactive  Face is symmetrical to light touch  Movements of facial expression reveal a mild left facial asymmetry  Hearing is normal to finger rub bilaterally  Soft palate lifts symmetrically   Shoulder shrug is symmetrical  Tongue is midline without atrophy  Motor:  A subtle left drift is noted on arm extension  Strength is full in the upper and lower extremities with normal bulk and tone  Sensory: Intact to temperature and vibratory sensation in the upper and lower extremities bilaterally  Cortical function is intact  Coordination: Finger to nose testing is performed accurately  Romberg is remarkable for mild sway  Gait reveals a normal base with symmetrical arm swing  Tandem walk is performed with difficulty, occasionally stepping off  Reflexes:  Mild hyperreflexia is noted on the left relative to the right  Toes are downgoing on the right and +/-upgoing on the left            ROS:    Review of Systems   Constitutional: Negative  Negative for appetite change and fever  HENT: Negative  Negative for hearing loss, tinnitus, trouble swallowing and voice change  Eyes: Negative  Negative for photophobia and pain  Respiratory: Positive for shortness of breath  Cardiovascular: Negative  Negative for palpitations  Gastrointestinal: Negative  Negative for nausea and vomiting  Endocrine: Negative  Negative for cold intolerance and heat intolerance  Genitourinary: Negative  Negative for dysuria, frequency and urgency  Musculoskeletal: Positive for arthralgias (shoulder)  Negative for myalgias and neck pain  Skin: Negative  Negative for rash  Neurological: Negative  Negative for dizziness, tremors, seizures, syncope, facial asymmetry, speech difficulty, weakness, light-headedness, numbness and headaches  Hematological: Negative  Does not bruise/bleed easily  Psychiatric/Behavioral: Negative  Negative for confusion, hallucinations and sleep disturbance  All other systems reviewed and are negative

## 2019-08-12 NOTE — TELEPHONE ENCOUNTER
Patient wants to know if he should then stop taking the eliquis if he is supposed to be taking plavix  Or can he do both?

## 2019-08-14 ENCOUNTER — OFFICE VISIT (OUTPATIENT)
Dept: CARDIOLOGY CLINIC | Facility: CLINIC | Age: 69
End: 2019-08-14
Payer: COMMERCIAL

## 2019-08-14 VITALS
OXYGEN SATURATION: 97 % | BODY MASS INDEX: 34.72 KG/M2 | HEIGHT: 71 IN | WEIGHT: 248 LBS | DIASTOLIC BLOOD PRESSURE: 82 MMHG | HEART RATE: 52 BPM | SYSTOLIC BLOOD PRESSURE: 146 MMHG

## 2019-08-14 DIAGNOSIS — E78.5 HYPERLIPIDEMIA: ICD-10-CM

## 2019-08-14 DIAGNOSIS — I48.0 PAROXYSMAL ATRIAL FIBRILLATION (HCC): ICD-10-CM

## 2019-08-14 DIAGNOSIS — Z98.61 CAD S/P PERCUTANEOUS CORONARY ANGIOPLASTY: ICD-10-CM

## 2019-08-14 DIAGNOSIS — Z86.73 HISTORY OF CVA (CEREBROVASCULAR ACCIDENT): ICD-10-CM

## 2019-08-14 DIAGNOSIS — I65.21 STENOSIS OF RIGHT CAROTID ARTERY: ICD-10-CM

## 2019-08-14 DIAGNOSIS — I25.10 CAD S/P PERCUTANEOUS CORONARY ANGIOPLASTY: ICD-10-CM

## 2019-08-14 DIAGNOSIS — I25.10 CORONARY ARTERY DISEASE INVOLVING NATIVE CORONARY ARTERY: Chronic | ICD-10-CM

## 2019-08-14 DIAGNOSIS — I65.23 ARTERIOSCLEROSIS OF BOTH CAROTID ARTERIES: ICD-10-CM

## 2019-08-14 DIAGNOSIS — Z01.810 PREOP CARDIOVASCULAR EXAM: Primary | ICD-10-CM

## 2019-08-14 DIAGNOSIS — Z79.01 ANTICOAGULANT LONG-TERM USE: ICD-10-CM

## 2019-08-14 DIAGNOSIS — I10 ESSENTIAL HYPERTENSION: ICD-10-CM

## 2019-08-14 DIAGNOSIS — E78.2 MIXED HYPERLIPIDEMIA: ICD-10-CM

## 2019-08-14 DIAGNOSIS — Z72.0 TOBACCO ABUSE: ICD-10-CM

## 2019-08-14 PROCEDURE — 99214 OFFICE O/P EST MOD 30 MIN: CPT | Performed by: INTERNAL MEDICINE

## 2019-08-14 RX ORDER — NITROGLYCERIN 0.4 MG/1
0.4 TABLET SUBLINGUAL
Qty: 90 TABLET | Refills: 0
Start: 2019-08-14

## 2019-08-14 RX ORDER — ASPIRIN 81 MG/1
81 TABLET ORAL DAILY
Start: 2019-08-14 | End: 2019-08-14

## 2019-08-14 RX ORDER — CLOPIDOGREL BISULFATE 75 MG/1
75 TABLET ORAL DAILY
Qty: 90 TABLET | Refills: 3 | Status: SHIPPED | OUTPATIENT
Start: 2019-08-14 | End: 2019-09-20 | Stop reason: ALTCHOICE

## 2019-08-14 NOTE — PROGRESS NOTES
CARDIOLOGY OFFICE VISIT  [facility]  [address], [address], [address]   [facility], [place], [facility] Elsie Coates  Tel: [phone]      NAME: Lida Ackerman  AGE: 81 y o  SEX: male  : 1950   MRN: [id number]      Chief Complaint:  Chief Complaint   Patient presents with    Follow-up     hosp         History of Present Illness:   Patient comes for cardiac risk assessment prior to right carotid artery revascularization by Dr Merly Gonzalez at [facility]  He also comes as a follow-up to recent hospitalization at [facility] where he was diagnosed with new Afib  States he has COPD and gets SOB from greater than usual levels of exertion  But pt denies chest pain, palpitations, lightheadedness, syncope, swelling feet, orthopnea, PND, claudication  History of MCA CVA s/p tPA    Paroxysmal atrial fibrillation - Of recent onset  CHADS2-VASc = 5 (HTN, Age, Stroke x 2, Vascular disease)  On Eliquis for anticoagulation and beta-blocker for rate control  Denies bleeding from any site    CAD with known RCA  -  States is doing well cardiac wise with no cardiac symptoms  Taking Plavix, BB, statin, prn NTG  Has not used SL NTG since the hospital discharge  Coronary angiography (2017) -  of the mid RCA with CORNELL grade 1 antegrade flow as well as collateral flow from the left system  LAD with 50% lesions in the proximal and mid vessel  The basal and mid inferior wall was hypokinetic with an estimated ejection fraction 55%  The EDP was mildly elevated  HTN -  Has been hypertensive for many years  Taking medications regularly  Denies lightheadedness, headache, medication side effects  HLP -  Has had hyperlipidemia for many years  Taking statin regularly along with diet control  Denies myalgia  PCP closely monitoring the blood work  Obesity - Unable to lose weight, pt states      Tobacco abuse -  He was on a nicotine patch but smoked again yesterday    COPD      Past Medical History:  Past Medical History:   Diagnosis Date    [facility]     Hyperlipidemia     Hypertension     [facility] infarction ([facility]     Pneumonia 2017    Pulmonary emphysema (HCC)     Shortness of breath     Stroke ([facility] )     Tobacco abuse 2019         Past Surgical History:  Past Surgical History:   Procedure Laterality Date    ANKLE SURGERY Right 1978    CAROTID STENT  2018    COLONOSCOPY      HAND SURGERY Left 1964    HAND SURGERY Left 1964    fingers replaced    [place] COLONOSCOPY FLX DX W/COLLJ SPEC WHEN PFRMD N/A 2017    Procedure: EGD AND COLONOSCOPY;  Surgeon: Nano Quinn MD;  Location: [facility]; Service: Gastroenterology         Family History:  Family History   Problem Relation Age of Onset    Hypertension Mother     Emphysema Father     Hypertension Father     Arthritis Brother     Diabetes Brother     Other Other         Cardiac Disorder    Hypertension Other     Arrhythmia Neg Hx     Anuerysm Neg Hx     Asthma Neg Hx     Clotting disorder Neg Hx     Fainting Neg Hx          Social History:  Social History     Socioeconomic History    Marital status:      Spouse name: None    Number of children: None    Years of education: None    Highest education level: None   Occupational History    None   Social Needs    Financial resource strain: None    Food insecurity:     Worry: None     Inability: None    Transportation needs:     Medical: None     Non-medical: None   Tobacco Use    Smoking status: Former Smoker     Packs/day: 0 50     Years: 45 00     Pack years: 22 50     Types: Cigarettes     Last attempt to quit: 2019     Years since quittin 1    Smokeless tobacco: Never Used    Tobacco comment: last smoked 2019   Substance and Sexual Activity    Alcohol use:  Yes     Alcohol/week: 7 0 - 21 0 standard drinks     Types: 7 - 21 Cans of beer per week     Comment: 1-3 daily     Drug use: No    Sexual activity: None   Lifestyle    Physical activity:     Days per week: None     Minutes per session: None    Stress: None   Relationships    Social connections:     Talks on phone: None     Gets together: None     Attends Scientologist service: None     Active member of club or organization: None     Attends meetings of clubs or organizations: None     Relationship status: None    Intimate partner violence:     Fear of current or ex partner: None     Emotionally abused: None     Physically abused: None     Forced sexual activity: None   Other Topics Concern    None   Social History Narrative    Always uses a seat belt    Caffeine use - Daily caffeine consumption, 2-3 servings a day             Active Problems:  Patient Active Problem List   Diagnosis    Palpitations    Coronary artery disease involving native coronary artery    ULLOA (dyspnea on exertion)    Abnormal EKG    Colon polyps    COPD (chronic obstructive pulmonary disease) (HCC)    Elevated hematocrit    Elevated hemoglobin (HCC)    Emphyse[facility] lung [facility]    Hiatal hernia    Hyperlipidemia    Hypertension    Hyponatremia    Mild concentric left ventricular hypertrophy (LVH)    Osteoarthritis of left glenohumeral joint    Osteoarthritis of right glenohumeral joint    Pityriasis rosea    PVC (premature ventricular contraction)    Thoracic ascending aortic aneurysm (HCC)    Elevated glucose    Elevated CO2 level    Microalbuminuria    Obesity    CVA (cerebral vascular accident) ([facility]    Alcohol abuse    Atrial fibrillation ([facility]    Acute metabolic encephalopathy    Stenosis of right internal carotid artery    Dependence on nicotine from cigarettes         The following portions of the patient's history were reviewed and updated as appropriate: past medical history, past surgical history, past family history,  past social history, current medications, allergies and problem list       Review of Systems:  Constitutional: Denies fever, chills  Eyes: Denies eye redness, eye discharge  ENT: Denies tinnitus, sneezing, nasal discharge, sore throat   Respiratory: Denies cough, expectoration, hemoptysis  Cardiovascular: Denies chest pain, palpitations, orthopnea, PND  Gastrointestinal: Denies vomiting, hematemesis, diarrhea, bloody stools  Genito-Urinary: Denies dysuria, incontinence  Musculoskeletal: Denies back pain  Neurologic: Denies syncope, headache,seizures  Endocrine: Denies polyuria, polydipsia, temperature intolerance  Allergy and Immunology: Denies hives, insect bite sensitivity  Hematological and Lymphatic: Denies bleeding problems, swollen glands   Psychological: Denies depression, suicidal ideation, anxiety, panic  Dermatological: Denies pruritus, rash, skin lesion changes      Vitals:  Vitals:    08/14/19 0846   BP: 146/82   Pulse: (!) 52   SpO2: 97%       Body mass index is 34 59 kg/m²  Weight (last 2 days)     Date/Time   Weight    08/14/19 0846   112 (248)                Physical Examination:  General: Obese  Patient is not in acute distress  Awake, alert, oriented in time, place and person  Responding to commands  Head: Normocephalic  Atraumatic  Eyes: Both pupils normal sized, round and reactive to light  Nonicteric  ENT: Normal external ear canals  Nares normal, no drainage  Lips and oral mucosa normal  Neck: Supple  JVP not raised  Trachea central  No thyromegaly  Lungs: Bilateral bronchovascular breath sounds with no crackles or rhonchi  Chest wall: No tenderness  Cardiovascular:  Irregular rhythm  S1 variable and S2 normal  No murmur, rub or gallop  Gastrointestinal: Abdomen soft, nontender  No guarding or rigidity  Liver and spleen not palpable  Bowel sounds present  Neurologic: Patient is awake, alert, oriented in time, place and person  Responding to command  Moving all extremities  Integumentary:  No skin rash  Lymphatic: No cervical lymphadenopathy  Back: Symmetric   No CVA tenderness  Extremities: No clubbing, cyanosis or edema      Laboratory Results:  CBC with diff:   Lab Results   Component Value Date    WBC 6 54 2019    RBC 5 36 2019    HGB 16 4 2019    HCT 51 6 (H) 2019    MCV 96 2019    MCH 30 6 2019    RDW 14 5 2019     2019       CMP:  Lab Results   Component Value Date    CREATININE 0 95 2019    BUN 19 2019    K 4 5 2019    CL 98 (L) 2019    CO2 33 (H) 2019    CO2 28 2019    GLUCOSE 97 2019    ALKPHOS 78 2019    ALT 34 2019    AST 27 2019    BILIDIR 0 21 (H) 2018       Lab Results   Component Value Date    HGBA1C 6 1 2019    MG 1 9 2019       Lab Results   Component Value Date    TROPONINI <0 02 2019    TROPONINI <0 02 2017       Lipid Profile:   No results found for: CHOL  Lab Results   Component Value Date    HDL 46 2019    HDL 49 2018    HDL 54 2018     Lab Results   Component Value Date    LDLCALC 113 (H) 2019    LDLCALC 81 2018    LDLCALC 72 2018     Lab Results   Component Value Date    TRIG 64 2019    TRIG 62 2018    TRIG 60 2018       Cardiac testing:   Results for orders placed during the hospital encounter of 19   Echo complete with contrast if indicated    Narrative [address] [place], [place] [id number] [phone]    Transthoracic Echocardiogram  2D, M-mode, Doppler, and Color Doppler    Study date:  2019    Patient: Charles Briseno  MR number: [id number]  Account number: [de-identified]  : 1950  Age: 81 years  Gender: Male  Status: Inpatient  Location: Bedside  Height: 73 in  Weight: 260 lb  BP: 160/ 90 mmHg    Indications: Cerebral thrombosis, Artery occlusion, Assess left ventricular function      Diagnoses: I63 9 - Cerebral infarction, unspecified    Sonographer:   [facility]  Referring Physician:  Carolyn Harden PA-C  Group:    [facility]  Interpreting Physician:  Anna Alamo MD    SUMMARY    PROCEDURE INFORMATION:  This was a technically difficult study  Echocardiographic views were limited due to poor acoustic window availability, decreased penetration, and lung interference  LEFT VENTRICLE:  Systolic function was normal  Ejection fraction was estimated to be 60 %  There were no regional wall motion abnormalities  Wall thickness was mildly increased  There was mild concentric hypertrophy  LEFT ATRIUM:  The atrium was moderately dilated  RIGHT ATRIUM:  The atrium was mildly dilated  MITRAL VALVE:  There was trace regurgitation  TRICUSPID VALVE:  There was trace regurgitation  HISTORY: Consistent with transient ischemic attack or stroke  PRIOR HISTORY: CAD, Palpitations, Myocardial infarction  Risk factors: hypertension, hypercholesterolemia, alcohol abuse, and a history of current cigarette use (within the last  month)  Chronic lung disease  PROCEDURE: The procedure was performed at the bedside  This was a routine study  The transthoracic approach was used  The study included complete 2D imaging, M-mode, complete spectral Doppler, and color Doppler  The heart rate was 87 bpm,  at the start of the study  Images were obtained from the parasternal, apical, subcostal, and suprasternal notch acoustic windows  Echocardiographic views were limited due to poor acoustic window availability, decreased penetration, and  lung interference  This was a technically difficult study  LEFT VENTRICLE: Size was normal  Systolic function was normal  Ejection fraction was estimated to be 60 %  There were no regional wall motion abnormalities  Wall thickness was mildly increased  There was mild concentric hypertrophy  No  evidence of apical thrombus   DOPPLER: Left ventricular diastolic function parameters were normal     RIGHT VENTRICLE: The size was normal  Systolic function was normal  Wall thickness was normal     LEFT ATRIUM: The atrium was moderately dilated  RIGHT ATRIUM: The atrium was mildly dilated  MITRAL VALVE: Valve structure was normal  There was normal leaflet separation  DOPPLER: The transmitral velocity was within the normal range  There was no evidence for stenosis  There was trace regurgitation  AORTIC VALVE: The valve was trileaflet  Leaflets exhibited mildly increased thickness and normal cuspal separation  DOPPLER: Transaortic velocity was within the normal range  There was no evidence for stenosis  There was no significant  regurgitation  TRICUSPID VALVE: The valve structure was normal  There was normal leaflet separation  DOPPLER: The transtricuspid velocity was within the normal range  There was no evidence for stenosis  There was trace regurgitation  PULMONIC VALVE: Leaflets exhibited normal thickness, no calcification, and normal cuspal separation  DOPPLER: The transpulmonic velocity was within the normal range  There was no significant regurgitation  PERICARDIUM: There was no pericardial effusion  The pericardium was normal in appearance  AORTA: The root exhibited normal size  SYSTEMIC VEINS: IVC: The inferior vena cava was normal in size   Respirophasic changes were normal     SYSTEM MEASUREMENT TABLES    2D  %FS: 27 3 %  Ao Diam: 3 4 cm  EDV(Teich): 144 9 ml  EF(Teich): 52 7 %  ESV(Teich): 68 6 ml  IVSd: 1 3 cm  LA Area: 40 9 cm2  LA Diam: 5 2 cm  LVEDV MOD A4C: 162 ml  LVEF MOD A4C: 57 4 %  LVESV MOD A4C: 69 ml  LVIDd: 5 5 cm  LVIDs: 4 cm  LVLd A4C: 10 3 cm  LVLs A4C: 8 cm  LVPWd: 1 2 cm  RA Area: 21 7 cm2  RVIDd: 3 6 cm  SV MOD A4C: 93 ml  SV(Teich): 76 3 ml    MM  TAPSE: 1 4 cm    PW  E': 0 1 m/s    [employer] Commission Accredited Echocardiography Laboratory    Prepared and electronically signed by    Chandler Wiggins MD  Signed 26-Jun-2019 15:27:05       Medications:    Current Outpatient Medications:     amLODIPine (NORVASC) 5 mg tablet, Take 1 tablet (5 mg total) by mouth daily, Disp: 90 tablet, Rfl: 0    apixaban (ELIQUIS) 5 mg, Take 1 tablet (5 mg total) by mouth 2 (two) times a day, Disp: 60 tablet, Rfl: 0    atorvastatin (LIPITOR) 20 mg tablet, take 1 tablet by mouth once daily, Disp: 90 tablet, Rfl: 3    budesonide-formoterol (SYMBICORT) 80-4 5 MCG/ACT inhaler, Inhale 2 puffs 2 (two) times a day Rinse mouth after use , Disp: 1 Inhaler, Rfl: 5    losartan (COZAAR) 100 MG tablet, Take 1 tablet (100 mg total) by mouth daily, Disp: 90 tablet, Rfl: 0    metoprolol tartrate (LOPRESSOR) 25 mg tablet, Take 1 tablet (25 mg total) by mouth every 12 (twelve) hours, Disp: 60 tablet, Rfl: 0    Naproxen Sodium (ALEVE) 220 MG CAPS, Take 2 capsules by mouth daily at bedtime as needed, Disp: , Rfl:     pantoprazole (PROTONIX) 40 mg tablet, TAKE 1 TABLET BY MOUTH DAILY, Disp: 30 tablet, Rfl: 0    sodium chloride 1 g tablet, Take 1 tablet (1 g total) by mouth daily for 90 days, Disp: 90 tablet, Rfl: 2    tiotropium (SPIRIVA RESPIMAT) 2 5 MCG/ACT AERS inhaler, Inhale 2 puffs daily, Disp: 4 g, Rfl: 5    VENTOLIN  (90 Base) MCG/ACT inhaler, INHALE 1-2 PUFFS EVERY 4 HOURS AS NEEDED FOR WHEEZING, Disp: 18 g, Rfl: 3    atorvastatin (LIPITOR) 40 mg tablet, Take 1 tablet (40 mg total) by mouth daily (Patient not taking: Reported on 8/14/2019), Disp: 90 tablet, Rfl: 1    clopidogrel (PLAVIX) 75 mg tablet, Take 1 tablet (75 mg total) by mouth daily, Disp: 90 tablet, Rfl: 3    nitroglycerin (NITROSTAT) 0 4 mg SL tablet, Place 1 tablet (0 4 mg total) under the tongue every 5 (five) minutes as needed for chest pain, Disp: 90 tablet, Rfl: 0      Allergies:  No Known Allergies      Assessment and Plan:  1  Preop cardiovascular exam  Patient has no active cardiac conditions (such as unstable cardiac syndrome, decompensated heart failure, significant arrhythmias, severe valvular disease)  Patient is a moderate cardiac risk patient going in for a carotid surgery    No further cardiac workup is needed at this time  No cardiac contraindications for surgery  Perioperative use of beta-blocker is highly recommended  if needed Eliquis can be held 2 days prior to surgery   if needed Plavix can be held 5-7 days prior to surgery    2  Paroxysmal atrial fibrillation (HCC)   continue Eliquis for anticoagulation  Continue beta-blocker for rate control    3  Anticoagulant long-term use   continue Eliquis  Patient denies bleeding from any site    4  CAD S/P percutaneous coronary angioplasty   continue  Plavix, statin,  ARB, beta-blocker, SL NTG    5  Essential hypertension   BP stable  Continue current medications    6  Mixed hyperlipidemia   continue statin and diet control  His PCP closely monitors his blood work    7  Arteriosclerosis of both carotid arteries   on statin and anti-platelet therapy    8  Tobacco abuse   strongly counseled to stop  smoking    9  History of CVA (cerebrovascular accident    Recommend aggressive risk factor modification and therapeutic lifestyle changes  Low-salt, low-calorie, low-fat, low-cholesterol diet with regular exercise and to optimize weight  I will defer the ordering and monitoring of necessity lab studies to you, but I am available and happy to review and manage any of the data at your request in the future  Discussed concepts of atherosclerosis, including signs and symptoms of cardiac disease  Previous studies were reviewed  Safety measures were reviewed  Questions were entertained and answered  Patient was advised to report any problems requiring medical attention  Follow-up with PCP and appropriate specialist and lab work as discussed  Return for follow up visit as scheduled or earlier, if needed  Thank you for allowing me to participate in the care and evaluation of your patient  Should you have any questions, please feel free to contact me        Rhina Yadav MD  9/03/8573,15:40 AM

## 2019-08-15 ENCOUNTER — PREP FOR PROCEDURE (OUTPATIENT)
Dept: VASCULAR SURGERY | Facility: CLINIC | Age: 69
End: 2019-08-15

## 2019-08-15 ENCOUNTER — TELEPHONE (OUTPATIENT)
Dept: VASCULAR SURGERY | Facility: CLINIC | Age: 69
End: 2019-08-15

## 2019-08-15 DIAGNOSIS — I48.91 ATRIAL FIBRILLATION, UNSPECIFIED TYPE (HCC): Primary | ICD-10-CM

## 2019-08-15 NOTE — TELEPHONE ENCOUNTER
S/w pt and scheduled procedure for 8-23-19 at [facility] with Dr Andrew Farley  He is aware nothing to eat or drink after midnight on 8-22-19  He will go to a [facility] for his blood work before surgery  He is aware to hold Eliquis 2 days prior to his procedure, last dose will be 8-20-19

## 2019-08-16 NOTE — TELEPHONE ENCOUNTER
Can take both as they work differently however if he is concerned he should contact his cardiologist as they prescribed Eliquis

## 2019-08-19 ENCOUNTER — TELEPHONE (OUTPATIENT)
Dept: VASCULAR SURGERY | Facility: CLINIC | Age: 69
End: 2019-08-19

## 2019-08-22 ENCOUNTER — LAB REQUISITION (OUTPATIENT)
Dept: LAB | Facility: HOSPITAL | Age: 69
End: 2019-08-22
Payer: COMMERCIAL

## 2019-08-22 ENCOUNTER — APPOINTMENT (OUTPATIENT)
Dept: LAB | Facility: HOSPITAL | Age: 69
End: 2019-08-22
Attending: SURGERY
Payer: COMMERCIAL

## 2019-08-22 DIAGNOSIS — I48.91 ATRIAL FIBRILLATION (HCC): ICD-10-CM

## 2019-08-22 DIAGNOSIS — I48.91 ATRIAL FIBRILLATION, UNSPECIFIED TYPE (HCC): ICD-10-CM

## 2019-08-22 LAB
ABO GROUP BLD: NORMAL
ALBUMIN SERPL BCP-MCNC: 3.9 G/DL (ref 3.5–5)
ANION GAP SERPL CALCULATED.3IONS-SCNC: 7 MMOL/L (ref 4–13)
BLD GP AB SCN SERPL QL: NEGATIVE
BUN SERPL-MCNC: 15 MG/DL (ref 5–25)
CALCIUM ALBUM COR SERPL-MCNC: 10.5 MG/DL (ref 8.3–10.1)
CALCIUM SERPL-MCNC: 10.4 MG/DL (ref 8.3–10.1)
CALCIUM SERPL-MCNC: 10.4 MG/DL (ref 8.3–10.1)
CHLORIDE SERPL-SCNC: 102 MMOL/L (ref 100–108)
CO2 SERPL-SCNC: 30 MMOL/L (ref 21–32)
CREAT SERPL-MCNC: 1.02 MG/DL (ref 0.6–1.3)
ERYTHROCYTE [DISTWIDTH] IN BLOOD BY AUTOMATED COUNT: 14.6 % (ref 11.6–15.1)
EST. AVERAGE GLUCOSE BLD GHB EST-MCNC: 128 MG/DL
GFR SERPL CREATININE-BSD FRML MDRD: 76 ML/MIN/1.73SQ M
GLUCOSE P FAST SERPL-MCNC: 101 MG/DL (ref 65–99)
HBA1C MFR BLD: 6.1 % (ref 4.2–6.3)
HCT VFR BLD AUTO: 48.7 % (ref 36.5–49.3)
HGB BLD-MCNC: 15.6 G/DL (ref 12–17)
INR PPP: 1.33 (ref 0.84–1.19)
MCH RBC QN AUTO: 30.7 PG (ref 26.8–34.3)
MCHC RBC AUTO-ENTMCNC: 32 G/DL (ref 31.4–37.4)
MCV RBC AUTO: 96 FL (ref 82–98)
PLATELET # BLD AUTO: 220 THOUSANDS/UL (ref 149–390)
PMV BLD AUTO: 10.5 FL (ref 8.9–12.7)
POTASSIUM SERPL-SCNC: 5 MMOL/L (ref 3.5–5.3)
PROTHROMBIN TIME: 16.5 SECONDS (ref 11.6–14.5)
RBC # BLD AUTO: 5.08 MILLION/UL (ref 3.88–5.62)
RH BLD: POSITIVE
SODIUM SERPL-SCNC: 139 MMOL/L (ref 136–145)
SPECIMEN EXPIRATION DATE: NORMAL
WBC # BLD AUTO: 6.48 THOUSAND/UL (ref 4.31–10.16)

## 2019-08-22 PROCEDURE — 86901 BLOOD TYPING SEROLOGIC RH(D): CPT | Performed by: SURGERY

## 2019-08-22 PROCEDURE — 85027 COMPLETE CBC AUTOMATED: CPT

## 2019-08-22 PROCEDURE — 86900 BLOOD TYPING SEROLOGIC ABO: CPT | Performed by: SURGERY

## 2019-08-22 PROCEDURE — 85610 PROTHROMBIN TIME: CPT

## 2019-08-22 PROCEDURE — 83036 HEMOGLOBIN GLYCOSYLATED A1C: CPT

## 2019-08-22 PROCEDURE — 86850 RBC ANTIBODY SCREEN: CPT | Performed by: SURGERY

## 2019-08-22 PROCEDURE — 80048 BASIC METABOLIC PNL TOTAL CA: CPT

## 2019-08-22 PROCEDURE — 36415 COLL VENOUS BLD VENIPUNCTURE: CPT

## 2019-08-22 PROCEDURE — 82040 ASSAY OF SERUM ALBUMIN: CPT

## 2019-08-23 NOTE — TELEPHONE ENCOUNTER
Rec call from Dr Stephanie Briggs advising us this case was canceled  Patient became thirsty at 11am and drank 2 bottles of water  Patient will need to be rescheduled

## 2019-08-26 ENCOUNTER — PREP FOR PROCEDURE (OUTPATIENT)
Dept: VASCULAR SURGERY | Facility: CLINIC | Age: 69
End: 2019-08-26

## 2019-08-26 ENCOUNTER — TELEPHONE (OUTPATIENT)
Dept: VASCULAR SURGERY | Facility: CLINIC | Age: 69
End: 2019-08-26

## 2019-08-26 DIAGNOSIS — I48.91 ATRIAL FIBRILLATION, UNSPECIFIED TYPE (HCC): Primary | ICD-10-CM

## 2019-08-26 NOTE — TELEPHONE ENCOUNTER
Called pt to give him his new procedure date  His procedure will be 9-25-19 at [facility] with Dr Amanda Bower  He is aware nothing to eat or drink after midnight on 9-24-19  He will go and have his blood work done again at [facility]  He is aware to hold his Eliquis 2 days prior to the procedure, last dose will be 9-22-19

## 2019-09-05 ENCOUNTER — TELEPHONE (OUTPATIENT)
Dept: PULMONOLOGY | Facility: CLINIC | Age: 69
End: 2019-09-05

## 2019-09-05 DIAGNOSIS — J44.9 CHRONIC OBSTRUCTIVE PULMONARY DISEASE, UNSPECIFIED COPD TYPE (HCC): ICD-10-CM

## 2019-09-05 DIAGNOSIS — I48.19 PERSISTENT ATRIAL FIBRILLATION (HCC): ICD-10-CM

## 2019-09-05 DIAGNOSIS — I10 ESSENTIAL HYPERTENSION: ICD-10-CM

## 2019-09-05 DIAGNOSIS — E87.1 HYPONATREMIA: ICD-10-CM

## 2019-09-05 RX ORDER — POLYETHYLENE GLYCOL 3350 17 G
4 POWDER IN PACKET (EA) ORAL AS NEEDED
COMMUNITY
End: 2019-09-17 | Stop reason: ALTCHOICE

## 2019-09-05 NOTE — TELEPHONE ENCOUNTER
Havent seen him in 1 year and he didn't get his sleep study done either    Please place with me or have him seen

## 2019-09-05 NOTE — TELEPHONE ENCOUNTER
Established patient needing pre-op clearance  Surgery: ANGIOPLASTY ARTERY CAROTID W/ STENT (Right Neck  Surgery date: 9/25  Last seen by us:  10/23/18  On oxygen: No    Do you want to squeeze in patient some where or do you want an AP to see him?

## 2019-09-06 DIAGNOSIS — J44.9 CHRONIC OBSTRUCTIVE PULMONARY DISEASE, UNSPECIFIED COPD TYPE (HCC): ICD-10-CM

## 2019-09-06 RX ORDER — SODIUM CHLORIDE 1000 MG
TABLET, SOLUBLE MISCELLANEOUS
Qty: 30 TABLET | Refills: 0 | OUTPATIENT
Start: 2019-09-06

## 2019-09-06 RX ORDER — DILTIAZEM HYDROCHLORIDE 60 MG/1
TABLET, FILM COATED ORAL
Qty: 10.2 G | Refills: 0 | OUTPATIENT
Start: 2019-09-06

## 2019-09-06 RX ORDER — POLYETHYLENE GLYCOL 3350 17 G
4 POWDER IN PACKET (EA) ORAL AS NEEDED
Qty: 100 EACH | OUTPATIENT
Start: 2019-09-06

## 2019-09-06 RX ORDER — APIXABAN 5 MG/1
TABLET, FILM COATED ORAL
Qty: 60 TABLET | Refills: 0 | OUTPATIENT
Start: 2019-09-06

## 2019-09-06 RX ORDER — TIOTROPIUM BROMIDE INHALATION SPRAY 3.12 UG/1
SPRAY, METERED RESPIRATORY (INHALATION)
Qty: 4 G | Refills: 0 | OUTPATIENT
Start: 2019-09-06

## 2019-09-06 RX ORDER — LOSARTAN POTASSIUM 100 MG/1
TABLET ORAL
Qty: 30 TABLET | Refills: 0 | Status: SHIPPED | OUTPATIENT
Start: 2019-09-06 | End: 2019-09-28 | Stop reason: SDUPTHER

## 2019-09-07 RX ORDER — DILTIAZEM HYDROCHLORIDE 60 MG/1
TABLET, FILM COATED ORAL
Qty: 10.2 G | Refills: 0 | Status: SHIPPED | OUTPATIENT
Start: 2019-09-07 | End: 2019-09-28 | Stop reason: SDUPTHER

## 2019-09-07 RX ORDER — TIOTROPIUM BROMIDE INHALATION SPRAY 3.12 UG/1
SPRAY, METERED RESPIRATORY (INHALATION)
Qty: 4 G | Refills: 0 | Status: SHIPPED | OUTPATIENT
Start: 2019-09-07 | End: 2019-09-30 | Stop reason: SDUPTHER

## 2019-09-10 ENCOUNTER — ANESTHESIA EVENT (OUTPATIENT)
Dept: PERIOP | Facility: HOSPITAL | Age: 69
DRG: 036 | End: 2019-09-10
Payer: COMMERCIAL

## 2019-09-10 NOTE — PRE-PROCEDURE INSTRUCTIONS
Pre-Surgery Instructions:   Medication Instructions    amLODIPine (NORVASC) 5 mg tablet Instructed patient per Anesthesia Guidelines   apixaban (ELIQUIS) 5 mg Patient was instructed by Physician and understands   atorvastatin (LIPITOR) 20 mg tablet Instructed patient per Anesthesia Guidelines   losartan (COZAAR) 100 MG tablet Instructed patient per Anesthesia Guidelines   metoprolol tartrate (LOPRESSOR) 25 mg tablet Instructed patient per Anesthesia Guidelines   nicotine polacrilex (COMMIT) 4 MG lozenge Instructed patient per Anesthesia Guidelines   pantoprazole (PROTONIX) 40 mg tablet Instructed patient per Anesthesia Guidelines   SPIRIVA RESPIMAT 2 5 MCG/ACT AERS inhaler Instructed patient per Anesthesia Guidelines   SYMBICORT 80-4 5 MCG/ACT inhaler Instructed patient per Anesthesia Guidelines   VENTOLIN  (90 Base) MCG/ACT inhaler Instructed patient per Anesthesia Guidelines  ACE/ARB Med Class     Continue this medication up to the evening before surgery/procedure, but do not take the morning of the day of surgery  Beta blocker Med Class     Continue to take this heart medication on your normal schedule  If this is an oral medication and you take it in the morning, then you may take this medicine with a sip of water  Calcium Channel Blocker Med Class     Continue to take this heart medication on your normal schedule  If this is an oral medication and you take it in the morning, then you may take this medicine with a sip of water  Direct Xa Inhibitor Med Class     Stop taking this medication at least 3 days prior to surgery/procedure with prescribing Physician and Surgeon consultation  Inhalational Med Class     Continue to take these inhaler medications on your normal schedule up to and including the day of surgery     Nitroglycerin Med Class     Continue to take your nitroglyerin as directed by your prescribing Physician and notify your Physician and Anesthesiologist if you have needed to increase the frequency or dosage  NSAID Med Class     Stop taking this medication at least 3 days prior to surgery/procedure  Platelet Aggregation Inhibitor Clopidogrel (Plavix) Med Class     Should be discontinued at least one week prior to planned operation, unless specifically stated otherwise by surgical service  [Patients taking Plavix for coronary stents should be reviewed by an anesthesiologist in the optimization clinic  Please do not discontinue Plavix in patients with coronary stents unless given specific permission to do so by the cardiologist who prescribed medication ] If your surgeon approves please continue to take this medication on your normal schedule  You may take this medication on the morning of your surgery with a sip of water  Statin Med Class     Continue to take this medication on your normal schedule  If this is an oral medication and you take it in the morning, then you may take this medicine with a sip of water  Pre op instructions reviewed with pt today 9/10    Meds, bathing and hospital instructions reviewed at this time with understanding

## 2019-09-12 ENCOUNTER — APPOINTMENT (OUTPATIENT)
Dept: LAB | Facility: HOSPITAL | Age: 69
End: 2019-09-12
Attending: SURGERY
Payer: COMMERCIAL

## 2019-09-12 ENCOUNTER — TRANSCRIBE ORDERS (OUTPATIENT)
Dept: ADMINISTRATIVE | Facility: HOSPITAL | Age: 69
End: 2019-09-12

## 2019-09-12 DIAGNOSIS — Z01.818 OTHER SPECIFIED PRE-OPERATIVE EXAMINATION: ICD-10-CM

## 2019-09-12 DIAGNOSIS — Z01.818 OTHER SPECIFIED PRE-OPERATIVE EXAMINATION: Primary | ICD-10-CM

## 2019-09-12 DIAGNOSIS — I48.91 ATRIAL FIBRILLATION, UNSPECIFIED TYPE (HCC): ICD-10-CM

## 2019-09-12 LAB
ABO GROUP BLD: NORMAL
ANION GAP SERPL CALCULATED.3IONS-SCNC: 9 MMOL/L (ref 4–13)
BLD GP AB SCN SERPL QL: NEGATIVE
BUN SERPL-MCNC: 13 MG/DL (ref 5–25)
CALCIUM SERPL-MCNC: 9.4 MG/DL (ref 8.3–10.1)
CHLORIDE SERPL-SCNC: 102 MMOL/L (ref 100–108)
CO2 SERPL-SCNC: 30 MMOL/L (ref 21–32)
CREAT SERPL-MCNC: 1.32 MG/DL (ref 0.6–1.3)
ERYTHROCYTE [DISTWIDTH] IN BLOOD BY AUTOMATED COUNT: 14.7 % (ref 11.6–15.1)
EST. AVERAGE GLUCOSE BLD GHB EST-MCNC: 120 MG/DL
GFR SERPL CREATININE-BSD FRML MDRD: 55 ML/MIN/1.73SQ M
GLUCOSE P FAST SERPL-MCNC: 95 MG/DL (ref 65–99)
HBA1C MFR BLD: 5.8 % (ref 4.2–6.3)
HCT VFR BLD AUTO: 44.6 % (ref 36.5–49.3)
HGB BLD-MCNC: 14.1 G/DL (ref 12–17)
INR PPP: 1.19 (ref 0.84–1.19)
MCH RBC QN AUTO: 30.7 PG (ref 26.8–34.3)
MCHC RBC AUTO-ENTMCNC: 31.6 G/DL (ref 31.4–37.4)
MCV RBC AUTO: 97 FL (ref 82–98)
PLATELET # BLD AUTO: 207 THOUSANDS/UL (ref 149–390)
PMV BLD AUTO: 10.7 FL (ref 8.9–12.7)
POTASSIUM SERPL-SCNC: 3.8 MMOL/L (ref 3.5–5.3)
PROTHROMBIN TIME: 15.2 SECONDS (ref 11.6–14.5)
RBC # BLD AUTO: 4.59 MILLION/UL (ref 3.88–5.62)
RH BLD: POSITIVE
SODIUM SERPL-SCNC: 141 MMOL/L (ref 136–145)
SPECIMEN EXPIRATION DATE: NORMAL
WBC # BLD AUTO: 5.39 THOUSAND/UL (ref 4.31–10.16)

## 2019-09-12 PROCEDURE — 80048 BASIC METABOLIC PNL TOTAL CA: CPT

## 2019-09-12 PROCEDURE — 86850 RBC ANTIBODY SCREEN: CPT

## 2019-09-12 PROCEDURE — 83036 HEMOGLOBIN GLYCOSYLATED A1C: CPT

## 2019-09-12 PROCEDURE — 86900 BLOOD TYPING SEROLOGIC ABO: CPT

## 2019-09-12 PROCEDURE — 85027 COMPLETE CBC AUTOMATED: CPT

## 2019-09-12 PROCEDURE — 85610 PROTHROMBIN TIME: CPT

## 2019-09-12 PROCEDURE — 36415 COLL VENOUS BLD VENIPUNCTURE: CPT

## 2019-09-12 PROCEDURE — 86901 BLOOD TYPING SEROLOGIC RH(D): CPT

## 2019-09-16 DIAGNOSIS — J44.9 CHRONIC OBSTRUCTIVE PULMONARY DISEASE, UNSPECIFIED COPD TYPE (HCC): ICD-10-CM

## 2019-09-16 RX ORDER — ALBUTEROL SULFATE 90 UG/1
AEROSOL, METERED RESPIRATORY (INHALATION)
Qty: 18 G | Refills: 0 | Status: SHIPPED | OUTPATIENT
Start: 2019-09-16 | End: 2019-09-30 | Stop reason: SDUPTHER

## 2019-09-17 ENCOUNTER — OFFICE VISIT (OUTPATIENT)
Dept: PULMONOLOGY | Facility: CLINIC | Age: 69
End: 2019-09-17
Payer: COMMERCIAL

## 2019-09-17 VITALS
RESPIRATION RATE: 16 BRPM | HEART RATE: 95 BPM | HEIGHT: 71 IN | BODY MASS INDEX: 33.6 KG/M2 | TEMPERATURE: 98.2 F | DIASTOLIC BLOOD PRESSURE: 98 MMHG | SYSTOLIC BLOOD PRESSURE: 150 MMHG | OXYGEN SATURATION: 93 % | WEIGHT: 240 LBS

## 2019-09-17 DIAGNOSIS — Z01.811 PREOP PULMONARY/RESPIRATORY EXAM: ICD-10-CM

## 2019-09-17 DIAGNOSIS — R06.83 SNORING: ICD-10-CM

## 2019-09-17 DIAGNOSIS — J44.9 CHRONIC OBSTRUCTIVE PULMONARY DISEASE, UNSPECIFIED COPD TYPE (HCC): Primary | ICD-10-CM

## 2019-09-17 PROCEDURE — 99214 OFFICE O/P EST MOD 30 MIN: CPT | Performed by: INTERNAL MEDICINE

## 2019-09-17 PROCEDURE — 94618 PULMONARY STRESS TESTING: CPT | Performed by: INTERNAL MEDICINE

## 2019-09-17 RX ORDER — NICOTINE 21 MG/24HR
1 PATCH, TRANSDERMAL 24 HOURS TRANSDERMAL EVERY 24 HOURS
COMMUNITY

## 2019-09-17 NOTE — PROGRESS NOTES
Progress note - Pulmonary Medicine   Lali Brochure 81 y o  male MRN: [id number]       Impression & Plan:   81 y o  M with PMHx of COPD, HTN, CVA, Afib, hyperlipidemia and CAD s/p MI who comes in for preoperative clearance for carotid artery surgery  1    Preoperative pulmonary clearance for carotid artery surgery -  he may proceed with acceptable risk to surgery  However he is a high risk for perioperative pulmonary complications due to chronic hypoxic respiratory failure, severe COPD and likely MONTSERRAT  -  In perioperative period I would utilize duonebs every 4 hrs in pre and post operative period      -  Monitor closely for the development of hypoxia especially post anesthesia  Utilize oxygen pre and post procedure  -  Minimize the use of benzodiazepines and narcotics as possible      -  Would encourage incentive spirometry to prevent atelectasis      -  Early ambulation as tolerated      -  Pharmacologic DVT prophylaxis  2   GOLD stage 4 COPD (FEV1 - 27%) -  He also has a 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, fiberglass        -   Utilize oxygen as above  -  Continue Symbicort 160 2 puffs twice daily        -  Continue Spiriva 2 puffs daily  -  Continue albuterol every 4-6 hrs to help with dyspnea        -   I think he would benefit from pulmonary rehab, we will consider that after his operation  3  Snoring -  Mallampati class 4, Body mass index is 36 55 kg/m² ,    He is at risk for obstructive sleep apnea based on [employer] survey  -  Will reorder a home sleep study to assess for obstructive sleep apnea      -  I reminded him of the risk of leaving sleep apnea untreated including hypertension, heart failure, arrhythmia, MI and stroke  -  The patient is agreeable to undergo testing and treatment of obstructive sleep apnea  He understands that pitfalls she may encounter along the way and is willing to attempt CPAP treatment     ______________________________________________________________________    HPI:    Cherelle Magaña presents today for follow-up for his COPD and possible MONTSERRAT  He unfortunately had a prolonged hospitalization around a stroke  He was found to be in AFib, had a stroke and spent prolonged period recovering  He was found to have carotid artery disease in evaluation and was recommended to undergo vascular surgery  He comes in for clearance  He states that it took a while for them to control his Afib and he is now on Eliquis  He has struggled with dyspnea on exertion and has to stop frequently with shortness of breath  He has been using spiriva and symbicort as instructed  He also has PRN albuterol as needed as well  He states that as long as he takes it slow he is doing fine  Review of Systems:  Review of Systems   Constitutional: Positive for activity change and fatigue  HENT: Negative  Eyes: Negative  Respiratory: Positive for cough, chest tightness, shortness of breath and wheezing  Cardiovascular: Positive for palpitations  Negative for chest pain and leg swelling  Gastrointestinal: Negative  Endocrine: Negative  Genitourinary: Negative  Musculoskeletal: Negative  Skin: Negative  Hematological: Negative            Social history updates:  Social History     Tobacco Use   Smoking Status Former Smoker    Packs/day: 1 00    Years: 45 00    Pack years: 45 00    Types: Cigarettes    Last attempt to quit: 2019    Years since quittin 2   Smokeless Tobacco Never Used   Tobacco Comment    last smoked 2019     Social History     Socioeconomic History    Marital status:      Spouse name: Not on file    Number of children: Not on file    Years of education: Not on file    Highest education level: Not on file   Occupational History    Not on file   Social Needs    Financial resource strain: Not on file    Food insecurity:     Worry: Not on file Inability: Not on file    Transportation needs:     Medical: Not on file     Non-medical: Not on file   Tobacco Use    Smoking status: Former Smoker     Packs/day: 1 00     Years: 45 00     Pack years: 45 00     Types: Cigarettes     Last attempt to quit: 2019     Years since quittin 2    Smokeless tobacco: Never Used    Tobacco comment: last smoked 2019   Substance and Sexual Activity    Alcohol use: Yes     Frequency: 2-3 times a week     Drinks per session: 3 or 4     Binge frequency: Never     Comment: currently drinking about 4 drinks per week    Drug use: No    Sexual activity: Yes     Partners: Female   Lifestyle    Physical activity:     Days per week: Not on file     Minutes per session: Not on file    Stress: Not on file   Relationships    Social connections:     Talks on phone: Not on file     Gets together: Not on file     Attends Oriental orthodox service: Not on file     Active member of club or organization: Not on file     Attends meetings of clubs or organizations: Not on file     Relationship status: Not on file    Intimate partner violence:     Fear of current or ex partner: Not on file     Emotionally abused: Not on file     Physically abused: Not on file     Forced sexual activity: Not on file   Other Topics Concern    Not on file   Social History Narrative    Always uses a seat belt    Caffeine use - Daily caffeine consumption, 2-3 servings a day           PhysicalExamination:  Vitals:   /98   Pulse 95   Temp 98 2 °F (36 8 °C)   Resp 16   Ht 5' 11" (1 803 m)   Wt 109 kg (240 lb)   SpO2 93%   BMI 33 47 kg/m²   General: Pleasant, Awake alert and oriented x 3, conversant without conversational dyspnea, NAD, normal affect  HEENT:  PERRL, Sclera noninjected, nonicteric OU, Nares patent,  no craniofacial abnormalities, Mucous membranes, moist, no oral lesions, normal dentition, Mallampati class 3  NECK: Trachea midline, no accessory muscle use, no stridor, no cervical or supraclavicular adenopathy, JVP not elevated  CARDIAC: Reg, single s1/S2, no m/r/g  PULM: CTA bilaterally no wheezing, rhonchi or rales  ABD: Normoactive bowel sounds, soft nontender, nondistended, no rebound, no rigidity, no guarding  EXT: No cyanosis, no clubbing, no edema, normal capillary refill  NEURO: no focal neurologic deficits, AAOx3, moving all extremities appropriately    Diagnostic Data:  Labs: I personally reviewed the most recent laboratory data pertinent to today's visit  I have personally reviewed pertinent lab results  Lab Results   Component Value Date    WBC 5 39 09/12/2019    HGB 14 1 09/12/2019    HCT 44 6 09/12/2019    MCV 97 09/12/2019     09/12/2019     Lab Results   Component Value Date    GLUCOSE 97 06/25/2019    CALCIUM 9 4 09/12/2019    K 3 8 09/12/2019    CO2 30 09/12/2019     09/12/2019    BUN 13 09/12/2019    CREATININE 1 32 (H) 09/12/2019     No results found for: IGE  Lab Results   Component Value Date    ALT 34 07/03/2019    AST 27 07/03/2019    ALKPHOS 78 07/03/2019       PFTs:  The most recent pulmonary function tests were reviewed  4/2017  Spirometry: Forced vital capacity: 3 42L(69% Predicted Values  )  Forced expiratory volume in one second: 1 01L (27% Predicted Value )  FEV1/FVC ratio is 30%  Post Bronchodilator Spirometry:   Lung Volumes:   DLCO:    PFT Interpretation:   Study shows a severe obstructive pulmonary impairment  I did compare this to a pre-and post bronchodilator study done on 2/17  I think the patient was having difficulty at that time  There was a very marked improvement in the postbronchodilator study with the postbronchodilator forced vital capacity of 3 76 and a postbronchodilator FEV1 of 1 01L     6 minute walk today  Starting saturation - 95%   Starting HR - 96bpm  Lowest saturation - 90%    Highest HR - 122  Patient stopped frequently due to back pain    No desaturation but only ambulated 42 m Limited test    Imaging  I personally reviewed the images on the [facility] system pertinent to today's visit  CT chest - IMPRESSION:  3/15/18  Stable emphysema   Previously seen tree-in-bud opacities in the lingula and right upper lobe have nearly resolved   No suspicious or developing nodule      Stable ascending thoracic aortic aneurysm measuring 4 1 cm   Continued follow-up recommended      Lung-RADS:  Lung-RADS2, benign appearance or behavior   Continue annual screening with LDCT in 12 months      Cardiac:  Echo 1/2017  LEFT VENTRICLE:  Ejection fraction was estimated to be 55 %  This study was inadequate for the evaluation of regional wall motion    There was mild concentric hypertrophy      Sleep studies:  None    Yetta Risk, DO

## 2019-09-17 NOTE — LETTER
September 17, 2019     Estefany Lepe, DO  [address] [facility] [address]    Patient: Cherelle Magaña   YOB: 1950   Date of Visit: 9/17/2019       Dear Dr Rebeca Tejada: Thank you for referring Olivia Melo to me for evaluation  Below are my notes for this consultation  If you have questions, please do not hesitate to call me  I look forward to following your patient along with you  Sincerely,        Martinez Vega DO        CC: PERRY Valle [place], DO  9/17/2019  2:51 PM  Sign at close encounter    Progress note - Pulmonary Medicine   Cherelle Magaña 81 y o  male MRN: [id number]       Impression & Plan:   81 y o  M with PMHx of COPD, HTN, CVA, Afib, hyperlipidemia and CAD s/p MI who comes in for preoperative clearance for carotid artery surgery  1    Preoperative pulmonary clearance for carotid artery surgery -  he may proceed with acceptable risk to surgery  However he is a high risk for perioperative pulmonary complications due to chronic hypoxic respiratory failure, severe COPD and likely MONTSERRAT  -  In perioperative period I would utilize duonebs every 4 hrs in pre and post operative period      -  Monitor closely for the development of hypoxia especially post anesthesia  Utilize oxygen pre and post procedure  -  Minimize the use of benzodiazepines and narcotics as possible      -  Would encourage incentive spirometry to prevent atelectasis      -  Early ambulation as tolerated      -  Pharmacologic DVT prophylaxis  2   GOLD stage 4 COPD (FEV1 - 27%) -  He also has a 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, fiberglass        -    Utilize oxygen as above  -  Continue Symbicort 160 2 puffs twice daily        -  Continue Spiriva 2 puffs daily           -  Continue albuterol every 4-6 hrs to help with dyspnea         -   I think he would benefit from pulmonary rehab, we will consider that after his operation  3  Snoring -  Mallampati class 4, Body mass index is 36 55 kg/m² ,    He is at risk for obstructive sleep apnea based on [employer] survey  -    Will reorder a home sleep study to assess for obstructive sleep apnea      -  I  reminded him of the risk of leaving sleep apnea untreated including hypertension, heart failure, arrhythmia, MI and stroke  -  The patient is agreeable to undergo testing and treatment of obstructive sleep apnea  He understands that pitfalls she may encounter along the way and is willing to attempt CPAP treatment       ______________________________________________________________________    HPI:    Willis Vides presents today for follow-up for his COPD and possible MONTSERRAT  He unfortunately had a prolonged hospitalization around a stroke  He was found to be in AFib, had a stroke and spent prolonged period recovering  He was found to have carotid artery disease in evaluation and was recommended to undergo vascular surgery  He comes in for clearance  He states that it took a while for them to control his Afib and he is now on Eliquis  He has struggled with dyspnea on exertion and has to stop frequently with shortness of breath  He has been using spiriva and symbicort as instructed  He also has PRN albuterol as needed as well  He states that as long as he takes it slow he is doing fine  Review of Systems:  Review of Systems   Constitutional: Positive for activity change and fatigue  HENT: Negative  Eyes: Negative  Respiratory: Positive for cough, chest tightness, shortness of breath and wheezing  Cardiovascular: Positive for palpitations  Negative for chest pain and leg swelling  Gastrointestinal: Negative  Endocrine: Negative  Genitourinary: Negative  Musculoskeletal: Negative  Skin: Negative  Hematological: Negative            Social history updates:  Social History     Tobacco Use   Smoking Status Former Smoker    Packs/day: 1 00    Years: 45 00    Pack years: 45 00    Types: Cigarettes    Last attempt to quit: 2019    Years since quittin 2   Smokeless Tobacco Never Used   Tobacco Comment    last smoked 2019     Social History     Socioeconomic History    Marital status:      Spouse name: Not on file    Number of children: Not on file    Years of education: Not on file    Highest education level: Not on file   Occupational History    Not on file   Social Needs    Financial resource strain: Not on file    Food insecurity:     Worry: Not on file     Inability: Not on file    Transportation needs:     Medical: Not on file     Non-medical: Not on file   Tobacco Use    Smoking status: Former Smoker     Packs/day: 1 00     Years: 45 00     Pack years: 45 00     Types: Cigarettes     Last attempt to quit: 2019     Years since quittin 2    Smokeless tobacco: Never Used    Tobacco comment: last smoked 2019   Substance and Sexual Activity    Alcohol use: Yes     Frequency: 2-3 times a week     Drinks per session: 3 or 4     Binge frequency: Never     Comment: currently drinking about 4 drinks per week    Drug use: No    Sexual activity: Yes     Partners: Female   Lifestyle    Physical activity:     Days per week: Not on file     Minutes per session: Not on file    Stress: Not on file   Relationships    Social connections:     Talks on phone: Not on file     Gets together: Not on file     Attends Yarsani service: Not on file     Active member of club or organization: Not on file     Attends meetings of clubs or organizations: Not on file     Relationship status: Not on file    Intimate partner violence:     Fear of current or ex partner: Not on file     Emotionally abused: Not on file     Physically abused: Not on file     Forced sexual activity: Not on file   Other Topics Concern    Not on file   Social History Narrative    Always uses a seat belt    Caffeine use - Daily caffeine consumption, 2-3 servings a day           PhysicalExamination:  Vitals:   /98   Pulse 95   Temp 98 2 °F (36 8 °C)   Resp 16   Ht 5' 11" (1 803 m)   Wt 109 kg (240 lb)   SpO2 93%   BMI 33 47 kg/m²    General: Pleasant, Awake alert and oriented x 3, conversant without conversational dyspnea, NAD, normal affect  HEENT:  PERRL, Sclera noninjected, nonicteric OU, Nares patent,  no craniofacial abnormalities, Mucous membranes, moist, no oral lesions, normal dentition, Mallampati class 3  NECK: Trachea midline, no accessory muscle use, no stridor, no cervical or supraclavicular adenopathy, JVP not elevated  CARDIAC: Reg, single s1/S2, no m/r/g  PULM: CTA bilaterally no wheezing, rhonchi or rales  ABD: Normoactive bowel sounds, soft nontender, nondistended, no rebound, no rigidity, no guarding  EXT: No cyanosis, no clubbing, no edema, normal capillary refill  NEURO: no focal neurologic deficits, AAOx3, moving all extremities appropriately    Diagnostic Data:  Labs: I personally reviewed the most recent laboratory data pertinent to today's visit  I have personally reviewed pertinent lab results  Lab Results   Component Value Date    WBC 5 39 09/12/2019    HGB 14 1 09/12/2019    HCT 44 6 09/12/2019    MCV 97 09/12/2019     09/12/2019     Lab Results   Component Value Date    GLUCOSE 97 06/25/2019    CALCIUM 9 4 09/12/2019    K 3 8 09/12/2019    CO2 30 09/12/2019     09/12/2019    BUN 13 09/12/2019    CREATININE 1 32 (H) 09/12/2019     No results found for: IGE  Lab Results   Component Value Date    ALT 34 07/03/2019    AST 27 07/03/2019    ALKPHOS 78 07/03/2019       PFTs:  The most recent pulmonary function tests were reviewed  4/2017  Spirometry: Forced vital capacity: 3 42L(69% Predicted Values  )  Forced expiratory volume in one second: 1 01L (27% Predicted Value )  FEV1/FVC ratio is 30%     Post Bronchodilator Spirometry:   Lung Volumes:   DLCO:    PFT Interpretation: Study shows a severe obstructive pulmonary impairment  I did compare this to a pre-and post bronchodilator study done on 2/17  I think the patient was having difficulty at that time  There was a very marked improvement in the postbronchodilator study with the postbronchodilator forced vital capacity of 3 76 and a postbronchodilator FEV1 of 1 01L     6 minute walk today  Starting saturation - 95%   Starting HR - 96bpm  Lowest saturation - 90%    Highest HR - 122  Patient stopped frequently due to back pain  No desaturation but only ambulated 43 m Limited test    Imaging  I personally reviewed the images on the [facility] system pertinent to today's visit  CT chest - IMPRESSION:  3/15/18  Stable emphysema   Previously seen tree-in-bud opacities in the lingula and right upper lobe have nearly resolved   No suspicious or developing nodule      Stable ascending thoracic aortic aneurysm measuring 4 1 cm   Continued follow-up recommended      Lung-RADS:  Lung-RADS2, benign appearance or behavior   Continue annual screening with LDCT in 12 months      Cardiac:  Echo 1/2017  LEFT VENTRICLE:  Ejection fraction was estimated to be 55 %  This study was inadequate for the evaluation of regional wall motion    There was mild concentric hypertrophy      Sleep studies:  None    Susi Mech, DO

## 2019-09-19 DIAGNOSIS — Z91.89 AT RISK FOR OBSTRUCTIVE SLEEP APNEA: Primary | ICD-10-CM

## 2019-09-20 ENCOUNTER — OFFICE VISIT (OUTPATIENT)
Dept: FAMILY MEDICINE CLINIC | Facility: MEDICAL CENTER | Age: 69
End: 2019-09-20
Payer: COMMERCIAL

## 2019-09-20 ENCOUNTER — TELEPHONE (OUTPATIENT)
Dept: VASCULAR SURGERY | Facility: CLINIC | Age: 69
End: 2019-09-20

## 2019-09-20 VITALS
HEART RATE: 80 BPM | WEIGHT: 237 LBS | DIASTOLIC BLOOD PRESSURE: 84 MMHG | HEIGHT: 71 IN | RESPIRATION RATE: 16 BRPM | BODY MASS INDEX: 33.18 KG/M2 | SYSTOLIC BLOOD PRESSURE: 140 MMHG

## 2019-09-20 DIAGNOSIS — I25.10 CORONARY ARTERY DISEASE INVOLVING NATIVE CORONARY ARTERY OF NATIVE HEART WITHOUT ANGINA PECTORIS: Chronic | ICD-10-CM

## 2019-09-20 DIAGNOSIS — I65.21 STENOSIS OF RIGHT INTERNAL CAROTID ARTERY: ICD-10-CM

## 2019-09-20 DIAGNOSIS — Z01.818 PREOP EXAMINATION: Primary | ICD-10-CM

## 2019-09-20 DIAGNOSIS — H02.9 LESION OF RIGHT EYELID: ICD-10-CM

## 2019-09-20 DIAGNOSIS — Z72.89 ALCOHOL USE: ICD-10-CM

## 2019-09-20 DIAGNOSIS — I10 ESSENTIAL HYPERTENSION: ICD-10-CM

## 2019-09-20 DIAGNOSIS — I48.91 ATRIAL FIBRILLATION, UNSPECIFIED TYPE (HCC): ICD-10-CM

## 2019-09-20 DIAGNOSIS — Z72.0 TOBACCO USE: ICD-10-CM

## 2019-09-20 DIAGNOSIS — I71.2 THORACIC ASCENDING AORTIC ANEURYSM (HCC): ICD-10-CM

## 2019-09-20 DIAGNOSIS — J44.9 CHRONIC OBSTRUCTIVE PULMONARY DISEASE, UNSPECIFIED COPD TYPE (HCC): ICD-10-CM

## 2019-09-20 DIAGNOSIS — Z91.89 AT RISK FOR SLEEP APNEA: ICD-10-CM

## 2019-09-20 DIAGNOSIS — E78.5 HYPERLIPIDEMIA, UNSPECIFIED HYPERLIPIDEMIA TYPE: ICD-10-CM

## 2019-09-20 PROCEDURE — 99214 OFFICE O/P EST MOD 30 MIN: CPT | Performed by: FAMILY MEDICINE

## 2019-09-20 NOTE — PROGRESS NOTES
Assessment/Plan:    No problem-specific Assessment & Plan notes found for this encounter  Diagnoses and all orders for this visit:    Preop examination  Preop labs reviewed  Patient is high risk for upcoming surgery  Stenosis of right internal carotid artery  Scheduled for surgery on 9/25/2019  Coronary artery disease involving native coronary artery of native heart without angina pectoris  Followed by Cardiology  See their note from 8/14/2019  Atrial fibrillation, unspecified type ([facility] [address] )  Followed by Cardiology  See their note from 8/14/2019  Chronic obstructive pulmonary disease, unspecified COPD type ([email or web address] )  Followed by pulmonology  See their note from 9/17/2019  Thoracic ascending aortic aneurysm [facility])  Seen on previous imaging perform for lung cancer screening  Patient was encouraged to get repeat imaging performed that was previously ordered  Unclear how aneurysm will affect upcoming surgery  Essential hypertension  Blood pressure acceptable  Labs show GFR 55  Hold losartan on day of surgery  Continue metoprolol and amlodipine on day of surgery  Hyperlipidemia, unspecified hyperlipidemia type  Patient can take his statin on day of surgery  At risk for sleep apnea  Patient is at risk for sleep apnea and likely has sleep apnea  Review of notes from pulmonology shows they plan on performing a sleep study  Tobacco use  Please note continued tobacco use  May benefit from nicotine patch while in the hospital     Alcohol use  Please note daily alcohol intake  Monitor for DTs  Benzodiazepine if needed while in the hospital     Lesion of right eyelid  -     Ambulatory referral to Plastic Surgery; Future  Etiology unclear  Referral to Plastic surgery for evaluation  Age-appropriate vaccinations highly recommended such as the flu vaccine but patient declines any vaccines at this time  Patient has had cardiology clearance an pulmonology clearance    My recommendations as above     Follow-up in two weeks or sooner if needed  Subjective:      Patient ID: Florecita Coulter is a 81 y o  male  Patient presents for preoperative evaluation  He is scheduled for right carotid angioplasty secondary to right carotid artery stenosis on 9/25/2019 with Dr Christopher Coto  Patient believes he had his stroke secondary to his carotid artery stenosis  He has coronary artery disease, atrial fibrillation as well as COPD  He is followed by Cardiology and pulmonology  He has had surgical clearance from a cardiology standpoint and pulmonology standpoint  He states he was given instruction on when stop his medication  Continues to drink 2-3 beers per day  Continues to smoke a few cigarettes per day  He is concerned about a mass of his right lower eyelid that has developed over the past few weeks  The following portions of the patient's history were reviewed and updated as appropriate:   He  has a past medical history of Acute metabolic encephalopathy, Arthritis, Atrial fibrillation (HCC), COPD (chronic obstructive pulmonary disease) ([address] ), COPD (chronic obstructive pulmonary disease) ([address] ), CVA (cerebral vascular accident) ([address] ), Elevated glucose, Emphysema lung ([address] ), ETOH abuse, Hiatal hernia, Hyperlipidemia, Hypertension, LVH (left ventricular hypertrophy), Myocardial infarction ([address] ), Obesity, Pneumonia (2/2/2017), Pneumonia, Pulmonary emphysema ([address] ), PVC (premature ventricular contraction), Right knee pain, Shortness of breath, Stenosis of right internal carotid artery, Stroke ([address] ), and Tobacco abuse (6/25/2019)    He   Patient Active Problem List    Diagnosis Date Noted    Dependence on nicotine from cigarettes 07/30/2019    Stenosis of right internal carotid artery 07/04/2019    Acute metabolic encephalopathy 71/25/6635    Atrial fibrillation ([address] ) 06/28/2019    CVA (cerebral vascular accident) ([address] ) 06/25/2019    Alcohol abuse 06/25/2019    Microalbuminuria 04/17/2019    Obesity 04/17/2019    Elevated CO2 level 09/27/2018    Elevated glucose 08/24/2018    Thoracic ascending aortic aneurysm (HCC) 03/22/2018    Coronary artery disease involving native coronary artery 01/30/2018    ULLOA (dyspnea on exertion) 01/30/2018    Osteoarthritis of left glenohumeral joint 06/01/2017    Osteoarthritis of right glenohumeral joint 06/01/2017    Colon polyps 05/15/2017    Palpitations 04/26/2017    Abnormal EKG 03/03/2017    PVC (premature ventricular contraction) 03/03/2017    Elevated hematocrit 02/02/2017    Elevated hemoglobin (HCC) 02/02/2017    Emphysema of lung ([facility] ) 02/02/2017    Hyperlipidemia 02/02/2017    Hyponatremia 02/02/2017    Mild concentric left ventricular hypertrophy (LVH) 02/02/2017    Hiatal hernia 01/19/2017    COPD (chronic obstructive pulmonary disease) ([facility] ) 01/18/2017    Hypertension 01/18/2017    Pityriasis rosea 01/18/2017     He  has a past surgical history that includes Ankle surgery (Right, 1978); Hand surgery (Left, 1964); Colonoscopy; pr colonoscopy flx dx w/collj spec when pfrmd (N/A, 5/9/2017); Carotid stent (02/13/2018); and Hand surgery (Left, 1964)  His family history includes Arthritis in his brother; Diabetes in his brother; Emphysema in his father; Hypertension in his father, mother, and other; Other in his other  He  reports that he has been smoking cigarettes  He has a 45 00 pack-year smoking history  He has never used smokeless tobacco  He reports that he drinks alcohol  He reports that he does not use drugs    Current Outpatient Medications   Medication Sig Dispense Refill    albuterol (PROVENTIL HFA,VENTOLIN HFA) 90 mcg/act inhaler inhale 2 puffs by mouth and INTO THE LUNGS every 4 hours if needed for wheezing 18 g 0    amLODIPine (NORVASC) 5 mg tablet Take 1 tablet (5 mg total) by mouth daily 90 tablet 0    apixaban (ELIQUIS) 5 mg Take 1 tablet (5 mg total) by mouth 2 (two) times a day 60 tablet 0    atorvastatin (LIPITOR) 20 mg tablet take 1 tablet by mouth once daily 90 tablet 3    losartan (COZAAR) 100 MG tablet take 1 tablet by mouth once daily 30 tablet 0    metoprolol tartrate (LOPRESSOR) 25 mg tablet Take 1 tablet (25 mg total) by mouth every 12 (twelve) hours 60 tablet 0    Naproxen Sodium (ALEVE) 220 MG CAPS Take 2 capsules by mouth daily at bedtime as needed      nicotine (NICODERM CQ) 21 mg/24 hr TD 24 hr patch Place 1 patch on the skin every 24 hours      nitroglycerin (NITROSTAT) 0 4 mg SL tablet Place 1 tablet (0 4 mg total) under the tongue every 5 (five) minutes as needed for chest pain 90 tablet 0    pantoprazole (PROTONIX) 40 mg tablet TAKE 1 TABLET BY MOUTH DAILY 30 tablet 0    SPIRIVA RESPIMAT 2 5 MCG/ACT AERS inhaler inhale 2 puffs by mouth and INTO THE LUNGS once daily 4 g 0    SYMBICORT 80-4 5 MCG/ACT inhaler inhale 2 puffs by mouth twice a day Rinse mouth after use 10 2 g 0    sodium chloride 1 g tablet Take 1 tablet (1 g total) by mouth daily for 90 days 90 tablet 2     No current facility-administered medications for this visit        Current Outpatient Medications on File Prior to Visit   Medication Sig    albuterol (PROVENTIL HFA,VENTOLIN HFA) 90 mcg/act inhaler inhale 2 puffs by mouth and INTO THE LUNGS every 4 hours if needed for wheezing    amLODIPine (NORVASC) 5 mg tablet Take 1 tablet (5 mg total) by mouth daily    apixaban (ELIQUIS) 5 mg Take 1 tablet (5 mg total) by mouth 2 (two) times a day    atorvastatin (LIPITOR) 20 mg tablet take 1 tablet by mouth once daily    losartan (COZAAR) 100 MG tablet take 1 tablet by mouth once daily    metoprolol tartrate (LOPRESSOR) 25 mg tablet Take 1 tablet (25 mg total) by mouth every 12 (twelve) hours    Naproxen Sodium (ALEVE) 220 MG CAPS Take 2 capsules by mouth daily at bedtime as needed    nicotine (NICODERM CQ) 21 mg/24 hr TD 24 hr patch Place 1 patch on the skin every 24 hours    nitroglycerin (NITROSTAT) 0 4 mg SL tablet Place 1 tablet (0 4 mg total) under the tongue every 5 (five) minutes as needed for chest pain    pantoprazole (PROTONIX) 40 mg tablet TAKE 1 TABLET BY MOUTH DAILY    SPIRIVA RESPIMAT 2 5 MCG/ACT AERS inhaler inhale 2 puffs by mouth and INTO THE LUNGS once daily    SYMBICORT 80-4 5 MCG/ACT inhaler inhale 2 puffs by mouth twice a day Rinse mouth after use    [DISCONTINUED] clopidogrel (PLAVIX) 75 mg tablet Take 1 tablet (75 mg total) by mouth daily    sodium chloride 1 g tablet Take 1 tablet (1 g total) by mouth daily for 90 days     No current facility-administered medications on file prior to visit  He has No Known Allergies       Review of Systems   Constitutional: Negative for fever  Respiratory: Negative for shortness of breath  Cardiovascular: Negative for chest pain  Gastrointestinal: Negative for abdominal pain and blood in stool  Genitourinary: Negative for dysuria and hematuria  Musculoskeletal: Negative for arthralgias and myalgias  Skin: Negative for rash  Neurological: Negative for headaches  Objective:      /84 (BP Location: Left arm, Patient Position: Sitting, Cuff Size: Adult)   Pulse 80   Resp 16   Ht 5' 11" (1 803 m)   Wt 108 kg (237 lb)   BMI 33 05 kg/m²          Physical Exam   Constitutional: He is oriented to person, place, and time  Vital signs are normal  He appears well-developed and well-nourished  HENT:   Head: Normocephalic and atraumatic  Right Ear: Tympanic membrane, external ear and ear canal normal    Left Ear: Tympanic membrane, external ear and ear canal normal    Nose: Nose normal    Mouth/Throat: Uvula is midline, oropharynx is clear and moist and mucous membranes are normal    Eyes: Pupils are equal, round, and reactive to light  Conjunctivae, EOM and lids are normal        Neck: Trachea normal  Neck supple  No thyromegaly present  Cardiovascular: Normal rate, regular rhythm, S1 normal and S2 normal    No murmur heard    Pulmonary/Chest: Effort normal and breath sounds normal    Abdominal: Soft  Bowel sounds are normal  There is no tenderness  Lymphadenopathy:     He has no cervical adenopathy  Neurological: He is alert and oriented to person, place, and time  No cranial nerve deficit  Skin: Skin is warm, dry and intact  Psychiatric: He has a normal mood and affect   His speech is normal and behavior is normal  Thought content normal

## 2019-09-20 NOTE — LETTER
September 20, 2019     [place], [place]  [address] [address]    Patient: Sugey Mckeon   YOB: 1950   Date of Visit: 9/20/2019       Dear Dr Andra Paula: Thank you for referring Adrian Baxter to me for evaluation  Below are my notes for this consultation  If you have questions, please do not hesitate to call me  I look forward to following your patient along with you  Sincerely,        Ailyn Vizcarra DO        CC: No Recipients  Ailyn Vizcarra DO  9/20/2019 12:15 PM  Sign at close encounter  Assessment/Plan:    No problem-specific Assessment & Plan notes found for this encounter  Diagnoses and all orders for this visit:    Preop examination  Preop labs reviewed  Patient is high risk for upcoming surgery  Stenosis of right internal carotid artery  Scheduled for surgery on 9/25/2019  Coronary artery disease involving native coronary artery of native heart without angina pectoris  Followed by Cardiology  See their note from 8/14/2019  Atrial fibrillation, unspecified type ([facility] [address] )  Followed by Cardiology  See their note from 8/14/2019  Chronic obstructive pulmonary disease, unspecified COPD type ([facility] [address] )  Followed by pulmonology  See their note from 9/17/2019  Thoracic ascending aortic aneurysm [facility])  Seen on previous imaging perform for lung cancer screening  Patient was encouraged to get repeat imaging performed that was previously ordered  Unclear how aneurysm will affect upcoming surgery  Essential hypertension  Blood pressure acceptable  Labs show GFR 55  Hold losartan on day of surgery  Continue metoprolol and amlodipine on day of surgery  Hyperlipidemia, unspecified hyperlipidemia type  Patient can take his statin on day of surgery  At risk for sleep apnea  Patient is at risk for sleep apnea and likely has sleep apnea  Review of notes from pulmonology shows they plan on performing a sleep study      Tobacco use  Please note continued tobacco use  May benefit from nicotine patch while in the hospital     Alcohol use  Please note daily alcohol intake  Monitor for DTs  Benzodiazepine if needed while in the hospital     Lesion of right eyelid  -     Ambulatory referral to Plastic Surgery; Future  Etiology unclear  Referral to Plastic surgery for evaluation  Age-appropriate vaccinations highly recommended such as the flu vaccine but patient declines any vaccines at this time  Patient has had cardiology clearance an pulmonology clearance  My recommendations as above  Follow-up in two weeks or sooner if needed  Subjective:      Patient ID: Lali Tubbschure is a 81 y o  male  Patient presents for preoperative evaluation  He is scheduled for right carotid angioplasty secondary to right carotid artery stenosis on 9/25/2019 with Dr Keanu Mena  Patient believes he had his stroke secondary to his carotid artery stenosis  He has coronary artery disease, atrial fibrillation as well as COPD  He is followed by Cardiology and pulmonology  He has had surgical clearance from a cardiology standpoint and pulmonology standpoint  He states he was given instruction on when stop his medication  Continues to drink 2-3 beers per day  Continues to smoke a few cigarettes per day  He is concerned about a mass of his right lower eyelid that has developed over the past few weeks        The following portions of the patient's history were reviewed and updated as appropriate:   He  has a past medical history of Acute metabolic encephalopathy, Arthritis, Atrial fibrillation (HCC), COPD (chronic obstructive pulmonary disease) ([address] ), COPD (chronic obstructive pulmonary disease) ([address] ), CVA (cerebral vascular accident) ([address] ), Elevated glucose, Emphysema lung ([address] ), ETOH abuse, Hiatal hernia, Hyperlipidemia, Hypertension, LVH (left ventricular hypertrophy), Myocardial infarction ([address] ), Obesity, Pneumonia (2/2/2017), Pneumonia, Pulmonary emphysema ([facility] ), PVC (premature ventricular contraction), Right knee pain, Shortness of breath, Stenosis of right internal carotid artery, Stroke ([place] [address] ), and Tobacco abuse (6/25/2019)  He   Patient Active Problem List    Diagnosis Date Noted    Dependence on nicotine from cigarettes 07/30/2019    Stenosis of right internal carotid artery 07/04/2019    Acute metabolic encephalopathy 56/91/8173    Atrial fibrillation ([place] [address] ) 06/28/2019    CVA (cerebral vascular accident) ([place] [address] ) 06/25/2019    Alcohol abuse 06/25/2019    Microalbuminuria 04/17/2019    Obesity 04/17/2019    Elevated CO2 level 09/27/2018    Elevated glucose 08/24/2018    Thoracic ascending aortic aneurysm (HCC) 03/22/2018    Coronary artery disease involving native coronary artery 01/30/2018    ULLOA (dyspnea on exertion) 01/30/2018    Osteoarthritis of left glenohumeral joint 06/01/2017    Osteoarthritis of right glenohumeral joint 06/01/2017    Colon polyps 05/15/2017    Palpitations 04/26/2017    Abnormal EKG 03/03/2017    PVC (premature ventricular contraction) 03/03/2017    Elevated hematocrit 02/02/2017    Elevated hemoglobin (HCC) 02/02/2017    Emphysema of lung ([place] [address] ) 02/02/2017    Hyperlipidemia 02/02/2017    Hyponatremia 02/02/2017    Mild concentric left ventricular hypertrophy (LVH) 02/02/2017    Hiatal hernia 01/19/2017    COPD (chronic obstructive pulmonary disease) ([place] [address] ) 01/18/2017    Hypertension 01/18/2017    Pityriasis rosea 01/18/2017     He  has a past surgical history that includes Ankle surgery (Right, 1978); Hand surgery (Left, 1964); Colonoscopy; pr colonoscopy flx dx w/collj spec when pfrmd (N/A, 5/9/2017); Carotid stent (02/13/2018); and Hand surgery (Left, 1964)  His family history includes Arthritis in his brother; Diabetes in his brother; Emphysema in his father; Hypertension in his father, mother, and other; Other in his other  He  reports that he has been smoking cigarettes   He has a 45 00 pack-year smoking history  He has never used smokeless tobacco  He reports that he drinks alcohol  He reports that he does not use drugs  Current Outpatient Medications   Medication Sig Dispense Refill    albuterol (PROVENTIL HFA,VENTOLIN HFA) 90 mcg/act inhaler inhale 2 puffs by mouth and INTO THE LUNGS every 4 hours if needed for wheezing 18 g 0    amLODIPine (NORVASC) 5 mg tablet Take 1 tablet (5 mg total) by mouth daily 90 tablet 0    apixaban (ELIQUIS) 5 mg Take 1 tablet (5 mg total) by mouth 2 (two) times a day 60 tablet 0    atorvastatin (LIPITOR) 20 mg tablet take 1 tablet by mouth once daily 90 tablet 3    losartan (COZAAR) 100 MG tablet take 1 tablet by mouth once daily 30 tablet 0    metoprolol tartrate (LOPRESSOR) 25 mg tablet Take 1 tablet (25 mg total) by mouth every 12 (twelve) hours 60 tablet 0    Naproxen Sodium (ALEVE) 220 MG CAPS Take 2 capsules by mouth daily at bedtime as needed      nicotine (NICODERM CQ) 21 mg/24 hr TD 24 hr patch Place 1 patch on the skin every 24 hours      nitroglycerin (NITROSTAT) 0 4 mg SL tablet Place 1 tablet (0 4 mg total) under the tongue every 5 (five) minutes as needed for chest pain 90 tablet 0    pantoprazole (PROTONIX) 40 mg tablet TAKE 1 TABLET BY MOUTH DAILY 30 tablet 0    SPIRIVA RESPIMAT 2 5 MCG/ACT AERS inhaler inhale 2 puffs by mouth and INTO THE LUNGS once daily 4 g 0    SYMBICORT 80-4 5 MCG/ACT inhaler inhale 2 puffs by mouth twice a day Rinse mouth after use 10 2 g 0    sodium chloride 1 g tablet Take 1 tablet (1 g total) by mouth daily for 90 days 90 tablet 2     No current facility-administered medications for this visit        Current Outpatient Medications on File Prior to Visit   Medication Sig    albuterol (PROVENTIL HFA,VENTOLIN HFA) 90 mcg/act inhaler inhale 2 puffs by mouth and INTO THE LUNGS every 4 hours if needed for wheezing    amLODIPine (NORVASC) 5 mg tablet Take 1 tablet (5 mg total) by mouth daily    apixaban (ELIQUIS) 5 mg Take 1 tablet (5 mg total) by mouth 2 (two) times a day    atorvastatin (LIPITOR) 20 mg tablet take 1 tablet by mouth once daily    losartan (COZAAR) 100 MG tablet take 1 tablet by mouth once daily    metoprolol tartrate (LOPRESSOR) 25 mg tablet Take 1 tablet (25 mg total) by mouth every 12 (twelve) hours    Naproxen Sodium (ALEVE) 220 MG CAPS Take 2 capsules by mouth daily at bedtime as needed    nicotine (NICODERM CQ) 21 mg/24 hr TD 24 hr patch Place 1 patch on the skin every 24 hours    nitroglycerin (NITROSTAT) 0 4 mg SL tablet Place 1 tablet (0 4 mg total) under the tongue every 5 (five) minutes as needed for chest pain    pantoprazole (PROTONIX) 40 mg tablet TAKE 1 TABLET BY MOUTH DAILY    SPIRIVA RESPIMAT 2 5 MCG/ACT AERS inhaler inhale 2 puffs by mouth and INTO THE LUNGS once daily    SYMBICORT 80-4 5 MCG/ACT inhaler inhale 2 puffs by mouth twice a day Rinse mouth after use    [DISCONTINUED] clopidogrel (PLAVIX) 75 mg tablet Take 1 tablet (75 mg total) by mouth daily    sodium chloride 1 g tablet Take 1 tablet (1 g total) by mouth daily for 90 days     No current facility-administered medications on file prior to visit  He has No Known Allergies       Review of Systems   Constitutional: Negative for fever  Respiratory: Negative for shortness of breath  Cardiovascular: Negative for chest pain  Gastrointestinal: Negative for abdominal pain and blood in stool  Genitourinary: Negative for dysuria and hematuria  Musculoskeletal: Negative for arthralgias and myalgias  Skin: Negative for rash  Neurological: Negative for headaches  Objective:      /84 (BP Location: Left arm, Patient Position: Sitting, Cuff Size: Adult)   Pulse 80   Resp 16   Ht 5' 11" (1 803 m)   Wt 108 kg (237 lb)   BMI 33 05 kg/m²           Physical Exam   Constitutional: He is oriented to person, place, and time  Vital signs are normal  He appears well-developed and well-nourished     HENT:   Head: Normocephalic and atraumatic  Right Ear: Tympanic membrane, external ear and ear canal normal    Left Ear: Tympanic membrane, external ear and ear canal normal    Nose: Nose normal    Mouth/Throat: Uvula is midline, oropharynx is clear and moist and mucous membranes are normal    Eyes: Pupils are equal, round, and reactive to light  Conjunctivae, EOM and lids are normal        Neck: Trachea normal  Neck supple  No thyromegaly present  Cardiovascular: Normal rate, regular rhythm, S1 normal and S2 normal    No murmur heard  Pulmonary/Chest: Effort normal and breath sounds normal    Abdominal: Soft  Bowel sounds are normal  There is no tenderness  Lymphadenopathy:     He has no cervical adenopathy  Neurological: He is alert and oriented to person, place, and time  No cranial nerve deficit  Skin: Skin is warm, dry and intact  Psychiatric: He has a normal mood and affect   His speech is normal and behavior is normal  Thought content normal

## 2019-09-20 NOTE — H&P (VIEW-ONLY)
Assessment/Plan:    No problem-specific Assessment & Plan notes found for this encounter  Diagnoses and all orders for this visit:    Preop examination  Preop labs reviewed  Patient is high risk for upcoming surgery  Stenosis of right internal carotid artery  Scheduled for surgery on 9/25/2019  Coronary artery disease involving native coronary artery of native heart without angina pectoris  Followed by Cardiology  See their note from 8/14/2019  Atrial fibrillation, unspecified type ([facility] [address] )  Followed by Cardiology  See their note from 8/14/2019  Chronic obstructive pulmonary disease, unspecified COPD type ([facility] )  Followed by pulmonology  See their note from 9/17/2019  Thoracic ascending aortic aneurysm [facility])  Seen on previous imaging perform for lung cancer screening  Patient was encouraged to get repeat imaging performed that was previously ordered  Unclear how aneurysm will affect upcoming surgery  Essential hypertension  Blood pressure acceptable  Labs show GFR 55  Hold losartan on day of surgery  Continue metoprolol and amlodipine on day of surgery  Hyperlipidemia, unspecified hyperlipidemia type  Patient can take his statin on day of surgery  At risk for sleep apnea  Patient is at risk for sleep apnea and likely has sleep apnea  Review of notes from pulmonology shows they plan on performing a sleep study  Tobacco use  Please note continued tobacco use  May benefit from nicotine patch while in the hospital     Alcohol use  Please note daily alcohol intake  Monitor for DTs  Benzodiazepine if needed while in the hospital     Lesion of right eyelid  -     Ambulatory referral to Plastic Surgery; Future  Etiology unclear  Referral to Plastic surgery for evaluation  Age-appropriate vaccinations highly recommended such as the flu vaccine but patient declines any vaccines at this time  Patient has had cardiology clearance an pulmonology clearance    My recommendations as above     Follow-up in two weeks or sooner if needed  Subjective:      Patient ID: Chuck Cartagena is a 81 y o  male  Patient presents for preoperative evaluation  He is scheduled for right carotid angioplasty secondary to right carotid artery stenosis on 9/25/2019 with Dr Paresh Londono  Patient believes he had his stroke secondary to his carotid artery stenosis  He has coronary artery disease, atrial fibrillation as well as COPD  He is followed by Cardiology and pulmonology  He has had surgical clearance from a cardiology standpoint and pulmonology standpoint  He states he was given instruction on when stop his medication  Continues to drink 2-3 beers per day  Continues to smoke a few cigarettes per day  He is concerned about a mass of his right lower eyelid that has developed over the past few weeks  The following portions of the patient's history were reviewed and updated as appropriate:   He  has a past medical history of Acute metabolic encephalopathy, Arthritis, Atrial fibrillation (HCC), COPD (chronic obstructive pulmonary disease) ([address] ), COPD (chronic obstructive pulmonary disease) ([address] ), CVA (cerebral vascular accident) ([address] ), Elevated glucose, Emphysema lung ([address] ), ETOH abuse, Hiatal hernia, Hyperlipidemia, Hypertension, LVH (left ventricular hypertrophy), Myocardial infarction ([address] ), Obesity, Pneumonia (2/2/2017), Pneumonia, Pulmonary emphysema ([address] ), PVC (premature ventricular contraction), Right knee pain, Shortness of breath, Stenosis of right internal carotid artery, Stroke ([address] ), and Tobacco abuse (6/25/2019)    He   Patient Active Problem List    Diagnosis Date Noted    Dependence on nicotine from cigarettes 07/30/2019    Stenosis of right internal carotid artery 07/04/2019    Acute metabolic encephalopathy 16/87/7011    Atrial fibrillation ([address] ) 06/28/2019    CVA (cerebral vascular accident) ([address] ) 06/25/2019    Alcohol abuse 06/25/2019    Microalbuminuria 04/17/2019    Obesity 04/17/2019    Elevated CO2 level 09/27/2018    Elevated glucose 08/24/2018    Thoracic ascending aortic aneurysm (HCC) 03/22/2018    Coronary artery disease involving native coronary artery 01/30/2018    ULLOA (dyspnea on exertion) 01/30/2018    Osteoarthritis of left glenohumeral joint 06/01/2017    Osteoarthritis of right glenohumeral joint 06/01/2017    Colon polyps 05/15/2017    Palpitations 04/26/2017    Abnormal EKG 03/03/2017    PVC (premature ventricular contraction) 03/03/2017    Elevated hematocrit 02/02/2017    Elevated hemoglobin (HCC) 02/02/2017    Emphysema of lung ([facility] [address] ) 02/02/2017    Hyperlipidemia 02/02/2017    Hyponatremia 02/02/2017    Mild concentric left ventricular hypertrophy (LVH) 02/02/2017    Hiatal hernia 01/19/2017    COPD (chronic obstructive pulmonary disease) ([facility] ) 01/18/2017    Hypertension 01/18/2017    Pityriasis rosea 01/18/2017     He  has a past surgical history that includes Ankle surgery (Right, 1978); Hand surgery (Left, 1964); Colonoscopy; pr colonoscopy flx dx w/collj spec when pfrmd (N/A, 5/9/2017); Carotid stent (02/13/2018); and Hand surgery (Left, 1964)  His family history includes Arthritis in his brother; Diabetes in his brother; Emphysema in his father; Hypertension in his father, mother, and other; Other in his other  He  reports that he has been smoking cigarettes  He has a 45 00 pack-year smoking history  He has never used smokeless tobacco  He reports that he drinks alcohol  He reports that he does not use drugs    Current Outpatient Medications   Medication Sig Dispense Refill    albuterol (PROVENTIL HFA,VENTOLIN HFA) 90 mcg/act inhaler inhale 2 puffs by mouth and INTO THE LUNGS every 4 hours if needed for wheezing 18 g 0    amLODIPine (NORVASC) 5 mg tablet Take 1 tablet (5 mg total) by mouth daily 90 tablet 0    apixaban (ELIQUIS) 5 mg Take 1 tablet (5 mg total) by mouth 2 (two) times a day 60 tablet 0    atorvastatin (LIPITOR) 20 mg tablet take 1 tablet by mouth once daily 90 tablet 3    losartan (COZAAR) 100 MG tablet take 1 tablet by mouth once daily 30 tablet 0    metoprolol tartrate (LOPRESSOR) 25 mg tablet Take 1 tablet (25 mg total) by mouth every 12 (twelve) hours 60 tablet 0    Naproxen Sodium (ALEVE) 220 MG CAPS Take 2 capsules by mouth daily at bedtime as needed      nicotine (NICODERM CQ) 21 mg/24 hr TD 24 hr patch Place 1 patch on the skin every 24 hours      nitroglycerin (NITROSTAT) 0 4 mg SL tablet Place 1 tablet (0 4 mg total) under the tongue every 5 (five) minutes as needed for chest pain 90 tablet 0    pantoprazole (PROTONIX) 40 mg tablet TAKE 1 TABLET BY MOUTH DAILY 30 tablet 0    SPIRIVA RESPIMAT 2 5 MCG/ACT AERS inhaler inhale 2 puffs by mouth and INTO THE LUNGS once daily 4 g 0    SYMBICORT 80-4 5 MCG/ACT inhaler inhale 2 puffs by mouth twice a day Rinse mouth after use 10 2 g 0    sodium chloride 1 g tablet Take 1 tablet (1 g total) by mouth daily for 90 days 90 tablet 2     No current facility-administered medications for this visit        Current Outpatient Medications on File Prior to Visit   Medication Sig    albuterol (PROVENTIL HFA,VENTOLIN HFA) 90 mcg/act inhaler inhale 2 puffs by mouth and INTO THE LUNGS every 4 hours if needed for wheezing    amLODIPine (NORVASC) 5 mg tablet Take 1 tablet (5 mg total) by mouth daily    apixaban (ELIQUIS) 5 mg Take 1 tablet (5 mg total) by mouth 2 (two) times a day    atorvastatin (LIPITOR) 20 mg tablet take 1 tablet by mouth once daily    losartan (COZAAR) 100 MG tablet take 1 tablet by mouth once daily    metoprolol tartrate (LOPRESSOR) 25 mg tablet Take 1 tablet (25 mg total) by mouth every 12 (twelve) hours    Naproxen Sodium (ALEVE) 220 MG CAPS Take 2 capsules by mouth daily at bedtime as needed    nicotine (NICODERM CQ) 21 mg/24 hr TD 24 hr patch Place 1 patch on the skin every 24 hours    nitroglycerin (NITROSTAT) 0 4 mg SL tablet Place 1 tablet (0 4 mg total) under the tongue every 5 (five) minutes as needed for chest pain    pantoprazole (PROTONIX) 40 mg tablet TAKE 1 TABLET BY MOUTH DAILY    SPIRIVA RESPIMAT 2 5 MCG/ACT AERS inhaler inhale 2 puffs by mouth and INTO THE LUNGS once daily    SYMBICORT 80-4 5 MCG/ACT inhaler inhale 2 puffs by mouth twice a day Rinse mouth after use    [DISCONTINUED] clopidogrel (PLAVIX) 75 mg tablet Take 1 tablet (75 mg total) by mouth daily    sodium chloride 1 g tablet Take 1 tablet (1 g total) by mouth daily for 90 days     No current facility-administered medications on file prior to visit  He has No Known Allergies       Review of Systems   Constitutional: Negative for fever  Respiratory: Negative for shortness of breath  Cardiovascular: Negative for chest pain  Gastrointestinal: Negative for abdominal pain and blood in stool  Genitourinary: Negative for dysuria and hematuria  Musculoskeletal: Negative for arthralgias and myalgias  Skin: Negative for rash  Neurological: Negative for headaches  Objective:      /84 (BP Location: Left arm, Patient Position: Sitting, Cuff Size: Adult)   Pulse 80   Resp 16   Ht 5' 11" (1 803 m)   Wt 108 kg (237 lb)   BMI 33 05 kg/m²          Physical Exam   Constitutional: He is oriented to person, place, and time  Vital signs are normal  He appears well-developed and well-nourished  HENT:   Head: Normocephalic and atraumatic  Right Ear: Tympanic membrane, external ear and ear canal normal    Left Ear: Tympanic membrane, external ear and ear canal normal    Nose: Nose normal    Mouth/Throat: Uvula is midline, oropharynx is clear and moist and mucous membranes are normal    Eyes: Pupils are equal, round, and reactive to light  Conjunctivae, EOM and lids are normal        Neck: Trachea normal  Neck supple  No thyromegaly present  Cardiovascular: Normal rate, regular rhythm, S1 normal and S2 normal    No murmur heard    Pulmonary/Chest: Effort normal and breath sounds normal    Abdominal: Soft  Bowel sounds are normal  There is no tenderness  Lymphadenopathy:     He has no cervical adenopathy  Neurological: He is alert and oriented to person, place, and time  No cranial nerve deficit  Skin: Skin is warm, dry and intact  Psychiatric: He has a normal mood and affect   His speech is normal and behavior is normal  Thought content normal

## 2019-09-20 NOTE — TELEPHONE ENCOUNTER
Spoke to patient to Hold his medication apixaban, 2 days prior to surgery last dose being 9-22-19 Patient confirmed and understood the instructions   LLF

## 2019-09-25 ENCOUNTER — HOSPITAL ENCOUNTER (INPATIENT)
Facility: HOSPITAL | Age: 69
LOS: 1 days | Discharge: HOME | DRG: 036 | End: 2019-09-26
Attending: SURGERY | Admitting: SURGERY
Payer: COMMERCIAL

## 2019-09-25 ENCOUNTER — APPOINTMENT (OUTPATIENT)
Dept: RADIOLOGY | Facility: HOSPITAL | Age: 69
DRG: 036 | End: 2019-09-25
Attending: SURGERY
Payer: COMMERCIAL

## 2019-09-25 ENCOUNTER — ANESTHESIA (OUTPATIENT)
Dept: PERIOP | Facility: HOSPITAL | Age: 69
DRG: 036 | End: 2019-09-25
Payer: COMMERCIAL

## 2019-09-25 DIAGNOSIS — I48.91 ATRIAL FIBRILLATION (HCC): ICD-10-CM

## 2019-09-25 DIAGNOSIS — I65.21 STENOSIS OF RIGHT INTERNAL CAROTID ARTERY: ICD-10-CM

## 2019-09-25 DIAGNOSIS — N17.9 AKI (ACUTE KIDNEY INJURY) (HCC): Primary | ICD-10-CM

## 2019-09-25 PROBLEM — Z87.448 HISTORY OF ACUTE RENAL FAILURE: Status: ACTIVE | Noted: 2019-09-25

## 2019-09-25 LAB
KCT BLD-ACNC: 242 SEC (ref 89–137)
KCT BLD-ACNC: 248 SEC (ref 89–137)
KCT BLD-ACNC: 254 SEC (ref 89–137)
SPECIMEN SOURCE: ABNORMAL

## 2019-09-25 PROCEDURE — C1894 INTRO/SHEATH, NON-LASER: HCPCS | Performed by: SURGERY

## 2019-09-25 PROCEDURE — C1876 STENT, NON-COA/NON-COV W/DEL: HCPCS | Performed by: SURGERY

## 2019-09-25 PROCEDURE — 99222 1ST HOSP IP/OBS MODERATE 55: CPT | Performed by: INTERNAL MEDICINE

## 2019-09-25 PROCEDURE — 037K3DZ DILATION OF RIGHT INTERNAL CAROTID ARTERY WITH INTRALUMINAL DEVICE, PERCUTANEOUS APPROACH: ICD-10-PCS | Performed by: SURGERY

## 2019-09-25 PROCEDURE — 85347 COAGULATION TIME ACTIVATED: CPT

## 2019-09-25 PROCEDURE — 76937 US GUIDE VASCULAR ACCESS: CPT

## 2019-09-25 PROCEDURE — C1725 CATH, TRANSLUMIN NON-LASER: HCPCS | Performed by: SURGERY

## 2019-09-25 PROCEDURE — 37215 TRANSCATH STENT CCA W/EPS: CPT | Performed by: SURGERY

## 2019-09-25 PROCEDURE — C1769 GUIDE WIRE: HCPCS | Performed by: SURGERY

## 2019-09-25 DEVICE — 10 MM X 40 MM
Type: IMPLANTABLE DEVICE | Site: CAROTID | Status: FUNCTIONAL
Brand: ENROUTE TRANSCAROTID STENT

## 2019-09-25 RX ORDER — FENTANYL CITRATE/PF 50 MCG/ML
50 SYRINGE (ML) INJECTION
Status: DISCONTINUED | OUTPATIENT
Start: 2019-09-25 | End: 2019-09-25 | Stop reason: HOSPADM

## 2019-09-25 RX ORDER — ROCURONIUM BROMIDE 10 MG/ML
INJECTION, SOLUTION INTRAVENOUS AS NEEDED
Status: DISCONTINUED | OUTPATIENT
Start: 2019-09-25 | End: 2019-09-25 | Stop reason: SURG

## 2019-09-25 RX ORDER — ONDANSETRON 2 MG/ML
4 INJECTION INTRAMUSCULAR; INTRAVENOUS ONCE AS NEEDED
Status: DISCONTINUED | OUTPATIENT
Start: 2019-09-25 | End: 2019-09-25

## 2019-09-25 RX ORDER — CLOPIDOGREL BISULFATE 75 MG/1
75 TABLET ORAL DAILY
Status: DISCONTINUED | OUTPATIENT
Start: 2019-09-25 | End: 2019-09-26 | Stop reason: HOSPADM

## 2019-09-25 RX ORDER — BUPIVACAINE HYDROCHLORIDE 5 MG/ML
INJECTION, SOLUTION PERINEURAL AS NEEDED
Status: DISCONTINUED | OUTPATIENT
Start: 2019-09-25 | End: 2019-09-25 | Stop reason: HOSPADM

## 2019-09-25 RX ORDER — DIPHENHYDRAMINE HYDROCHLORIDE 50 MG/ML
12.5 INJECTION INTRAMUSCULAR; INTRAVENOUS ONCE AS NEEDED
Status: DISCONTINUED | OUTPATIENT
Start: 2019-09-25 | End: 2019-09-25 | Stop reason: HOSPADM

## 2019-09-25 RX ORDER — DEXMEDETOMIDINE HYDROCHLORIDE 100 UG/ML
INJECTION, SOLUTION INTRAVENOUS AS NEEDED
Status: DISCONTINUED | OUTPATIENT
Start: 2019-09-25 | End: 2019-09-25 | Stop reason: SURG

## 2019-09-25 RX ORDER — NICOTINE 21 MG/24HR
1 PATCH, TRANSDERMAL 24 HOURS TRANSDERMAL EVERY 24 HOURS
Status: DISCONTINUED | OUTPATIENT
Start: 2019-09-25 | End: 2019-09-26 | Stop reason: HOSPADM

## 2019-09-25 RX ORDER — LABETALOL 20 MG/4 ML (5 MG/ML) INTRAVENOUS SYRINGE
5
Status: DISCONTINUED | OUTPATIENT
Start: 2019-09-25 | End: 2019-09-26 | Stop reason: HOSPADM

## 2019-09-25 RX ORDER — SODIUM CHLORIDE 9 MG/ML
INJECTION, SOLUTION INTRAVENOUS CONTINUOUS PRN
Status: DISCONTINUED | OUTPATIENT
Start: 2019-09-25 | End: 2019-09-25 | Stop reason: SURG

## 2019-09-25 RX ORDER — AMOXICILLIN 250 MG
1 CAPSULE ORAL
Status: DISCONTINUED | OUTPATIENT
Start: 2019-09-25 | End: 2019-09-26 | Stop reason: HOSPADM

## 2019-09-25 RX ORDER — FENTANYL CITRATE 50 UG/ML
INJECTION, SOLUTION INTRAMUSCULAR; INTRAVENOUS AS NEEDED
Status: DISCONTINUED | OUTPATIENT
Start: 2019-09-25 | End: 2019-09-25 | Stop reason: SURG

## 2019-09-25 RX ORDER — HEPARIN SODIUM 5000 [USP'U]/ML
5000 INJECTION, SOLUTION INTRAVENOUS; SUBCUTANEOUS EVERY 8 HOURS SCHEDULED
Status: DISCONTINUED | OUTPATIENT
Start: 2019-09-25 | End: 2019-09-25

## 2019-09-25 RX ORDER — OXYCODONE HYDROCHLORIDE 5 MG/1
10 TABLET ORAL EVERY 4 HOURS PRN
Status: DISCONTINUED | OUTPATIENT
Start: 2019-09-25 | End: 2019-09-26 | Stop reason: HOSPADM

## 2019-09-25 RX ORDER — SUCCINYLCHOLINE/SOD CL,ISO/PF 100 MG/5ML
SYRINGE (ML) INTRAVENOUS AS NEEDED
Status: DISCONTINUED | OUTPATIENT
Start: 2019-09-25 | End: 2019-09-25 | Stop reason: SURG

## 2019-09-25 RX ORDER — GLYCOPYRROLATE 0.2 MG/ML
INJECTION INTRAMUSCULAR; INTRAVENOUS AS NEEDED
Status: DISCONTINUED | OUTPATIENT
Start: 2019-09-25 | End: 2019-09-25 | Stop reason: SURG

## 2019-09-25 RX ORDER — PROTAMINE SULFATE 10 MG/ML
INJECTION, SOLUTION INTRAVENOUS AS NEEDED
Status: DISCONTINUED | OUTPATIENT
Start: 2019-09-25 | End: 2019-09-25 | Stop reason: SURG

## 2019-09-25 RX ORDER — ONDANSETRON 2 MG/ML
4 INJECTION INTRAMUSCULAR; INTRAVENOUS EVERY 6 HOURS PRN
Status: DISCONTINUED | OUTPATIENT
Start: 2019-09-25 | End: 2019-09-26 | Stop reason: HOSPADM

## 2019-09-25 RX ORDER — LIDOCAINE HYDROCHLORIDE 10 MG/ML
INJECTION, SOLUTION INFILTRATION; PERINEURAL
Status: COMPLETED | OUTPATIENT
Start: 2019-09-25 | End: 2019-09-25

## 2019-09-25 RX ORDER — SODIUM CHLORIDE 9 MG/ML
75 INJECTION, SOLUTION INTRAVENOUS CONTINUOUS
Status: DISCONTINUED | OUTPATIENT
Start: 2019-09-25 | End: 2019-09-26

## 2019-09-25 RX ORDER — HYDROMORPHONE HCL/PF 1 MG/ML
0.5 SYRINGE (ML) INJECTION
Status: DISCONTINUED | OUTPATIENT
Start: 2019-09-25 | End: 2019-09-25 | Stop reason: HOSPADM

## 2019-09-25 RX ORDER — PANTOPRAZOLE SODIUM 40 MG/1
40 TABLET, DELAYED RELEASE ORAL
Status: DISCONTINUED | OUTPATIENT
Start: 2019-09-26 | End: 2019-09-26 | Stop reason: HOSPADM

## 2019-09-25 RX ORDER — HEPARIN SODIUM 200 [USP'U]/100ML
INJECTION, SOLUTION INTRAVENOUS
Status: COMPLETED | OUTPATIENT
Start: 2019-09-25 | End: 2019-09-25

## 2019-09-25 RX ORDER — ALBUTEROL SULFATE 90 UG/1
2 AEROSOL, METERED RESPIRATORY (INHALATION) EVERY 4 HOURS PRN
Status: DISCONTINUED | OUTPATIENT
Start: 2019-09-25 | End: 2019-09-26 | Stop reason: HOSPADM

## 2019-09-25 RX ORDER — ASPIRIN 81 MG/1
81 TABLET, CHEWABLE ORAL DAILY
Status: CANCELLED | OUTPATIENT
Start: 2019-09-25

## 2019-09-25 RX ORDER — SODIUM CHLORIDE, SODIUM LACTATE, POTASSIUM CHLORIDE, CALCIUM CHLORIDE 600; 310; 30; 20 MG/100ML; MG/100ML; MG/100ML; MG/100ML
20 INJECTION, SOLUTION INTRAVENOUS CONTINUOUS
Status: DISCONTINUED | OUTPATIENT
Start: 2019-09-25 | End: 2019-09-25

## 2019-09-25 RX ORDER — HYDRALAZINE HYDROCHLORIDE 20 MG/ML
15 INJECTION INTRAMUSCULAR; INTRAVENOUS
Status: DISCONTINUED | OUTPATIENT
Start: 2019-09-25 | End: 2019-09-26 | Stop reason: HOSPADM

## 2019-09-25 RX ORDER — HYDROMORPHONE HCL/PF 1 MG/ML
0.5 SYRINGE (ML) INJECTION
Status: DISCONTINUED | OUTPATIENT
Start: 2019-09-25 | End: 2019-09-26 | Stop reason: HOSPADM

## 2019-09-25 RX ORDER — DEXAMETHASONE SODIUM PHOSPHATE 10 MG/ML
INJECTION, SOLUTION INTRAMUSCULAR; INTRAVENOUS AS NEEDED
Status: DISCONTINUED | OUTPATIENT
Start: 2019-09-25 | End: 2019-09-25 | Stop reason: SURG

## 2019-09-25 RX ORDER — ALBUTEROL SULFATE 90 UG/1
AEROSOL, METERED RESPIRATORY (INHALATION) AS NEEDED
Status: DISCONTINUED | OUTPATIENT
Start: 2019-09-25 | End: 2019-09-25 | Stop reason: SURG

## 2019-09-25 RX ORDER — EPHEDRINE SULFATE 50 MG/ML
INJECTION INTRAVENOUS AS NEEDED
Status: DISCONTINUED | OUTPATIENT
Start: 2019-09-25 | End: 2019-09-25 | Stop reason: SURG

## 2019-09-25 RX ORDER — SODIUM CHLORIDE, SODIUM LACTATE, POTASSIUM CHLORIDE, CALCIUM CHLORIDE 600; 310; 30; 20 MG/100ML; MG/100ML; MG/100ML; MG/100ML
20 INJECTION, SOLUTION INTRAVENOUS CONTINUOUS
Status: DISCONTINUED | OUTPATIENT
Start: 2019-09-25 | End: 2019-09-26

## 2019-09-25 RX ORDER — ACETAMINOPHEN 325 MG/1
650 TABLET ORAL EVERY 6 HOURS PRN
Status: DISCONTINUED | OUTPATIENT
Start: 2019-09-25 | End: 2019-09-26 | Stop reason: HOSPADM

## 2019-09-25 RX ORDER — OXYCODONE HYDROCHLORIDE 5 MG/1
5 TABLET ORAL EVERY 4 HOURS PRN
Status: DISCONTINUED | OUTPATIENT
Start: 2019-09-25 | End: 2019-09-26 | Stop reason: HOSPADM

## 2019-09-25 RX ORDER — HEPARIN SODIUM 1000 [USP'U]/ML
INJECTION, SOLUTION INTRAVENOUS; SUBCUTANEOUS AS NEEDED
Status: DISCONTINUED | OUTPATIENT
Start: 2019-09-25 | End: 2019-09-25 | Stop reason: SURG

## 2019-09-25 RX ORDER — LIDOCAINE HYDROCHLORIDE 10 MG/ML
INJECTION, SOLUTION INFILTRATION; PERINEURAL AS NEEDED
Status: DISCONTINUED | OUTPATIENT
Start: 2019-09-25 | End: 2019-09-25 | Stop reason: SURG

## 2019-09-25 RX ORDER — ATORVASTATIN CALCIUM 20 MG/1
20 TABLET, FILM COATED ORAL EVERY EVENING
Status: DISCONTINUED | OUTPATIENT
Start: 2019-09-25 | End: 2019-09-26 | Stop reason: HOSPADM

## 2019-09-25 RX ORDER — NEOSTIGMINE METHYLSULFATE 1 MG/ML
INJECTION INTRAVENOUS AS NEEDED
Status: DISCONTINUED | OUTPATIENT
Start: 2019-09-25 | End: 2019-09-25 | Stop reason: SURG

## 2019-09-25 RX ORDER — CEFAZOLIN SODIUM 1 G/3ML
INJECTION, POWDER, FOR SOLUTION INTRAMUSCULAR; INTRAVENOUS AS NEEDED
Status: DISCONTINUED | OUTPATIENT
Start: 2019-09-25 | End: 2019-09-25 | Stop reason: SURG

## 2019-09-25 RX ORDER — PROPOFOL 10 MG/ML
INJECTION, EMULSION INTRAVENOUS AS NEEDED
Status: DISCONTINUED | OUTPATIENT
Start: 2019-09-25 | End: 2019-09-25 | Stop reason: SURG

## 2019-09-25 RX ORDER — ONDANSETRON 2 MG/ML
INJECTION INTRAMUSCULAR; INTRAVENOUS AS NEEDED
Status: DISCONTINUED | OUTPATIENT
Start: 2019-09-25 | End: 2019-09-25 | Stop reason: SURG

## 2019-09-25 RX ADMIN — PHENYLEPHRINE HYDROCHLORIDE 200 MCG: 10 INJECTION INTRAVENOUS at 09:33

## 2019-09-25 RX ADMIN — SODIUM CHLORIDE: 0.9 INJECTION, SOLUTION INTRAVENOUS at 09:50

## 2019-09-25 RX ADMIN — PROTAMINE SULFATE 20 MG: 10 INJECTION, SOLUTION INTRAVENOUS at 10:30

## 2019-09-25 RX ADMIN — PHENYLEPHRINE HYDROCHLORIDE 100 MCG: 10 INJECTION INTRAVENOUS at 09:00

## 2019-09-25 RX ADMIN — APIXABAN 5 MG: 5 TABLET, FILM COATED ORAL at 17:57

## 2019-09-25 RX ADMIN — PHENYLEPHRINE HYDROCHLORIDE 50 MCG/MIN: 10 INJECTION INTRAVENOUS at 09:03

## 2019-09-25 RX ADMIN — LIDOCAINE HYDROCHLORIDE 50 MG: 10 INJECTION, SOLUTION INFILTRATION; PERINEURAL at 08:50

## 2019-09-25 RX ADMIN — ATORVASTATIN CALCIUM 20 MG: 20 TABLET, FILM COATED ORAL at 17:57

## 2019-09-25 RX ADMIN — FENTANYL CITRATE 50 MCG: 50 INJECTION, SOLUTION INTRAMUSCULAR; INTRAVENOUS at 08:30

## 2019-09-25 RX ADMIN — GLYCOPYRROLATE 0.2 MG: 0.2 INJECTION, SOLUTION INTRAMUSCULAR; INTRAVENOUS at 09:29

## 2019-09-25 RX ADMIN — DEXMEDETOMIDINE HCL 8 MCG: 100 INJECTION INTRAVENOUS at 08:19

## 2019-09-25 RX ADMIN — SODIUM CHLORIDE 75 ML/HR: 0.9 INJECTION, SOLUTION INTRAVENOUS at 11:53

## 2019-09-25 RX ADMIN — FENTANYL CITRATE 50 MCG: 50 INJECTION, SOLUTION INTRAMUSCULAR; INTRAVENOUS at 08:26

## 2019-09-25 RX ADMIN — DEXMEDETOMIDINE HCL 8 MCG: 100 INJECTION INTRAVENOUS at 08:26

## 2019-09-25 RX ADMIN — HEPARIN SODIUM 11000 UNITS: 1000 INJECTION INTRAVENOUS; SUBCUTANEOUS at 09:29

## 2019-09-25 RX ADMIN — SODIUM CHLORIDE, SODIUM LACTATE, POTASSIUM CHLORIDE, AND CALCIUM CHLORIDE 20 ML/HR: .6; .31; .03; .02 INJECTION, SOLUTION INTRAVENOUS at 08:10

## 2019-09-25 RX ADMIN — CLOPIDOGREL BISULFATE 75 MG: 75 TABLET ORAL at 12:25

## 2019-09-25 RX ADMIN — ONDANSETRON 4 MG: 2 INJECTION INTRAMUSCULAR; INTRAVENOUS at 10:20

## 2019-09-25 RX ADMIN — DEXMEDETOMIDINE HCL 4 MCG: 100 INJECTION INTRAVENOUS at 08:33

## 2019-09-25 RX ADMIN — ROCURONIUM BROMIDE 50 MG: 50 INJECTION, SOLUTION INTRAVENOUS at 09:00

## 2019-09-25 RX ADMIN — SODIUM CHLORIDE: 0.9 INJECTION, SOLUTION INTRAVENOUS at 08:29

## 2019-09-25 RX ADMIN — ROCURONIUM BROMIDE 20 MG: 50 INJECTION, SOLUTION INTRAVENOUS at 09:51

## 2019-09-25 RX ADMIN — GLYCOPYRROLATE 0.2 MG: 0.2 INJECTION, SOLUTION INTRAMUSCULAR; INTRAVENOUS at 10:33

## 2019-09-25 RX ADMIN — ALBUTEROL SULFATE 4 PUFF: 90 AEROSOL, METERED RESPIRATORY (INHALATION) at 10:34

## 2019-09-25 RX ADMIN — CEFAZOLIN 2000 MG: 1 INJECTION, POWDER, FOR SOLUTION INTRAVENOUS at 09:02

## 2019-09-25 RX ADMIN — Medication 100 MG: at 08:54

## 2019-09-25 RX ADMIN — PROPOFOL 100 MG: 10 INJECTION, EMULSION INTRAVENOUS at 08:51

## 2019-09-25 RX ADMIN — PHENYLEPHRINE HYDROCHLORIDE 200 MCG: 10 INJECTION INTRAVENOUS at 09:03

## 2019-09-25 RX ADMIN — PHENYLEPHRINE HYDROCHLORIDE 100 MCG: 10 INJECTION INTRAVENOUS at 08:37

## 2019-09-25 RX ADMIN — DEXAMETHASONE SODIUM PHOSPHATE 5 MG: 10 INJECTION, SOLUTION INTRAMUSCULAR; INTRAVENOUS at 10:20

## 2019-09-25 RX ADMIN — DEXMEDETOMIDINE HYDROCHLORIDE 0.6 MCG/KG/HR: 100 INJECTION, SOLUTION INTRAVENOUS at 08:19

## 2019-09-25 RX ADMIN — LIDOCAINE HYDROCHLORIDE 3 ML: 10 INJECTION, SOLUTION INFILTRATION; PERINEURAL at 09:18

## 2019-09-25 RX ADMIN — PHENYLEPHRINE HYDROCHLORIDE 200 MCG: 10 INJECTION INTRAVENOUS at 08:50

## 2019-09-25 RX ADMIN — NEOSTIGMINE METHYLSULFATE 3 MG: 1 INJECTION, SOLUTION INTRAVENOUS at 10:33

## 2019-09-25 RX ADMIN — HEPARIN SODIUM 2000 UNITS: 1000 INJECTION INTRAVENOUS; SUBCUTANEOUS at 09:48

## 2019-09-25 RX ADMIN — PROTAMINE SULFATE 20 MG: 10 INJECTION, SOLUTION INTRAVENOUS at 10:22

## 2019-09-25 RX ADMIN — EPHEDRINE SULFATE 5 MG: 50 INJECTION, SOLUTION INTRAVENOUS at 08:46

## 2019-09-25 NOTE — ANESTHESIA PROCEDURE NOTES
Arterial Line Insertion  Performed by: Tawana Webber CRNA  Authorized by: Jocelyne Maojr MD   Consent: Verbal consent obtained  Written consent obtained  Risks and benefits: risks, benefits and alternatives were discussed  Consent given by: patient  Patient understanding: patient states understanding of the procedure being performed  Patient consent: the patient's understanding of the procedure matches consent given  Procedure consent: procedure consent matches procedure scheduled  Relevant documents: relevant documents present and verified  Test results: test results available and properly labeled  Site marked: the operative site was marked  Patient identity confirmed: arm band and verbally with patient  Time out: Immediately prior to procedure a "time out" was called to verify the correct patient, procedure, equipment, support staff and site/side marked as required  Preparation: Patient was prepped and draped in the usual sterile fashion    Indications: hemodynamic monitoring  Orientation:  Right  Location: radial arterylidocaine (XYLOCAINE) 1% local infiltration, 3 mL  Sedation:  Patient sedated: yes  Analgesia: see MAR for details    Procedure Details:  Tripp's test normal: yes  Needle gauge: 20  Number of attempts: 2    Post-procedure:  Post-procedure: dressing applied  Waveform: good waveform  Post-procedure CNS: normal and unchanged  Patient tolerance: Patient tolerated the procedure well with no immediate complications

## 2019-09-25 NOTE — INTERVAL H&P NOTE
H&P reviewed  After examining the patient I find no changes in the patients condition since the H&P had been written      Vitals:    09/25/19 0731   BP: 146/86   Pulse: 94   Resp: 16   Temp: (!) 96 1 °F (35 6 °C)   SpO2: 94%     Plan: R carotid stenting  Operative site marked

## 2019-09-25 NOTE — ANESTHESIA POSTPROCEDURE EVALUATION
Post-Op Assessment Note    CV Status:  Stable  Pain Score: 0    Pain management: adequate     Mental Status:  Alert and awake   Hydration Status:  Euvolemic   PONV Controlled:  Controlled   Airway Patency:  Patent   Post Op Vitals Reviewed: Yes      Staff: CRNA           BP (P) 133/86 (09/25/19 1049)    Temp 97 6 °F (36 4 °C) (09/25/19 1049)    Pulse 93 (09/25/19 1049)   Resp   24   SpO2 (P) 100 % (09/25/19 1049)

## 2019-09-25 NOTE — CONSULTS
Consultation - Nephrology   Nicolasa Mack 81 y o  male MRN: [id number]  Unit/Bed#: OR POOL Encounter: [id number]    ASSESSMENT/PLAN:   1  Recent acute kidney injury:  Baseline creatinine around 0 8-1 but recently on 09/12 creatinine was up to 1 3    · Monitor for postoperative acute kidney injury  · Continue to hold losartan  · Continue IV fluids   · Avoid hypotension  · Avoid NSAIDs IV dye or other nephrotoxic  ·  Check BMP  2  Carotid artery stenosis status post angioplasty and stent  3  Hypertension:  Monitor off losartan for now    HISTORY OF PRESENT ILLNESS:  Requesting Physician: Gregory Apple DO  Reason for Consult:  Acute kidney injury prevention protocol    Nicolasa Mack is a 81y o  year old male with a history of right carotid artery stenosis who was admitted today for right carotid artery angioplasty  Postoperatively nephrology was consulted as part of the acute kidney injury prevention protocol  The patient is currently very lethargic after surgery so most of the history comes from the chart however he does awaken and denies any current complaints  Patient did have a stroke in the past which was felt to be related to his carotid artery disease  He currently is doing well postoperatively        PAST MEDICAL HISTORY:  Past Medical History:   Diagnosis Date    Acute metabolic encephalopathy     Arthritis     Atrial fibrillation ([address] )     COPD [facility]chronic obstructive pulmonary disease) [facility] [address] [facility]     COPD (chronic obstructive pulmonary disease) ([address] )     CVA (cerebral vascular accident) ([address] )     Elevated glucose     Emphysema lung ([address] )     ETOH abuse     Hiatal hernia     Hyperlipidemia     Hypertension     LVH (left ventricular hypertrophy)     Myocardial infarction ([address] )     Obesity     Pneumonia 2/2/2017    Pneumonia     Pulmonary emphysema (HCC)     PVC (premature ventricular contraction)     Right knee pain     Shortness of breath     Stenosis of right internal carotid artery  Stroke [facility] [address] )     Tobacco abuse 6/25/2019       PAST SURGICAL HISTORY:  Past Surgical History:   Procedure Laterality Date    ANKLE SURGERY Right 1978    CAROTID STENT  02/13/2018    COLONOSCOPY      HAND SURGERY Left 1964    HAND SURGERY Left 1964    fingers replaced    MO COLONOSCOPY FLX DX W/COLLJ SPEC WHEN PFRMD N/A 5/9/2017    Procedure: EGD AND COLONOSCOPY;  Surgeon: Zen Villaseñor MD;  Location: [facility]; Service: Gastroenterology       ALLERGIES:  No Known Allergies    SOCIAL HISTORY:  Social History     Substance and Sexual Activity   Alcohol Use Yes    Comment: Has 2-3 beers daily  Social History     Substance and Sexual Activity   Drug Use No     Social History     Tobacco Use   Smoking Status Light Tobacco Smoker    Packs/day: 1 00    Years: 45 00    Pack years: 45 00    Types: Cigarettes   Smokeless Tobacco Never Used   Tobacco Comment    Smokes a few cigarettes per day         FAMILY HISTORY:  Family History   Problem Relation Age of Onset    Hypertension Mother     Emphysema Father     Hypertension Father     Arthritis Brother     Diabetes Brother     Other Other         Cardiac Disorder    Hypertension Other     Arrhythmia Neg Hx     Anuerysm Neg Hx     Asthma Neg Hx     Clotting disorder Neg Hx     Fainting Neg Hx        MEDICATIONS:  Scheduled Meds:  Current Facility-Administered Medications:  acetaminophen 650 mg Oral Q6H PRN Barbara Anthony MD    albuterol 2 puff Inhalation Q4H PRN Barbara Anthony MD    apixaban 5 mg Oral BID Barbara Anthony MD    atorvastatin 20 mg Oral Daily Barbara Anthony MD    cefazolin 1 g in sodium chloride 0 9% 1000 mL irrigation bottle  Irrigation Once Calogero Viki, DO    clopidogrel 75 mg Oral Daily Barbara Anthony MD    diphenhydrAMINE 12 5 mg Intravenous Once PRN Janna Lemus MD    fentaNYL 50 mcg Intravenous Q5 Min PRN Janna Lemus MD    heparin 2000 units and papaverine 60 mg in isolyte equivalent 500 mL irrigation bag Irrigation Once Calogero Viki, DO    heparin (porcine) 5,000 Units Subcutaneous Q8H [address] Kenneth Proper, MD    Labetalol HCl 5 mg Intravenous Q15 Min PRN Kenneth Proper, MD    And        hydrALAZINE 15 mg Intravenous Q15 Min PRN Kenneth Proper, MD    And        niCARdipine 1-15 mg/hr Intravenous Continuous PRN Kenneth Proper, MD    HYDROmorphone 0 5 mg Intravenous Q10 Min PRN Unique Cheung, MD    HYDROmorphone 0 5 mg Intravenous Q3H PRN Kenneth Proper, MD    lactated ringers 20 mL/hr Intravenous Continuous Unique Cheung, MD    nicotine 1 patch Transdermal Q24H Kenneth Proper, MD    ondansetron 4 mg Intravenous Once PRN Unique Cheung, MD    ondansetron 4 mg Intravenous Q6H PRN Kenneth Proper, MD    oxyCODONE 10 mg Oral Q4H PRN Kenneth Proper, MD    oxyCODONE 5 mg Oral Q4H PRN Kenneth Proper, MD    pantoprazole 40 mg Oral Daily Kenneth Proper, MD    senna-docusate sodium 1 tablet Oral HS Kenneth Proper, MD    sodium chloride 75 mL/hr Intravenous Continuous Kenneth Proper, MD Last Rate: 75 mL/hr (09/25/19 1153)       PRN Meds:   acetaminophen    albuterol    diphenhydrAMINE    fentaNYL    Labetalol HCl **AND** hydrALAZINE **AND** niCARdipine    HYDROmorphone    HYDROmorphone    ondansetron    ondansetron    oxyCODONE    oxyCODONE    Continuous Infusions:  lactated ringers 20 mL/hr    niCARdipine 1-15 mg/hr    sodium chloride 75 mL/hr Last Rate: 75 mL/hr (09/25/19 1153)       REVIEW OF SYSTEMS:  A complete review of systems was done  Pertinent positives and negatives noted in the HPI but otherwise the review of systems is negative      PHYSICAL EXAM:  Current Weight: Weight - Scale: 109 kg (240 lb)  First Weight: Weight - Scale: 109 kg (240 lb)  Vitals:    09/25/19 1300   BP: 104/65   Pulse: 80   Resp: 17   Temp:    SpO2: 95%       Intake/Output Summary (Last 24 hours) at 9/25/2019 1319  Last data filed at 9/25/2019 1157  Gross per 24 hour   Intake 1900 ml   Output 50 ml   Net 1850 ml     General: No acute distress  Skin:  No rash  Eyes:  Sclerae anicteric  ENT:  Moist mucous membranes  Neck:  Supple, symmetric  Chest:  Clear to auscultation bilaterally  CVS:  Regular rate and rhythm  Abdomen:  Soft, nontender, nondistended  Extremities:  No edema  Neuro:  Lethargic  Psych:  Appropriate affect    Lab Results:   Pending     Radiology Results:   IR carotid stent    (Results Pending)

## 2019-09-25 NOTE — OP NOTE
OPERATIVE REPORT  PATIENT NAME: Ema Powell    :  1950  MRN: [id number]  Pt Location: [facility] HYBRID OR ROOM 02    SURGERY DATE: 2019    Surgeon(s) and Role:     * Prashanth Nielsen DO - Primary     *  Labor Doctor, MD - Assisting  No qualified surgical residents were available to assist with the case  Dr Ace Morrison assistance was required for proper exposure, dissection and manipulation of wires, balloons, and stent with risk of distal athero-embolization/stroke  Preop Diagnosis:  Atrial fibrillation, unspecified type ([address] ) [I48 91]    Post-Op Diagnosis Codes:     * Atrial fibrillation, unspecified type ([address] ) [I48 91]    Procedure(s) (LRB):  ANGIOPLASTY ARTERY CAROTID W/ STENT (Right)    Specimen(s):  * No specimens in log *    Estimated Blood Loss:   50 mL    Drains:  * No LDAs found *    Anesthesia Type:   GETA    Operative Indications:  Atrial fibrillation, unspecified type ([address] ) [I48 91]  Modesto Lenz is a 61-year-old smoker who was recently referred to our office due to high-grade right carotid stenosis  MR imaging of the head also demonstrated multifocal foci of diffusion restriction throughout majority of right MCA territory  Right transcervical carotid artery stenting recommended  The procedure, risks, benefits, alternatives, and anticipated postop course were discussed in detail  All questions were answered to satisfaction  Patient was agreeable to proceed  Written consent was obtained  Patient brought to the hybrid operating room for the above-mentioned procedure      Operative Findings:  -Decision to proceed with case under general anesthesia after attempts at positioning patient to provide adequate exposure of right neck was extremely challenging as patient had limited neck rotation mobility and began to complain of left shoulder pain with positioning his right arm at the side   -Eccentric plaque at carotid bulb/proximal ICA  -ECA not visualized on DSA consistent with chronic occlusion  -Post stent there is <50% residual stenosis  This despite both pre and post balloon angioplasty  -Patient awoke moving all extremities  Complications:   None immediately apparent      Procedure and Technique:     Prema Em was properly identified in the preop holding area  Patient's name, laterality, and nature procedure verified  The operative site was marked  Patient was brought to the operating room where he was positioned supine on the OR table  Attempted placing a shoulder roll and turning patient's head to the left to facilitate exposure of the right neck was extremely challenging in addition patient began to complain of pain in the left shoulder  Due to rather large neck and concern with patient cooperation to remain still during procedure it was decided to proceed with case under general anesthesia  After adequate induction general anesthesia patient was intubated without difficulty  The patient's neck was then able to be rotated to the left more easily  The neck was prepped with chlorhexidine and draped in usual sterile fashion  Preoperative dose of antibiotics was administered  A formal time-out was performed and all were in agreement  A 23 centimeter skin incision was made in a transverse fashion between the 2 heads of SCM approximately 1-2 finger breaths above the clavicle  Electrocautery was used to dissect through the soft tissue and the platysma was divided  The sternocleidomastoid muscle was identified and the 2 heads were   The internal jugular vein was identified and dissected free longitudinally over a 23 centimeters segment  A small venous  Branch originating off medial aspect of IJV was isolated and divided between silk ties to allow retraction of internal jugular vein laterally  The jugular vein was retracted laterally exposing the common carotid artery  The vagus nerve was not visualized within the surgical field   The anterior surface of the common carotid artery was exposed over a 23 centimeter segment and encircled inferiorly with a umbilical tape  11,000 IV heparin was administered  An ACT level was obtained and an additional 2000 units of IV heparin was given  A pursestring suture was placed on the anterior surface of the common carotid artery with a 5-0 Prolene suture and left untied  The left femoral vein was then imaged via ultrasound and cannulated with a micropuncture kit and a 5 [place] sheath was placed  The common carotid artery was cannulated with a micropuncture system through the pursestring suture  The dilator was then placed and images were obtained in the AP projection clearly identifying the distal CCA/ICA eccentric plaque/stenosis  The ECA was not visualized/occluded  An angled wire was then advanced through microsheath under fluoroscopy  The microsheath was removed and the 8Fr arterial sheath placed  The dilator and wire were removed  Interval imaging carried out post sheath placement to ensure adequate placement  The sheath was then secured with silk suture  The reversal of flow 8Fr venous sheath was then placed  The ENROUTE neuroprotective reversal of flow system was then prepped and connected to the arterial and venous sheaths in the usual fashion  Passive reversal of flow confirmed  At this point, the common carotid artery was occluded with a vascular clamp  A roadmap image was obtained and a [employer] wire was used to traverse the internal carotid artery stenosis  A 5mm  x 30 mm balloon was then positioned across the stenosis and inflated  The patient remained hemodynamically stable during this inflation  A 10 mm x 40mm self-expanding stent was then positioned across the stenosis and deployed under fluoroscopy  Post stent digital subtraction imaging demonstrated stent constrained  at point of maximal stenosis  Post stent dilatation was carried out using in the 5 mm x 30 mm balloon     Completion imaging continued to demonstrate residual stenosis less than 50% in separate obliquities  No additional attempts at post stent dilation performed  Total flow reversal time 12min  The wire was then removed and the vascular clamp was released    The reversal of flow tubing and filter were then disconnected, drained into the venous system, and then removed  The reversal of flow arterial sheath was then removed and the puncture site closed with the previously placed 5-0 Prolene pursestring suture  No bleeding points were identified  A piece of fibrillar placed within wound to assist with minor oozing  Patient given 40mg protamine  The platysma was reapproximated with a running 3-0 monocryl suture  The skin was closed with a running 4-0 monocryl suture and dressed with histoacryl glue/ telfa/tegaderm  The left femoral  venous sheath was removed and manual compression was held until hemostasis was achieved  The patient was awakened, extubated and noted to be moving all extremities  He was transferred to recovery room in satisfactory condition  Vascular Quality Initiative - Carotid Artery Stent      Anatomic High Risk Factors: none (physiologically high risk)    Pre-op Imaging is CT/CTA: Yes      Lesion Calcification: 26-50% circumference    Arch Atherosclerosis: Mild    Urgency: Elective      ASA: III  Anesthesia:  General, IV Sedation with Anesthesia    Indication: Symptomatic Stenosis    Arch Type: Type I    Bovine Arch: no     Approach: Carotid open     Medication Loading: ASA or P2Y12 antagonist    Prophylactic Anti-bradyarrhythmic yes    Anticoagulant: Heparin    Antiplatelet IIb/IIIa: no    Bradyarrhythmia Requiring Tx: No    Distinct Lesions Treated: 1      If Distinct Lesions Treated is 1, Second Stenosis (Not Treated): No    Lesion Type:  Atherosclerosis  Lesion Side: right    Lesion Location: Bifurcation     ICA Distal Tortuosity: None/Mild    Lesion Length: 25mm    Lesion Stenosis:70%    Protection Device Used: Yes, successful      Protection Device Type: Flow reversal     Flow Reversal Type: [address] [address] ENROUTE      Flow Reversal Time: 12 minutes    Pre-dilate Before Protection Device: yes  Angioplasty required in order to cross lesion with protection device  For TCAR procedures answer yes if vessel pre-dilated prior to stent placement, or no if not pre-dilated before stent placement  Pre-dilate Lesion: yes  Pre-dilate Lesion: Angioplasty required in order to cross lesion for stent placement  Required even if there is a technical failure in stent deployment       Technical Failure: No       Number of Stents: 1    Post- dilate: Yes, Post Dilate : Balloon Diameter 5mm(2-15 mm)     Neurologic Change: No    Intra-Cranial Completion Angiogram: No        I was present for the entire procedure, A qualified resident physician was not available and A co-surgeon was required because of skills and techniques relevant to speciality    Patient Disposition:  PACU     SIGNATURE: Ulises Hendrickson DO  DATE: September 25, 2019  TIME: 11:37 AM

## 2019-09-25 NOTE — ANESTHESIA PREPROCEDURE EVALUATION
Review of Systems/Medical History  Patient summary reviewed  Chart reviewed      Cardiovascular  EKG reviewed, Hyperlipidemia, Hypertension , Past MI , CAD , Cardiac stents  Dysrhythmias , atrial fibrillation, ULLOA,    Pulmonary  Smoker cigarette smoker  , COPD moderate- medication dependent , No shortness of breath,        GI/Hepatic     Hiatal hernia,             Endo/Other     GYN       Hematology   Musculoskeletal    Arthritis     Neurology    CVA , no residual symptoms,    Psychology           Physical Exam    Airway    Mallampati score: I         Dental   upper dentures and lower dentures,     Cardiovascular  Rhythm: regular, Rate: normal,     Pulmonary  Breath sounds normal,     Other Findings        Anesthesia Plan  ASA Score- 3     Anesthesia Type- IV sedation with anesthesia and general with ASA Monitors  Additional Monitors:   Airway Plan:     Comment: Plan for MAC  Back up plan includes GA with ETT        Plan Factors- Patient instructed to abstain from smoking on day of procedure  Patient did not smoke on day of surgery  Induction- intravenous  Postoperative Plan-     Informed Consent- Anesthetic plan and risks discussed with patient  I personally reviewed this patient with the CRNA  Discussed and agreed on the Anesthesia Plan with the CRNA  Shannan Cabral

## 2019-09-26 ENCOUNTER — TRANSITIONAL CARE MANAGEMENT (OUTPATIENT)
Dept: FAMILY MEDICINE CLINIC | Facility: MEDICAL CENTER | Age: 69
End: 2019-09-26

## 2019-09-26 VITALS
BODY MASS INDEX: 31.81 KG/M2 | DIASTOLIC BLOOD PRESSURE: 89 MMHG | TEMPERATURE: 98.7 F | HEIGHT: 73 IN | HEART RATE: 86 BPM | SYSTOLIC BLOOD PRESSURE: 138 MMHG | WEIGHT: 240 LBS | OXYGEN SATURATION: 95 % | RESPIRATION RATE: 18 BRPM

## 2019-09-26 LAB
ANION GAP SERPL CALCULATED.3IONS-SCNC: 2 MMOL/L (ref 4–13)
APTT PPP: 35 SECONDS (ref 23–37)
BUN SERPL-MCNC: 23 MG/DL (ref 5–25)
CALCIUM SERPL-MCNC: 8.1 MG/DL (ref 8.3–10.1)
CHLORIDE SERPL-SCNC: 108 MMOL/L (ref 100–108)
CO2 SERPL-SCNC: 25 MMOL/L (ref 21–32)
CREAT SERPL-MCNC: 0.82 MG/DL (ref 0.6–1.3)
ERYTHROCYTE [DISTWIDTH] IN BLOOD BY AUTOMATED COUNT: 14.7 % (ref 11.6–15.1)
GFR SERPL CREATININE-BSD FRML MDRD: 92 ML/MIN/1.73SQ M
GLUCOSE SERPL-MCNC: 115 MG/DL (ref 65–140)
HCT VFR BLD AUTO: 40.6 % (ref 36.5–49.3)
HGB BLD-MCNC: 12.9 G/DL (ref 12–17)
INR PPP: 1.28 (ref 0.84–1.19)
MCH RBC QN AUTO: 31.3 PG (ref 26.8–34.3)
MCHC RBC AUTO-ENTMCNC: 31.8 G/DL (ref 31.4–37.4)
MCV RBC AUTO: 99 FL (ref 82–98)
PLATELET # BLD AUTO: 196 THOUSANDS/UL (ref 149–390)
PMV BLD AUTO: 10.1 FL (ref 8.9–12.7)
POTASSIUM SERPL-SCNC: 4.5 MMOL/L (ref 3.5–5.3)
PROTHROMBIN TIME: 15.6 SECONDS (ref 11.6–14.5)
RBC # BLD AUTO: 4.12 MILLION/UL (ref 3.88–5.62)
SODIUM SERPL-SCNC: 135 MMOL/L (ref 136–145)
WBC # BLD AUTO: 8.19 THOUSAND/UL (ref 4.31–10.16)

## 2019-09-26 PROCEDURE — 85027 COMPLETE CBC AUTOMATED: CPT | Performed by: STUDENT IN AN ORGANIZED HEALTH CARE EDUCATION/TRAINING PROGRAM

## 2019-09-26 PROCEDURE — 85610 PROTHROMBIN TIME: CPT | Performed by: STUDENT IN AN ORGANIZED HEALTH CARE EDUCATION/TRAINING PROGRAM

## 2019-09-26 PROCEDURE — 80048 BASIC METABOLIC PNL TOTAL CA: CPT | Performed by: STUDENT IN AN ORGANIZED HEALTH CARE EDUCATION/TRAINING PROGRAM

## 2019-09-26 PROCEDURE — 99232 SBSQ HOSP IP/OBS MODERATE 35: CPT | Performed by: INTERNAL MEDICINE

## 2019-09-26 PROCEDURE — 85730 THROMBOPLASTIN TIME PARTIAL: CPT | Performed by: STUDENT IN AN ORGANIZED HEALTH CARE EDUCATION/TRAINING PROGRAM

## 2019-09-26 PROCEDURE — 99024 POSTOP FOLLOW-UP VISIT: CPT | Performed by: SURGERY

## 2019-09-26 PROCEDURE — NC001 PR NO CHARGE: Performed by: SURGERY

## 2019-09-26 RX ORDER — ACETAMINOPHEN 325 MG/1
650 TABLET ORAL EVERY 6 HOURS PRN
Qty: 30 TABLET | Refills: 0
Start: 2019-09-26 | End: 2019-10-17 | Stop reason: ALTCHOICE

## 2019-09-26 RX ORDER — CLOPIDOGREL BISULFATE 75 MG/1
75 TABLET ORAL DAILY
Qty: 90 TABLET | Refills: 2 | Status: SHIPPED | OUTPATIENT
Start: 2019-09-27 | End: 2019-12-03 | Stop reason: ALTCHOICE

## 2019-09-26 RX ADMIN — PANTOPRAZOLE SODIUM 40 MG: 40 TABLET, DELAYED RELEASE ORAL at 06:20

## 2019-09-26 RX ADMIN — CLOPIDOGREL BISULFATE 75 MG: 75 TABLET ORAL at 09:14

## 2019-09-26 RX ADMIN — SODIUM CHLORIDE 75 ML/HR: 0.9 INJECTION, SOLUTION INTRAVENOUS at 01:00

## 2019-09-26 RX ADMIN — APIXABAN 5 MG: 5 TABLET, FILM COATED ORAL at 09:14

## 2019-09-26 NOTE — UTILIZATION REVIEW
Initial Clinical Review  SCHEDULED INPATIENT SURGICAL PROCEDURE 9/25/19 / Joni Davies [id number] / Niko Melendez # [id number]    Age/Sex: 81 y o  male    Surgery Date:9/25/19 Wednesday    Procedure: ANGIOPLASTY ARTERY CAROTID W/ STENT (Right)    Anesthesia: GETA    Operative Indications:  Atrial fibrillation, unspecified type [facility]) []  61-year-old smoker who was recently referred to our office due to high-grade right carotid stenosis  MR imaging of the head also demonstrated multifocal foci of diffusion restriction throughout majority of right MCA territory  Right transcervical carotid artery stenting recommended  T    Operative Findings:  -Eccentric plaque at carotid bulb/proximal ICA  -ECA not visualized on DSA consistent with chronic occlusion  -Post stent there is <50% residual stenosis   This despite both pre and post balloon angioplasty  -Patient awoke moving all extremities      Admission Orders: Date/Time/Statement: Inpatient Admission Orders (From admission, onward)     Ordered        09/25/19 1101  Inpatient Admission  Once                Orders Placed This Encounter   Procedures    Inpatient Admission     Standing Status:   Standing     Number of Occurrences:   1     Order Specific Question:   Admitting Physician     Answer:   Sarah Botello [[id number]]     Order Specific Question:   Level of Care     Answer:   Level 1 Stepdown [13]     Order Specific Question:   Estimated length of stay     Answer:   Inpatient Only Surgery     Vital Signs: /89   Pulse 86   Temp 98 7 °F (37 1 °C)   Resp 18   Ht 6' 1" (1 854 m)   Wt 109 kg (240 lb)   SpO2 95%   BMI 31 66 kg/m²     Diet: Regular House Diet    Mobility: Up with Assistance    DVT Prophylaxis: Eliquis, Plavix, SCD    IVF sodium chloride 0 9 % infusion   Ordered Dose: 75 mL/hr Route: Intravenous Frequency: Continuous @ 75 mL/hr   Scheduled Start Date/Time: 09/25/19 1130 End Date/Time: 09/26/19 0615       Medications/Pain Control:   Current Facility-Administered Medications:  acetaminophen 650 mg Oral Q6H PRN   albuterol 2 puff Inhalation Q4H PRN   apixaban 5 mg Oral BID   atorvastatin 20 mg Oral QPM   clopidogrel 75 mg Oral Daily   Labetalol HCl 5 mg Intravenous Q15 Min PRN   And      hydrALAZINE 15 mg Intravenous Q15 Min PRN   And      niCARdipine 1-15 mg/hr Intravenous Continuous PRN  TITRATE  - 160   HYDROmorphone 0 5 mg Intravenous Q3H PRN   nicotine 1 patch Transdermal Q24H   ondansetron 4 mg Intravenous Q6H PRN   oxyCODONE 10 mg Oral Q4H PRN   oxyCODONE 5 mg Oral Q4H PRN   pantoprazole 40 mg Oral Early Morning   senna-docusate sodium 1 tablet Oral HS     Network Utilization Review Department  Phone: [phone]; Fax [phone]  [email or web address]  ATTENTION: Please call with any questions or concerns to [phone]  and carefully listen to the prompts so that you are directed to the right person  Send all requests for admission clinical reviews, approved or denied determinations and any other requests to fax [phone]   All voicemails are confidential

## 2019-09-26 NOTE — PROGRESS NOTES
NEPHROLOGY PROGRESS NOTE    Robson Cortez 81 y o  male MRN: [id number]  Unit/Bed#: [facility] 526-01 Encounter: [id number]  Reason for Consult:  Renal insufficiency    Patient is stable postoperatively be overnight for discharge today  ASSESSMENT/PLAN:  1  Renal  The patient was admitted underwent carotid angioplasty and stent placement with contrast exposure  Preop labs showed a creatinine that was slightly elevated and he was on NSAIDs  Today labs were checked and renal function is normal at 0 8  Patient is stable for discharge and this is anticipated today  No need for outpatient renal follow-up at this time  2  Carotid artery disease  Status post stent placement and angioplasty  SUBJECTIVE:  Review of Systems   Constitution: Negative for chills and fever  HENT: Negative  Eyes: Negative  Cardiovascular: Negative  Negative for chest pain, dyspnea on exertion, orthopnea and palpitations  Respiratory: Negative  Negative for cough, shortness of breath and wheezing  Gastrointestinal: Negative  Negative for abdominal pain, diarrhea, nausea and vomiting  Genitourinary: Negative  Psychiatric/Behavioral: Negative for altered mental status  OBJECTIVE:  Current Weight: Weight - Scale: 109 kg (240 lb)  Vitals:Temp (24hrs), Av °F (36 7 °C), Min:97 3 °F (36 3 °C), Max:98 7 °F (37 1 °C)  Current: Temperature: 98 7 °F (37 1 °C)   Blood pressure 138/89, pulse 86, temperature 98 7 °F (37 1 °C), resp  rate 18, height 6' 1" (1 854 m), weight 109 kg (240 lb), SpO2 95 %  , Body mass index is 31 66 kg/m²        Intake/Output Summary (Last 24 hours) at 2019 1122  Last data filed at 2019 0915  Gross per 24 hour   Intake 2948 75 ml   Output 1525 ml   Net 1423 75 ml       Physical Exam: /89   Pulse 86   Temp 98 7 °F (37 1 °C)   Resp 18   Ht 6' 1" (1 854 m)   Wt 109 kg (240 lb)   SpO2 95%   BMI 31 66 kg/m²   Physical Exam   Constitutional: He is oriented to person, place, and time  No distress  Neck: Neck supple  No JVD present  Cardiovascular: Normal rate and regular rhythm  Exam reveals no friction rub  Pulmonary/Chest: Effort normal and breath sounds normal  No respiratory distress  He has no wheezes  He has no rales  Abdominal: Soft  Bowel sounds are normal  He exhibits no distension  There is no tenderness  Neurological: He is alert and oriented to person, place, and time         Medications:    Current Facility-Administered Medications:     acetaminophen (TYLENOL) tablet 650 mg, 650 mg, Oral, Q6H PRN, Velasquez Orona MD    albuterol (PROVENTIL HFA,VENTOLIN HFA) inhaler 2 puff, 2 puff, Inhalation, Q4H PRN, Velasquez Orona MD    apixaban (ELIQUIS) tablet 5 mg, 5 mg, Oral, BID, Velasquez Orona MD, 5 mg at 09/26/19 0914    atorvastatin (LIPITOR) tablet 20 mg, 20 mg, Oral, QPM, Velasquez Orona MD, 20 mg at 09/25/19 1757    clopidogrel (PLAVIX) tablet 75 mg, 75 mg, Oral, Daily, Velasquez Orona MD, 75 mg at 09/26/19 0914    Labetalol HCl (NORMODYNE) injection 5 mg, 5 mg, Intravenous, Q15 Min PRN **AND** hydrALAZINE (APRESOLINE) injection 15 mg, 15 mg, Intravenous, Q15 Min PRN **AND** niCARdipine (CARDENE) 25 mg (STANDARD CONCENTRATION) in sodium chloride 0 9% 250 mL, 1-15 mg/hr, Intravenous, Continuous PRN, Velasquez Orona MD    HYDROmorphone (DILAUDID) injection 0 5 mg, 0 5 mg, Intravenous, Q3H PRN, Velasquez Orona MD    nicotine (NICODERM CQ) 21 mg/24 hr TD 24 hr patch 1 patch, 1 patch, Transdermal, Q24H, Velasquez Orona MD    ondansetron (ZOFRAN) injection 4 mg, 4 mg, Intravenous, Q6H PRN, Velasquez Orona MD    oxyCODONE (ROXICODONE) IR tablet 10 mg, 10 mg, Oral, Q4H PRN, Velasquez Orona MD    oxyCODONE (ROXICODONE) IR tablet 5 mg, 5 mg, Oral, Q4H PRN, Velasquez Orona MD    pantoprazole (PROTONIX) EC tablet 40 mg, 40 mg, Oral, Early Morning, Velasquez Orona MD, 40 mg at 09/26/19 0620    senna-docusate sodium (SENOKOT S) 8 6-50 mg per tablet 1 tablet, 1 tablet, Oral, HS, Charlie Messer MD    Current Outpatient Medications:     albuterol (PROVENTIL HFA,VENTOLIN HFA) 90 mcg/act inhaler, inhale 2 puffs by mouth and INTO THE LUNGS every 4 hours if needed for wheezing, Disp: 18 g, Rfl: 0    amLODIPine (NORVASC) 5 mg tablet, Take 1 tablet (5 mg total) by mouth daily, Disp: 90 tablet, Rfl: 0    apixaban (ELIQUIS) 5 mg, Take 1 tablet (5 mg total) by mouth 2 (two) times a day, Disp: 60 tablet, Rfl: 0    atorvastatin (LIPITOR) 20 mg tablet, take 1 tablet by mouth once daily, Disp: 90 tablet, Rfl: 3    losartan (COZAAR) 100 MG tablet, take 1 tablet by mouth once daily, Disp: 30 tablet, Rfl: 0    metoprolol tartrate (LOPRESSOR) 25 mg tablet, Take 1 tablet (25 mg total) by mouth every 12 (twelve) hours, Disp: 60 tablet, Rfl: 0    pantoprazole (PROTONIX) 40 mg tablet, TAKE 1 TABLET BY MOUTH DAILY, Disp: 30 tablet, Rfl: 0    SPIRIVA RESPIMAT 2 5 MCG/ACT AERS inhaler, inhale 2 puffs by mouth and INTO THE LUNGS once daily, Disp: 4 g, Rfl: 0    SYMBICORT 80-4 5 MCG/ACT inhaler, inhale 2 puffs by mouth twice a day Rinse mouth after use, Disp: 10 2 g, Rfl: 0    acetaminophen (TYLENOL) 325 mg tablet, Take 2 tablets (650 mg total) by mouth every 6 (six) hours as needed for mild pain or fever, Disp: 30 tablet, Rfl: 0    [START ON 9/27/2019] clopidogrel (PLAVIX) 75 mg tablet, Take 1 tablet (75 mg total) by mouth daily, Disp: 90 tablet, Rfl: 2    Naproxen Sodium (ALEVE) 220 MG CAPS, Take 2 capsules by mouth daily at bedtime as needed, Disp: , Rfl:     nicotine (NICODERM CQ) 21 mg/24 hr TD 24 hr patch, Place 1 patch on the skin every 24 hours, Disp: , Rfl:     nitroglycerin (NITROSTAT) 0 4 mg SL tablet, Place 1 tablet (0 4 mg total) under the tongue every 5 (five) minutes as needed for chest pain, Disp: 90 tablet, Rfl: 0    sodium chloride 1 g tablet, Take 1 tablet (1 g total) by mouth daily for 90 days, Disp: 90 tablet, Rfl: 2    Laboratory Results:  Lab Results   Component Value Date    WBC 8 19 09/26/2019    HGB 12 9 09/26/2019    HCT 40 6 09/26/2019    MCV 99 (H) 09/26/2019     09/26/2019     Lab Results   Component Value Date    SODIUM 135 (L) 09/26/2019    K 4 5 09/26/2019     09/26/2019    CO2 25 09/26/2019    BUN 23 09/26/2019    CREATININE 0 82 09/26/2019    GLUC 115 09/26/2019    CALCIUM 8 1 (L) 09/26/2019     Lab Results   Component Value Date    CALCIUM 8 1 (L) 09/26/2019     No results found for: LABPROT

## 2019-09-26 NOTE — PROGRESS NOTES
PGY2 Post-Op Check Note     S:    Pt doing well post-op  Denies any complaints  Tolerating diet  Ambulating well  O:     Vitals:    09/25/19 1900   BP: 168/97   Pulse: 102   Resp: (!) 29   Temp: 98 5 °F (36 9 °C)   SpO2: 93%        I/O       09/24 0701 - 09/25 0700 09/25 0701 - 09/26 0700    P  O   240    I V  (mL/kg)  1900 (17 4)    Total Intake(mL/kg)  2140 (19 6)    Urine (mL/kg/hr)  750 (0 5)    Blood  50    Total Output  800    Net  +1340                PE:  GEN: NAD  HEENT: MMM, R neck incision c/d/i with no evidence of edema or hematoma  CV: RRR  Lung: normal effort  Ab: Soft, NT/ND  Extrem: No CCE  Neuro:  A+Ox3, motor and sensation grossly intact       Lab Results   Component Value Date    WBC 5 39 09/12/2019    HGB 14 1 09/12/2019    HCT 44 6 09/12/2019    MCV 97 09/12/2019     09/12/2019     Lab Results   Component Value Date    GLUCOSE 97 06/25/2019    CALCIUM 9 4 09/12/2019    K 3 8 09/12/2019    CO2 30 09/12/2019     09/12/2019    BUN 13 09/12/2019    CREATININE 1 32 (H) 09/12/2019           A/P:   80 y/o M s/p R TCAR  --Regular diet  --d/c IVF in AM  --Neuro checks  --Continue Eliquis/Plavix

## 2019-09-26 NOTE — SOCIAL WORK
Met with pt to discuss role of CM and to discuss any needs pt may have prior to d/c  Pt lives alone in a 1st floor apartment with 1 DIANA  Pt performed ADLs independently pta  Pt ambulates independently  No DME  Pt has hx of STR @ [facility] [place] and hx of VNA but does not recall the agency  Pt drives  Pt denies hx of MH or D&A tx  PCP: Dr Mason Channel  Preferred Pharmacy: [facility] on [address] [facility]    Contact: Kemi Maya (brother) [phone] or [phone]  No POA or LW on file  Pt car on site  He will drive himself home at time of d/c      CM reviewed d/c planning process including the following: identifying help at home, patient preference for d/c planning needs, Discharge Lounge, [facility] Meds to Bed program, availability of treatment team to discuss questions or concerns patient and/or family may have regarding understanding medications and recognizing signs and symptoms once discharged  CM also encouraged patient to follow up with all recommended appointments after discharge  Patient advised of importance for patient and family to participate in managing patients medical well being  No immediate CM needs  CM to remain available until d/c

## 2019-09-26 NOTE — PROGRESS NOTES
Progress Note - Vascular Surgery  Karen Stakes 81 y o  male MRN: [id number]  Unit/Bed#: [facility] 526-01 Encounter: [id number]    Assessment:  69M w/R carotid stenosis s/p R TCAR 9/25  Plan:  - regular diet  - d/c A line  - d/c IVF  - med surg, can come off SD1  - neuro checks  - continue eliquis/plavix  - PT/OT  - d/c home today      Subjective/Objective   Subjective: currently denies complaints, pain controlled    Objective:    Blood pressure 133/62, pulse 97, temperature 98 2 °F (36 8 °C), temperature source Oral, resp  rate 20, height 6' 1" (1 854 m), weight 109 kg (240 lb), SpO2 98 %  ,Body mass index is 31 66 kg/m²  I/O last 24 hours: In: 3348 8 [P O :240;  I V :3108 8]  Out: 1575 [Urine:1525; Blood:50]    Invasive Devices     Peripheral Intravenous Line            Peripheral IV 09/25/19 Left Forearm less than 1 day    Peripheral IV 09/25/19 Left Hand less than 1 day                Physical Exam:   NAD, alert and oriented x3  Normocephalic, atraumatic  MMM, EOMI, PERRLA  Norm resp effort on RA  RRR  Abd soft, NT/ND  No calf tenderness or peripheral edema  Motor/sensation intact in distal extremities  CN grossly intact, no focal neuro deficits  R neck incision cdi no hematoma, dressing dry  L groin incision cdi closed with histoacryl  -rash/lesions      Lab, Imaging and other studies:  Lab Results   Component Value Date    WBC 5 39 09/12/2019    HGB 14 1 09/12/2019    HCT 44 6 09/12/2019    MCV 97 09/12/2019     09/12/2019      Lab Results   Component Value Date    GLUCOSE 97 06/25/2019    CALCIUM 9 4 09/12/2019    K 3 8 09/12/2019    CO2 30 09/12/2019     09/12/2019    BUN 13 09/12/2019    CREATININE 1 32 (H) 09/12/2019       VTE Pharmacologic Prophylaxis: Sequential compression device (Venodyne) Eliquis  VTE Mechanical Prophylaxis: sequential compression device

## 2019-09-26 NOTE — DISCHARGE INSTRUCTIONS
DISCHARGE INSTRUCTIONS  CAROTID ARTERIOGRAM/STENT    Following discharge from the hospital, you may have some questions about your procedure, your activities or your general condition  These instructions may answer some of your questions and help you adjust during the first few weeks following your procedure  ACTIVITY:   Resume your normal activities and exercise schedule as tolerated  If you were driving prior to the procedure, you may resume driving one week following discharge from the hospital     DIET:  Resume your normal diet  Try to eat low fat and low cholesterol foods  Drink more liquids than usual for the next 24 hours  RECURRENT SYMPTOMS If you develop any new numbness, weakness, blindness, or difficulty with speech after discharge CALL 911 or go to the nearest Emergency department immediately  INCISION: Your physician may have chosen to use a type of adhesive glue, to close your incision  The glue is used to cover the incision, assist in closure, and prevent contamination  This adhesive will darken and peel away on its own within one to two weeks  You may shower after your surgery, but do not scrub the incision  Sitting in a tub is not recommended for the two days following surgery or if you have any open wounds  It is normal to have some bruising, swelling or mild discoloration around the incision  IF increasing redness or pain develops, call our office immediately  If one is present, you may remove the dressing, band-aid or steri-strips over your wound after two days  If you notice any active bleeding, apply pressure to the site and call our office ([phone])  FOLLOW UP STUDIES:  Doppler ultrasound studies are very important to your post-procedure care  Your Physician will arrange for them at your first post-procedure visit        PLEASE CALL THE OFFICE IF YOU HAVE ANY QUESTIONS    [phone]  [phone] TOLL FREE [phone]  [address] [place] [address], [place], [address]  [address], [address], [facility], [address]  [address], RACHEL Lynch [address]  [address] [facility], [facility],[address], [facility], [address]    [address] Mer[address], Gely Coron[address]  [address] [facility], [facility] [address]   [address] [facility], [address] [facility], [address]  [address], [address] [facility], [place] [address]

## 2019-09-26 NOTE — DISCHARGE SUMMARY
Discharge Summary - Janice Couch 81 y o  male MRN: [id number]    Unit/Bed#: [facility] 526-01 Encounter: [id number]    Admission Date:   Admission Orders (From admission, onward)     Ordered        09/25/19 1101  Inpatient Admission  Once                     Admitting Diagnosis: Atrial fibrillation, unspecified type [facility]) [I48 [id number]]    HPI: From Dr Margene Claude office visit 7/30: "Susi Jones is a 62-year-old smoker who recently presented  To hospital (at end of June) with complaints of left arm weakness / numbness  Workup revealed right MCA distribution infarcts  CT imaging demonstrated right carotid 70% stenosis with bulky calcification  He was ultimately discharged however presented again a few weeks ago with altered mental status /recurrent falls  Neurology felt symptoms perhaps represented recrudence of CVA "    Procedures Performed: No orders of the defined types were placed in this encounter  Summary of Hospital Course: Patient was admitted on 09/25 for elective right transcervical carotid artery revascularization  The procedure was uncomplicated  We did have to place the patient under general anesthesia when attempts at positioning patient were challenging secondary to limited neck mobility  A stent was placed and post stent placement there was less than 50% residual stenosis  Balloon angioplasty was performed pre and post stent placement  Patient awoke moving all extremities  Postoperative day 1, the patient was doing well  His pain was controlled with only oral medications and his incision sites appeared well  He was able to tolerate a regular diet and ambulate at his baseline  He was continued on his home Eliquis and started on Plavix  He will be discharged on both of these medications  He will follow up in the vascular office in approximately 2 weeks      Significant Findings, Care, Treatment and Services Provided: see above    Complications: none    Discharge Diagnosis: R carotid stenosis    Resolved Problems  Date Reviewed: 9/25/2019    None          Condition at Discharge: good         Discharge instructions/Information to patient and family:   See after visit summary for information provided to patient and family  Provisions for Follow-Up Care:  See after visit summary for information related to follow-up care and any pertinent home health orders  PCP: Terrence Cain DO    Disposition: Home    Planned Readmission: No      Discharge Statement   I spent 30 minutes discharging the patient  This time was spent on the day of discharge  I had direct contact with the patient on the day of discharge  Additional documentation is required if more than 30 minutes were spent on discharge  Discharge Medications:  See after visit summary for reconciled discharge medications provided to patient and family

## 2019-09-26 NOTE — PLAN OF CARE
Problem: Prexisting or High Potential for Compromised Skin Integrity  Goal: Skin integrity is maintained or improved  Description  INTERVENTIONS:  - Identify patients at risk for skin breakdown  - Assess and monitor skin integrity  - Assess and monitor nutrition and hydration status  - Monitor labs   - Assess for incontinence   - Turn and reposition patient  - Assist with mobility/ambulation  - Relieve pressure over bony prominences  - Avoid friction and shearing  - Provide appropriate hygiene as needed including keeping skin clean and dry  - Evaluate need for skin moisturizer/barrier cream  - Collaborate with interdisciplinary team   - Patient/family teaching  - Consider wound care consult   Outcome: Progressing     Problem: NEUROSENSORY - ADULT  Goal: Achieves stable or improved neurological status  Description  INTERVENTIONS  - Monitor and report changes in neurological status  - Monitor vital signs such as temperature, blood pressure, glucose, and any other labs ordered   - Initiate measures to prevent increased intracranial pressure  - Monitor for seizure activity and implement precautions if appropriate      Outcome: Progressing  Goal: Remains free of injury related to seizures activity  Description  INTERVENTIONS  - Maintain airway, patient safety  and administer oxygen as ordered  - Monitor patient for seizure activity, document and report duration and description of seizure to physician/advanced practitioner  - If seizure occurs,  ensure patient safety during seizure  - Reorient patient post seizure  - Seizure pads on all 4 side rails  - Instruct patient/family to notify RN of any seizure activity including if an aura is experienced  - Instruct patient/family to call for assistance with activity based on nursing assessment  - Administer anti-seizure medications if ordered    Outcome: Progressing  Goal: Achieves maximal functionality and self care  Description  INTERVENTIONS  - Monitor swallowing and airway patency with patient fatigue and changes in neurological status  - Encourage and assist patient to increase activity and self care     - Encourage visually impaired, hearing impaired and aphasic patients to use assistive/communication devices  Outcome: Progressing     Problem: CARDIOVASCULAR - ADULT  Goal: Maintains optimal cardiac output and hemodynamic stability  Description  INTERVENTIONS:  - Monitor I/O, vital signs and rhythm  - Monitor for S/S and trends of decreased cardiac output  - Administer and titrate ordered vasoactive medications to optimize hemodynamic stability  - Assess quality of pulses, skin color and temperature  - Assess for signs of decreased coronary artery perfusion  - Instruct patient to report change in severity of symptoms  Outcome: Progressing  Goal: Absence of cardiac dysrhythmias or at baseline rhythm  Description  INTERVENTIONS:  - Continuous cardiac monitoring, vital signs, obtain 12 lead EKG if ordered  - Administer antiarrhythmic and heart rate control medications as ordered  - Monitor electrolytes and administer replacement therapy as ordered  Outcome: Progressing     Problem: RESPIRATORY - ADULT  Goal: Achieves optimal ventilation and oxygenation  Description  INTERVENTIONS:  - Assess for changes in respiratory status  - Assess for changes in mentation and behavior  - Position to facilitate oxygenation and minimize respiratory effort  - Oxygen administered by appropriate delivery if ordered  - Initiate smoking cessation education as indicated  - Encourage broncho-pulmonary hygiene including cough, deep breathe, Incentive Spirometry  - Assess the need for suctioning and aspirate as needed  - Assess and instruct to report SOB or any respiratory difficulty  - Respiratory Therapy support as indicated  Outcome: Progressing     Problem: GASTROINTESTINAL - ADULT  Goal: Minimal or absence of nausea and/or vomiting  Description  INTERVENTIONS:  - Administer IV fluids if ordered to ensure adequate hydration  - Maintain NPO status until nausea and vomiting are resolved  - Nasogastric tube if ordered  - Administer ordered antiemetic medications as needed  - Provide nonpharmacologic comfort measures as appropriate  - Advance diet as tolerated, if ordered  - Consider nutrition services referral to assist patient with adequate nutrition and appropriate food choices  Outcome: Progressing  Goal: Maintains or returns to baseline bowel function  Description  INTERVENTIONS:  - Assess bowel function  - Encourage oral fluids to ensure adequate hydration  - Administer IV fluids if ordered to ensure adequate hydration  - Administer ordered medications as needed  - Encourage mobilization and activity  - Consider nutritional services referral to assist patient with adequate nutrition and appropriate food choices  Outcome: Progressing  Goal: Maintains adequate nutritional intake  Description  INTERVENTIONS:  - Monitor percentage of each meal consumed  - Identify factors contributing to decreased intake, treat as appropriate  - Assist with meals as needed  - Monitor I&O, weight, and lab values if indicated  - Obtain nutrition services referral as needed  Outcome: Progressing  Goal: Establish and maintain optimal ostomy function  Description  INTERVENTIONS:  - Assess bowel function  - Encourage oral fluids to ensure adequate hydration  - Administer IV fluids if ordered to ensure adequate hydration   - Administer ordered medications as needed  - Encourage mobilization and activity  - Nutrition services referral to assist patient with appropriate food choices  - Assess stoma site  - Consider wound care consult   Outcome: Progressing     Problem: GENITOURINARY - ADULT  Goal: Maintains or returns to baseline urinary function  Description  INTERVENTIONS:  - Assess urinary function  - Encourage oral fluids to ensure adequate hydration if ordered  - Administer IV fluids as ordered to ensure adequate hydration  - Administer ordered medications as needed  - Offer frequent toileting  - Follow urinary retention protocol if ordered  Outcome: Progressing  Goal: Absence of urinary retention  Description  INTERVENTIONS:  - Assess patients ability to void and empty bladder  - Monitor I/O  - Bladder scan as needed  - Discuss with physician/AP medications to alleviate retention as needed  - Discuss catheterization for long term situations as appropriate  Outcome: Progressing  Goal: Urinary catheter remains patent  Description  INTERVENTIONS:  - Assess patency of urinary catheter  - If patient has a chronic acosta, consider changing catheter if non-functioning  - Follow guidelines for intermittent irrigation of non-functioning urinary catheter  Outcome: Progressing     Problem: METABOLIC, FLUID AND ELECTROLYTES - ADULT  Goal: Electrolytes maintained within normal limits  Description  INTERVENTIONS:  - Monitor labs and assess patient for signs and symptoms of electrolyte imbalances  - Administer electrolyte replacement as ordered  - Monitor response to electrolyte replacements, including repeat lab results as appropriate  - Instruct patient on fluid and nutrition as appropriate  Outcome: Progressing  Goal: Fluid balance maintained  Description  INTERVENTIONS:  - Monitor labs   - Monitor I/O and WT  - Instruct patient on fluid and nutrition as appropriate  - Assess for signs & symptoms of volume excess or deficit  Outcome: Progressing  Goal: Glucose maintained within target range  Description  INTERVENTIONS:  - Monitor Blood Glucose as ordered  - Assess for signs and symptoms of hyperglycemia and hypoglycemia  - Administer ordered medications to maintain glucose within target range  - Assess nutritional intake and initiate nutrition service referral as needed  Outcome: Progressing     Problem: SKIN/TISSUE INTEGRITY - ADULT  Goal: Skin integrity remains intact  Description  INTERVENTIONS  - Identify patients at risk for skin breakdown  - Assess and monitor skin integrity  - Assess and monitor nutrition and hydration status  - Monitor labs (i e  albumin)  - Assess for incontinence   - Turn and reposition patient  - Assist with mobility/ambulation  - Relieve pressure over bony prominences  - Avoid friction and shearing  - Provide appropriate hygiene as needed including keeping skin clean and dry  - Evaluate need for skin moisturizer/barrier cream  - Collaborate with interdisciplinary team (i e  Nutrition, Rehabilitation, etc )   - Patient/family teaching  Outcome: Progressing  Goal: Incision(s), wounds(s) or drain site(s) healing without S/S of infection  Description  INTERVENTIONS  - Assess and document risk factors for skin impairment   - Assess and document dressing, incision, wound bed, drain sites and surrounding tissue  - Consider nutrition services referral as needed  - Oral mucous membranes remain intact  - Provide patient/ family education  Outcome: Progressing  Goal: Oral mucous membranes remain intact  Description  INTERVENTIONS  - Assess oral mucosa and hygiene practices  - Implement preventative oral hygiene regimen  - Implement oral medicated treatments as ordered  - Initiate Nutrition services referral as needed  Outcome: Progressing     Problem: HEMATOLOGIC - ADULT  Goal: Maintains hematologic stability  Description  INTERVENTIONS  - Assess for signs and symptoms of bleeding or hemorrhage  - Monitor labs  - Administer supportive blood products/factors as ordered and appropriate  Outcome: Progressing     Problem: MUSCULOSKELETAL - ADULT  Goal: Maintain or return mobility to safest level of function  Description  INTERVENTIONS:  - Assess patient's ability to carry out ADLs; assess patient's baseline for ADL function and identify physical deficits which impact ability to perform ADLs (bathing, care of mouth/teeth, toileting, grooming, dressing, etc )  - Assess/evaluate cause of self-care deficits   - Assess range of motion  - Assess patient's mobility  - Assess patient's need for assistive devices and provide as appropriate  - Encourage maximum independence but intervene and supervise when necessary  - Involve family in performance of ADLs  - Assess for home care needs following discharge   - Consider OT consult to assist with ADL evaluation and planning for discharge  - Provide patient education as appropriate  Outcome: Progressing  Goal: Maintain proper alignment of affected body part  Description  INTERVENTIONS:  - Support, maintain and protect limb and body alignment  - Provide patient/ family with appropriate education  Outcome: Progressing     Problem: PAIN - ADULT  Goal: Verbalizes/displays adequate comfort level or baseline comfort level  Description  Interventions:  - Encourage patient to monitor pain and request assistance  - Assess pain using appropriate pain scale  - Administer analgesics based on type and severity of pain and evaluate response  - Implement non-pharmacological measures as appropriate and evaluate response  - Consider cultural and social influences on pain and pain management  - Notify physician/advanced practitioner if interventions unsuccessful or patient reports new pain  Outcome: Progressing     Problem: INFECTION - ADULT  Goal: Absence or prevention of progression during hospitalization  Description  INTERVENTIONS:  - Assess and monitor for signs and symptoms of infection  - Monitor lab/diagnostic results  - Monitor all insertion sites, i e  indwelling lines, tubes, and drains  - Monitor endotracheal if appropriate and nasal secretions for changes in amount and color  - Russell appropriate cooling/warming therapies per order  - Administer medications as ordered  - Instruct and encourage patient and family to use good hand hygiene technique  - Identify and instruct in appropriate isolation precautions for identified infection/condition  Outcome: Progressing  Goal: Absence of fever/infection during neutropenic period  Description  INTERVENTIONS:  - Monitor WBC    Outcome: Progressing     Problem: SAFETY ADULT  Goal: Patient will remain free of falls  Description  INTERVENTIONS:  - Assess patient frequently for physical needs  -  Identify cognitive and physical deficits and behaviors that affect risk of falls    -  [place] fall precautions as indicated by assessment   - Educate patient/family on patient safety including physical limitations  - Instruct patient to call for assistance with activity based on assessment  - Modify environment to reduce risk of injury  - Consider OT/PT consult to assist with strengthening/mobility  Outcome: Progressing  Goal: Maintain or return to baseline ADL function  Description  INTERVENTIONS:  -  Assess patient's ability to carry out ADLs; assess patient's baseline for ADL function and identify physical deficits which impact ability to perform ADLs (bathing, care of mouth/teeth, toileting, grooming, dressing, etc )  - Assess/evaluate cause of self-care deficits   - Assess range of motion  - Assess patient's mobility; develop plan if impaired  - Assess patient's need for assistive devices and provide as appropriate  - Encourage maximum independence but intervene and supervise when necessary  - Involve family in performance of ADLs  - Assess for home care needs following discharge   - Consider OT consult to assist with ADL evaluation and planning for discharge  - Provide patient education as appropriate  Outcome: Progressing  Goal: Maintain or return mobility status to optimal level  Description  INTERVENTIONS:  - Assess patient's baseline mobility status (ambulation, transfers, stairs, etc )    - Identify cognitive and physical deficits and behaviors that affect mobility  - Identify mobility aids required to assist with transfers and/or ambulation (gait belt, sit-to-stand, lift, walker, cane, etc )  - [place] fall precautions as indicated by assessment  - Record patient progress and toleration of activity level on Mobility SBAR; progress patient to next Phase/Stage  - Instruct patient to call for assistance with activity based on assessment  - Consider rehabilitation consult to assist with strengthening/weightbearing, etc   Outcome: Progressing     Problem: DISCHARGE PLANNING  Goal: Discharge to home or other facility with appropriate resources  Description  INTERVENTIONS:  - Identify barriers to discharge w/patient and caregiver  - Arrange for needed discharge resources and transportation as appropriate  - Identify discharge learning needs (meds, wound care, etc )  - Arrange for interpretive services to assist at discharge as needed  - Refer to Case Management Department for coordinating discharge planning if the patient needs post-hospital services based on physician/advanced practitioner order or complex needs related to functional status, cognitive ability, or social support system  Outcome: Progressing     Problem: Knowledge Deficit  Goal: Patient/family/caregiver demonstrates understanding of disease process, treatment plan, medications, and discharge instructions  Description  Complete learning assessment and assess knowledge base    Interventions:  - Provide teaching at level of understanding  - Provide teaching via preferred learning methods  Outcome: Progressing

## 2019-09-28 DIAGNOSIS — I10 ESSENTIAL HYPERTENSION: ICD-10-CM

## 2019-09-28 DIAGNOSIS — J44.9 CHRONIC OBSTRUCTIVE PULMONARY DISEASE, UNSPECIFIED COPD TYPE (HCC): ICD-10-CM

## 2019-09-30 DIAGNOSIS — J44.9 CHRONIC OBSTRUCTIVE PULMONARY DISEASE, UNSPECIFIED COPD TYPE (HCC): ICD-10-CM

## 2019-09-30 RX ORDER — LOSARTAN POTASSIUM 100 MG/1
TABLET ORAL
Qty: 30 TABLET | Refills: 0 | Status: SHIPPED | OUTPATIENT
Start: 2019-09-30 | End: 2019-10-01 | Stop reason: SDUPTHER

## 2019-09-30 RX ORDER — DILTIAZEM HYDROCHLORIDE 60 MG/1
TABLET, FILM COATED ORAL
Qty: 10.2 G | Refills: 0 | Status: SHIPPED | OUTPATIENT
Start: 2019-09-30 | End: 2019-10-28 | Stop reason: SDUPTHER

## 2019-09-30 RX ORDER — TIOTROPIUM BROMIDE INHALATION SPRAY 3.12 UG/1
SPRAY, METERED RESPIRATORY (INHALATION)
Qty: 4 G | Refills: 0 | Status: SHIPPED | OUTPATIENT
Start: 2019-09-30 | End: 2019-10-30 | Stop reason: SDUPTHER

## 2019-09-30 RX ORDER — ALBUTEROL SULFATE 90 UG/1
AEROSOL, METERED RESPIRATORY (INHALATION)
Qty: 18 G | Refills: 0 | Status: SHIPPED | OUTPATIENT
Start: 2019-09-30

## 2019-10-01 DIAGNOSIS — I48.19 PERSISTENT ATRIAL FIBRILLATION (HCC): ICD-10-CM

## 2019-10-01 DIAGNOSIS — I10 ESSENTIAL HYPERTENSION: ICD-10-CM

## 2019-10-01 RX ORDER — LOSARTAN POTASSIUM 100 MG/1
100 TABLET ORAL DAILY
Qty: 30 TABLET | Refills: 0 | Status: SHIPPED | OUTPATIENT
Start: 2019-10-01

## 2019-10-01 RX ORDER — AMLODIPINE BESYLATE 5 MG/1
5 TABLET ORAL DAILY
Qty: 30 TABLET | Refills: 0 | Status: SHIPPED | OUTPATIENT
Start: 2019-10-01 | End: 2019-10-26 | Stop reason: SDUPTHER

## 2019-10-02 ENCOUNTER — HOSPITAL ENCOUNTER (OUTPATIENT)
Dept: PULMONOLOGY | Facility: HOSPITAL | Age: 69
Discharge: HOME | End: 2019-10-02
Payer: COMMERCIAL

## 2019-10-02 DIAGNOSIS — J44.9 CHRONIC OBSTRUCTIVE PULMONARY DISEASE, UNSPECIFIED COPD TYPE (HCC): ICD-10-CM

## 2019-10-02 DIAGNOSIS — R06.83 SNORING: ICD-10-CM

## 2019-10-02 LAB
BASE EXCESS BLDA CALC-SCNC: 1 MMOL/L (ref -2–3)
CA-I BLD-SCNC: 1.25 MMOL/L (ref 1.12–1.32)
GLUCOSE SERPL-MCNC: 168 MG/DL (ref 65–140)
HCO3 BLDA-SCNC: 25.9 MMOL/L (ref 22–28)
HCT VFR BLD CALC: 40 % (ref 36.5–49.3)
HGB BLDA-MCNC: 13.6 G/DL (ref 12–17)
PCO2 BLD: 27 MMOL/L (ref 21–32)
PCO2 BLD: 40.2 MM HG (ref 36–44)
PH BLD: 7.42 [PH] (ref 7.35–7.45)
PO2 BLD: 68 MM HG (ref 75–129)
POTASSIUM BLD-SCNC: 3.9 MMOL/L (ref 3.5–5.3)
SAO2 % BLD FROM PO2: 94 % (ref 95–98)
SODIUM BLD-SCNC: 136 MMOL/L (ref 136–145)
SPECIMEN SOURCE: ABNORMAL

## 2019-10-02 PROCEDURE — 94060 EVALUATION OF WHEEZING: CPT | Performed by: INTERNAL MEDICINE

## 2019-10-02 PROCEDURE — 82803 BLOOD GASES ANY COMBINATION: CPT

## 2019-10-02 PROCEDURE — 82330 ASSAY OF CALCIUM: CPT

## 2019-10-02 PROCEDURE — 36600 WITHDRAWAL OF ARTERIAL BLOOD: CPT

## 2019-10-02 PROCEDURE — 84132 ASSAY OF SERUM POTASSIUM: CPT

## 2019-10-02 PROCEDURE — 84295 ASSAY OF SERUM SODIUM: CPT

## 2019-10-02 PROCEDURE — 94760 N-INVAS EAR/PLS OXIMETRY 1: CPT

## 2019-10-02 PROCEDURE — 85014 HEMATOCRIT: CPT

## 2019-10-02 PROCEDURE — 94729 DIFFUSING CAPACITY: CPT

## 2019-10-02 PROCEDURE — 94060 EVALUATION OF WHEEZING: CPT

## 2019-10-02 PROCEDURE — 94729 DIFFUSING CAPACITY: CPT | Performed by: INTERNAL MEDICINE

## 2019-10-02 PROCEDURE — 82947 ASSAY GLUCOSE BLOOD QUANT: CPT

## 2019-10-02 RX ORDER — ALBUTEROL SULFATE 2.5 MG/3ML
2.5 SOLUTION RESPIRATORY (INHALATION) ONCE
Status: COMPLETED | OUTPATIENT
Start: 2019-10-02 | End: 2019-10-02

## 2019-10-02 RX ADMIN — ALBUTEROL SULFATE 2.5 MG: 2.5 SOLUTION RESPIRATORY (INHALATION) at 13:07

## 2019-10-03 ENCOUNTER — OFFICE VISIT (OUTPATIENT)
Dept: FAMILY MEDICINE CLINIC | Facility: MEDICAL CENTER | Age: 69
End: 2019-10-03
Payer: COMMERCIAL

## 2019-10-03 VITALS
SYSTOLIC BLOOD PRESSURE: 136 MMHG | HEIGHT: 73 IN | DIASTOLIC BLOOD PRESSURE: 84 MMHG | BODY MASS INDEX: 31.41 KG/M2 | HEART RATE: 80 BPM | WEIGHT: 237 LBS | RESPIRATION RATE: 16 BRPM

## 2019-10-03 DIAGNOSIS — G47.00 INSOMNIA, UNSPECIFIED TYPE: ICD-10-CM

## 2019-10-03 DIAGNOSIS — I25.10 CORONARY ARTERY DISEASE INVOLVING NATIVE CORONARY ARTERY OF NATIVE HEART WITHOUT ANGINA PECTORIS: Chronic | ICD-10-CM

## 2019-10-03 DIAGNOSIS — J44.9 CHRONIC OBSTRUCTIVE PULMONARY DISEASE, UNSPECIFIED COPD TYPE (HCC): ICD-10-CM

## 2019-10-03 DIAGNOSIS — Z23 ENCOUNTER FOR IMMUNIZATION: ICD-10-CM

## 2019-10-03 DIAGNOSIS — I10 ESSENTIAL HYPERTENSION: ICD-10-CM

## 2019-10-03 DIAGNOSIS — I65.21 STENOSIS OF RIGHT INTERNAL CAROTID ARTERY: Primary | ICD-10-CM

## 2019-10-03 DIAGNOSIS — I48.91 ATRIAL FIBRILLATION, UNSPECIFIED TYPE (HCC): ICD-10-CM

## 2019-10-03 PROCEDURE — 99495 TRANSJ CARE MGMT MOD F2F 14D: CPT | Performed by: FAMILY MEDICINE

## 2019-10-03 PROCEDURE — G0008 ADMIN INFLUENZA VIRUS VAC: HCPCS

## 2019-10-03 PROCEDURE — G0009 ADMIN PNEUMOCOCCAL VACCINE: HCPCS

## 2019-10-03 PROCEDURE — 1111F DSCHRG MED/CURRENT MED MERGE: CPT

## 2019-10-03 PROCEDURE — 90662 IIV NO PRSV INCREASED AG IM: CPT

## 2019-10-03 PROCEDURE — 90732 PPSV23 VACC 2 YRS+ SUBQ/IM: CPT

## 2019-10-03 NOTE — PROGRESS NOTES
Assessment/Plan:    No problem-specific Assessment & Plan notes found for this encounter  Diagnoses and all orders for this visit:    Stenosis of right internal carotid artery  Status post surgical intervention  Surgical site healing well  Keep postop appointment with vascular surgery  Essential hypertension  Well controlled  Continue medications  Insomnia, unspecified type  -     Melatonin 500 MCG TBDP; Take 1 tablet (500 mcg total) by mouth daily at bedtime as needed (insomnia)  Start melatonin at bedtime  Chronic obstructive pulmonary disease, unspecified COPD type ([address] )  Stable  Continue all inhalers  Continue regular follow-up with pulmonology  Atrial fibrillation, unspecified type ([address] )  Stable  Continue anticoagulation  Continue regular follow-up with Cardiology  Coronary artery disease involving native coronary artery of native heart without angina pectoris  Stable  Continue medication  Continue regular follow-up with Cardiology  Encounter for immunization  -     PNEUMOCOCCAL POLYSACCHARIDE VACCINE 23-VALENT =>1YO SQ IM  -     FLUZONE HIGH-DOSE: influenza vaccine, high-dose, preservative-free 0 5 mL  Flu vaccine and pneumococcal vaccine given today  Patient tolerated both vaccines well  Follow-up in six months for Medicare wellness exam or sooner if needed  Subjective:      Patient ID: Jennifer Nielsen is a 81 y o  male  Patient presents for hospital follow-up  Recently underwent surgical intervention for right carotid artery stenosis  Patient states the procedure went well  He is having no pain on the right side of his neck  No discharge from the surgical site  No redness of the surgical site  He is back on all of his medications  He states he was given melatonin in the hospital to help with sleep and he would like a prescription for this as he had really good rest he took the medication  Patient otherwise has no acute concerns    Is agreeable to the flu shot and the pneumococcal vaccine  The following portions of the patient's history were reviewed and updated as appropriate:   He  has a past medical history of Acute metabolic encephalopathy, Arthritis, Atrial fibrillation (HCC), COPD (chronic obstructive pulmonary disease) ([facility] ), COPD (chronic obstructive pulmonary disease) ([facility] ), CVA (cerebral vascular accident) ([facility] ), Elevated glucose, Emphysema lung ([facility] ), ETOH abuse, Hiatal hernia, Hyperlipidemia, Hypertension, LVH (left ventricular hypertrophy), Myocardial infarction ([address] ), Obesity, Pneumonia (2/2/2017), Pneumonia, Pulmonary emphysema ([facility] ), PVC (premature ventricular contraction), Right knee pain, Shortness of breath, Stenosis of right internal carotid artery, Stroke ([facility] ), and Tobacco abuse (6/25/2019)    He   Patient Active Problem List    Diagnosis Date Noted    Insomnia 10/03/2019    Renal insufficiency     History of acute renal failure 09/25/2019    Dependence on nicotine from cigarettes 07/30/2019    Stenosis of right internal carotid artery 07/04/2019    Acute metabolic encephalopathy 15/91/5610    Atrial fibrillation ([address] ) 06/28/2019    CVA (cerebral vascular accident) ([facility] ) 06/25/2019    Alcohol abuse 06/25/2019    Microalbuminuria 04/17/2019    Obesity 04/17/2019    Elevated CO2 level 09/27/2018    Elevated glucose 08/24/2018    Thoracic ascending aortic aneurysm (HCC) 03/22/2018    Coronary artery disease involving native coronary artery 01/30/2018    ULLOA (dyspnea on exertion) 01/30/2018    Osteoarthritis of left glenohumeral joint 06/01/2017    Osteoarthritis of right glenohumeral joint 06/01/2017    Colon polyps 05/15/2017    Palpitations 04/26/2017    Abnormal EKG 03/03/2017    PVC (premature ventricular contraction) 03/03/2017    Elevated hematocrit 02/02/2017    Elevated hemoglobin (HCC) 02/02/2017    Emphysema of lung ([address] ) 02/02/2017    Hyperlipidemia 02/02/2017    Hyponatremia 02/02/2017    Mild concentric left ventricular hypertrophy (LVH) 02/02/2017    Hiatal hernia 01/19/2017    COPD (chronic obstructive pulmonary disease) ([facility] ) 01/18/2017    Hypertension 01/18/2017    Pityriasis rosea 01/18/2017     He  has a past surgical history that includes Ankle surgery (Right, 1978); Hand surgery (Left, 1964); Colonoscopy; pr colonoscopy flx dx w/collj spec when pfrmd (N/A, 5/9/2017); Carotid stent (02/13/2018); Hand surgery (Left, 1964); pr tcat iv stent crv crtd art embolic protecj (Right, 3/23/7970); and IR carotid stent (9/25/2019)  His family history includes Arthritis in his brother; Diabetes in his brother; Emphysema in his father; Hypertension in his father, mother, and other; Other in his other  He  reports that he has been smoking cigarettes  He has a 45 00 pack-year smoking history  He has never used smokeless tobacco  He reports that he drinks alcohol  He reports that he does not use drugs    Current Outpatient Medications   Medication Sig Dispense Refill    acetaminophen (TYLENOL) 325 mg tablet Take 2 tablets (650 mg total) by mouth every 6 (six) hours as needed for mild pain or fever 30 tablet 0    albuterol (PROVENTIL HFA,VENTOLIN HFA) 90 mcg/act inhaler inhale 2 puffs by mouth AND INTO THE LUNGS EVERY 4 HORUS IF NEEDED FOR WHEEZING 18 g 0    amLODIPine (NORVASC) 5 mg tablet Take 1 tablet (5 mg total) by mouth daily 30 tablet 0    apixaban (ELIQUIS) 5 mg Take 1 tablet (5 mg total) by mouth 2 (two) times a day 60 tablet 0    atorvastatin (LIPITOR) 20 mg tablet take 1 tablet by mouth once daily 90 tablet 3    clopidogrel (PLAVIX) 75 mg tablet Take 1 tablet (75 mg total) by mouth daily 90 tablet 2    losartan (COZAAR) 100 MG tablet Take 1 tablet (100 mg total) by mouth daily 30 tablet 0    metoprolol tartrate (LOPRESSOR) 25 mg tablet Take 1 tablet (25 mg total) by mouth every 12 (twelve) hours 60 tablet 0    Naproxen Sodium (ALEVE) 220 MG CAPS Take 2 capsules by mouth daily at bedtime as needed      nicotine (NICODERM CQ) 21 mg/24 hr TD 24 hr patch Place 1 patch on the skin every 24 hours      nitroglycerin (NITROSTAT) 0 4 mg SL tablet Place 1 tablet (0 4 mg total) under the tongue every 5 (five) minutes as needed for chest pain 90 tablet 0    pantoprazole (PROTONIX) 40 mg tablet TAKE 1 TABLET BY MOUTH DAILY 30 tablet 0    SPIRIVA RESPIMAT 2 5 MCG/ACT AERS inhaler inhale 2 puffs by mouth and INTO THE LUNGS once daily 4 g 0    SYMBICORT 80-4 5 MCG/ACT inhaler inhale 2 puffs by mouth twice a day Rinse mouth after use 10 2 g 0    Melatonin 500 MCG TBDP Take 1 tablet (500 mcg total) by mouth daily at bedtime as needed (insomnia) 90 tablet 3    sodium chloride 1 g tablet Take 1 tablet (1 g total) by mouth daily for 90 days 90 tablet 2     No current facility-administered medications for this visit        Current Outpatient Medications on File Prior to Visit   Medication Sig    acetaminophen (TYLENOL) 325 mg tablet Take 2 tablets (650 mg total) by mouth every 6 (six) hours as needed for mild pain or fever    albuterol (PROVENTIL HFA,VENTOLIN HFA) 90 mcg/act inhaler inhale 2 puffs by mouth AND INTO THE LUNGS EVERY 4 HORUS IF NEEDED FOR WHEEZING    amLODIPine (NORVASC) 5 mg tablet Take 1 tablet (5 mg total) by mouth daily    apixaban (ELIQUIS) 5 mg Take 1 tablet (5 mg total) by mouth 2 (two) times a day    atorvastatin (LIPITOR) 20 mg tablet take 1 tablet by mouth once daily    clopidogrel (PLAVIX) 75 mg tablet Take 1 tablet (75 mg total) by mouth daily    losartan (COZAAR) 100 MG tablet Take 1 tablet (100 mg total) by mouth daily    metoprolol tartrate (LOPRESSOR) 25 mg tablet Take 1 tablet (25 mg total) by mouth every 12 (twelve) hours    Naproxen Sodium (ALEVE) 220 MG CAPS Take 2 capsules by mouth daily at bedtime as needed    nicotine (NICODERM CQ) 21 mg/24 hr TD 24 hr patch Place 1 patch on the skin every 24 hours    nitroglycerin (NITROSTAT) 0 4 mg SL tablet Place 1 tablet (0 4 mg total) under the tongue every 5 (five) minutes as needed for chest pain    pantoprazole (PROTONIX) 40 mg tablet TAKE 1 TABLET BY MOUTH DAILY    SPIRIVA RESPIMAT 2 5 MCG/ACT AERS inhaler inhale 2 puffs by mouth and INTO THE LUNGS once daily    SYMBICORT 80-4 5 MCG/ACT inhaler inhale 2 puffs by mouth twice a day Rinse mouth after use    sodium chloride 1 g tablet Take 1 tablet (1 g total) by mouth daily for 90 days     Current Facility-Administered Medications on File Prior to Visit   Medication    [COMPLETED] albuterol inhalation solution 2 5 mg     He has No Known Allergies       Review of Systems   Constitutional: Negative for fever  Respiratory: Negative for shortness of breath  Cardiovascular: Negative for chest pain  Objective:      /84 (BP Location: Left arm, Patient Position: Sitting, Cuff Size: Adult)   Pulse 80   Resp 16   Ht 6' 1" (1 854 m)   Wt 108 kg (237 lb)   BMI 31 27 kg/m²          Physical Exam   Constitutional: He appears well-developed and well-nourished  Neck:       Cardiovascular: Normal rate and normal heart sounds  An irregularly irregular rhythm present     Pulmonary/Chest: Effort normal and breath sounds normal

## 2019-10-15 NOTE — PROGRESS NOTES
Assessment/Plan:    Bilateral carotid artery stenosis    Post-op day # 22 of RIGHT Transcervical Carotid Artery stenting (TCAR) on 9/25/19 with Dr Nabila Elizondo  82 y/o M CAD, hypertension, HLD, AF, smoker, RIGHT high grade carotid artery stenosis  Patient presents for post-op follow-up       -He offers no complaints   -No new weakness  No HA's, dizziness, lightheadedness, visual changes, numnbess  No problems with incision or groin sties    -No problems with bleeding      -Incision healed; R groin - intact    Plan:    Patient is stable after TCAR  We discussed role of medical therapy and plan for routine follow up and surveillance  He is not interested in quitting smoking      -Advance activity as tolerated   -Smoking cessation discussed  -Continue with Eliquis 5 twice a day, Plavix 75 and Lipitor 20   -Check carotid doppler 6 weeks post-op (due 11/7/19) and per protocol  -Anticoagulation education provided  Recommend to avoid naproxen/NSAIDS while on both blood thinner and antiplatelet agents, but patient reports that he has chronic shoulder pain for which needs to continue NSAIDS  He takes 2 tablets daily  We discussed that he places himself at increased risk of serious bleeding      -Follow up with Dr Simin Mcgraw in 3 months    Dependence on nicotine from cigarettes  -Smoking cessation was strongly recommended  Patient is not interested in quitting at this time  Subjective:      Patient ID: Humberto Bates is a 81 y o  male  Pt is here S/P right Carotid Artery Angioplasty with Stenting on 9/25/19 by Dr Simin Mcgraw  Pt is currently taking Eliquis, Plavix and Lipitor  HPI     Brianda Abdul 81 y/oM CAD, hypertension, HLD, AF, smoker (1-2 ppd)  found to have high-grade right carotid stenosis  MR imaging of the head also demonstrated multifocal foci of diffusion restriction throughout majority of right MCA territory   The patient underwent RIGHT Transcervical Carotid Artery stenting (TCAR) on 9/25/19 with Dr Jeovany Baker  Operative report noted there was < 50% residual post-stent stenosis  There was eccentric plaque at the bulb/prox ICA  ECA not visualized (chronic occlusion)  It was noted that the case was challenging due to limited neck rotation  [place] presents today for post-operative follow up  He has no complaints  No headaches, dizziness, lightheadedness  No arm/leg weakness  The transcervical site is healed  He has no groin pain, swelling, redness  VAS carotid du 6/28/19:  RIGHT:  There is extensive calcific shadowing plaque noted in the internal carotid  artery making visualized of bloodflow difficult to evaluate  Plaque is  heterogenous and irregular  Vertebral artery flow could not be visualized  There is no significant  subclavian artery disease  LEFT:  There is <50% stenosis noted in the internal carotid artery  Plaque is  heterogenous and irregular  Vertebral artery flow could not be visualized  There is no significant  subclavian artery disease  There is no previous study for comparison  The following portions of the patient's history were reviewed and updated as appropriate: allergies, current medications, past family history, past medical history, past social history, past surgical history and problem list     Review of Systems   Constitutional: Negative  Negative for chills, fever and unexpected weight change  HENT: Negative  Eyes: Negative  Negative for visual disturbance  Respiratory: Negative  Negative for cough, chest tightness, shortness of breath and wheezing  Cardiovascular: Negative  Negative for chest pain, palpitations and leg swelling  Gastrointestinal: Negative  Negative for abdominal distention, abdominal pain, blood in stool, constipation, diarrhea and nausea  Endocrine: Negative  Negative for cold intolerance and heat intolerance  Genitourinary: Negative  Negative for difficulty urinating, frequency and hematuria  Musculoskeletal: Negative  Negative for arthralgias and myalgias  Skin: Negative  Negative for color change and wound  Neurological: Negative  Negative for dizziness, tremors, syncope, speech difficulty, weakness, light-headedness, numbness and headaches  Hematological: Negative  Does not bruise/bleed easily  Psychiatric/Behavioral: Negative  Negative for confusion  Objective:    /74 (BP Location: Left arm, Patient Position: Sitting)   Pulse (!) 106   Ht 6' 1" (1 854 m)   Wt 112 kg (247 lb 6 4 oz)   BMI 32 64 kg/m²     RIGHT neck TCAR access is closed  PERRLE EOMI  Smile intact  Normal facial movement and sensation  Good strength bilaterally  Physical Exam   Constitutional: He is oriented to person, place, and time  He appears well-developed and well-nourished  He is cooperative  HENT:   Head: Normocephalic and atraumatic  Eyes: Pupils are equal, round, and reactive to light  EOM are normal    Neck: Trachea normal  Neck supple  No JVD present  No thyromegaly present  Cardiovascular: Normal rate, regular rhythm, S1 normal, S2 normal and normal heart sounds  Exam reveals no gallop and no friction rub  No murmur heard  Pulses:       Carotid pulses are 2+ on the right side, and 2+ on the left side  Radial pulses are 2+ on the right side, and 2+ on the left side  Dorsalis pedis pulses are 2+ on the right side, and 2+ on the left side  Pulmonary/Chest: Effort normal and breath sounds normal  No accessory muscle usage  No respiratory distress  He has no wheezes  He has no rales  Abdominal: Soft  Bowel sounds are normal  He exhibits no distension  There is no hepatosplenomegaly  There is no tenderness  Musculoskeletal: Normal range of motion  He exhibits no edema or deformity  Neurological: He is alert and oriented to person, place, and time  Grossly normal    Skin: Skin is warm and dry  No lesion and no rash noted  No cyanosis   Nails show no clubbing  Psychiatric: He has a normal mood and affect  Nursing note and vitals reviewed  I have reviewed and made appropriate changes to the review of systems input by the medical assistant  Vitals:    10/17/19 1013   BP: 134/74   BP Location: Left arm   Patient Position: Sitting   Pulse: (!) 106   Weight: 112 kg (247 lb 6 4 oz)   Height: 6' 1" (1 854 m)       Patient Active Problem List   Diagnosis    Palpitations    Coronary artery disease involving native coronary artery    ULLOA (dyspnea on exertion)    Abnormal EKG    Colon polyps    COPD (chronic obstructive pulmonary disease) (HCC)    Elevated hematocrit    Elevated hemoglobin (HCC)    Emphysema of lung (HCC)    Hiatal hernia    Hyperlipidemia    Hypertension    Hyponatremia    Mild concentric left ventricular hypertrophy (LVH)    Osteoarthritis of left glenohumeral joint    Osteoarthritis of right glenohumeral joint    Pityriasis rosea    PVC (premature ventricular contraction)    Thoracic ascending aortic aneurysm (HCC)    Elevated glucose    Elevated CO2 level    Microalbuminuria    Obesity    CVA (cerebral vascular accident) ([address] )    Alcohol abuse    Atrial fibrillation (HCC)    Acute metabolic encephalopathy    Stenosis of right internal carotid artery    Dependence on nicotine from cigarettes    History of acute renal failure    Renal insufficiency    Insomnia    Bilateral carotid artery stenosis       Past Surgical History:   Procedure Laterality Date    ANKLE SURGERY Right 1978    CAROTID STENT  02/13/2018    COLONOSCOPY      HAND SURGERY Left 1964    HAND SURGERY Left 1964    fingers replaced    IR CAROTID STENT  9/25/2019    CT COLONOSCOPY FLX DX W/COLLJ SPEC WHEN PFRMD N/A 5/9/2017    Procedure: EGD AND COLONOSCOPY;  Surgeon: Asael Anaya MD;  [facility]: [facility];   Service: Gastroenterology    CT TCAT IV STENT CRV CRTD ART EMBOLIC PROTECJ Right 0/09/0273    Procedure: ANGIOPLASTY ARTERY CAROTID W/ STENT;  Surgeon: Luis Kumari DO;  Location: [facility];  Service: Vascular       Family History   Problem Relation Age of Onset    Hypertension Mother     Emphysema Father     Hypertension Father     Arthritis Brother     Diabetes Brother     Other Other         Cardiac Disorder    Hypertension Other     Arrhythmia Neg Hx     Anuerysm Neg Hx     Asthma Neg Hx     Clotting disorder Neg Hx     Fainting Neg Hx        Social History     Socioeconomic History    Marital status:      Spouse name: Not on file    Number of children: Not on file    Years of education: Not on file    Highest education level: Not on file   Occupational History    Not on file   Social Needs    Financial resource strain: Not on file    Food insecurity:     Worry: Not on file     Inability: Not on file    Transportation needs:     Medical: Not on file     Non-medical: Not on file   Tobacco Use    Smoking status: Light Tobacco Smoker     Packs/day: 1 00     Years: 45 00     Pack years: 45 00     Types: Cigarettes    Smokeless tobacco: Never Used    Tobacco comment: Smokes a few cigarettes per day  Substance and Sexual Activity    Alcohol use: Yes     Comment: Has 2-3 beers daily      Drug use: No    Sexual activity: Yes     Partners: Female   Lifestyle    Physical activity:     Days per week: Not on file     Minutes per session: Not on file    Stress: Not on file   Relationships    Social connections:     Talks on phone: Not on file     Gets together: Not on file     Attends Mosque service: Not on file     Active member of club or organization: Not on file     Attends meetings of clubs or organizations: Not on file     Relationship status: Not on file    Intimate partner violence:     Fear of current or ex partner: Not on file     Emotionally abused: Not on file     Physically abused: Not on file     Forced sexual activity: Not on file   Other Topics Concern    Not on file   Social History Narrative    Always uses a seat belt    Caffeine use - Daily caffeine consumption, 2-3 servings a day           No Known Allergies      Current Outpatient Medications:     albuterol (PROVENTIL HFA,VENTOLIN HFA) 90 mcg/act inhaler, inhale 2 puffs by mouth AND INTO THE LUNGS EVERY 4 HORUS IF NEEDED FOR WHEEZING, Disp: 18 g, Rfl: 0    amLODIPine (NORVASC) 5 mg tablet, Take 1 tablet (5 mg total) by mouth daily, Disp: 30 tablet, Rfl: 0    atorvastatin (LIPITOR) 20 mg tablet, take 1 tablet by mouth once daily, Disp: 90 tablet, Rfl: 3    clopidogrel (PLAVIX) 75 mg tablet, Take 1 tablet (75 mg total) by mouth daily, Disp: 90 tablet, Rfl: 2    losartan (COZAAR) 100 MG tablet, Take 1 tablet (100 mg total) by mouth daily, Disp: 30 tablet, Rfl: 0    Melatonin 500 MCG TBDP, Take 1 tablet (500 mcg total) by mouth daily at bedtime as needed (insomnia), Disp: 90 tablet, Rfl: 3    metoprolol tartrate (LOPRESSOR) 25 mg tablet, Take 1 tablet (25 mg total) by mouth every 12 (twelve) hours, Disp: 60 tablet, Rfl: 0    nicotine (NICODERM CQ) 21 mg/24 hr TD 24 hr patch, Place 1 patch on the skin every 24 hours, Disp: , Rfl:     nitroglycerin (NITROSTAT) 0 4 mg SL tablet, Place 1 tablet (0 4 mg total) under the tongue every 5 (five) minutes as needed for chest pain, Disp: 90 tablet, Rfl: 0    pantoprazole (PROTONIX) 40 mg tablet, TAKE 1 TABLET BY MOUTH DAILY, Disp: 30 tablet, Rfl: 0    SPIRIVA RESPIMAT 2 5 MCG/ACT AERS inhaler, inhale 2 puffs by mouth and INTO THE LUNGS once daily, Disp: 4 g, Rfl: 0    SYMBICORT 80-4 5 MCG/ACT inhaler, inhale 2 puffs by mouth twice a day Rinse mouth after use, Disp: 10 2 g, Rfl: 0    apixaban (ELIQUIS) 5 mg, Take 1 tablet (5 mg total) by mouth 2 (two) times a day, Disp: 60 tablet, Rfl: 5    sodium chloride 1 g tablet, Take 1 tablet (1 g total) by mouth daily for 90 days, Disp: 90 tablet, Rfl: 2

## 2019-10-16 ENCOUNTER — TELEPHONE (OUTPATIENT)
Dept: CARDIOLOGY CLINIC | Facility: CLINIC | Age: 69
End: 2019-10-16

## 2019-10-16 DIAGNOSIS — I48.19 PERSISTENT ATRIAL FIBRILLATION (HCC): ICD-10-CM

## 2019-10-16 NOTE — TELEPHONE ENCOUNTER
S/w patient and informed him to contact Dr Zana Carlton office for refill since Dr Chiquita Galeas is the one who prescribed for patient  Patient verbally understood

## 2019-10-17 ENCOUNTER — OFFICE VISIT (OUTPATIENT)
Dept: VASCULAR SURGERY | Facility: CLINIC | Age: 69
End: 2019-10-17

## 2019-10-17 VITALS
BODY MASS INDEX: 32.79 KG/M2 | SYSTOLIC BLOOD PRESSURE: 134 MMHG | WEIGHT: 247.4 LBS | HEIGHT: 73 IN | DIASTOLIC BLOOD PRESSURE: 74 MMHG | HEART RATE: 106 BPM

## 2019-10-17 DIAGNOSIS — I65.23 BILATERAL CAROTID ARTERY STENOSIS: ICD-10-CM

## 2019-10-17 DIAGNOSIS — F17.210 CIGARETTE NICOTINE DEPENDENCE WITHOUT COMPLICATION: Primary | ICD-10-CM

## 2019-10-17 PROCEDURE — 99024 POSTOP FOLLOW-UP VISIT: CPT | Performed by: PHYSICIAN ASSISTANT

## 2019-10-17 NOTE — LETTER
October 17, 2019     Remy Rodriguez, DO  [address] [facility] [address]    Patient: Jorge Gamble   YOB: 1950   Date of Visit: 10/17/2019     Dear Dr Sean Uribe      Thank you for referring Kiana Reeds to me for evaluation  Below are the relevant portions of my assessment and plan of care  If you have questions, please do not hesitate to call me  I look forward to following [place] along with you  Sincerely,        Tammy Fuller PA-C        CC: No Recipients    Progress Notes:      Bilateral carotid artery stenosis    Post-op day # 22 of RIGHT Transcervical Carotid Artery stenting (TCAR) on 9/25/19 with Dr Zach Newman  80 y/o M CAD, hypertension, HLD, AF, smoker, RIGHT high grade carotid artery stenosis  Patient presents for post-op follow-up       -He offers no complaints   -No new weakness  No HA's, dizziness, lightheadedness, visual changes, numnbess  No problems with incision or groin sties    -No problems with bleeding      -Incision healed; R groin - intact    Plan:    Patient is stable after TCAR  We discussed role of medical therapy and plan for routine follow up and surveillance  He is not interested in quitting smoking      -Advance activity as tolerated   -Smoking cessation discussed  -Continue with Eliquis 5 twice a day, Plavix 75 and Lipitor 20   -Check carotid doppler 6 weeks post-op (due 11/7/19) and per protocol  -Anticoagulation education provided  Recommend to avoid naproxen/NSAIDS while on both blood thinner and antiplatelet agents, but patient reports that he has chronic shoulder pain for which needs to continue NSAIDS  He takes 2 tablets daily  We discussed that he places himself at increased risk of serious bleeding      -Follow up with Dr Mitchell Barriga in 3 months    Dependence on nicotine from cigarettes  -Smoking cessation was strongly recommended  Patient is not interested in quitting at this time

## 2019-10-17 NOTE — PROGRESS NOTES
Assessment/Plan:    No problem-specific Assessment & Plan notes found for this encounter  {Assess/PlanSmartLinks:40304}      Subjective:      Patient ID: Mikie Resendez is a 81 y o  male  Pt is here for FU on his R TCAR from 09/25/19  Pt denies having any TIA or Stroke Symptoms  Pt states his walking has been improving and he is able to walk further without taking a break  He is able to walk  1 block before having to rest  Pt smokes about 1 PPD  Pt is currently taking Eliquis, Plavix, Atorvastatin, Metoprolol Tartrate  HPI    {Common ambulatory SmartLinks:23065}    Review of Systems   Constitutional: Negative  HENT: Negative  Eyes: Negative  Respiratory: Negative  Cardiovascular: Negative  Gastrointestinal: Negative  Endocrine: Negative  Genitourinary: Negative  Musculoskeletal: Negative  Skin: Negative  Allergic/Immunologic: Negative  Neurological: Negative for dizziness, facial asymmetry, weakness, light-headedness, numbness and headaches  Hematological: Negative  Psychiatric/Behavioral: Negative  Objective: There were no vitals taken for this visit           Physical Exam

## 2019-10-26 DIAGNOSIS — I10 ESSENTIAL HYPERTENSION: ICD-10-CM

## 2019-10-28 DIAGNOSIS — J44.9 CHRONIC OBSTRUCTIVE PULMONARY DISEASE, UNSPECIFIED COPD TYPE (HCC): ICD-10-CM

## 2019-10-28 RX ORDER — AMLODIPINE BESYLATE 5 MG/1
TABLET ORAL
Qty: 30 TABLET | Refills: 0 | Status: SHIPPED | OUTPATIENT
Start: 2019-10-28 | End: 2019-12-12 | Stop reason: SDUPTHER

## 2019-10-28 RX ORDER — DILTIAZEM HYDROCHLORIDE 60 MG/1
TABLET, FILM COATED ORAL
Qty: 10.2 G | Refills: 0 | Status: SHIPPED | OUTPATIENT
Start: 2019-10-28 | End: 2019-11-27 | Stop reason: DRUGHIGH

## 2019-10-30 DIAGNOSIS — J44.9 CHRONIC OBSTRUCTIVE PULMONARY DISEASE, UNSPECIFIED COPD TYPE (HCC): ICD-10-CM

## 2019-10-30 RX ORDER — TIOTROPIUM BROMIDE INHALATION SPRAY 3.12 UG/1
SPRAY, METERED RESPIRATORY (INHALATION)
Qty: 4 G | Refills: 0 | Status: SHIPPED | OUTPATIENT
Start: 2019-10-30 | End: 2019-11-29 | Stop reason: SDUPTHER

## 2019-11-01 ENCOUNTER — OFFICE VISIT (OUTPATIENT)
Dept: FAMILY MEDICINE CLINIC | Facility: MEDICAL CENTER | Age: 69
End: 2019-11-01
Payer: COMMERCIAL

## 2019-11-01 VITALS
DIASTOLIC BLOOD PRESSURE: 80 MMHG | HEART RATE: 51 BPM | OXYGEN SATURATION: 99 % | BODY MASS INDEX: 34.86 KG/M2 | SYSTOLIC BLOOD PRESSURE: 128 MMHG | WEIGHT: 249 LBS | HEIGHT: 71 IN

## 2019-11-01 DIAGNOSIS — Z12.2 ENCOUNTER FOR SCREENING FOR LUNG CANCER: ICD-10-CM

## 2019-11-01 DIAGNOSIS — Z00.00 MEDICARE ANNUAL WELLNESS VISIT, SUBSEQUENT: Primary | ICD-10-CM

## 2019-11-01 DIAGNOSIS — Z12.5 SCREENING FOR PROSTATE CANCER: ICD-10-CM

## 2019-11-01 DIAGNOSIS — Z11.4 SCREENING FOR HIV (HUMAN IMMUNODEFICIENCY VIRUS): ICD-10-CM

## 2019-11-01 DIAGNOSIS — I71.2 THORACIC ASCENDING AORTIC ANEURYSM (HCC): ICD-10-CM

## 2019-11-01 DIAGNOSIS — I10 ESSENTIAL HYPERTENSION: ICD-10-CM

## 2019-11-01 PROCEDURE — G0439 PPPS, SUBSEQ VISIT: HCPCS | Performed by: FAMILY MEDICINE

## 2019-11-01 PROCEDURE — 1125F AMNT PAIN NOTED PAIN PRSNT: CPT | Performed by: FAMILY MEDICINE

## 2019-11-01 PROCEDURE — 1170F FXNL STATUS ASSESSED: CPT | Performed by: FAMILY MEDICINE

## 2019-11-01 RX ORDER — LOSARTAN POTASSIUM 100 MG/1
TABLET ORAL
Qty: 90 TABLET | Refills: 0 | Status: SHIPPED | OUTPATIENT
Start: 2019-11-01 | End: 2019-12-03 | Stop reason: SDUPTHER

## 2019-11-01 NOTE — PROGRESS NOTES
Assessment and Plan:     Problem List Items Addressed This Visit        Cardiovascular and Mediastinum    Thoracic ascending aortic aneurysm ([address] )    Relevant Orders    CTA chest wo w contrast      Other Visit Diagnoses     Medicare annual wellness visit, subsequent    -  Primary    Screening for HIV (human immunodeficiency virus)        Relevant Orders    HIV 1/2 AG-AB combo    Screening for prostate cancer        Relevant Orders    PSA, Total Screen    Encounter for screening for lung cancer        Relevant Orders    CTA chest wo w contrast        BMI Counseling: Body mass index is 34 73 kg/m²  The BMI is above normal  Nutrition recommendations include decreasing portion sizes  Exercise recommendations include moderate physical activity 150 minutes/week  Tobacco Cessation Counseling: Tobacco cessation counseling was provided  The patient is sincerely urged to quit consumption of tobacco  He is not ready to quit tobacco      Preventive health issues were discussed with patient, and age appropriate screening tests were ordered as noted in patient's After Visit Summary  Personalized health advice and appropriate referrals for health education or preventive services given if needed, as noted in patient's After Visit Summary  CT angiogram ordered of the chest due to thoracic ascending aortic aneurysm and this imaging modality should suffice also for lung cancer screening  Patient gave verbal consent for HIV screening  Patient was agreeable to repeat PSA screening  Follow-up in six months or sooner if needed       History of Present Illness:     Patient presents for Medicare Annual Wellness visit    Patient Care Team:  Madisyn Carrington DO as PCP - General (Family Medicine)  Jeromy Agarwal MD as Consulting Physician (Cardiology)  Dana Sandoval MD as Consulting Physician (Gastroenterology)  Dayanna Brown PA-C as Physician Assistant (Gastroenterology)  Jose Carlos Rodriguez MD as Consulting Physician (Pulmonary Disease)  Santa Moeller MD as Consulting Physician (Nephrology)  Vilma Varghese MD as Consulting Physician (Orthopedic Surgery)  Sabrina Yi MD as Endoscopist     Problem List:     Patient Active Problem List   Diagnosis    Palpitations    Coronary artery disease involving native coronary artery    ULLOA (dyspnea on exertion)    Abnormal EKG    Colon polyps    COPD (chronic obstructive pulmonary disease) (HCC)    Elevated hematocrit    Elevated hemoglobin (HCC)    Emphysema of lung ([address] )    Hiatal hernia    Hyperlipidemia    Hypertension    Hyponatremia    Mild concentric left ventricular hypertrophy (LVH)    Osteoarthritis of left glenohumeral joint    Osteoarthritis of right glenohumeral joint    Pityriasis rosea    PVC (premature ventricular contraction)    Thoracic ascending aortic aneurysm (HCC)    Elevated glucose    Elevated CO2 level    Microalbuminuria    Obesity    CVA [facility]cere[facility] vascular [facility]) [facility]    Alcohol abuse    Atrial fibrillation ([address] )    Acute metabolic encephalopathy    Stenosis of right internal carotid artery    Dependence on nicotine from cigarettes    History of acute renal failure    Renal insufficiency    Insomnia    Bilateral carotid artery stenosis      Past Medical and Surgical History:     Past Medical History:   Diagnosis Date    Acute metabolic encephalopathy     Arthritis     Atrial fibrillation ([facility]     COPD (chronic obstructive pulmonary disease) ([address] )     COPD (chronic obstructive pulmonary disease) ([address] )     CVA (cerebral vascular accident) ([address] )     Elevated glucose     Emphysema lung ([address] )     ETOH abuse     Hiatal hernia     Hyperlipidemia     Hypertension     LVH (left ventricular hypertrophy)     Myocardial infarction ([address] )     Obesity     Pneumonia 2/2/2017    Pneumonia     Pulmonary emphysema ([address] )     PVC (premature ventricular contraction)     Right knee pain     Shortness of breath     Stenosis of right internal carotid artery     Stroke [facility] )     Tobacco abuse 6/25/2019     Past Surgical History:   Procedure Laterality Date    ANKLE SURGERY Right 1978    CAROTID STENT  02/13/2018    COLONOSCOPY      HAND SURGERY Left 1964    HAND SURGERY Left 1964    fingers replaced    IR CAROTID STENT  9/25/2019    CO COLONOSCOPY FLX DX W/COLLJ SPEC WHEN PFRMD N/A 5/9/2017    Procedure: EGD AND COLONOSCOPY;  Surgeon: Jimmy Doan MD;  [facility]; Service: Gastroenterology    CO TCAT IV STENT CRV CRTD ART EMBOLIC PROTECJ Right 4/53/1298    Procedure: ANGIOPLASTY ARTERY CAROTID W/ STENT;  Surgeon: Dagmar Magallanes DO;  [facility];  Service: Vascular      Family History:     Family History   Problem Relation Age of Onset    Hypertension Mother     Emphysema Father     Hypertension Father     Arthritis Brother     Diabetes Brother     Other Other         Cardiac Disorder    Hypertension Other     Arrhythmia Neg Hx     Anuerysm Neg Hx     Asthma Neg Hx     Clotting disorder Neg Hx     Fainting Neg Hx       Social History:     Social History     Socioeconomic History    Marital status:      Spouse name: None    Number of children: None    Years of education: None    Highest education level: None   Occupational History    None   Social Needs    Financial resource strain: None    Food insecurity:     Worry: None     Inability: None    Transportation needs:     Medical: None     Non-medical: None   Tobacco Use    Smoking status: Light Tobacco Smoker     Packs/day: 1 00     Years: 45 00     Pack years: 45 00     Types: Cigarettes    Smokeless tobacco: Never Used    Tobacco comment: Smokes a few cigarettes per day  Substance and Sexual Activity    Alcohol use: Yes     Comment: Has 2-3 beers daily      Drug use: No    Sexual activity: Yes     Partners: Female   Lifestyle    Physical activity:     Days per week: None     Minutes per session: None    Stress: None Relationships    Social connections:     Talks on phone: None     Gets together: None     Attends Restorationism service: None     Active member of club or organization: None     Attends meetings of clubs or organizations: None     Relationship status: None    Intimate partner violence:     Fear of current or ex partner: None     Emotionally abused: None     Physically abused: None     Forced sexual activity: None   Other Topics Concern    None   Social History Narrative    Always uses a seat belt    Caffeine use - Daily caffeine consumption, 2-3 servings a day           Medications and Allergies:     Current Outpatient Medications   Medication Sig Dispense Refill    albuterol (PROVENTIL HFA,VENTOLIN HFA) 90 mcg/act inhaler inhale 2 puffs by mouth AND INTO THE LUNGS EVERY 4 HORUS IF NEEDED FOR WHEEZING 18 g 0    amLODIPine (NORVASC) 5 mg tablet take 1 tablet by mouth once daily 30 tablet 0    apixaban (ELIQUIS) 5 mg Take 1 tablet (5 mg total) by mouth 2 (two) times a day 60 tablet 5    atorvastatin (LIPITOR) 20 mg tablet take 1 tablet by mouth once daily 90 tablet 3    clopidogrel (PLAVIX) 75 mg tablet Take 1 tablet (75 mg total) by mouth daily 90 tablet 2    losartan (COZAAR) 100 MG tablet Take 1 tablet (100 mg total) by mouth daily 30 tablet 0    Melatonin 500 MCG TBDP Take 1 tablet (500 mcg total) by mouth daily at bedtime as needed (insomnia) 90 tablet 3    metoprolol tartrate (LOPRESSOR) 25 mg tablet Take 1 tablet (25 mg total) by mouth every 12 (twelve) hours 60 tablet 0    nicotine (NICODERM CQ) 21 mg/24 hr TD 24 hr patch Place 1 patch on the skin every 24 hours      nitroglycerin (NITROSTAT) 0 4 mg SL tablet Place 1 tablet (0 4 mg total) under the tongue every 5 (five) minutes as needed for chest pain 90 tablet 0    pantoprazole (PROTONIX) 40 mg tablet TAKE 1 TABLET BY MOUTH DAILY 30 tablet 0    SPIRIVA RESPIMAT 2 5 MCG/ACT AERS inhaler inhale 2 puffs by mouth and INTO THE LUNGS once daily 4 g 0  SYMBICORT 80-4 5 MCG/ACT inhaler inhale 2 puffs by mouth twice a day Rinse mouth after use 10 2 g 0    sodium chloride 1 g tablet Take 1 tablet (1 g total) by mouth daily for 90 days 90 tablet 2     No current facility-administered medications for this visit  No Known Allergies   Immunizations:     Immunization History   Administered Date(s) Administered    Influenza Split High Dose Preservative Free IM 02/22/2018    Influenza, high dose seasonal 0 5 mL 10/03/2019    Pneumococcal Conjugate 13-Valent 05/17/2017    Pneumococcal Polysaccharide PPV23 10/03/2019      Health Maintenance:         Topic Date Due    CRC Screening: Colonoscopy  05/09/2020    Hepatitis C Screening  Completed         Topic Date Due    DTaP,Tdap,and Td Vaccines (1 - Tdap) 01/05/1973      Medicare Health Risk Assessment:     /80 (BP Location: Left arm, Patient Position: Sitting, Cuff Size: Large)   Pulse (!) 51   Ht 5' 11" (1 803 m)   Wt 113 kg (249 lb)   SpO2 99%   BMI 34 73 kg/m²      Carole Cochran is here for his Subsequent Wellness visit  Health Risk Assessment:   Patient rates overall health as good  Patient feels that their physical health rating is slightly better  Eyesight was rated as same  Hearing was rated as same  Patient feels that their emotional and mental health rating is same  Pain experienced in the last 7 days has been a lot  Patient's pain rating has been 10/10  Patient states that he has experienced no weight loss or gain in last 6 months  Depression Screening:   PHQ-2 Score: 0      Fall Risk Screening: In the past year, patient has experienced: no history of falling in past year      Home Safety:  Patient does not have trouble with stairs inside or outside of their home  Patient has working smoke alarms and has no working carbon monoxide detector  Home safety hazards include: none  Nutrition:   Current diet is Regular       Medications:   Patient is currently taking over-the-counter supplements  OTC medications include: see medication list  Patient is able to manage medications  Activities of Daily Living (ADLs)/Instrumental Activities of Daily Living (IADLs):   Walk and transfer into and out of bed and chair?: Yes  Dress and groom yourself?: Yes    Bathe or shower yourself?: Yes    Feed yourself? Yes  Do your laundry/housekeeping?: Yes  Manage your money, pay your bills and track your expenses?: Yes  Make your own meals?: Yes    Do your own shopping?: Yes    Previous Hospitalizations:   Any hospitalizations or ED visits within the last 12 months?: Yes    How many hospitalizations have you had in the last year?: 1-2    Advance Care Planning:   Living will: Yes    Durable POA for healthcare: Yes    Advanced directive: Yes      PREVENTIVE SCREENINGS      Cardiovascular Screening:    General: History Lipid Disorder and Screening Current      Diabetes Screening:     General: Screening Current      Colorectal Cancer Screening:     General: Screening Current      Prostate Cancer Screening:    General: Risks and Benefits Discussed    Due for: PSA      Osteoporosis Screening:    General: Screening Not Indicated      Abdominal Aortic Aneurysm (AAA) Screening:    Risk factors include: age between 73-69 yo and tobacco use        Lung Cancer Screening:     General: Risks and Benefits Discussed      Hepatitis C Screening:    General: Screening Current      Preventive Screening Comments: Patient continues to smoke  He is due for lung cancer screening however a low-dose CT scan will not be ordered    Patient has a known thoracic ascending aortic aneurysm and therefore he will get a CTA of the chest       Cydney Jose,

## 2019-11-08 ENCOUNTER — TELEPHONE (OUTPATIENT)
Dept: FAMILY MEDICINE CLINIC | Facility: MEDICAL CENTER | Age: 69
End: 2019-11-08

## 2019-11-08 NOTE — TELEPHONE ENCOUNTER
Pt called to let Dr Lizeth Sesay know that some of the testing he needs to have done will not be covered by his insurance if he goes to [facility], so he will be going to [facility]  He will ask them to fax those results to Dr Lizeth Sesay  Gave pt our fax number      Routed to Dr Lizeth Sesay - Enid Renae

## 2019-11-12 ENCOUNTER — TELEPHONE (OUTPATIENT)
Dept: FAMILY MEDICINE CLINIC | Facility: MEDICAL CENTER | Age: 69
End: 2019-11-12

## 2019-11-13 ENCOUNTER — HOSPITAL ENCOUNTER (OUTPATIENT)
Dept: NON INVASIVE DIAGNOSTICS | Facility: CLINIC | Age: 69
Discharge: HOME | End: 2019-11-13
Payer: COMMERCIAL

## 2019-11-13 DIAGNOSIS — F17.210 CIGARETTE NICOTINE DEPENDENCE WITHOUT COMPLICATION: ICD-10-CM

## 2019-11-13 DIAGNOSIS — I65.23 BILATERAL CAROTID ARTERY STENOSIS: ICD-10-CM

## 2019-11-13 PROCEDURE — 93880 EXTRACRANIAL BILAT STUDY: CPT | Performed by: SURGERY

## 2019-11-13 PROCEDURE — 93880 EXTRACRANIAL BILAT STUDY: CPT

## 2019-11-20 ENCOUNTER — TELEPHONE (OUTPATIENT)
Dept: PULMONOLOGY | Facility: CLINIC | Age: 69
End: 2019-11-20

## 2019-11-20 ENCOUNTER — TELEPHONE (OUTPATIENT)
Dept: FAMILY MEDICINE CLINIC | Facility: MEDICAL CENTER | Age: 69
End: 2019-11-20

## 2019-11-20 NOTE — TELEPHONE ENCOUNTER
----- Message from Nicholas Herr DO sent at 11/19/2019  3:09 PM EST -----  He missed his appointment, was hoping to discuss his PFTs and treatment, Can you reschedule him

## 2019-11-20 NOTE — TELEPHONE ENCOUNTER
Pt just spoke with his insurance and the CTA needs to be done at [facility] also  Pt not sure how to proceed

## 2019-11-20 NOTE — TELEPHONE ENCOUNTER
I have called patient and left him a voicemail asking him to call me back to reschedule his follow up appointment

## 2019-11-21 NOTE — TELEPHONE ENCOUNTER
Left a detailed message and provided Kenia Negron with the phone number and to call us back if he has any issues with scheduling

## 2019-11-21 NOTE — TELEPHONE ENCOUNTER
He will need to call imaging at [facility] to schedule that  If he is unsure of the number please search it on the Internet and provide it to him

## 2019-11-27 DIAGNOSIS — J44.9 CHRONIC OBSTRUCTIVE PULMONARY DISEASE, UNSPECIFIED COPD TYPE (HCC): ICD-10-CM

## 2019-11-27 DIAGNOSIS — J44.9 COPD, SEVERE (HCC): Primary | ICD-10-CM

## 2019-11-27 RX ORDER — BUDESONIDE AND FORMOTEROL FUMARATE DIHYDRATE 160; 4.5 UG/1; UG/1
2 AEROSOL RESPIRATORY (INHALATION) 2 TIMES DAILY
Qty: 1 INHALER | Refills: 5 | Status: SHIPPED | OUTPATIENT
Start: 2019-11-27

## 2019-11-27 RX ORDER — DILTIAZEM HYDROCHLORIDE 60 MG/1
TABLET, FILM COATED ORAL
Qty: 10.2 G | Refills: 0 | OUTPATIENT
Start: 2019-11-27

## 2019-11-29 DIAGNOSIS — J44.9 CHRONIC OBSTRUCTIVE PULMONARY DISEASE, UNSPECIFIED COPD TYPE (HCC): ICD-10-CM

## 2019-12-02 RX ORDER — TIOTROPIUM BROMIDE INHALATION SPRAY 3.12 UG/1
SPRAY, METERED RESPIRATORY (INHALATION)
Qty: 4 G | Refills: 0 | Status: SHIPPED | OUTPATIENT
Start: 2019-12-02 | End: 2019-12-29 | Stop reason: SDUPTHER

## 2019-12-03 ENCOUNTER — OFFICE VISIT (OUTPATIENT)
Dept: NEUROLOGY | Facility: CLINIC | Age: 69
End: 2019-12-03
Payer: COMMERCIAL

## 2019-12-03 VITALS
SYSTOLIC BLOOD PRESSURE: 142 MMHG | WEIGHT: 245 LBS | BODY MASS INDEX: 34.3 KG/M2 | HEIGHT: 71 IN | DIASTOLIC BLOOD PRESSURE: 80 MMHG | HEART RATE: 62 BPM

## 2019-12-03 DIAGNOSIS — I48.91 ATRIAL FIBRILLATION, UNSPECIFIED TYPE (HCC): ICD-10-CM

## 2019-12-03 DIAGNOSIS — Z86.79 HISTORY OF CAROTID ARTERY STENOSIS: ICD-10-CM

## 2019-12-03 DIAGNOSIS — I63.411 CEREBROVASCULAR ACCIDENT (CVA) DUE TO EMBOLISM OF RIGHT MIDDLE CEREBRAL ARTERY (HCC): Primary | ICD-10-CM

## 2019-12-03 DIAGNOSIS — I10 ESSENTIAL HYPERTENSION: ICD-10-CM

## 2019-12-03 DIAGNOSIS — J44.9 CHRONIC OBSTRUCTIVE PULMONARY DISEASE, UNSPECIFIED COPD TYPE (HCC): ICD-10-CM

## 2019-12-03 PROCEDURE — 99213 OFFICE O/P EST LOW 20 MIN: CPT | Performed by: PSYCHIATRY & NEUROLOGY

## 2019-12-03 NOTE — PROGRESS NOTES
Inga Garcia is a 81 y o  male who returns in follow-up today with a history of CVA    Assessment:  1  Cerebrovascular accident (CVA) due to embolism of right middle cerebral artery ([address] )    2  Atrial fibrillation, unspecified type ([address] )    3  History of carotid artery stenosis        Plan:  Continue Eliquis and Lipitor  Follow-up 6 months    Discussion:  Sabrina Byers has not had any new symptoms of stroke since here last   He did have a stent placed and has high-grade right carotid stenosis without event  He will continue with Eliquis for his atrial fibrillation and I will see him back in follow-up in 6 months      Subjective:    HPI  Sabrina Byers returns in follow-up today  He denies any new stroke symptoms  He denies any weakness, vision, speech or sensory changes since here last   Did undergo right carotid stenting without event    Follow-up carotid ultrasound demonstrated patent artery on the right and less than 50% stenosis on the left      Past Medical History:   Diagnosis Date    Acute metabolic encephalopathy     Arthritis     Atrial fibrillation (HCC)     COPD (chronic obstructive pulmonary disease) (HCC)     COPD (chronic obstructive pulmonary disease) (HCC)     CVA (cerebral vascular accident) ([address] )     Elevated glucose     Emphysema lung ([address] )     ETOH abuse     Hiatal hernia     Hyperlipidemia     Hypertension     LVH (left ventricular hypertrophy)     Myocardial infarction ([address] )     Obesity     Pneumonia 2/2/2017    Pneumonia     Pulmonary emphysema (HCC)     PVC (premature ventricular contraction)     Right knee pain     Shortness of breath     Stenosis of right internal carotid artery     Stroke ([address] )     Tobacco abuse 6/25/2019       Family History:  Family History   Problem Relation Age of Onset    Hypertension Mother     Emphysema Father     Hypertension Father     Arthritis Brother     Diabetes Brother     Other Other         Cardiac Disorder    Hypertension Other     Arrhythmia Neg Hx     Anuerysm Neg Hx     Asthma Neg Hx     Clotting disorder Neg Hx     Fainting Neg Hx        Past Surgical History:  Past Surgical History:   Procedure Laterality Date    ANKLE SURGERY Right 1978    CAROTID STENT  02/13/2018    COLONOSCOPY      HAND SURGERY Left 1964    HAND SURGERY Left 1964    fingers replaced    IR CAROTID STENT  9/25/2019    VT COLONOSCOPY FLX DX W/COLLJ SPEC WHEN PFRMD N/A 5/9/2017    Procedure: EGD AND COLONOSCOPY;  Surgeon: Stephy Elder MD;  [facility]; Service: Gastroenterology    VT TCAT IV STENT CRV CRTD ART EMBOLIC PROTECJ Right 0/20/8556    Procedure: ANGIOPLASTY ARTERY CAROTID W/ STENT;  Surgeon: Shaye Moore DO;  [facility];  Service: Vascular       Social History:   reports that he has been smoking cigarettes  He has a 45 00 pack-year smoking history  He has never used smokeless tobacco  He reports that he drinks alcohol  He reports that he does not use drugs  Allergies:  Patient has no known allergies        Current Outpatient Medications:     albuterol (PROVENTIL HFA,VENTOLIN HFA) 90 mcg/act inhaler, inhale 2 puffs by mouth AND INTO THE LUNGS EVERY 4 HORUS IF NEEDED FOR WHEEZING, Disp: 18 g, Rfl: 0    amLODIPine (NORVASC) 5 mg tablet, take 1 tablet by mouth once daily, Disp: 30 tablet, Rfl: 0    apixaban (ELIQUIS) 5 mg, Take 1 tablet (5 mg total) by mouth 2 (two) times a day (Patient taking differently: Take 5 mg by mouth daily ), Disp: 60 tablet, Rfl: 5    atorvastatin (LIPITOR) 20 mg tablet, take 1 tablet by mouth once daily, Disp: 90 tablet, Rfl: 3    budesonide-formoterol (SYMBICORT) 160-4 5 mcg/act inhaler, Inhale 2 puffs 2 (two) times a day Rinse mouth after use , Disp: 1 Inhaler, Rfl: 5    losartan (COZAAR) 100 MG tablet, Take 1 tablet (100 mg total) by mouth daily, Disp: 30 tablet, Rfl: 0    Melatonin 500 MCG TBDP, Take 1 tablet (500 mcg total) by mouth daily at bedtime as needed (insomnia), Disp: 90 tablet, Rfl: 3    metoprolol tartrate (LOPRESSOR) 25 mg tablet, Take 1 tablet (25 mg total) by mouth every 12 (twelve) hours, Disp: 60 tablet, Rfl: 0    nicotine (NICODERM CQ) 21 mg/24 hr TD 24 hr patch, Place 1 patch on the skin every 24 hours, Disp: , Rfl:     pantoprazole (PROTONIX) 40 mg tablet, TAKE 1 TABLET BY MOUTH DAILY, Disp: 30 tablet, Rfl: 0    SPIRIVA RESPIMAT 2 5 MCG/ACT AERS inhaler, inhale 2 puffs by mouth and INTO THE LUNGS once daily, Disp: 4 g, Rfl: 0    nitroglycerin (NITROSTAT) 0 4 mg SL tablet, Place 1 tablet (0 4 mg total) under the tongue every 5 (five) minutes as needed for chest pain (Patient not taking: Reported on 12/3/2019), Disp: 90 tablet, Rfl: 0    sodium chloride 1 g tablet, Take 1 tablet (1 g total) by mouth daily for 90 days, Disp: 90 tablet, Rfl: 2    I have reviewed the past medical, social and family history, current medications, allergies, vitals, review of systems and updated this information as appropriate today     Objective:    Vitals:  Blood pressure 142/80, pulse 62, height 5' 11" (1 803 m), weight 111 kg (245 lb)  Physical Exam    Neurological Exam  GENERAL:  Well-developed well-nourished man in no acute distress  HEENT/NECK: Head is atraumatic normocephalic, neck is supple  CARDIOVASCULAR:  No Carotid bruit  NEUROLOGIC:  Mental Status: Awake and alert without aphasia  Cranial Nerves: Extraocular movements are full  Face is symmetrical  Motor:  A subtle left upper extremity drift is noted on arm extension  Coordination:  Finger-to-nose testing is performed accurately  Gait is stable        ROS:    Review of Systems   Constitutional: Negative  Negative for appetite change and fever  HENT: Negative  Negative for hearing loss, tinnitus, trouble swallowing and voice change  Eyes: Positive for redness  Negative for photophobia and pain  Respiratory: Positive for shortness of breath  Cardiovascular: Negative  Negative for palpitations     Gastrointestinal: Negative  Negative for blood in stool, constipation, diarrhea, nausea and vomiting  Endocrine: Negative  Negative for cold intolerance and heat intolerance  Genitourinary: Negative  Negative for dysuria, frequency and urgency  Musculoskeletal: Negative for back pain, gait problem, myalgias and neck pain  Skin: Negative  Negative for rash  Neurological: Negative  Negative for dizziness, tremors, seizures, syncope, facial asymmetry, speech difficulty, weakness, light-headedness, numbness and headaches  Hematological: Negative  Does not bruise/bleed easily  Psychiatric/Behavioral: Negative  Negative for confusion, decreased concentration, hallucinations and sleep disturbance

## 2019-12-04 RX ORDER — ALBUTEROL SULFATE 90 UG/1
AEROSOL, METERED RESPIRATORY (INHALATION)
Qty: 18 G | Refills: 0 | Status: SHIPPED | OUTPATIENT
Start: 2019-12-04

## 2019-12-04 RX ORDER — LOSARTAN POTASSIUM 100 MG/1
TABLET ORAL
Qty: 90 TABLET | Refills: 0 | Status: SHIPPED | OUTPATIENT
Start: 2019-12-04

## 2019-12-05 ENCOUNTER — APPOINTMENT (OUTPATIENT)
Dept: LAB | Facility: HOSPITAL | Age: 69
End: 2019-12-05
Payer: COMMERCIAL

## 2019-12-05 DIAGNOSIS — Z12.5 SCREENING FOR PROSTATE CANCER: ICD-10-CM

## 2019-12-05 DIAGNOSIS — Z11.4 SCREENING FOR HIV (HUMAN IMMUNODEFICIENCY VIRUS): ICD-10-CM

## 2019-12-05 LAB — PSA SERPL-MCNC: 0.7 NG/ML (ref 0–4)

## 2019-12-05 PROCEDURE — 87389 HIV-1 AG W/HIV-1&-2 AB AG IA: CPT

## 2019-12-05 PROCEDURE — G0103 PSA SCREENING: HCPCS

## 2019-12-05 PROCEDURE — 36415 COLL VENOUS BLD VENIPUNCTURE: CPT

## 2019-12-06 ENCOUNTER — TELEPHONE (OUTPATIENT)
Dept: FAMILY MEDICINE CLINIC | Facility: MEDICAL CENTER | Age: 69
End: 2019-12-06

## 2019-12-06 LAB — HIV 1+2 AB+HIV1 P24 AG SERPL QL IA: NORMAL

## 2019-12-06 NOTE — TELEPHONE ENCOUNTER
Zac from [facility] called to ask if referral was in for pt's CTA, which is scheduled for Tuesday  I told her I saw it was pending and would have Jaren Madsen call her on Monday to confirm that it would be completed in time for Tuesday appt      Routed to Jaren Madsen

## 2019-12-09 ENCOUNTER — TELEPHONE (OUTPATIENT)
Dept: FAMILY MEDICINE CLINIC | Facility: MEDICAL CENTER | Age: 69
End: 2019-12-09

## 2019-12-09 ENCOUNTER — APPOINTMENT (OUTPATIENT)
Dept: LAB | Facility: HOSPITAL | Age: 69
End: 2019-12-09
Payer: COMMERCIAL

## 2019-12-09 DIAGNOSIS — N28.9 RENAL INSUFFICIENCY: ICD-10-CM

## 2019-12-09 DIAGNOSIS — N28.9 RENAL INSUFFICIENCY: Primary | ICD-10-CM

## 2019-12-09 LAB
BUN SERPL-MCNC: 21 MG/DL (ref 5–25)
CREAT SERPL-MCNC: 1.01 MG/DL (ref 0.6–1.3)
GFR SERPL CREATININE-BSD FRML MDRD: 77 ML/MIN/1.73SQ M

## 2019-12-09 PROCEDURE — 36415 COLL VENOUS BLD VENIPUNCTURE: CPT

## 2019-12-09 PROCEDURE — 84520 ASSAY OF UREA NITROGEN: CPT

## 2019-12-09 PROCEDURE — 82565 ASSAY OF CREATININE: CPT

## 2019-12-09 NOTE — TELEPHONE ENCOUNTER
Patient is having CTA at [facility] in [place], [place] [id number]  I called for precert because his insurance is saying it has to be done there but request is now going for review  I will be faxing office notes as well as the NPI number for the facility  I spoke with the patient and asked him to cancel until he hears from our office  We need to call  or [phone] to give them pre cert info  I called [phone] to precert  [id number] 2/[id number] 2 are the CPT code and Dx codes used  We can check back with [facility] for updates

## 2019-12-09 NOTE — TELEPHONE ENCOUNTER
[facility] called, [phone] pt scheduled for ct scan tomorrow, but they need a recent BUN/Creat, one from Sept too old  Also we'll need to let the pt know he'll have to reschedule

## 2019-12-09 NOTE — TELEPHONE ENCOUNTER
lmcb for pt to call back so we can explain more in depth, also I left [facility] know that Dr Keyshawn Carbajal ordered the the labs

## 2019-12-12 ENCOUNTER — TELEPHONE (OUTPATIENT)
Dept: FAMILY MEDICINE CLINIC | Facility: MEDICAL CENTER | Age: 69
End: 2019-12-12

## 2019-12-12 ENCOUNTER — TELEPHONE (OUTPATIENT)
Dept: CARDIOLOGY CLINIC | Facility: MEDICAL CENTER | Age: 69
End: 2019-12-12

## 2019-12-12 DIAGNOSIS — I10 ESSENTIAL HYPERTENSION: ICD-10-CM

## 2019-12-12 DIAGNOSIS — K21.9 GASTROESOPHAGEAL REFLUX DISEASE, ESOPHAGITIS PRESENCE NOT SPECIFIED: ICD-10-CM

## 2019-12-12 NOTE — TELEPHONE ENCOUNTER
Pt has some confusion in regards to his meds, he thinks he's missing 3  Pt has his 2 inhalers, but thinks he's missing 1 or 2

## 2019-12-12 NOTE — TELEPHONE ENCOUNTER
Pt called re: his "medication mix-up "  He tried to get an appt to see cardiology, but they can't see him until January  Pt is going to [place] and won't be back until February  Is there anything Dr Keyshawn Carbajal can do?   Please advise

## 2019-12-12 NOTE — TELEPHONE ENCOUNTER
Patient states he does not have of these medications  Refill or reply with new directions concerning these meds

## 2019-12-12 NOTE — TELEPHONE ENCOUNTER
WENDI: Sorry I placed the refill request for the Amlodipine and Pantoprazole when I sent you the other task  Forgot to mention that in the message  Note he also had stopped the Eliquis a month ago because he ran out  I did inform him the pharmacy had plenty of refills for that medication  He says he does recall a text from the pharmacy but never addressed it  I have asked him to go to the pharmacy to see which meds they have there for him  As far as cardiology seeing him sooner I advised he call them back

## 2019-12-12 NOTE — TELEPHONE ENCOUNTER
Misa he is out of these 2 medications and they are on his list   New task forwarded to Dr Chelsi Chaudhry to address

## 2019-12-12 NOTE — TELEPHONE ENCOUNTER
----- Message from Amirah Tate DO sent at 12/12/2019 12:32 PM EST -----  CT scan reviewed  Aneurysm persists  It is mild  Will continue with regular yearly monitoring  Moderate COPD  Continue to follow with pulmonology  Moderate hiatal hernia  I can have patient see Gastroenterology for evaluation of this

## 2019-12-12 NOTE — TELEPHONE ENCOUNTER
Please review the entire message  Patient is on multiple medications  There is no mention of what medication he is missing

## 2019-12-12 NOTE — TELEPHONE ENCOUNTER
Patient called the [place] office to he does not know what medication he suppose to be taking  I tried to clarify his medication list  He did not know any of the medicatios names  Advise patient he should get a appt with us asap but he hung up the phone       Please advise

## 2019-12-13 RX ORDER — PANTOPRAZOLE SODIUM 40 MG/1
40 TABLET, DELAYED RELEASE ORAL DAILY
Qty: 30 TABLET | Refills: 2 | Status: SHIPPED | OUTPATIENT
Start: 2019-12-13

## 2019-12-13 RX ORDER — AMLODIPINE BESYLATE 5 MG/1
5 TABLET ORAL DAILY
Qty: 30 TABLET | Refills: 2 | Status: SHIPPED | OUTPATIENT
Start: 2019-12-13

## 2019-12-17 NOTE — TELEPHONE ENCOUNTER
I spoke to him last week  It's OK to refill the meds on his list, including apixaban, but no the inhalers-those should be by his PCP or pulmonary

## 2019-12-27 DIAGNOSIS — J44.9 CHRONIC OBSTRUCTIVE PULMONARY DISEASE, UNSPECIFIED COPD TYPE (HCC): ICD-10-CM

## 2019-12-27 RX ORDER — DILTIAZEM HYDROCHLORIDE 60 MG/1
TABLET, FILM COATED ORAL
Qty: 10.2 G | Refills: 0 | OUTPATIENT
Start: 2019-12-27

## 2019-12-29 DIAGNOSIS — J44.9 CHRONIC OBSTRUCTIVE PULMONARY DISEASE, UNSPECIFIED COPD TYPE (HCC): ICD-10-CM

## 2019-12-30 RX ORDER — TIOTROPIUM BROMIDE INHALATION SPRAY 3.12 UG/1
SPRAY, METERED RESPIRATORY (INHALATION)
Qty: 4 G | Refills: 0 | Status: SHIPPED | OUTPATIENT
Start: 2019-12-30 | End: 2020-01-28

## 2020-01-10 ENCOUNTER — TELEPHONE (OUTPATIENT)
Dept: VASCULAR SURGERY | Facility: CLINIC | Age: 70
End: 2020-01-10

## 2020-01-10 NOTE — TELEPHONE ENCOUNTER
Scheduling Instructions   3mo f/u s/p TCAR CV duplex 02/17/2020     Called pt to schedule - unable to leave message vm full

## 2020-01-28 DIAGNOSIS — J44.9 CHRONIC OBSTRUCTIVE PULMONARY DISEASE, UNSPECIFIED COPD TYPE (HCC): ICD-10-CM

## 2020-01-28 RX ORDER — TIOTROPIUM BROMIDE INHALATION SPRAY 3.12 UG/1
SPRAY, METERED RESPIRATORY (INHALATION)
Qty: 4 G | Refills: 0 | Status: SHIPPED | OUTPATIENT
Start: 2020-01-28

## 2020-02-17 ENCOUNTER — TELEPHONE (OUTPATIENT)
Dept: VASCULAR SURGERY | Facility: CLINIC | Age: 70
End: 2020-02-17

## 2020-02-17 NOTE — TELEPHONE ENCOUNTER
?   ?   ?   ?   ?   ? Kate Mass 2/17/2020 9:32 AM   [facility] [place] Look  ? No show for his carotid today 1st post op s/p tcar L  Just Spoke with Darrick Lassiter visiting brother  Does not know when hell be back and he currently has no insurance     Declined to reschedule

## 2020-03-04 DIAGNOSIS — J44.9 COPD, SEVERE (HCC): ICD-10-CM

## 2020-03-04 RX ORDER — BUDESONIDE AND FORMOTEROL FUMARATE DIHYDRATE 160; 4.5 UG/1; UG/1
2 AEROSOL RESPIRATORY (INHALATION) 2 TIMES DAILY
Qty: 1 INHALER | Refills: 2 | Status: SHIPPED | OUTPATIENT
Start: 2020-03-04

## 2020-03-04 NOTE — TELEPHONE ENCOUNTER
Per patient's insurance Symbicort is covered under pt's insurance plan but the script needs to state "Brand necessary"

## 2020-05-28 ENCOUNTER — TELEPHONE (OUTPATIENT)
Dept: NEUROLOGY | Facility: CLINIC | Age: 70
End: 2020-05-28

## 2020-08-06 DIAGNOSIS — I65.23 CAROTID STENOSIS, BILATERAL: Primary | ICD-10-CM

## 2020-08-11 ENCOUNTER — TELEPHONE (OUTPATIENT)
Dept: ADMINISTRATIVE | Facility: HOSPITAL | Age: 70
End: 2020-08-11

## 2021-03-10 ENCOUNTER — TELEPHONE (OUTPATIENT)
Dept: VASCULAR SURGERY | Facility: CLINIC | Age: 71
End: 2021-03-10

## 2022-08-05 NOTE — TELEPHONE ENCOUNTER
Patient is aware and understands instructions  REVIEW OF SYSTEMS:    CONSTITUTIONAL: + weakness, fevers or chills  EYES/ENT: No visual changes;  No vertigo or throat pain   NECK: No pain or stiffness  RESPIRATORY: No cough, wheezing, hemoptysis; No shortness of breath  CARDIOVASCULAR: No chest pain or palpitations  GASTROINTESTINAL: No abdominal or epigastric pain, vomiting, or hematemesis; No diarrhea or constipation. No melena or hematochezia. +Nausea  GENITOURINARY: No dysuria, No hematuria + Urinary Frequency (baseline due to medications)  NEUROLOGICAL: No numbness or weakness  SKIN: No itching, burning, rashes, or lesions   All other review of systems is negative unless indicated above.

## 2023-03-29 NOTE — PATIENT INSTRUCTIONS
4 Eyes Admission Assessment     I agree as the admission nurse that 2 RN's have performed a thorough Head to Toe Skin Assessment on the patient. ALL assessment sites listed below have been assessed on admission. Areas assessed by both nurses:   [x]   Head, Face, and Ears   [x]   Shoulders, Back, and Chest  [x]   Arms, Elbows, and Hands   [x]   Coccyx, Sacrum, and Ischium  [x]   Legs, Feet, and Heels    Eczematous rash on bilateral Upper extremities. Does the Patient have Skin Breakdown?   No         Aleksey Prevention initiated:  No   Wound Care Orders initiated:  No      C nurse consulted for Pressure Injury (Stage 3,4, Unstageable, DTI, NWPT, and Complex wounds) or Aleksey score 18 or lower:  No      Nurse 1 eSignature: Electronically signed by Joanne Cat RN on 3/29/23 at 2:18 AM EDT    **SHARE this note so that the co-signing nurse is able to place an eSignature**    Nurse 2 eSignature: Electronically signed by Erwin Pro RN on 3/29/23 at 2:35 AM EDT PT and OT at bedside to see pt. Pt given discharge instructions with verbalized understanding. Awaiting for meds to beds to be done. Pt agreeable to discharge. All documented belongings with pt. Will continue to monitor. Pt received discharge orders, left splint placed on patient. Will continue to monitor. Pt to MRI at this time. Pt was discharged at this time, pt has all belongings with pt.   Pt's IV was removed and taken to car via wheelchair with Rn. S/P RIGHT carotid stent (TCAR)    -doing well; no complaints  -incision healed; R groin - intact      Plan:  -advance activity as tolerated  -work on quitting smoking   -check carotid doppler 6 weeks post-op (due 11/7/19)  -continue with Eliquis 5 twice a day, Plavix 75 and Lipitor 20   -Follow up with Dr Alonso Pillai in 3 months  -smoking cessation discussed  -Recommend avoiding naproxen on blood thinners and antiplatelet agents  Patient reports that he has severe shoulder pain and needs the naproxen  Patient educated on increased risk of bleeding  Symptoms of stroke:  - Unable to speak or understand speech  - Unable to move one side of the body (arm or leg)  - Visual changes  - Call 911 for any symptoms of stroke    Healthy Lifestyle changes  - Work on quitting smoking  - Stay active with regular activity  - Maintain a healthy weight  - Eat low fat, low cholesterol foods, low-fat dairy; recommend diet high in high in fruits and vegetables     - Maintain good blood pressure before hitting the ground by a family member. MRI brain= No intracranial hemorrhage, mass or acute infarction 2. Progression of mild to moderate cerebral white matter disease since 2018 most compatible with chronic small vessel ischemia 3. Chronic right otomastoiditis and small left mastoid effusion. There is new, nonspecific generalized thickening of the adenoid tissue, which can be seen with allergies. x-ray Lft hand (-)  Family / Caregiver Present: No  Referring Practitioner: Dr. Giselle Scott  Diagnosis: Vertigo  Subjective  Subjective: Pt in bed upon entry. Pt agreeable to activity. Pain: Lft wrist at rest o/10, with movement 10/10 (pt fitted with wrist brace)     Social/Functional History  Social/Functional History  Lives With:  (grandson, daughter, 3year old daughter \"it's a revolving restaurant\")  Type of Home: House  Home Layout: One level  Home Access: Stairs to enter with rails  Entrance Stairs - Number of Steps: 2  Entrance Stairs - Rails: Right  Bathroom Shower/Tub: Walk-in shower  Bathroom Toilet: Standard  Bathroom Equipment: Grab bars in shower, Grab bars around toilet  Has the patient had two or more falls in the past year or any fall with injury in the past year?: No  ADL Assistance: Independent  Homemaking Assistance: Independent  Ambulation Assistance: Independent  Transfer Assistance: Independent  Active : Yes  Occupation: Full time employment  Type of Occupation: Transport  Leisure & Hobbies: Taking care of 2 yr old daughter       Objective      Safety Devices  Type of Devices: Chair alarm in place; Left in chair;Nurse notified;Call light within reach  Balance  Sitting: Intact  Standing: Intact  Toilet Transfers  Toilet - Technique: Ambulating  Equipment Used: Standard toilet  Toilet Transfer: Supervision  AROM: Within functional limits (Left hand pain with movement of IF and thumb)  Strength:  Within functional limits  Coordination: Within functional limits  ADL  Grooming: directions, denies dizziness. Stairs/Curb  Stairs?: Yes  Stairs  # Steps : 4  Rails: None  Assistance: Supervision  Comment: Reciprocal, rec use of rail when feeling dizziness, denies dizziness this session. AM-PAC Score  AM-PAC Inpatient Mobility Raw Score : 23 (03/29/23 1520)  AM-PAC Inpatient T-Scale Score : 56.93 (03/29/23 1520)  Mobility Inpatient CMS 0-100% Score: 11.2 (03/29/23 1520)  Mobility Inpatient CMS G-Code Modifier : CI (03/29/23 1520)        Education  Patient Education  Education Given To: Patient  Education Provided: Role of Therapy;Plan of Care; Fall Prevention Strategies  Education Method: Verbal  Barriers to Learning: None  Education Outcome: Demonstrated understanding;Verbalized understanding      Therapy Time   Individual Concurrent Group Co-treatment   Time In 3218         Time Out 1502         Minutes 40             Timed Code Treatment Minutes:25    Total Treatment Minutes:40    Earline Look PT, DPT, NCS, CSRS present. Symptoms are suspected to be due to to chronic mastoid disease at this time. - Meclizine 12.5 mg daily PRN  - Ondansetron 4mg Q8H PRN  - Neurochecks Q4H  - PT for education on eppley maneuvers  - ENT consult requested     2. Leukocytosis  - Mild elevation in WBC on admission. Patient has chronic dry cough and is active smoker, low suspicion for lower respiratory tract infection.  - Continue to monitor     3. L hand pain  - Suspected carpal tunnel due to traumatic injury to palmar aspect of hand  - Xray of L hand shows no acute osseous abnormality.  - OT to provide splint  - Recommended outpatient F/U      Chronic Conditions    1.  Essential HTN  - Restart home triamterene-HCTZ 37.5mg daily     2. Eczema  -follows with dermatology outpatient        Code Status: full  FEN: normal  PPX:   DISPO: home    Aleena Mejía M3  03/29/23  11:13 AM    This patient has been staffed and discussed with Guido Liz MD.

## (undated) DEVICE — SUT MONOCRYL 4-0 PS-2 18 IN Y496G

## (undated) DEVICE — SYRINGE 20ML LL

## (undated) DEVICE — FLUID MANAGEMENT KIT - IR

## (undated) DEVICE — SUT SILK 2-0 18 IN A185H

## (undated) DEVICE — PETRI DISH STERILE

## (undated) DEVICE — PAD GROUNDING ADULT

## (undated) DEVICE — SET EXT 20IN IV LS SLIDE CLAMP LF NON DEHP

## (undated) DEVICE — ELECTRODE BLADE MOD  E-Z CLEAN 6.5IN -0014M

## (undated) DEVICE — PROVE COVER: Brand: UNBRANDED

## (undated) DEVICE — RADIFOCUS TORQUE DEVICE MULTI-TORQUE VISE: Brand: RADIFOCUS TORQUE DEVICE

## (undated) DEVICE — GUIDEWIRE THRUWAY 0.014 IN 130CM SHORT STR

## (undated) DEVICE — 1200CC GUARDIAN II: Brand: GUARDIAN

## (undated) DEVICE — BETHL CAROTID ENDARTERECTOMY: Brand: CARDINAL HEALTH

## (undated) DEVICE — TELFA NON-ADHERENT ABSORBENT DRESSING: Brand: TELFA

## (undated) DEVICE — ELECTRODE BLADE E-Z CLEAN 4IN -0014A

## (undated) DEVICE — INTENDED FOR TISSUE SEPARATION, AND OTHER PROCEDURES THAT REQUIRE A SHARP SURGICAL BLADE TO PUNCTURE OR CUT.: Brand: BARD-PARKER SAFETY BLADES SIZE 15, STERILE

## (undated) DEVICE — COVER PROBE INTRAOPERATIVE 6 X 96 IN

## (undated) DEVICE — 3M™ TEGADERM™ TRANSPARENT FILM DRESSING FRAME STYLE, 1626W, 4 IN X 4-3/4 IN (10 CM X 12 CM), 50/CT 4CT/CASE: Brand: 3M™ TEGADERM™

## (undated) DEVICE — ANGLED-TIP ARTERIAL SHEATH CONFIGURATION: Brand: ENROUTE TRANSCAROTID NEUROPROTECTION SYSTEM

## (undated) DEVICE — PROXIMATE PLUS MD MULTI-DIRECTIONAL RELEASE SKIN STAPLERS CONTAINS 35 STAINLESS STEEL STAPLES APPROXIMATE CLOSED DIMENSIONS: 6.9MM X 3.9MM WIDE: Brand: PROXIMATE

## (undated) DEVICE — BAG DECANTER

## (undated) DEVICE — THYROID SHEET: Brand: CONVERTORS

## (undated) DEVICE — INFLATION DEVICE BASIX 30ATM

## (undated) DEVICE — SUT MONOCRYL 3-0 SH 27 IN Y416H

## (undated) DEVICE — ADHESIVE SKN CLSR HISTOACRYL FLEX 0.5ML LF

## (undated) DEVICE — SUT SILK 0 CT-1 30 IN 424H

## (undated) DEVICE — GLOVE INDICATOR PI UNDERGLOVE SZ 8 BLUE

## (undated) DEVICE — STOPCOCK 4 WAY LL HIGH PRESSURE

## (undated) DEVICE — 3M™ IOBAN™ 2 ANTIMICROBIAL INCISE DRAPE 6640EZ: Brand: IOBAN™ 2

## (undated) DEVICE — STERILE ICS CARDIOVASCULAR PK: Brand: CARDINAL HEALTH

## (undated) DEVICE — Device

## (undated) DEVICE — SNAP KOVER: Brand: UNBRANDED

## (undated) DEVICE — INTRODUCER KIT MICO 4FR 21G X 7CM

## (undated) DEVICE — SUT PROLENE 5-0 BV-1 24 IN 9702H

## (undated) DEVICE — SUT POLYESTER TAPE D-G 8618-00

## (undated) DEVICE — NEEDLE 25G X 1 1/2

## (undated) DEVICE — CHLORAPREP HI-LITE 26ML ORANGE

## (undated) DEVICE — 3000CC GUARDIAN II: Brand: GUARDIAN

## (undated) DEVICE — INTENDED FOR TISSUE SEPARATION, AND OTHER PROCEDURES THAT REQUIRE A SHARP SURGICAL BLADE TO PUNCTURE OR CUT.: Brand: BARD-PARKER ® CARBON RIB-BACK BLADES

## (undated) DEVICE — AVIATOR PLUS .014 5.0X30 142CM: Brand: AVIATOR

## (undated) DEVICE — GLOVE SRG BIOGEL 7.5

## (undated) DEVICE — MICROPUNCTURE 501

## (undated) DEVICE — ADHESIVE SKIN HIGH VISCOSITY EXOFIN 1ML